# Patient Record
Sex: FEMALE | Race: WHITE | NOT HISPANIC OR LATINO | Employment: FULL TIME | URBAN - METROPOLITAN AREA
[De-identification: names, ages, dates, MRNs, and addresses within clinical notes are randomized per-mention and may not be internally consistent; named-entity substitution may affect disease eponyms.]

---

## 2018-01-30 ENCOUNTER — OFFICE VISIT CONVERTED (OUTPATIENT)
Dept: INTERNAL MEDICINE | Facility: CLINIC | Age: 48
End: 2018-01-30
Attending: INTERNAL MEDICINE

## 2018-07-09 ENCOUNTER — OFFICE VISIT CONVERTED (OUTPATIENT)
Dept: INTERNAL MEDICINE | Facility: CLINIC | Age: 48
End: 2018-07-09
Attending: INTERNAL MEDICINE

## 2019-02-21 ENCOUNTER — HOSPITAL ENCOUNTER (OUTPATIENT)
Dept: OTHER | Facility: HOSPITAL | Age: 49
Discharge: HOME | End: 2019-02-21
Attending: INTERNAL MEDICINE

## 2019-02-21 ENCOUNTER — OFFICE VISIT CONVERTED (OUTPATIENT)
Dept: INTERNAL MEDICINE | Facility: CLINIC | Age: 49
End: 2019-02-21
Attending: INTERNAL MEDICINE

## 2019-05-21 ENCOUNTER — OFFICE VISIT CONVERTED (OUTPATIENT)
Dept: INTERNAL MEDICINE | Facility: CLINIC | Age: 49
End: 2019-05-21
Attending: INTERNAL MEDICINE

## 2019-05-21 ENCOUNTER — CONVERSION ENCOUNTER (OUTPATIENT)
Dept: INTERNAL MEDICINE | Facility: CLINIC | Age: 49
End: 2019-05-21

## 2020-01-16 ENCOUNTER — HOSPITAL ENCOUNTER (OUTPATIENT)
Dept: URGENT CARE | Facility: CLINIC | Age: 50
Discharge: HOME | End: 2020-01-16
Attending: FAMILY MEDICINE

## 2020-01-19 LAB — BACTERIA SPEC AEROBE CULT: NORMAL

## 2020-01-24 ENCOUNTER — OFFICE VISIT CONVERTED (OUTPATIENT)
Dept: INTERNAL MEDICINE | Facility: CLINIC | Age: 50
End: 2020-01-24
Attending: INTERNAL MEDICINE

## 2020-01-24 ENCOUNTER — HOSPITAL ENCOUNTER (OUTPATIENT)
Dept: OTHER | Facility: HOSPITAL | Age: 50
Discharge: HOME | End: 2020-01-24
Attending: INTERNAL MEDICINE

## 2020-01-24 ENCOUNTER — CONVERSION ENCOUNTER (OUTPATIENT)
Dept: INTERNAL MEDICINE | Facility: CLINIC | Age: 50
End: 2020-01-24

## 2020-01-24 LAB
ALBUMIN SERPL-MCNC: 4.1 G/DL (ref 3.5–5)
ALBUMIN/GLOB SERPL: 1.3 {RATIO} (ref 1.4–2.6)
ALP SERPL-CCNC: 62 U/L (ref 42–98)
ALT SERPL-CCNC: 36 U/L (ref 10–40)
ANION GAP SERPL CALC-SCNC: 19 MMOL/L (ref 8–19)
AST SERPL-CCNC: 23 U/L (ref 15–50)
BASOPHILS # BLD AUTO: 0.04 10*3/UL (ref 0–0.2)
BASOPHILS NFR BLD AUTO: 0.4 % (ref 0–3)
BILIRUB SERPL-MCNC: <0.15 MG/DL (ref 0.2–1.3)
BUN SERPL-MCNC: 24 MG/DL (ref 5–25)
BUN/CREAT SERPL: 32 {RATIO} (ref 6–20)
CALCIUM SERPL-MCNC: 10 MG/DL (ref 8.7–10.4)
CHLORIDE SERPL-SCNC: 95 MMOL/L (ref 99–111)
CHOLEST SERPL-MCNC: 250 MG/DL (ref 107–200)
CHOLEST/HDLC SERPL: 4.5 {RATIO} (ref 3–6)
CONV ABS IMM GRAN: 0.03 10*3/UL (ref 0–0.2)
CONV CO2: 21 MMOL/L (ref 22–32)
CONV IMMATURE GRAN: 0.3 % (ref 0–1.8)
CONV TOTAL PROTEIN: 7.3 G/DL (ref 6.3–8.2)
CREAT UR-MCNC: 0.76 MG/DL (ref 0.5–0.9)
DEPRECATED RDW RBC AUTO: 42 FL (ref 36.4–46.3)
EOSINOPHIL # BLD AUTO: 0.16 10*3/UL (ref 0–0.7)
EOSINOPHIL # BLD AUTO: 1.6 % (ref 0–7)
ERYTHROCYTE [DISTWIDTH] IN BLOOD BY AUTOMATED COUNT: 12.1 % (ref 11.7–14.4)
GFR SERPLBLD BASED ON 1.73 SQ M-ARVRAT: >60 ML/MIN/{1.73_M2}
GLOBULIN UR ELPH-MCNC: 3.2 G/DL (ref 2–3.5)
GLUCOSE SERPL-MCNC: 97 MG/DL (ref 65–99)
HCT VFR BLD AUTO: 41.1 % (ref 37–47)
HDLC SERPL-MCNC: 55 MG/DL (ref 40–60)
HGB BLD-MCNC: 14.1 G/DL (ref 12–16)
LDLC SERPL CALC-MCNC: 142 MG/DL (ref 70–100)
LYMPHOCYTES # BLD AUTO: 3.6 10*3/UL (ref 1–5)
LYMPHOCYTES NFR BLD AUTO: 35.9 % (ref 20–45)
MCH RBC QN AUTO: 32.3 PG (ref 27–31)
MCHC RBC AUTO-ENTMCNC: 34.3 G/DL (ref 33–37)
MCV RBC AUTO: 94.1 FL (ref 81–99)
MONOCYTES # BLD AUTO: 0.71 10*3/UL (ref 0.2–1.2)
MONOCYTES NFR BLD AUTO: 7.1 % (ref 3–10)
NEUTROPHILS # BLD AUTO: 5.48 10*3/UL (ref 2–8)
NEUTROPHILS NFR BLD AUTO: 54.7 % (ref 30–85)
NRBC CBCN: 0 % (ref 0–0.7)
OSMOLALITY SERPL CALC.SUM OF ELEC: 276 MOSM/KG (ref 273–304)
PLATELET # BLD AUTO: 349 10*3/UL (ref 130–400)
PMV BLD AUTO: 10.8 FL (ref 9.4–12.3)
POTASSIUM SERPL-SCNC: 4.2 MMOL/L (ref 3.5–5.3)
RBC # BLD AUTO: 4.37 10*6/UL (ref 4.2–5.4)
SODIUM SERPL-SCNC: 131 MMOL/L (ref 135–147)
T4 FREE SERPL-MCNC: 1.3 NG/DL (ref 0.9–1.8)
TRIGL SERPL-MCNC: 504 MG/DL (ref 40–150)
TSH SERPL-ACNC: 4.81 M[IU]/L (ref 0.27–4.2)
WBC # BLD AUTO: 10.02 10*3/UL (ref 4.8–10.8)

## 2020-02-10 PROBLEM — G47.30 SLEEP APNEA: Status: ACTIVE | Noted: 2020-02-10

## 2020-02-10 PROBLEM — I10 ESSENTIAL HYPERTENSION: Status: ACTIVE | Noted: 2020-02-10

## 2020-02-10 PROBLEM — E03.9 HYPOTHYROID: Status: ACTIVE | Noted: 2020-02-10

## 2020-02-10 PROBLEM — R53.82 CHRONIC FATIGUE: Status: ACTIVE | Noted: 2020-02-10

## 2020-02-10 PROBLEM — F41.9 ANXIETY: Status: ACTIVE | Noted: 2020-02-10

## 2020-02-13 DIAGNOSIS — G47.30 SLEEP APNEA, UNSPECIFIED TYPE: ICD-10-CM

## 2020-02-13 DIAGNOSIS — E66.9 OBESITY, CLASS II, BMI 35-39.9: Primary | ICD-10-CM

## 2020-02-13 DIAGNOSIS — R53.82 CHRONIC FATIGUE: ICD-10-CM

## 2020-02-13 DIAGNOSIS — F41.9 ANXIETY: ICD-10-CM

## 2020-02-13 DIAGNOSIS — I10 ESSENTIAL HYPERTENSION: ICD-10-CM

## 2020-02-13 PROBLEM — E66.812 OBESITY, CLASS II, BMI 35-39.9: Status: ACTIVE | Noted: 2020-02-13

## 2020-02-17 ENCOUNTER — LAB (OUTPATIENT)
Dept: LAB | Facility: HOSPITAL | Age: 50
End: 2020-02-17

## 2020-02-17 ENCOUNTER — CONSULT (OUTPATIENT)
Dept: BARIATRICS/WEIGHT MGMT | Facility: CLINIC | Age: 50
End: 2020-02-17

## 2020-02-17 VITALS
HEART RATE: 87 BPM | RESPIRATION RATE: 18 BRPM | SYSTOLIC BLOOD PRESSURE: 124 MMHG | WEIGHT: 213 LBS | DIASTOLIC BLOOD PRESSURE: 74 MMHG | BODY MASS INDEX: 37.74 KG/M2 | TEMPERATURE: 98.1 F | HEIGHT: 63 IN

## 2020-02-17 DIAGNOSIS — R53.82 CHRONIC FATIGUE: ICD-10-CM

## 2020-02-17 DIAGNOSIS — I10 ESSENTIAL HYPERTENSION: ICD-10-CM

## 2020-02-17 DIAGNOSIS — E66.9 OBESITY, CLASS II, BMI 35-39.9: ICD-10-CM

## 2020-02-17 DIAGNOSIS — E66.9 OBESITY, CLASS II, BMI 35-39.9: Primary | ICD-10-CM

## 2020-02-17 DIAGNOSIS — R10.13 DYSPEPSIA: ICD-10-CM

## 2020-02-17 DIAGNOSIS — E03.9 HYPOTHYROIDISM, UNSPECIFIED TYPE: ICD-10-CM

## 2020-02-17 DIAGNOSIS — Z01.818 PREOPERATIVE TESTING: ICD-10-CM

## 2020-02-17 DIAGNOSIS — G47.30 SLEEP APNEA, UNSPECIFIED TYPE: ICD-10-CM

## 2020-02-17 DIAGNOSIS — F41.9 ANXIETY: ICD-10-CM

## 2020-02-17 LAB
ALBUMIN SERPL-MCNC: 4 G/DL (ref 3.5–5.2)
ALBUMIN/GLOB SERPL: 1.5 G/DL
ALP SERPL-CCNC: 47 U/L (ref 39–117)
ALT SERPL W P-5'-P-CCNC: 18 U/L (ref 1–33)
ANION GAP SERPL CALCULATED.3IONS-SCNC: 13 MMOL/L (ref 5–15)
AST SERPL-CCNC: 17 U/L (ref 1–32)
BASOPHILS # BLD AUTO: 0.04 10*3/MM3 (ref 0–0.2)
BASOPHILS NFR BLD AUTO: 0.6 % (ref 0–1.5)
BILIRUB SERPL-MCNC: 0.2 MG/DL (ref 0.2–1.2)
BUN BLD-MCNC: 16 MG/DL (ref 6–20)
BUN/CREAT SERPL: 22.5 (ref 7–25)
CALCIUM SPEC-SCNC: 9.5 MG/DL (ref 8.6–10.5)
CHLORIDE SERPL-SCNC: 101 MMOL/L (ref 98–107)
CHOLEST SERPL-MCNC: 191 MG/DL (ref 0–200)
CO2 SERPL-SCNC: 22 MMOL/L (ref 22–29)
CREAT BLD-MCNC: 0.71 MG/DL (ref 0.57–1)
DEPRECATED RDW RBC AUTO: 41.4 FL (ref 37–54)
EOSINOPHIL # BLD AUTO: 0.13 10*3/MM3 (ref 0–0.4)
EOSINOPHIL NFR BLD AUTO: 1.9 % (ref 0.3–6.2)
ERYTHROCYTE [DISTWIDTH] IN BLOOD BY AUTOMATED COUNT: 12.2 % (ref 12.3–15.4)
GFR SERPL CREATININE-BSD FRML MDRD: 87 ML/MIN/1.73
GLOBULIN UR ELPH-MCNC: 2.7 GM/DL
GLUCOSE BLD-MCNC: 101 MG/DL (ref 65–99)
HBA1C MFR BLD: 6 % (ref 4.8–5.6)
HCT VFR BLD AUTO: 39.4 % (ref 34–46.6)
HDLC SERPL-MCNC: 71 MG/DL (ref 40–60)
HGB BLD-MCNC: 13.4 G/DL (ref 12–15.9)
IMM GRANULOCYTES # BLD AUTO: 0.02 10*3/MM3 (ref 0–0.05)
IMM GRANULOCYTES NFR BLD AUTO: 0.3 % (ref 0–0.5)
LDLC SERPL CALC-MCNC: 92 MG/DL (ref 0–100)
LDLC/HDLC SERPL: 1.29 {RATIO}
LYMPHOCYTES # BLD AUTO: 2.07 10*3/MM3 (ref 0.7–3.1)
LYMPHOCYTES NFR BLD AUTO: 30.9 % (ref 19.6–45.3)
MCH RBC QN AUTO: 31.8 PG (ref 26.6–33)
MCHC RBC AUTO-ENTMCNC: 34 G/DL (ref 31.5–35.7)
MCV RBC AUTO: 93.4 FL (ref 79–97)
MONOCYTES # BLD AUTO: 0.38 10*3/MM3 (ref 0.1–0.9)
MONOCYTES NFR BLD AUTO: 5.7 % (ref 5–12)
NEUTROPHILS # BLD AUTO: 4.06 10*3/MM3 (ref 1.7–7)
NEUTROPHILS NFR BLD AUTO: 60.6 % (ref 42.7–76)
NRBC BLD AUTO-RTO: 0 /100 WBC (ref 0–0.2)
PLATELET # BLD AUTO: 252 10*3/MM3 (ref 140–450)
PMV BLD AUTO: 10.5 FL (ref 6–12)
POTASSIUM BLD-SCNC: 4.4 MMOL/L (ref 3.5–5.2)
PROT SERPL-MCNC: 6.7 G/DL (ref 6–8.5)
RBC # BLD AUTO: 4.22 10*6/MM3 (ref 3.77–5.28)
SODIUM BLD-SCNC: 136 MMOL/L (ref 136–145)
TRIGL SERPL-MCNC: 141 MG/DL (ref 0–150)
TSH SERPL DL<=0.05 MIU/L-ACNC: 7.61 UIU/ML (ref 0.27–4.2)
VLDLC SERPL-MCNC: 28.2 MG/DL (ref 5–40)
WBC NRBC COR # BLD: 6.7 10*3/MM3 (ref 3.4–10.8)

## 2020-02-17 PROCEDURE — 84443 ASSAY THYROID STIM HORMONE: CPT

## 2020-02-17 PROCEDURE — 99205 OFFICE O/P NEW HI 60 MIN: CPT | Performed by: NURSE PRACTITIONER

## 2020-02-17 PROCEDURE — 83036 HEMOGLOBIN GLYCOSYLATED A1C: CPT

## 2020-02-17 PROCEDURE — 36415 COLL VENOUS BLD VENIPUNCTURE: CPT

## 2020-02-17 PROCEDURE — 80061 LIPID PANEL: CPT

## 2020-02-17 PROCEDURE — 80053 COMPREHEN METABOLIC PANEL: CPT

## 2020-02-17 PROCEDURE — 85025 COMPLETE CBC W/AUTO DIFF WBC: CPT

## 2020-02-17 RX ORDER — NORGESTIMATE AND ETHINYL ESTRADIOL 7DAYSX3 LO
1 KIT ORAL DAILY
COMMUNITY
Start: 2020-01-09 | End: 2020-07-06

## 2020-02-17 NOTE — PROGRESS NOTES
[facility] BARIATRIC SURGERY  [address] [facility] [address]  [facility] [address]  [phone]  [address] [id number] [phone]  Dept: [phone]  2020      Xuan Gasca.  [id number]  [id number]  1970  female      Chief Complaint of weight gain; unable to maintain weight loss    History of Present Illness:   Xuan is a 49 y.o. female who presents today for evaluation, education and consultation regarding weight loss surgery. The patient is interested in the sleeve gastrectomy.      Diet History:Xuan has been overweight for at least 20 years, has been 35 pounds or more overweight for at least 20 years, has been 100 pounds or more overweight for 20 or more years and started dieting at age 29.  The most weight Xuan lost was 40 pounds on exercise and low carb diet and maintained the weight loss for 9 months. Xuan describes her eating habits as skipping meals, not eating regularly, not cooking for herself and eating fast food due to fatigue associated with low energy and underactive thyroid. Xuan Gasca has tried Atkins, Nutrisystem, [place], Weight Watchers, reduced calorie and exercising among others with success of losing up to 40 pounds, but in each instance regained the weight.    See dietician documentation for complete history.    Bariatric Surgery Evaluation: The patient is being seen for an initial visit for bariatric surgery evaluation.     Bariatric Co-morbidities:  sleep apnea, hypertension and mental health disease    Patient Active Problem List   Diagnosis   • Chronic fatigue   • Essential hypertension   • Sleep apnea   • Anxiety   • Hypothyroid   • Obesity, Class II, BMI 35-39.9   • Preoperative testing       No past medical history on file.    Past Surgical History:   Procedure Laterality Date   • CARPAL TUNNEL RELEASE     •  SECTION  ,,   • HYSTERECTOMY         Allergies    Allergen Reactions   • Penicillins Other (See Comments)     unknown         Current Outpatient Medications:   •  amLODIPine (NORVASC) 5 MG tablet, Take 5 mg by mouth Daily., Disp: , Rfl:   •  citalopram (CeleXA) 40 MG tablet, Take 40 mg by mouth Daily., Disp: , Rfl:   •  levothyroxine (SYNTHROID, LEVOTHROID) 137 MCG tablet, Take 137 mcg by mouth Daily., Disp: , Rfl:   •  lisinopril-hydrochlorothiazide (PRINZIDE,ZESTORETIC) 20-12.5 MG per tablet, Take 1 tablet by mouth Daily., Disp: , Rfl:   •  norgestimate-ethinyl estradiol (ORTHO TRI-CYCLEN LO) 0.18/0.215/0.25 MG-25 MCG per tablet, Take 1 tablet by mouth Daily., Disp: , Rfl:   •  spironolactone (ALDACTONE) 100 MG tablet, Take 100 mg by mouth Daily., Disp: , Rfl:     Social History     Socioeconomic History   • Marital status:      Spouse name: Not on file   • Number of children: Not on file   • Years of education: Not on file   • Highest education level: Not on file   Tobacco Use   • Smoking status: Never Smoker   • Smokeless tobacco: Never Used   Substance and Sexual Activity   • Alcohol use: Never     Frequency: Never   • Drug use: Never       Family History   Problem Relation Age of Onset   • Diabetes Mother    • Hypertension Mother    • Hypertension Father          Review of Systems:  Review of Systems   Constitutional: Positive for fatigue. Negative for unexpected weight change.   HENT: Negative.    Respiratory: Negative.    Cardiovascular: Negative.    Gastrointestinal: Negative for constipation.   Endocrine: Negative.    Genitourinary: Negative.    Musculoskeletal: Negative for back pain.   Neurological: Negative.    Hematological: Negative.    Psychiatric/Behavioral: Negative.        Physical Exam:  Vital Signs:  Weight: 96.6 kg (213 lb)   Body mass index is 37.73 kg/m².  Temp: 98.1 °F (36.7 °C)   Heart Rate: 87   BP: 124/74     Physical Exam   Constitutional: She is oriented to person, place, and time. She appears well-developed and  well-nourished.   HENT:   Head: Normocephalic and atraumatic.   Neck: Normal range of motion.   Cardiovascular: Normal rate, regular rhythm and normal heart sounds.   Pulmonary/Chest: Effort normal and breath sounds normal. No respiratory distress. She has no wheezes.   Abdominal: Soft. Bowel sounds are normal. She exhibits no distension. There is no tenderness.   Musculoskeletal: She exhibits no edema or deformity.   Neurological: She is alert and oriented to person, place, and time.   Skin: Skin is warm and dry.   Psychiatric: She has a normal mood and affect. Her behavior is normal.   Nursing note and vitals reviewed.         Assessment:         Xuan Gasca is a 49 y.o. year old female with medically complicated severe obesity. Weight: 96.6 kg (213 lb), Body mass index is 37.73 kg/m². and weight related problems including sleep apnea, hypertension and mental health disease.    I explained in detail the procedures that we are performing.  All of those procedures can be performed laparoscopically but there is a chance to convert to open if any technical challenges or complications do occur.  Bariatric surgery is not cosmetic surgery but rather a tool to help a patient make a life-long commitment lifestyle changes including diet, exercise, behavior changes, and taking supplemental vitamins and minerals.    Due to the patient's BMI and co-morbidities they are at a high risk for surgery and will obtain the following:  The patient has been advised that a letter of medical support and a history and physical must be obtained from her primary care physician. A psychological evaluation will be arranged for this patient. CBC, CMP, FLP, TSH and HgbA1C will be drawn- reviewed in the office. Xuan Gasca will obtain a pre-operative CXR and EKG.       Xuan Gasca will be set up for a pre-operative diagnostic esophagogastroduodenoscopy with biopsy for evaluation. The risks and benefits of the procedure were discussed  with the patient in detail and all questions were answered.  Possibility of perforation, bleeding, aspiration, anoxic brain injury, respiratory and/or cardiac arrest and death were discussed.   She received handouts regarding, all questions were answered.     The risks, benefits, alternatives, and potential complications of all of the procedures were explained in detail including, but not limited to death, anesthesia and medication adverse effect/DVT, pulmonary embolism, trocar site/incisional hernia, wound infection, abdominal infection, bleeding, failure to lose weight or gain weight and change in body image, metabolic complications with calcium, thiamine, vitamin B12, folate, iron, and anemia.    The patient was advised to start a high protein, low fat and low carbohydrate diet. The patient was given individualized information by our dietician along with general group information and handouts.     The patient was given information regarding the ESTEE educational video. ESTEE is an internet based educational video which explains the surgical procedure and answers basic questions regarding the procedure. The patient was provided with instructions and a password to watch the video.    The patient was encouraged to start routine exercise including but not limited to 150 minutes per week. The patient received a resistance band along with a handout of exercises.     The consultation plan was reviewed with the patient.    The patient understands the surgical procedures and the different surgical options that are available.  She understands the lifestyle changes that would be required after surgery and has agreed to participate in a pre-operative and postoperative weight management program.  She also expressed understanding of possible risks, had several questions answered and desires to proceed.    I think she is a good candidate for this surgery, and is interested in a sleeve gastrectomy.    Encounter Diagnoses   Name  "Primary?   • Obesity, Class II, BMI 35-39.9 Yes   • Sleep apnea, unspecified type    • Essential hypertension    • Preoperative testing    • Anxiety    • Chronic fatigue    • Hypothyroidism, unspecified type        Plan:    Patient will have evaluations and follow up with bariatric dieticians and a psychologist before undergoing a multidisciplinary review of her candidacy.  We also discussed the weight loss requirement and rationale, and other program requirements.      Teresa Jaime, APRN  2020                                           Bariatric Nutrition Counseling Interview    Patient Name:  Xuan Gasca  YOB: 1970  Age:  49 y.o.  Sex:  female  MRN: [id number]  Date:  20    Procedure Considering:  Sleeve    Last Documented Height:    Ht Readings from Last 1 Encounters:   20 160 cm (63\")     Last Documented Weight:   Wt Readings from Last 1 Encounters:   20 96.6 kg (213 lb)      Body mass index is 37.73 kg/m².    Highest Weight:  220  Goal Weight: 125    History:  No past medical history on file.  Past Surgical History:   Procedure Laterality Date   • CARPAL TUNNEL RELEASE     •  SECTION  ,,   • HYSTERECTOMY  2004     Family History   Problem Relation Age of Onset   • Diabetes Mother    • Hypertension Mother    • Hypertension Father      Social History     Socioeconomic History   • Marital status:      Spouse name: Not on file   • Number of children: Not on file   • Years of education: Not on file   • Highest education level: Not on file   Tobacco Use   • Smoking status: Never Smoker   • Smokeless tobacco: Never Used   Substance and Sexual Activity   • Alcohol use: Never     Frequency: Never   • Drug use: Never     Additional Health Issues to Consider:  Hypertension, hypothyroid, sleep apnea per patient; elevated TSH and HgbA1c - refer to PCP    Weight History:  Weight gain as a result of an event or condition - pregnancy    Previous " Weight Loss Efforts:  Weight Watchers, The Gal[place]z diet, The [place] diet, Nutrisystem  Most Successful Weight Loss Effort:  Walter pugh diet and exercise - got down to 136lb from 176lb    Eating Habits: Eat large portions, Eat too fast  Eat three meals on most days?  Yes  Worst eating habit?  Eat large portions, Eat too fast    How often do you eat fast food? weekly    Do you exercise regularly? (at least 3 times each week)  No    Occupation:      Personal Goal After Procedure:  Improve sleep apnea, decrease medications   Personal Support:  family and coworkers    Assessment:  Program materials for successful weight loss before/after bariatric surgery were provided, reviewed, and discussed. The significance of taking in at least 70g of protein and 64 ounces of fluid was emphasized. The importance of routine exercise was discussed. Nutrition materials provided included a reduced calorie meal plan, protein sources, snack options, and diet guidelines post-surgery. Discussed personal habits and lifestyle behaviors that may influence diet efforts. She demonstrated a good comprehension of diet requirements and a commitment to work on personal challenges.  Patient was also provided a list of short-term goals to work towards prior to surgery. She appears to be an appropriate candidate for bariatric surgery.    Electronically signed by:  Manda Aviles RD  02/17/20 11:10 AM

## 2020-02-20 NOTE — PROGRESS NOTES
"Bariatric Nutrition Counseling Interview    Patient Name:  Xuan Gasca  YOB: 1970  Age:  49 y.o.  Sex:  female  MRN: [id number]  Date:  20    Procedure Considering:  Sleeve    Last Documented Height:    Ht Readings from Last 1 Encounters:   20 160 cm (63\")     Last Documented Weight:   Wt Readings from Last 1 Encounters:   20 96.6 kg (213 lb)      Body mass index is 37.73 kg/m².    Highest Weight:  220  Goal Weight: 125    History:  History reviewed. No pertinent past medical history.  Past Surgical History:   Procedure Laterality Date   • CARPAL TUNNEL RELEASE     •  SECTION  ,,   • HYSTERECTOMY       Family History   Problem Relation Age of Onset   • Diabetes Mother    • Hypertension Mother    • Hypertension Father      Social History     Socioeconomic History   • Marital status:      Spouse name: Not on file   • Number of children: Not on file   • Years of education: Not on file   • Highest education level: Not on file   Tobacco Use   • Smoking status: Never Smoker   • Smokeless tobacco: Never Used   Substance and Sexual Activity   • Alcohol use: Never     Frequency: Never   • Drug use: Never   • Sexual activity: Defer     Additional Health Issues to Consider:  Hypothyroidism, HTN, sleep apnea per patient report; noted elevated HgbA1c and TSH - refer to PCP    Weight History:  Weight gain as a result of an event or condition - pregnancy    Previous Weight Loss Efforts:  Weight Watchers, The Galdamez diet, The [place] diet, Nutrisystem  Most Successful Weight Loss Effort:  Lost 40 lb with exercise and Walter Angel plan    Eating Habits: Eat large portions, Eat too fast  Eat three meals on most days?  Yes  Worst eating habit?  Eat large portions, Eat too fast    How often do you eat fast food? weekly    Do you exercise regularly? (at least 3 times each week)  No    Occupation:      Personal Goal After Procedure:  " Decrease medications and improve sleep apnea   Personal Support:  family, coworkers    Assessment:  Program materials for successful weight loss before/after bariatric surgery were provided, reviewed, and discussed. The significance of taking in at least 70g of protein and 64 ounces of fluid was emphasized. The importance of routine exercise was discussed. Nutrition materials provided included a reduced calorie meal plan, protein sources, snack options, and diet guidelines post-surgery. Discussed personal habits and lifestyle behaviors that may influence diet efforts. She demonstrated a good comprehension of diet requirements and a commitment to work on personal challenges.  Patient was also provided a list of short-term goals to work towards prior to surgery. She appears to be an appropriate candidate for bariatric surgery.      Electronically signed by:  Manda Aviles RD  02/20/20 10:57 AM

## 2020-02-24 ENCOUNTER — OFFICE VISIT CONVERTED (OUTPATIENT)
Dept: INTERNAL MEDICINE | Facility: CLINIC | Age: 50
End: 2020-02-24
Attending: INTERNAL MEDICINE

## 2020-02-24 ENCOUNTER — CONVERSION ENCOUNTER (OUTPATIENT)
Dept: INTERNAL MEDICINE | Facility: CLINIC | Age: 50
End: 2020-02-24

## 2020-02-24 ENCOUNTER — HOSPITAL ENCOUNTER (OUTPATIENT)
Dept: OTHER | Facility: HOSPITAL | Age: 50
Discharge: HOME | End: 2020-02-24
Attending: INTERNAL MEDICINE

## 2020-02-24 LAB
T4 FREE SERPL-MCNC: 1.5 NG/DL (ref 0.9–1.8)
TSH SERPL-ACNC: 2.36 M[IU]/L (ref 0.27–4.2)

## 2020-02-27 ENCOUNTER — HOSPITAL ENCOUNTER (OUTPATIENT)
Dept: OTHER | Facility: HOSPITAL | Age: 50
Discharge: HOME | End: 2020-02-27

## 2020-03-05 ENCOUNTER — TELEPHONE (OUTPATIENT)
Dept: BARIATRICS/WEIGHT MGMT | Facility: CLINIC | Age: 50
End: 2020-03-05

## 2020-03-05 NOTE — TELEPHONE ENCOUNTER
Spoke with pt regarding normal EKG. Pt gave verbal understanding        ----- Message from KULWINDER Magallanes sent at 3/5/2020  2:30 PM EST -----  AS

## 2020-03-06 ENCOUNTER — TELEPHONE (OUTPATIENT)
Dept: BARIATRICS/WEIGHT MGMT | Facility: CLINIC | Age: 50
End: 2020-03-06

## 2020-03-06 NOTE — TELEPHONE ENCOUNTER
Spoke to pt regarding normal chest xray. Pt gave a verbal understanding      ----- Message from KULWINDER Magallanes sent at 3/5/2020  3:55 PM EST -----  Normal chest

## 2020-03-26 ENCOUNTER — TELEMEDICINE CONVERTED (OUTPATIENT)
Dept: INTERNAL MEDICINE | Facility: CLINIC | Age: 50
End: 2020-03-26
Attending: INTERNAL MEDICINE

## 2020-04-23 ENCOUNTER — TELEPHONE CONVERTED (OUTPATIENT)
Dept: INTERNAL MEDICINE | Facility: CLINIC | Age: 50
End: 2020-04-23
Attending: INTERNAL MEDICINE

## 2020-04-23 ENCOUNTER — CONVERSION ENCOUNTER (OUTPATIENT)
Dept: INTERNAL MEDICINE | Facility: CLINIC | Age: 50
End: 2020-04-23

## 2020-04-29 ENCOUNTER — TELEMEDICINE CONVERTED (OUTPATIENT)
Dept: UROLOGY | Facility: CLINIC | Age: 50
End: 2020-04-29
Attending: UROLOGY

## 2020-05-28 ENCOUNTER — TELEMEDICINE CONVERTED (OUTPATIENT)
Dept: INTERNAL MEDICINE | Facility: CLINIC | Age: 50
End: 2020-05-28
Attending: INTERNAL MEDICINE

## 2020-06-04 ENCOUNTER — TRANSCRIBE ORDERS (OUTPATIENT)
Dept: SLEEP MEDICINE | Facility: HOSPITAL | Age: 50
End: 2020-06-04

## 2020-06-04 DIAGNOSIS — Z01.818 OTHER SPECIFIED PRE-OPERATIVE EXAMINATION: Primary | ICD-10-CM

## 2020-06-06 ENCOUNTER — LAB (OUTPATIENT)
Dept: LAB | Facility: HOSPITAL | Age: 50
End: 2020-06-06

## 2020-06-06 DIAGNOSIS — Z01.818 OTHER SPECIFIED PRE-OPERATIVE EXAMINATION: ICD-10-CM

## 2020-06-06 PROCEDURE — U0004 COV-19 TEST NON-CDC HGH THRU: HCPCS

## 2020-06-08 LAB
REF LAB TEST METHOD: NORMAL
SARS-COV-2 RNA RESP QL NAA+PROBE: NOT DETECTED

## 2020-06-09 ENCOUNTER — HOSPITAL ENCOUNTER (OUTPATIENT)
Facility: HOSPITAL | Age: 50
Setting detail: HOSPITAL OUTPATIENT SURGERY
Discharge: HOME | End: 2020-06-09
Attending: SURGERY | Admitting: SURGERY

## 2020-06-09 ENCOUNTER — ANESTHESIA EVENT (OUTPATIENT)
Dept: GASTROENTEROLOGY | Facility: HOSPITAL | Age: 50
End: 2020-06-09

## 2020-06-09 ENCOUNTER — ANESTHESIA (OUTPATIENT)
Dept: GASTROENTEROLOGY | Facility: HOSPITAL | Age: 50
End: 2020-06-09

## 2020-06-09 VITALS
DIASTOLIC BLOOD PRESSURE: 82 MMHG | OXYGEN SATURATION: 98 % | HEART RATE: 75 BPM | TEMPERATURE: 98 F | WEIGHT: 210.7 LBS | HEIGHT: 63 IN | SYSTOLIC BLOOD PRESSURE: 136 MMHG | BODY MASS INDEX: 37.33 KG/M2 | RESPIRATION RATE: 16 BRPM

## 2020-06-09 DIAGNOSIS — E66.9 OBESITY, CLASS II, BMI 35-39.9: ICD-10-CM

## 2020-06-09 DIAGNOSIS — Z01.818 PREOPERATIVE TESTING: ICD-10-CM

## 2020-06-09 DIAGNOSIS — R10.13 DYSPEPSIA: ICD-10-CM

## 2020-06-09 PROBLEM — K44.9 HIATAL HERNIA: Status: ACTIVE | Noted: 2020-06-09

## 2020-06-09 PROBLEM — K31.7 GASTRIC POLYPS: Status: ACTIVE | Noted: 2020-06-09

## 2020-06-09 PROCEDURE — 87081 CULTURE SCREEN ONLY: CPT | Performed by: SURGERY

## 2020-06-09 PROCEDURE — 88305 TISSUE EXAM BY PATHOLOGIST: CPT | Performed by: SURGERY

## 2020-06-09 PROCEDURE — 43239 EGD BIOPSY SINGLE/MULTIPLE: CPT | Performed by: SURGERY

## 2020-06-09 PROCEDURE — 25010000002 PROPOFOL 10 MG/ML EMULSION: Performed by: ANESTHESIOLOGY

## 2020-06-09 RX ORDER — LIDOCAINE HYDROCHLORIDE 20 MG/ML
INJECTION, SOLUTION INFILTRATION; PERINEURAL AS NEEDED
Status: DISCONTINUED | OUTPATIENT
Start: 2020-06-09 | End: 2020-06-09 | Stop reason: SURG

## 2020-06-09 RX ORDER — PROPOFOL 10 MG/ML
VIAL (ML) INTRAVENOUS CONTINUOUS PRN
Status: DISCONTINUED | OUTPATIENT
Start: 2020-06-09 | End: 2020-06-09 | Stop reason: SURG

## 2020-06-09 RX ORDER — PANTOPRAZOLE SODIUM 40 MG/1
40 TABLET, DELAYED RELEASE ORAL DAILY
Qty: 30 TABLET | Refills: 5 | Status: SHIPPED | OUTPATIENT
Start: 2020-06-09 | End: 2020-10-16

## 2020-06-09 RX ORDER — LISINOPRIL 20 MG/1
20 TABLET ORAL DAILY
COMMUNITY
End: 2020-07-06

## 2020-06-09 RX ORDER — PROPOFOL 10 MG/ML
VIAL (ML) INTRAVENOUS AS NEEDED
Status: DISCONTINUED | OUTPATIENT
Start: 2020-06-09 | End: 2020-06-09 | Stop reason: SURG

## 2020-06-09 RX ORDER — SODIUM CHLORIDE, SODIUM LACTATE, POTASSIUM CHLORIDE, CALCIUM CHLORIDE 600; 310; 30; 20 MG/100ML; MG/100ML; MG/100ML; MG/100ML
1000 INJECTION, SOLUTION INTRAVENOUS CONTINUOUS
Status: DISCONTINUED | OUTPATIENT
Start: 2020-06-09 | End: 2020-06-09 | Stop reason: HOSPADM

## 2020-06-09 RX ADMIN — PROPOFOL 300 MCG/KG/MIN: 10 INJECTION, EMULSION INTRAVENOUS at 08:35

## 2020-06-09 RX ADMIN — PROPOFOL 120 MG: 10 INJECTION, EMULSION INTRAVENOUS at 08:35

## 2020-06-09 RX ADMIN — LIDOCAINE HYDROCHLORIDE 60 MG: 20 INJECTION, SOLUTION INFILTRATION; PERINEURAL at 08:35

## 2020-06-09 RX ADMIN — SODIUM CHLORIDE, POTASSIUM CHLORIDE, SODIUM LACTATE AND CALCIUM CHLORIDE 1000 ML: 600; 310; 30; 20 INJECTION, SOLUTION INTRAVENOUS at 08:10

## 2020-06-09 NOTE — ANESTHESIA PREPROCEDURE EVALUATION
Anesthesia Evaluation     Patient summary reviewed and Nursing notes reviewed                Airway   Mallampati: III  TM distance: >3 FB  Neck ROM: full  Possible difficult intubation  Dental - normal exam     Pulmonary - normal exam   (+) sleep apnea on CPAP,   Cardiovascular - normal exam    (+) hypertension 2 medications or greater,       Neuro/Psych  (+) psychiatric history Depression and Anxiety,     GI/Hepatic/Renal/Endo      Musculoskeletal     Abdominal   (+) obese,    Substance History      OB/GYN          Other                        Anesthesia Plan    ASA 3     MAC     intravenous induction     Anesthetic plan, all risks, benefits, and alternatives have been provided, discussed and informed consent has been obtained with: patient.

## 2020-06-09 NOTE — OP NOTE
Surgeon: Ron Cadet Jr., M.D.    Preoperative Diagnosis: Dyspepsia    Postoperative Diagnosis: #1 gastritis #2 LA grade a esophagitis #3 small hiatal hernia #4 gastric body polyps    Procedure Performed: Transoral esophagogastroduodenoscopy with forceps polypectomies, gastric antral and distal esophageal biopsies    Indications: 50-year-old female with morbid obesity with complaints of heartburn.  Patient does not take H2 blocker or PPI on regular basis.    Procedure:     The procedure, indications, preparation and potential complications were explained to the patient, who indicated understanding and signed the corresponding consent forms.  The patient was identified, taken to the endoscopy suite, and placed on the left side down decubitus position.  The patient underwent a MAC anesthesia and was appropriately monitored through the case by the anesthesia personnel with continuous pulse oximetry, blood pressure, and cardiac monitoring.  A bite block was placed.  After adequate IV sedation and using a transoral technique a lubed flexible endoscope was placed in the hypopharynx and advanced to the second portion of the duodenum without difficulty. The scope was then withdrawn back into the antrum of the stomach.  Cold forcep biopsies of the antrum were taken to rule out Helicobacter pylori.  The scope was retroflexed noting the body, fundus and cardia.  The scope was then withdrawn back into the esophagus after decompressing the stomach.  The Z line was noted and GE junction measured from the incisors.  The scope was then completely withdrawn.  The patient tolerated the procedure well and left the endoscopy suite in stable condition.  The findings are listed below.    Duodenum: Unremarkable  Antrum: Mild patchy erythema  Body/Fundus: Multiple small polyps larger one measuring up to 5 to 6 mm in diameter.  The larger ones were removed with biopsy forceps and sent off pathology rule out dysplasia.  No active  bleeding noted  Cardia: Small defect  Esophagus: Z line slightly irregular with one area of erythema extending proximally less than 5 mm.  This was biopsied with cold forceps sent off pathology rule out Joe's.  No active bleeding noted    Recommendations:     We will start on H2 blocker or PPI and await biopsy results and follow-up in the office as scheduled

## 2020-06-09 NOTE — ANESTHESIA POSTPROCEDURE EVALUATION
Patient: Xuan Gasca    Procedure Summary     Date:  06/09/20 Room / Location:  [facility] ENDOSCOPY 7 /  [facility]    Anesthesia Start:  0830 Anesthesia Stop:  0847    Procedure:  ESOPHAGOGASTRODUODENOSCOPY WITH BIOPSY (N/A Esophagus) Diagnosis:       Obesity, Class II, BMI 35-39.9      Preoperative testing      Dyspepsia      (Obesity, Class II, BMI 35-39.9 [E66.9])      (Preoperative testing [Z01.818])      (Dyspepsia [R10.13])    Surgeon:  Ron Cadet Jr., MD Provider:  Horacio Olmedo MD    Anesthesia Type:  MAC ASA Status:  3          Anesthesia Type: MAC    Vitals  No vitals data found for the desired time range.          Post Anesthesia Care and Evaluation    Patient location during evaluation: PHASE II  Patient participation: complete - patient participated  Level of consciousness: awake and alert  Pain management: adequate  Airway patency: patent  Anesthetic complications: No anesthetic complications  PONV Status: none  Cardiovascular status: acceptable  Respiratory status: acceptable  Hydration status: acceptable

## 2020-06-09 NOTE — H&P
Patient Care Team:  Suri Lynn MD as PCP - General (Internal Medicine)    Chief complaint Heartburn and in need of preoperative clearance prior to surgery    Subjective     Patient is a 50 y.o. female who is a patient of ours and has undergone our extensive initial evaluation for bariatric surgery and needs to proceed with upper endoscopy for preoperative clearance prior to proceeding with surgery.  Please see the initial history and physical for further detailed information.      Review of Systems   Pertinent items are noted in HPI and no changes since last visit.    Past Medical History:   Diagnosis Date   • Depression    • Disease of thyroid gland    • Hypertension    • Sleep apnea     COMPLIANT WITH CPAP      Past Surgical History:   Procedure Laterality Date   • CARPAL TUNNEL RELEASE     •  SECTION  ,,   • HYSTERECTOMY       Family History   Problem Relation Age of Onset   • Diabetes Mother    • Hypertension Mother    • Hypertension Father    • Malig Hyperthermia Neg Hx      Social History     Tobacco Use   • Smoking status: Never Smoker   • Smokeless tobacco: Never Used   Substance Use Topics   • Alcohol use: Yes     Frequency: Never     Comment: OCCASIONALLY    • Drug use: Never     Medications Prior to Admission   Medication Sig Dispense Refill Last Dose   • amLODIPine (NORVASC) 5 MG tablet Take 5 mg by mouth Daily.      • citalopram (CeleXA) 40 MG tablet Take 40 mg by mouth Daily.   Taking   • levothyroxine (SYNTHROID, LEVOTHROID) 137 MCG tablet Take 137 mcg by mouth Daily.   Taking   • lisinopril-hydrochlorothiazide (PRINZIDE,ZESTORETIC) 20-12.5 MG per tablet Take 1 tablet by mouth Daily.   Taking   • spironolactone (ALDACTONE) 100 MG tablet Take 100 mg by mouth Daily.   Taking   • norgestimate-ethinyl estradiol (ORTHO TRI-CYCLEN LO) 0.18/0.215/0.25 MG-25 MCG per tablet Take 1 tablet by mouth Daily.        Allergies:  Penicillins    Vital Signs  See PreOp record    "    Flowsheet Rows      First Filed Value   Admission Height  160 cm (63\") Documented at 06/09/2020 0747   Admission Weight  95.6 kg (210 lb 11.2 oz) Documented at 06/09/2020 0747           Physical Exam:   Heart: RR  Lungs: CTA B  Abd: soft and NT/ND  Ext: no clubbing, cyanosis    Results Review:    I have reviewed the patient's clinical results      Obesity, Class II, BMI 35-39.9    Preoperative testing    Dyspepsia      The risks and benefits of the procedure were discussed with the patient in detail and all questions were answered.  Possibility of perforation, bleeding, aspiration, anoxic brain injury, respiratory and/or cardiac arrest and death were discussed.  Consent will be signed and witnessed..     Ron Cadet MD  06/09/20  07:56  Time: Approximately 15 minutes was spent with the patient and over half that time was spent counseling.  All of the patients questions were answered.    "

## 2020-06-10 ENCOUNTER — TELEPHONE (OUTPATIENT)
Dept: BARIATRICS/WEIGHT MGMT | Facility: CLINIC | Age: 50
End: 2020-06-10

## 2020-06-10 LAB
LAB AP CASE REPORT: NORMAL
PATH REPORT.FINAL DX SPEC: NORMAL
PATH REPORT.GROSS SPEC: NORMAL
UREASE TISS QL: NEGATIVE

## 2020-06-10 NOTE — TELEPHONE ENCOUNTER
Spoke to patient and informed her of negative results.    ----- Message from Ron Cadet Jr., MD sent at 6/10/2020  9:42 AM EDT -----  Please call patient with negative results.

## 2020-06-11 ENCOUNTER — CONVERSION ENCOUNTER (OUTPATIENT)
Dept: INTERNAL MEDICINE | Facility: CLINIC | Age: 50
End: 2020-06-11

## 2020-06-11 ENCOUNTER — TELEMEDICINE CONVERTED (OUTPATIENT)
Dept: INTERNAL MEDICINE | Facility: CLINIC | Age: 50
End: 2020-06-11
Attending: NURSE PRACTITIONER

## 2020-06-18 ENCOUNTER — PREP FOR SURGERY (OUTPATIENT)
Dept: OTHER | Facility: HOSPITAL | Age: 50
End: 2020-06-18

## 2020-06-18 DIAGNOSIS — E66.9 OBESITY, CLASS II, BMI 35-39.9: Primary | ICD-10-CM

## 2020-06-18 RX ORDER — ACETAMINOPHEN 160 MG/5ML
975 SOLUTION ORAL ONCE
Status: CANCELLED | OUTPATIENT
Start: 2020-07-08 | End: 2020-06-18

## 2020-06-18 RX ORDER — SCOLOPAMINE TRANSDERMAL SYSTEM 1 MG/1
1 PATCH, EXTENDED RELEASE TRANSDERMAL CONTINUOUS
Status: CANCELLED | OUTPATIENT
Start: 2020-07-08 | End: 2020-07-11

## 2020-06-18 RX ORDER — SODIUM CHLORIDE 0.9 % (FLUSH) 0.9 %
3-10 SYRINGE (ML) INJECTION AS NEEDED
Status: CANCELLED | OUTPATIENT
Start: 2020-07-08

## 2020-06-18 RX ORDER — SODIUM CHLORIDE 0.9 % (FLUSH) 0.9 %
3 SYRINGE (ML) INJECTION EVERY 12 HOURS SCHEDULED
Status: CANCELLED | OUTPATIENT
Start: 2020-06-18

## 2020-06-18 RX ORDER — METOCLOPRAMIDE HYDROCHLORIDE 5 MG/ML
10 INJECTION INTRAMUSCULAR; INTRAVENOUS ONCE
Status: CANCELLED | OUTPATIENT
Start: 2020-07-08 | End: 2020-06-18

## 2020-06-18 RX ORDER — SODIUM CHLORIDE, SODIUM LACTATE, POTASSIUM CHLORIDE, CALCIUM CHLORIDE 600; 310; 30; 20 MG/100ML; MG/100ML; MG/100ML; MG/100ML
100 INJECTION, SOLUTION INTRAVENOUS CONTINUOUS
Status: CANCELLED | OUTPATIENT
Start: 2020-07-08

## 2020-06-18 RX ORDER — CHLORHEXIDINE GLUCONATE 0.12 MG/ML
15 RINSE ORAL SEE ADMIN INSTRUCTIONS
Status: CANCELLED | OUTPATIENT
Start: 2020-07-08

## 2020-06-18 RX ORDER — PANTOPRAZOLE SODIUM 40 MG/10ML
40 INJECTION, POWDER, LYOPHILIZED, FOR SOLUTION INTRAVENOUS ONCE
Status: CANCELLED | OUTPATIENT
Start: 2020-07-08 | End: 2020-06-18

## 2020-06-25 ENCOUNTER — CONSULT (OUTPATIENT)
Dept: BARIATRICS/WEIGHT MGMT | Facility: CLINIC | Age: 50
End: 2020-06-25

## 2020-06-25 VITALS
HEIGHT: 63 IN | WEIGHT: 210 LBS | TEMPERATURE: 97.1 F | HEART RATE: 65 BPM | DIASTOLIC BLOOD PRESSURE: 84 MMHG | RESPIRATION RATE: 18 BRPM | SYSTOLIC BLOOD PRESSURE: 148 MMHG | BODY MASS INDEX: 37.21 KG/M2

## 2020-06-25 DIAGNOSIS — E66.9 OBESITY, CLASS II, BMI 35-39.9: Primary | ICD-10-CM

## 2020-06-25 DIAGNOSIS — R53.82 CHRONIC FATIGUE: ICD-10-CM

## 2020-06-25 DIAGNOSIS — K44.9 HIATAL HERNIA: ICD-10-CM

## 2020-06-25 DIAGNOSIS — K31.7 GASTRIC POLYPS: ICD-10-CM

## 2020-06-25 DIAGNOSIS — I10 ESSENTIAL HYPERTENSION: ICD-10-CM

## 2020-06-25 PROCEDURE — 99215 OFFICE O/P EST HI 40 MIN: CPT | Performed by: SURGERY

## 2020-06-25 RX ORDER — URSODIOL 300 MG/1
300 CAPSULE ORAL 2 TIMES DAILY
Qty: 60 CAPSULE | Refills: 5 | Status: SHIPPED | OUTPATIENT
Start: 2020-06-25 | End: 2021-07-27

## 2020-06-25 RX ORDER — LISINOPRIL 10 MG/1
10 TABLET ORAL DAILY
COMMUNITY
Start: 2020-06-11 | End: 2021-06-18

## 2020-06-25 NOTE — H&P
Bariatric Consult:  Referred by Suri Lynn MD    Xuan Gasca is here today for consult on Consult (Consult sleeve)      History of Present Illness:     Xuan Gasca is a 50 y.o. female with morbid obesity with co-morbidities including sleep apnea, hypertension, back pain, knee pain and depression who presents for surgical consultation for the above procedure. Xuan has completed the initial intake visit and has been examined by our nurse practitioner, dietician, psychologist and underwent the extensive educational teaching process under the guidance of our bariatric coordinator and myself. Xuan also has seen the educational video ESTEE on the surgical procedure if available. Xuan attended today more educational teaching from our bariatric coordinator and myself. Xuan has had an extensive medical workup including a visit with their primary care physician, EKG, chest radiograph, blood work, EGD or UGI and possibly further testing. These have been reviewed by me and discussed with the patient. Xuan is now ready to proceed with surgery. Xuan presently denies nausea, vomiting, fever, chills, chest pain, shortness of air, melena, hematochezia, hemetemesis, dysuria, frequency, hematuria, jaundice or abdominal pain.       Past Medical History:   Diagnosis Date   • Depression    • Disease of thyroid gland    • Hypertension    • Sleep apnea     COMPLIANT WITH CPAP        Encounter Diagnoses   Name Primary?   • Obesity, Class II, BMI 35-39.9 Yes   • Hiatal hernia    • Gastric polyps    • Essential hypertension    • Chronic fatigue        Past Surgical History:   Procedure Laterality Date   • CARPAL TUNNEL RELEASE     •  SECTION  ,,   • ENDOSCOPY N/A 2020    Procedure: ESOPHAGOGASTRODUODENOSCOPY WITH BIOPSY;  Surgeon: Ron Cadet Jr., MD;  Location: [facility] ENDOSCOPY;  Service: General;  Laterality: N/A;  PRE- DYSPEPSIA  POST- GASTRITIS, GASTRIC POLYPS,  ESOPHAGITIS, HIATAL HERNIA     • HYSTERECTOMY  2004       Patient Active Problem List   Diagnosis   • Chronic fatigue   • Essential hypertension   • Sleep apnea   • Anxiety   • Hypothyroid   • Obesity, Class II, BMI 35-39.9   • Preoperative testing   • Dyspepsia   • Gastric polyps   • Hiatal hernia       Allergies   Allergen Reactions   • Penicillins Anaphylaxis     unknown         Current Outpatient Medications:   •  citalopram (CeleXA) 40 MG tablet, Take 40 mg by mouth Daily., Disp: , Rfl:   •  levothyroxine (SYNTHROID, LEVOTHROID) 137 MCG tablet, Take 137 mcg by mouth Daily., Disp: , Rfl:   •  lisinopril (PRINIVIL,ZESTRIL) 10 MG tablet, Take 10 mg by mouth Daily., Disp: , Rfl:   •  lisinopril-hydrochlorothiazide (PRINZIDE,ZESTORETIC) 20-12.5 MG per tablet, Take 1 tablet by mouth Daily., Disp: , Rfl:   •  pantoprazole (PROTONIX) 40 MG EC tablet, Take 1 tablet by mouth Daily., Disp: 30 tablet, Rfl: 5  •  amLODIPine (NORVASC) 5 MG tablet, Take 5 mg by mouth Daily., Disp: , Rfl:   •  folic acid-vit B6-vit B12 (FOLBEE) 2.5-25-1 MG tablet tablet, Take 1 tablet by mouth Daily., Disp: 40 tablet, Rfl: 0  •  lisinopril (PRINIVIL,ZESTRIL) 20 MG tablet, Take 20 mg by mouth Daily. 1/2 pill, Disp: , Rfl:   •  norgestimate-ethinyl estradiol (ORTHO TRI-CYCLEN LO) 0.18/0.215/0.25 MG-25 MCG per tablet, Take 1 tablet by mouth Daily., Disp: , Rfl:   •  spironolactone (ALDACTONE) 100 MG tablet, Take 100 mg by mouth Daily., Disp: , Rfl:   •  ursodiol (Actigall) 300 MG capsule, Take 1 capsule by mouth 2 (Two) Times a Day., Disp: 60 capsule, Rfl: 5    Social History     Socioeconomic History   • Marital status:      Spouse name: Not on file   • Number of children: Not on file   • Years of education: Not on file   • Highest education level: Not on file   Tobacco Use   • Smoking status: Never Smoker   • Smokeless tobacco: Never Used   Substance and Sexual Activity   • Alcohol use: Yes     Frequency: Never     Comment: OCCASIONALLY     • Drug use: Never   • Sexual activity: Defer       Family History   Problem Relation Age of Onset   • Diabetes Mother    • Hypertension Mother    • Hypertension Father    • Malig Hyperthermia Neg Hx        Review of Systems:  Review of Systems   Constitutional: Positive for fatigue.   Musculoskeletal: Positive for arthralgias.   All other systems reviewed and are negative.        Physical Exam:    Vital Signs:  Weight: 95.3 kg (210 lb)   Body mass index is 37.21 kg/m².  Temp: 97.1 °F (36.2 °C)   Heart Rate: 65   BP: 148/84       Physical Exam   Constitutional: She is oriented to person, place, and time. She appears well-nourished.   HENT:   Head: Normocephalic and atraumatic.   Mouth/Throat: Oropharynx is clear and moist.   Eyes: Pupils are equal, round, and reactive to light. Conjunctivae and EOM are normal. No scleral icterus.   Neck: Normal range of motion. Neck supple. No thyromegaly present.   Cardiovascular: Normal rate and regular rhythm.   Pulmonary/Chest: Effort normal and breath sounds normal.   Abdominal: Soft. Bowel sounds are normal. She exhibits no distension and no mass. There is no tenderness. There is no rebound and no guarding. No hernia.   Musculoskeletal: Normal range of motion.   Lymphadenopathy:     She has no cervical adenopathy.   Neurological: She is alert and oriented to person, place, and time. No cranial nerve deficit. Coordination normal.   Skin: Skin is warm and dry. No erythema.   Psychiatric: She has a normal mood and affect. Her behavior is normal.   Vitals reviewed.        Assessment:    Xuan Gasca is a 50 y.o. year old female with medically complicated severe obesity with a BMI of Body mass index is 37.21 kg/m². and multiple co-morbidities listed in the encounter diagnosis.    I think she is an appropriate candidate for this surgery, and is ready to proceed.      Plan/Discussion/Summary:  Small hiatal hernia per me.  No PPI.  H. pylori negative.  Polyps benign.  Biopsies  negative    The patient has returned to the office for a surgical consultation and has requested to proceed with a laparoscopic gastric sleeve.  I have had the opportunity to obtain a history, examine the patient and review the patient's chart.    The patient understands that surgery is a tool and that weight loss is not guaranteed but only seen in the context of appropriate use, regular follow up, exercise and making appropriate food choices.     I personally discussed the potential complications of the laparoscopic gastric sleeve with this patient.  The patient is well aware of potential complications of the surgery that include but not limited to bleeding, infections, deep vein thrombosis, pulmonary embolism, pulmonary complications such as pneumonia, cardiac event, hernias, small bowel obstruction, damage to the spleen or other organs, bowel injury, disfiguring scars, failure to lose weight, need for additional surgery, conversion to an open procedure and death.  The patient is also aware of complications which apply in particular to the gastric sleeve and can include but not limited to the leakage of gastric contents at the staple line, the development of an intra-abdominal abscess, gastroesophageal reflux disease, Joe's esophagus, ulcers, vitamin/mineral deficiencies, strictures, and the possibility of converting this procedure to a Loretta-en-Y gastric bypass. The patient also understands the possibility of requiring an acid reducer medication for the rest of their life.    The risks, benefits, potential complications and alternative therapies were discussed at great length as outlined in our extensive consent forms, online consent and educational teaching processes.    The patient has confirmed the participation in the programs extensive educational activities.    All questions and concerns were answered to patient's satisfaction.  The patient now wishes to proceed with surgery.    Patient has declined the  pre-operative insertion of an IVC filter.     The patient has declined a postoperative course of anitcoagulant therapy.      I instructed patient to start on a H2 blocker or proton pump inhibitor if not already on one of these medications.    I explained in detail the procedures that we perform.  All of these procedures have a chance to convert to open if any technical challenges or complications do occur.  Bariatric surgery is not cosmetic surgery but rather a tool to help a patient make a life-long commitment lifestyle change including diet, exercise, behavior changes, and taking supplemental vitamins and minerals.    Problems after surgery may require more operations to correct them.    The risks, benefits, alternatives, and potential complications of all of the procedures were explained in detail including, but not limited to death, anesthesia and medication adverse effect, deep venous thrombosis, pulmonary embolism, trocar site/incisional hernia, wound infection, abdominal infection, bleeding, failure to lose weight, gain weight, a change in body image, metabolic complications with vitamin deficiences and anemia.    Weight loss expectations were discussed with the patient in detail. The weight loss operations most commonly performed are the sleeve gastrectomy and the Loretta-en-Y gastric bypass. These operations result in weight losses up to approximately 25-35% of initial body weight 12 to 24 months after surgery with the gastric bypass usually the higher percent of weight loss but depends on patient using the tool.    For the gastric bypass and loop duodenal switch (TRUNG-S) the risks include but not limited to the following early complications:  Anastomotic leak/peritonitis, Loretta/Alimentary/biliopancreatic limb obstruction, severe & minor wound infection/seroma, and nausea/vomiting.  Late complications can include but are not limited to malnutrition, vitamin deficiencies, frequent loose stools,  stomal stenosis,  marginal ulcer, bowel obstruction, intussusception, internal, and incisional hernia.    Regarding the gastric sleeve, there is less long-term outcome data and higher risk of dysphagia and reflux compared to a gastric bypass, as well as risk of internal visceral/organ injury, splenectomy, bleeding, infection, leak (which could require further intervention possible conversion to Loretta-en-Y gastric bypass), stenosis and possibility of regaining weight.    Xuan was counseled regarding diagnostic results, instructions for management, risk factor reductions, prognosis, patient and family education, impressions, risks and benefits of treatment options and importance of compliance with treatment. Total face to face time of the encounter was over 45 minutes and over 30 minutes was spent counseling.     Stella Report   As part of this patient's treatment plan I am prescribing controlled substances. The patient has been made aware of appropriate use of such medications, including potential risk of somnolence, limited ability to drive and /or work safely, and potential for dependence or overdose. It has also been made clear that these medications are for use by this patient only, without concomitant use of alcohol or other substances unless prescribed.    Xuan has completed prescribing agreement detailing terms of continued prescribing of controlled substances, including monitoring STELLA reports, urine drug screening, and pill counts if necessary. Xuan is aware that inappropriate use will result in cessation of prescribing such medications.    STELLA report has been reviewed      History and physical exam exhibit continued safe and appropriate use of controlled substances.      Xuan understands the surgical procedures and the different surgical options that are available.  She understands the lifestyle changes that are required after surgery and has agreed to follow the guidelines outlined in the weight  management program.  She also expressed understanding of the risks involved and had all of female questions answered and desires to proceed.      Ron Cadet MD  6/25/2020

## 2020-06-25 NOTE — PATIENT INSTRUCTIONS
Bariatric Manual    You were provided a manual specific to the procedure that you have chosen.  Please refer to that with any questions or call the office at [phone]

## 2020-07-06 ENCOUNTER — APPOINTMENT (OUTPATIENT)
Dept: PREADMISSION TESTING | Facility: HOSPITAL | Age: 50
End: 2020-07-06

## 2020-07-06 VITALS
HEIGHT: 63 IN | RESPIRATION RATE: 16 BRPM | TEMPERATURE: 98.8 F | BODY MASS INDEX: 37.2 KG/M2 | OXYGEN SATURATION: 96 % | HEART RATE: 71 BPM | SYSTOLIC BLOOD PRESSURE: 130 MMHG | DIASTOLIC BLOOD PRESSURE: 85 MMHG

## 2020-07-06 DIAGNOSIS — E66.9 OBESITY, CLASS II, BMI 35-39.9: ICD-10-CM

## 2020-07-06 LAB
ALBUMIN SERPL-MCNC: 4.4 G/DL (ref 3.5–5.2)
ALBUMIN/GLOB SERPL: 1.8 G/DL
ALP SERPL-CCNC: 60 U/L (ref 39–117)
ALT SERPL W P-5'-P-CCNC: 32 U/L (ref 1–33)
ANION GAP SERPL CALCULATED.3IONS-SCNC: 10 MMOL/L (ref 5–15)
AST SERPL-CCNC: 22 U/L (ref 1–32)
BILIRUB SERPL-MCNC: <0.2 MG/DL (ref 0–1.2)
BUN SERPL-MCNC: 18 MG/DL (ref 6–20)
BUN/CREAT SERPL: 28.6 (ref 7–25)
CALCIUM SPEC-SCNC: 9.6 MG/DL (ref 8.6–10.5)
CHLORIDE SERPL-SCNC: 104 MMOL/L (ref 98–107)
CO2 SERPL-SCNC: 25 MMOL/L (ref 22–29)
CREAT SERPL-MCNC: 0.63 MG/DL (ref 0.57–1)
DEPRECATED RDW RBC AUTO: 47.5 FL (ref 37–54)
ERYTHROCYTE [DISTWIDTH] IN BLOOD BY AUTOMATED COUNT: 13.3 % (ref 12.3–15.4)
GFR SERPL CREATININE-BSD FRML MDRD: 100 ML/MIN/1.73
GLOBULIN UR ELPH-MCNC: 2.4 GM/DL
GLUCOSE SERPL-MCNC: 94 MG/DL (ref 65–99)
HCT VFR BLD AUTO: 43 % (ref 34–46.6)
HGB BLD-MCNC: 14.3 G/DL (ref 12–15.9)
MCH RBC QN AUTO: 31.6 PG (ref 26.6–33)
MCHC RBC AUTO-ENTMCNC: 33.3 G/DL (ref 31.5–35.7)
MCV RBC AUTO: 94.9 FL (ref 79–97)
PLATELET # BLD AUTO: 262 10*3/MM3 (ref 140–450)
PMV BLD AUTO: 10.4 FL (ref 6–12)
POTASSIUM SERPL-SCNC: 4.2 MMOL/L (ref 3.5–5.2)
PROT SERPL-MCNC: 6.8 G/DL (ref 6–8.5)
RBC # BLD AUTO: 4.53 10*6/MM3 (ref 3.77–5.28)
SODIUM SERPL-SCNC: 139 MMOL/L (ref 136–145)
WBC # BLD AUTO: 8.76 10*3/MM3 (ref 3.4–10.8)

## 2020-07-06 PROCEDURE — 85027 COMPLETE CBC AUTOMATED: CPT | Performed by: SURGERY

## 2020-07-06 PROCEDURE — C9803 HOPD COVID-19 SPEC COLLECT: HCPCS

## 2020-07-06 PROCEDURE — 36415 COLL VENOUS BLD VENIPUNCTURE: CPT

## 2020-07-06 PROCEDURE — 80053 COMPREHEN METABOLIC PANEL: CPT | Performed by: SURGERY

## 2020-07-06 PROCEDURE — U0004 COV-19 TEST NON-CDC HGH THRU: HCPCS | Performed by: NURSE PRACTITIONER

## 2020-07-06 NOTE — DISCHARGE INSTRUCTIONS
Take only the following medications the morning of surgery with a small sip of water:  LEVOTHYROXINE    ARRIVAL TIME 08:30      Do not take Bariatric Vitamins, Folic Acid, Actigall (if applicable) or Lovenox Injections (if applicable) the morning of surgery.  If you have a history of blood clots or have a BMI greater than 50, Dr. Cadet may order Lovenox for after surgery. Do not take Lovenox blood thinner before surgery.      General Instructions:   • Drink one 20 ounce Gatorade G2 the evening before surgery.  Nothing red in color.  • Do not eat solid food after midnight the night before surgery.    • The morning of surgery have another 20 ounce Gatorade G2.  Again, nothing red in color.  Your drink must be completed 2 hours before your arrival time.   • Patients who avoid smoking, chewing tobacco and alcohol for 4 weeks prior to surgery have a reduced risk of post-operative complications.  Quit smoking as many days before surgery as you can.  • Do not smoke, use chewing tobacco or drink alcohol the day of surgery.   • Bring any papers given to you in the doctor's office.  Wear clean comfortable clothes.  • Do not wear contact lenses, false eyelashes or make-up.  Bring a case for your glasses.   • Bring crutches or walker if applicable.  • Remove all piercings.  Leave jewelry and any other valuables at home.  • Remove fingernail polish, gel overlays or any artificial nails.  • Hair extensions with metal clips must be removed prior to surgery.  • The Pre-Admission Testing nurse will instruct you to bring medications if unable to obtain an accurate list in Pre-Admission Testing.        Preventing a Surgical Site Infection:  • For 2 to 3 days before surgery, avoid shaving with a razor because the razor can irritate skin and make it easier to develop an infection.    • Any areas of open skin can increase the risk of a post-operative wound infection by allowing bacteria to enter and travel throughout the body.  Notify  your surgeon if you have any skin wounds / rashes even if it is not near the expected surgical site.  The area will need assessed to determine if surgery should be delayed until it is healed.  • 2 days prior to surgery, take a shower using a fresh bar of anti-bacterial soap (such as Dial).  Use a clean washcloth and dry with a clean towel.    • The day prior to surgery, take a shower using a fresh bar of anti-bacterial soap (such as Dial).  Use a clean washcloth and dry with a clean towel.  Sleep in a clean bed with clean clothing.  Do not allow pets to sleep with you.  • The morning of surgery shower using a fresh bar of anti-bacterial soap (such as Dial).  Use a clean washcloth and dry with a clean towel.  Follow the Chlorhexidine instructions below.    CHLORHEXIDINE CLOTH INSTRUCTIONS  The morning of surgery follow these instructions using the Chlorhexidine cloths you've been given.  These steps reduce bacteria on the body.  Do not use the cloths near your eyes, ears mouth, genitalia or on open wounds.  Throw the cloths away after use but do not try to flush them down a toilet.    • Open and remove one cloth at a time from the package.    • Leave the cloth unfolded and begin the bathing.  • Massage the skin with the cloths using gentle pressure to remove bacteria.  Do not scrub harshly.   • Follow the steps below with one 2% CHG cloth per area (6 total cloths).  • One cloth for neck, shoulders and chest.  • One cloth for both arms, hands, fingers and underarms (do underarms last).  • One cloth for the abdomen followed by groin.  • One cloth for right leg and foot including between the toes.  • One cloth for left leg and foot including between the toes.  • The last cloth is to be used for the back of the neck, back and buttocks.    Allow the CHG to air dry 3 minutes on the skin which will give it time to work and decrease the chance of irritation.  The skin may feel sticky until it is dry.  Do not rinse with water  or any other liquid or you will lose the beneficial effects of the CHG.  If mild skin irritation occurs, do rinse the skin to remove the CHG.  Report this to the nurse at time of admission.  Do not apply lotions, creams, ointments, deodorants or perfumes after using the clothes. Dress in clean clothes before coming to the hospital.    • Ask your surgeon if you will be receiving antibiotics prior to surgery.  • Make sure you, your family, and all healthcare providers clean their hands with soap and water or an alcohol based hand  before caring for you or your wound.      Day of surgery:  Your arrival time is approximately two hours before your scheduled surgery time.  Upon arrival, a Pre-op nurse and Anesthesiologist will review your health history, obtain vital signs, and answer questions you may have.  A Pre-op nurse will start an IV and you may receive medication in preparation for surgery, including something to help you relax.  Your family will be able to see you in the Pre-op area.  Two visitors at a time will be allowed in the Pre-op room.  While you are in surgery, your family should notify the waiting room  if they leave the waiting room area and provide a contact phone number.  If you are staying overnight your family can leave your belongings in the car and bring them to your room later.  If applicable, we do ask that you have your C-PAP/BI-PAP machine available. It can be utilized the night of surgery.     Please be aware that surgery does come with discomfort.  We want to make every effort to control your discomfort so please discuss any uncontrolled symptoms with your nurse.   Your doctor will most likely have prescribed pain medications.      If you are going home after surgery you will receive individualized written care instructions before being discharged.  A responsible adult must drive you to and from the hospital on the day of your surgery and stay with you for 24  hours.    If you are staying overnight following surgery, you will be transported to your hospital room following the recovery period.  [facility] has all private rooms.    If you have any questions please call Pre-Admission Testing at [phone].  Deductibles and co-payments are collected on the day of service. Please be prepared to pay the required co-pay, deductible or deposit on the day of service as defined by your plan.    Patient Education for Self-Quarantine Process    Following your COVID testing, we strongly recommend that you do not leave your home after you have been tested for COVID except to get medical care. This includes not going to work, school or to public areas.  If this is not possible for you to do please limit your activities to only required outings.  Be sure to wear a mask when you are with other people, practice social distancing and wash your hands frequently.      The following items provide additional details to keep you safe.  • Wash your hands with soap and water frequently for at least 20 seconds.   • Avoid touching your eyes, nose and mouth with unwashed hands.  • Do not share anything - utensils, towels, food from the same bowl.   • Have your own utensils, drinking glass, dishes, towels and bedding.   • Do not have visitors.   • Do use FaceTime to stay in touch with family and friends.  • You should stay in a specific room away from others if possible.   • Stay at least 6 feet away from others in the home if you cannot have a dedicated room to yourself.   • Do not snuggle with your pet. While the CDC says there is no evidence that pets can spread COVID-19 or be infected from humans, it is probably best to avoid “petting, snuggling, being kissed or licked and sharing food (during self-quarantine)”, according to the CDC.   • Sanitize household surfaces daily. Include all high touch areas (door handles, light switches, phones, countertops, etc.)  • Do not share a  bathroom with others, if possible.   • Wear a mask around others in your home if you are unable to stay in a separate room or 6 feet apart. If  you are unable to wear a mask, have your family member wear a mask if they must be within 6 feet of you.   Call your surgeon immediately if you experience any of the following symptoms:  • Sore Throat  • Shortness of Breath or difficulty breathing  • Cough  • Chills  • Body soreness or muscle pain  • Headache  • Fever  • New loss of taste or smell  • Do not arrive for your surgery ill.  Your procedure will need to be rescheduled to another time.  You will need to call your physician before the day of surgery to avoid any unnecessary exposure to hospital staff as well as other patients.

## 2020-07-07 LAB
REF LAB TEST METHOD: NORMAL
SARS-COV-2 RNA RESP QL NAA+PROBE: NOT DETECTED

## 2020-07-08 ENCOUNTER — HOSPITAL ENCOUNTER (INPATIENT)
Facility: HOSPITAL | Age: 50
LOS: 1 days | Discharge: HOME | End: 2020-07-09
Attending: SURGERY | Admitting: SURGERY

## 2020-07-08 ENCOUNTER — ANESTHESIA EVENT (OUTPATIENT)
Dept: PERIOP | Facility: HOSPITAL | Age: 50
End: 2020-07-08

## 2020-07-08 ENCOUNTER — ANESTHESIA (OUTPATIENT)
Dept: PERIOP | Facility: HOSPITAL | Age: 50
End: 2020-07-08

## 2020-07-08 DIAGNOSIS — E66.9 OBESITY, CLASS II, BMI 35-39.9: ICD-10-CM

## 2020-07-08 DIAGNOSIS — Z98.84 S/P LAPAROSCOPIC SLEEVE GASTRECTOMY: Primary | ICD-10-CM

## 2020-07-08 PROCEDURE — 25010000002 METOCLOPRAMIDE PER 10 MG: Performed by: SURGERY

## 2020-07-08 PROCEDURE — 25010000002 PROPOFOL 10 MG/ML EMULSION: Performed by: NURSE ANESTHETIST, CERTIFIED REGISTERED

## 2020-07-08 PROCEDURE — 0BQT4ZZ REPAIR DIAPHRAGM, PERCUTANEOUS ENDOSCOPIC APPROACH: ICD-10-PCS | Performed by: SURGERY

## 2020-07-08 PROCEDURE — 25010000002 HYDROMORPHONE PER 4 MG: Performed by: ANESTHESIOLOGY

## 2020-07-08 PROCEDURE — 25010000002 PHENYLEPHRINE PER 1 ML: Performed by: NURSE ANESTHETIST, CERTIFIED REGISTERED

## 2020-07-08 PROCEDURE — 25010000002 FENTANYL CITRATE (PF) 100 MCG/2ML SOLUTION: Performed by: NURSE ANESTHETIST, CERTIFIED REGISTERED

## 2020-07-08 PROCEDURE — 43775 LAP SLEEVE GASTRECTOMY: CPT | Performed by: NURSE PRACTITIONER

## 2020-07-08 PROCEDURE — 25010000002 VANCOMYCIN 10 G RECONSTITUTED SOLUTION: Performed by: SURGERY

## 2020-07-08 PROCEDURE — 88307 TISSUE EXAM BY PATHOLOGIST: CPT | Performed by: SURGERY

## 2020-07-08 PROCEDURE — 43775 LAP SLEEVE GASTRECTOMY: CPT | Performed by: SURGERY

## 2020-07-08 PROCEDURE — 25010000002 MIDAZOLAM PER 1 MG: Performed by: ANESTHESIOLOGY

## 2020-07-08 PROCEDURE — 0DB64Z3 EXCISION OF STOMACH, PERCUTANEOUS ENDOSCOPIC APPROACH, VERTICAL: ICD-10-PCS | Performed by: SURGERY

## 2020-07-08 PROCEDURE — 25010000002 ONDANSETRON PER 1 MG: Performed by: NURSE ANESTHETIST, CERTIFIED REGISTERED

## 2020-07-08 PROCEDURE — 25010000002 DEXAMETHASONE PER 1 MG: Performed by: NURSE ANESTHETIST, CERTIFIED REGISTERED

## 2020-07-08 PROCEDURE — 25010000002 ENOXAPARIN PER 10 MG: Performed by: SURGERY

## 2020-07-08 PROCEDURE — 25010000002 FENTANYL CITRATE (PF) 100 MCG/2ML SOLUTION: Performed by: ANESTHESIOLOGY

## 2020-07-08 DEVICE — ENDOPATH ECHELON ENDOSCOPIC LINEAR CUTTER RELOADS, GREEN, 60MM
Type: IMPLANTABLE DEVICE | Site: ABDOMEN | Status: FUNCTIONAL
Brand: ECHELON ENDOPATH

## 2020-07-08 DEVICE — SEALANT WND FIBRIN TISSEEL PREFIL/SYR/PRIMAFZ 4ML: Type: IMPLANTABLE DEVICE | Site: ABDOMEN | Status: FUNCTIONAL

## 2020-07-08 DEVICE — STPL/LN REINF PERISTRIP DRY VERITAS SECUREGRIP THN: Type: IMPLANTABLE DEVICE | Site: ABDOMEN | Status: FUNCTIONAL

## 2020-07-08 RX ORDER — FENTANYL CITRATE 50 UG/ML
INJECTION, SOLUTION INTRAMUSCULAR; INTRAVENOUS AS NEEDED
Status: DISCONTINUED | OUTPATIENT
Start: 2020-07-08 | End: 2020-07-08 | Stop reason: SURG

## 2020-07-08 RX ORDER — ACETAMINOPHEN 500 MG
1000 TABLET ORAL EVERY 6 HOURS
Status: DISCONTINUED | OUTPATIENT
Start: 2020-07-08 | End: 2020-07-09 | Stop reason: HOSPADM

## 2020-07-08 RX ORDER — SODIUM CHLORIDE, SODIUM LACTATE, POTASSIUM CHLORIDE, CALCIUM CHLORIDE 600; 310; 30; 20 MG/100ML; MG/100ML; MG/100ML; MG/100ML
150 INJECTION, SOLUTION INTRAVENOUS CONTINUOUS
Status: DISCONTINUED | OUTPATIENT
Start: 2020-07-08 | End: 2020-07-09 | Stop reason: HOSPADM

## 2020-07-08 RX ORDER — PROMETHAZINE HYDROCHLORIDE 25 MG/1
25 TABLET ORAL ONCE AS NEEDED
Status: DISCONTINUED | OUTPATIENT
Start: 2020-07-08 | End: 2020-07-08 | Stop reason: HOSPADM

## 2020-07-08 RX ORDER — SODIUM CHLORIDE 0.9 % (FLUSH) 0.9 %
10 SYRINGE (ML) INJECTION AS NEEDED
Status: DISCONTINUED | OUTPATIENT
Start: 2020-07-08 | End: 2020-07-08 | Stop reason: HOSPADM

## 2020-07-08 RX ORDER — MIDAZOLAM HYDROCHLORIDE 1 MG/ML
1 INJECTION INTRAMUSCULAR; INTRAVENOUS
Status: DISCONTINUED | OUTPATIENT
Start: 2020-07-08 | End: 2020-07-08 | Stop reason: HOSPADM

## 2020-07-08 RX ORDER — ACETAMINOPHEN 160 MG/5ML
975 SOLUTION ORAL ONCE
Status: COMPLETED | OUTPATIENT
Start: 2020-07-08 | End: 2020-07-08

## 2020-07-08 RX ORDER — ONDANSETRON 2 MG/ML
INJECTION INTRAMUSCULAR; INTRAVENOUS AS NEEDED
Status: DISCONTINUED | OUTPATIENT
Start: 2020-07-08 | End: 2020-07-08 | Stop reason: SURG

## 2020-07-08 RX ORDER — MORPHINE SULFATE 2 MG/ML
2 INJECTION, SOLUTION INTRAMUSCULAR; INTRAVENOUS
Status: DISCONTINUED | OUTPATIENT
Start: 2020-07-08 | End: 2020-07-09 | Stop reason: HOSPADM

## 2020-07-08 RX ORDER — LIDOCAINE HYDROCHLORIDE 10 MG/ML
0.5 INJECTION, SOLUTION EPIDURAL; INFILTRATION; INTRACAUDAL; PERINEURAL ONCE AS NEEDED
Status: COMPLETED | OUTPATIENT
Start: 2020-07-08 | End: 2020-07-08

## 2020-07-08 RX ORDER — LORAZEPAM 1 MG/1
1 TABLET ORAL EVERY 12 HOURS PRN
Status: DISCONTINUED | OUTPATIENT
Start: 2020-07-08 | End: 2020-07-09 | Stop reason: HOSPADM

## 2020-07-08 RX ORDER — BUPIVACAINE HYDROCHLORIDE AND EPINEPHRINE 5; 5 MG/ML; UG/ML
INJECTION, SOLUTION PERINEURAL AS NEEDED
Status: DISCONTINUED | OUTPATIENT
Start: 2020-07-08 | End: 2020-07-08 | Stop reason: HOSPADM

## 2020-07-08 RX ORDER — ACETAMINOPHEN 650 MG/1
650 SUPPOSITORY RECTAL ONCE AS NEEDED
Status: DISCONTINUED | OUTPATIENT
Start: 2020-07-08 | End: 2020-07-08 | Stop reason: HOSPADM

## 2020-07-08 RX ORDER — ONDANSETRON 4 MG/1
4 TABLET, FILM COATED ORAL EVERY 4 HOURS PRN
Status: DISCONTINUED | OUTPATIENT
Start: 2020-07-08 | End: 2020-07-09 | Stop reason: HOSPADM

## 2020-07-08 RX ORDER — GABAPENTIN 300 MG/1
300 CAPSULE ORAL EVERY 8 HOURS SCHEDULED
Status: DISCONTINUED | OUTPATIENT
Start: 2020-07-08 | End: 2020-07-09 | Stop reason: HOSPADM

## 2020-07-08 RX ORDER — DIPHENHYDRAMINE HYDROCHLORIDE 50 MG/ML
25 INJECTION INTRAMUSCULAR; INTRAVENOUS EVERY 4 HOURS PRN
Status: DISCONTINUED | OUTPATIENT
Start: 2020-07-08 | End: 2020-07-09 | Stop reason: HOSPADM

## 2020-07-08 RX ORDER — NALOXONE HCL 0.4 MG/ML
0.1 VIAL (ML) INJECTION
Status: DISCONTINUED | OUTPATIENT
Start: 2020-07-08 | End: 2020-07-09 | Stop reason: HOSPADM

## 2020-07-08 RX ORDER — HYDROMORPHONE HYDROCHLORIDE 1 MG/ML
0.5 INJECTION, SOLUTION INTRAMUSCULAR; INTRAVENOUS; SUBCUTANEOUS
Status: DISCONTINUED | OUTPATIENT
Start: 2020-07-08 | End: 2020-07-09 | Stop reason: HOSPADM

## 2020-07-08 RX ORDER — SODIUM CHLORIDE 9 MG/ML
INJECTION, SOLUTION INTRAVENOUS AS NEEDED
Status: DISCONTINUED | OUTPATIENT
Start: 2020-07-08 | End: 2020-07-08 | Stop reason: HOSPADM

## 2020-07-08 RX ORDER — SODIUM CHLORIDE 0.9 % (FLUSH) 0.9 %
3-10 SYRINGE (ML) INJECTION AS NEEDED
Status: DISCONTINUED | OUTPATIENT
Start: 2020-07-08 | End: 2020-07-08 | Stop reason: HOSPADM

## 2020-07-08 RX ORDER — HYDROCODONE BITARTRATE AND ACETAMINOPHEN 5; 325 MG/1; MG/1
1 TABLET ORAL ONCE AS NEEDED
Status: DISCONTINUED | OUTPATIENT
Start: 2020-07-08 | End: 2020-07-08 | Stop reason: HOSPADM

## 2020-07-08 RX ORDER — LORAZEPAM 2 MG/ML
1 INJECTION INTRAMUSCULAR EVERY 12 HOURS PRN
Status: DISCONTINUED | OUTPATIENT
Start: 2020-07-08 | End: 2020-07-09 | Stop reason: HOSPADM

## 2020-07-08 RX ORDER — CYANOCOBALAMIN 1000 UG/ML
1000 INJECTION, SOLUTION INTRAMUSCULAR; SUBCUTANEOUS ONCE
Status: COMPLETED | OUTPATIENT
Start: 2020-07-09 | End: 2020-07-09

## 2020-07-08 RX ORDER — FAMOTIDINE 10 MG/ML
20 INJECTION, SOLUTION INTRAVENOUS EVERY 12 HOURS SCHEDULED
Status: DISCONTINUED | OUTPATIENT
Start: 2020-07-08 | End: 2020-07-09 | Stop reason: HOSPADM

## 2020-07-08 RX ORDER — HYDROMORPHONE HYDROCHLORIDE 2 MG/1
2 TABLET ORAL EVERY 4 HOURS PRN
Status: DISCONTINUED | OUTPATIENT
Start: 2020-07-08 | End: 2020-07-09 | Stop reason: HOSPADM

## 2020-07-08 RX ORDER — DIPHENHYDRAMINE HYDROCHLORIDE 50 MG/ML
12.5 INJECTION INTRAMUSCULAR; INTRAVENOUS
Status: DISCONTINUED | OUTPATIENT
Start: 2020-07-08 | End: 2020-07-08 | Stop reason: HOSPADM

## 2020-07-08 RX ORDER — FENTANYL CITRATE 50 UG/ML
50 INJECTION, SOLUTION INTRAMUSCULAR; INTRAVENOUS
Status: DISCONTINUED | OUTPATIENT
Start: 2020-07-08 | End: 2020-07-08 | Stop reason: HOSPADM

## 2020-07-08 RX ORDER — CHLORHEXIDINE GLUCONATE 0.12 MG/ML
15 RINSE ORAL SEE ADMIN INSTRUCTIONS
Status: COMPLETED | OUTPATIENT
Start: 2020-07-08 | End: 2020-07-08

## 2020-07-08 RX ORDER — PROMETHAZINE HYDROCHLORIDE 25 MG/ML
6.25 INJECTION, SOLUTION INTRAMUSCULAR; INTRAVENOUS ONCE AS NEEDED
Status: DISCONTINUED | OUTPATIENT
Start: 2020-07-08 | End: 2020-07-08 | Stop reason: HOSPADM

## 2020-07-08 RX ORDER — LISINOPRIL 10 MG/1
10 TABLET ORAL DAILY
Status: DISCONTINUED | OUTPATIENT
Start: 2020-07-08 | End: 2020-07-09 | Stop reason: HOSPADM

## 2020-07-08 RX ORDER — PROMETHAZINE HYDROCHLORIDE 25 MG/ML
12.5 INJECTION, SOLUTION INTRAMUSCULAR; INTRAVENOUS EVERY 4 HOURS PRN
Status: DISCONTINUED | OUTPATIENT
Start: 2020-07-08 | End: 2020-07-09 | Stop reason: HOSPADM

## 2020-07-08 RX ORDER — HYDRALAZINE HYDROCHLORIDE 20 MG/ML
5 INJECTION INTRAMUSCULAR; INTRAVENOUS
Status: DISCONTINUED | OUTPATIENT
Start: 2020-07-08 | End: 2020-07-08 | Stop reason: HOSPADM

## 2020-07-08 RX ORDER — LIDOCAINE HYDROCHLORIDE 20 MG/ML
INJECTION, SOLUTION INFILTRATION; PERINEURAL AS NEEDED
Status: DISCONTINUED | OUTPATIENT
Start: 2020-07-08 | End: 2020-07-08 | Stop reason: SURG

## 2020-07-08 RX ORDER — LEVOTHYROXINE SODIUM 137 UG/1
137 TABLET ORAL DAILY
Status: DISCONTINUED | OUTPATIENT
Start: 2020-07-09 | End: 2020-07-09 | Stop reason: HOSPADM

## 2020-07-08 RX ORDER — SCOLOPAMINE TRANSDERMAL SYSTEM 1 MG/1
1 PATCH, EXTENDED RELEASE TRANSDERMAL CONTINUOUS
Status: DISCONTINUED | OUTPATIENT
Start: 2020-07-08 | End: 2020-07-09 | Stop reason: HOSPADM

## 2020-07-08 RX ORDER — NALBUPHINE HCL 10 MG/ML
2 AMPUL (ML) INJECTION EVERY 4 HOURS PRN
Status: DISCONTINUED | OUTPATIENT
Start: 2020-07-08 | End: 2020-07-08 | Stop reason: HOSPADM

## 2020-07-08 RX ORDER — ACETAMINOPHEN 325 MG/1
650 TABLET ORAL ONCE AS NEEDED
Status: DISCONTINUED | OUTPATIENT
Start: 2020-07-08 | End: 2020-07-08 | Stop reason: HOSPADM

## 2020-07-08 RX ORDER — NITROGLYCERIN 0.4 MG/1
0.4 TABLET SUBLINGUAL
Status: DISCONTINUED | OUTPATIENT
Start: 2020-07-08 | End: 2020-07-09 | Stop reason: HOSPADM

## 2020-07-08 RX ORDER — ONDANSETRON 4 MG/1
4 TABLET, ORALLY DISINTEGRATING ORAL EVERY 4 HOURS PRN
Status: DISCONTINUED | OUTPATIENT
Start: 2020-07-08 | End: 2020-07-09 | Stop reason: HOSPADM

## 2020-07-08 RX ORDER — NALOXONE HCL 0.4 MG/ML
0.4 VIAL (ML) INJECTION AS NEEDED
Status: DISCONTINUED | OUTPATIENT
Start: 2020-07-08 | End: 2020-07-08 | Stop reason: HOSPADM

## 2020-07-08 RX ORDER — GLYCOPYRROLATE 0.2 MG/ML
INJECTION INTRAMUSCULAR; INTRAVENOUS AS NEEDED
Status: DISCONTINUED | OUTPATIENT
Start: 2020-07-08 | End: 2020-07-08 | Stop reason: SURG

## 2020-07-08 RX ORDER — ALBUTEROL SULFATE 2.5 MG/3ML
2.5 SOLUTION RESPIRATORY (INHALATION) EVERY 6 HOURS PRN
Status: DISCONTINUED | OUTPATIENT
Start: 2020-07-08 | End: 2020-07-09 | Stop reason: HOSPADM

## 2020-07-08 RX ORDER — EPHEDRINE SULFATE 50 MG/ML
INJECTION, SOLUTION INTRAVENOUS AS NEEDED
Status: DISCONTINUED | OUTPATIENT
Start: 2020-07-08 | End: 2020-07-08 | Stop reason: SURG

## 2020-07-08 RX ORDER — SODIUM CHLORIDE 0.9 % (FLUSH) 0.9 %
10 SYRINGE (ML) INJECTION EVERY 12 HOURS SCHEDULED
Status: DISCONTINUED | OUTPATIENT
Start: 2020-07-08 | End: 2020-07-08 | Stop reason: HOSPADM

## 2020-07-08 RX ORDER — PANTOPRAZOLE SODIUM 40 MG/10ML
40 INJECTION, POWDER, LYOPHILIZED, FOR SOLUTION INTRAVENOUS ONCE
Status: COMPLETED | OUTPATIENT
Start: 2020-07-08 | End: 2020-07-08

## 2020-07-08 RX ORDER — ALBUTEROL SULFATE 2.5 MG/3ML
2.5 SOLUTION RESPIRATORY (INHALATION)
Status: DISCONTINUED | OUTPATIENT
Start: 2020-07-08 | End: 2020-07-08

## 2020-07-08 RX ORDER — METOCLOPRAMIDE HYDROCHLORIDE 5 MG/ML
10 INJECTION INTRAMUSCULAR; INTRAVENOUS EVERY 6 HOURS
Status: DISCONTINUED | OUTPATIENT
Start: 2020-07-08 | End: 2020-07-09 | Stop reason: HOSPADM

## 2020-07-08 RX ORDER — NALOXONE HCL 0.4 MG/ML
0.4 VIAL (ML) INJECTION
Status: DISCONTINUED | OUTPATIENT
Start: 2020-07-08 | End: 2020-07-09 | Stop reason: HOSPADM

## 2020-07-08 RX ORDER — MAGNESIUM HYDROXIDE 1200 MG/15ML
LIQUID ORAL AS NEEDED
Status: DISCONTINUED | OUTPATIENT
Start: 2020-07-08 | End: 2020-07-08 | Stop reason: HOSPADM

## 2020-07-08 RX ORDER — ROCURONIUM BROMIDE 10 MG/ML
INJECTION, SOLUTION INTRAVENOUS AS NEEDED
Status: DISCONTINUED | OUTPATIENT
Start: 2020-07-08 | End: 2020-07-08 | Stop reason: SURG

## 2020-07-08 RX ORDER — DEXAMETHASONE SODIUM PHOSPHATE 10 MG/ML
INJECTION INTRAMUSCULAR; INTRAVENOUS AS NEEDED
Status: DISCONTINUED | OUTPATIENT
Start: 2020-07-08 | End: 2020-07-08 | Stop reason: SURG

## 2020-07-08 RX ORDER — SODIUM CHLORIDE, SODIUM LACTATE, POTASSIUM CHLORIDE, CALCIUM CHLORIDE 600; 310; 30; 20 MG/100ML; MG/100ML; MG/100ML; MG/100ML
100 INJECTION, SOLUTION INTRAVENOUS CONTINUOUS
Status: DISCONTINUED | OUTPATIENT
Start: 2020-07-08 | End: 2020-07-08 | Stop reason: HOSPADM

## 2020-07-08 RX ORDER — METOCLOPRAMIDE HYDROCHLORIDE 5 MG/ML
10 INJECTION INTRAMUSCULAR; INTRAVENOUS ONCE
Status: COMPLETED | OUTPATIENT
Start: 2020-07-08 | End: 2020-07-08

## 2020-07-08 RX ORDER — NALBUPHINE HCL 10 MG/ML
10 AMPUL (ML) INJECTION EVERY 4 HOURS PRN
Status: DISCONTINUED | OUTPATIENT
Start: 2020-07-08 | End: 2020-07-08 | Stop reason: HOSPADM

## 2020-07-08 RX ORDER — ONDANSETRON 2 MG/ML
4 INJECTION INTRAMUSCULAR; INTRAVENOUS EVERY 4 HOURS PRN
Status: DISCONTINUED | OUTPATIENT
Start: 2020-07-08 | End: 2020-07-09 | Stop reason: HOSPADM

## 2020-07-08 RX ORDER — SODIUM CHLORIDE, SODIUM LACTATE, POTASSIUM CHLORIDE, CALCIUM CHLORIDE 600; 310; 30; 20 MG/100ML; MG/100ML; MG/100ML; MG/100ML
9 INJECTION, SOLUTION INTRAVENOUS CONTINUOUS
Status: DISCONTINUED | OUTPATIENT
Start: 2020-07-08 | End: 2020-07-08 | Stop reason: HOSPADM

## 2020-07-08 RX ORDER — HALOPERIDOL 5 MG/ML
0.5 INJECTION INTRAMUSCULAR EVERY 4 HOURS PRN
Status: DISCONTINUED | OUTPATIENT
Start: 2020-07-08 | End: 2020-07-09 | Stop reason: HOSPADM

## 2020-07-08 RX ORDER — HYDROMORPHONE HYDROCHLORIDE 1 MG/ML
0.25 INJECTION, SOLUTION INTRAMUSCULAR; INTRAVENOUS; SUBCUTANEOUS
Status: DISCONTINUED | OUTPATIENT
Start: 2020-07-08 | End: 2020-07-08 | Stop reason: HOSPADM

## 2020-07-08 RX ORDER — MIRTAZAPINE 15 MG/1
15 TABLET, ORALLY DISINTEGRATING ORAL NIGHTLY
Status: DISCONTINUED | OUTPATIENT
Start: 2020-07-08 | End: 2020-07-09 | Stop reason: HOSPADM

## 2020-07-08 RX ORDER — PROMETHAZINE HYDROCHLORIDE 25 MG/1
25 SUPPOSITORY RECTAL ONCE AS NEEDED
Status: DISCONTINUED | OUTPATIENT
Start: 2020-07-08 | End: 2020-07-08 | Stop reason: HOSPADM

## 2020-07-08 RX ORDER — LABETALOL HYDROCHLORIDE 5 MG/ML
10 INJECTION, SOLUTION INTRAVENOUS
Status: DISCONTINUED | OUTPATIENT
Start: 2020-07-08 | End: 2020-07-09 | Stop reason: HOSPADM

## 2020-07-08 RX ORDER — PROPOFOL 10 MG/ML
VIAL (ML) INTRAVENOUS AS NEEDED
Status: DISCONTINUED | OUTPATIENT
Start: 2020-07-08 | End: 2020-07-08 | Stop reason: SURG

## 2020-07-08 RX ADMIN — FAMOTIDINE 20 MG: 10 INJECTION INTRAVENOUS at 20:24

## 2020-07-08 RX ADMIN — GABAPENTIN 300 MG: 300 CAPSULE ORAL at 14:34

## 2020-07-08 RX ADMIN — AZTREONAM 2 G: 2 INJECTION, POWDER, FOR SOLUTION INTRAMUSCULAR; INTRAVENOUS at 11:50

## 2020-07-08 RX ADMIN — DEXAMETHASONE SODIUM PHOSPHATE 8 MG: 10 INJECTION INTRAMUSCULAR; INTRAVENOUS at 11:54

## 2020-07-08 RX ADMIN — LIDOCAINE HYDROCHLORIDE 0.5 ML: 10 INJECTION, SOLUTION EPIDURAL; INFILTRATION; INTRACAUDAL; PERINEURAL at 09:12

## 2020-07-08 RX ADMIN — LIDOCAINE HYDROCHLORIDE 40 MG: 20 INJECTION, SOLUTION INFILTRATION; PERINEURAL at 11:50

## 2020-07-08 RX ADMIN — GLYCOPYRROLATE 0.2 MG: 0.2 INJECTION INTRAMUSCULAR; INTRAVENOUS at 11:43

## 2020-07-08 RX ADMIN — METOCLOPRAMIDE HYDROCHLORIDE 10 MG: 5 INJECTION INTRAMUSCULAR; INTRAVENOUS at 09:54

## 2020-07-08 RX ADMIN — PHENYLEPHRINE HYDROCHLORIDE 100 MCG: 10 INJECTION INTRAVENOUS at 12:02

## 2020-07-08 RX ADMIN — FENTANYL CITRATE 50 MCG: 50 INJECTION INTRAMUSCULAR; INTRAVENOUS at 11:45

## 2020-07-08 RX ADMIN — SODIUM CHLORIDE, POTASSIUM CHLORIDE, SODIUM LACTATE AND CALCIUM CHLORIDE 500 ML: 600; 310; 30; 20 INJECTION, SOLUTION INTRAVENOUS at 09:12

## 2020-07-08 RX ADMIN — MIDAZOLAM 1 MG: 1 INJECTION INTRAMUSCULAR; INTRAVENOUS at 09:53

## 2020-07-08 RX ADMIN — SUGAMMADEX 200 MG: 100 INJECTION, SOLUTION INTRAVENOUS at 12:24

## 2020-07-08 RX ADMIN — METOCLOPRAMIDE HYDROCHLORIDE 10 MG: 5 INJECTION INTRAMUSCULAR; INTRAVENOUS at 14:34

## 2020-07-08 RX ADMIN — ACETAMINOPHEN 1000 MG: 500 TABLET, FILM COATED ORAL at 20:24

## 2020-07-08 RX ADMIN — PANTOPRAZOLE SODIUM 40 MG: 40 INJECTION, POWDER, FOR SOLUTION INTRAVENOUS at 09:12

## 2020-07-08 RX ADMIN — ACETAMINOPHEN 1000 MG: 500 TABLET, FILM COATED ORAL at 14:34

## 2020-07-08 RX ADMIN — METOCLOPRAMIDE HYDROCHLORIDE 10 MG: 5 INJECTION INTRAMUSCULAR; INTRAVENOUS at 20:24

## 2020-07-08 RX ADMIN — SODIUM CHLORIDE, POTASSIUM CHLORIDE, SODIUM LACTATE AND CALCIUM CHLORIDE: 600; 310; 30; 20 INJECTION, SOLUTION INTRAVENOUS at 11:42

## 2020-07-08 RX ADMIN — ROCURONIUM BROMIDE 50 MG: 10 INJECTION, SOLUTION INTRAVENOUS at 11:45

## 2020-07-08 RX ADMIN — GABAPENTIN 300 MG: 300 CAPSULE ORAL at 21:43

## 2020-07-08 RX ADMIN — FENTANYL CITRATE 50 MCG: 50 INJECTION, SOLUTION INTRAMUSCULAR; INTRAVENOUS at 13:38

## 2020-07-08 RX ADMIN — EPHEDRINE SULFATE 10 MG: 50 INJECTION INTRAVENOUS at 12:00

## 2020-07-08 RX ADMIN — SODIUM CHLORIDE, POTASSIUM CHLORIDE, SODIUM LACTATE AND CALCIUM CHLORIDE: 600; 310; 30; 20 INJECTION, SOLUTION INTRAVENOUS at 12:20

## 2020-07-08 RX ADMIN — ONDANSETRON HYDROCHLORIDE 4 MG: 2 SOLUTION INTRAMUSCULAR; INTRAVENOUS at 11:43

## 2020-07-08 RX ADMIN — HYDROMORPHONE HYDROCHLORIDE 0.5 MG: 1 INJECTION, SOLUTION INTRAMUSCULAR; INTRAVENOUS; SUBCUTANEOUS at 13:13

## 2020-07-08 RX ADMIN — HYDROMORPHONE HYDROCHLORIDE 0.5 MG: 1 INJECTION, SOLUTION INTRAMUSCULAR; INTRAVENOUS; SUBCUTANEOUS at 13:23

## 2020-07-08 RX ADMIN — PROPOFOL 200 MG: 10 INJECTION, EMULSION INTRAVENOUS at 11:45

## 2020-07-08 RX ADMIN — THIAMINE HYDROCHLORIDE 250 ML/HR: 100 INJECTION, SOLUTION INTRAMUSCULAR; INTRAVENOUS at 23:42

## 2020-07-08 RX ADMIN — SCOPALAMINE 1 PATCH: 1 PATCH, EXTENDED RELEASE TRANSDERMAL at 09:17

## 2020-07-08 RX ADMIN — HYDROMORPHONE HYDROCHLORIDE 0.5 MG: 1 INJECTION, SOLUTION INTRAMUSCULAR; INTRAVENOUS; SUBCUTANEOUS at 13:37

## 2020-07-08 RX ADMIN — FENTANYL CITRATE 50 MCG: 50 INJECTION INTRAMUSCULAR; INTRAVENOUS at 11:50

## 2020-07-08 RX ADMIN — MIRTAZAPINE 15 MG: 15 TABLET, ORALLY DISINTEGRATING ORAL at 20:24

## 2020-07-08 RX ADMIN — CHLORHEXIDINE GLUCONATE 15 ML: 1.2 RINSE ORAL at 09:18

## 2020-07-08 RX ADMIN — ENOXAPARIN SODIUM 40 MG: 40 INJECTION SUBCUTANEOUS at 11:28

## 2020-07-08 RX ADMIN — SODIUM CHLORIDE, POTASSIUM CHLORIDE, SODIUM LACTATE AND CALCIUM CHLORIDE 150 ML/HR: 600; 310; 30; 20 INJECTION, SOLUTION INTRAVENOUS at 17:46

## 2020-07-08 RX ADMIN — ACETAMINOPHEN ORAL SOLUTION 975 MG: 325 SOLUTION ORAL at 08:57

## 2020-07-08 RX ADMIN — VANCOMYCIN HYDROCHLORIDE 1500 MG: 10 INJECTION, POWDER, LYOPHILIZED, FOR SOLUTION INTRAVENOUS at 10:00

## 2020-07-08 NOTE — ANESTHESIA POSTPROCEDURE EVALUATION
Patient: Xuan Gasca    Procedure Summary     Date:  07/08/20 Room / Location:   [facility]  /  [facility]    Anesthesia Start:  1141 Anesthesia Stop:  1245    Procedure:  GASTRIC SLEEVE LAPAROSCOPIC With Hiatal Hernia Repair (N/A Abdomen) Diagnosis:       Obesity, Class II, BMI 35-39.9      (Obesity, Class II, BMI 35-39.9 [E66.9])    Surgeon:  Rno Cadet Jr., MD Provider:  Tree Ochoa MD    Anesthesia Type:  general ASA Status:  3          Anesthesia Type: general    Vitals  Vitals Value Taken Time   /66 7/8/2020  1:45 PM   Temp 36.4 °C (97.5 °F) 7/8/2020 12:42 PM   Pulse 97 7/8/2020  1:48 PM   Resp 16 7/8/2020  1:45 PM   SpO2 95 % 7/8/2020  1:48 PM   Vitals shown include unvalidated device data.        Post Anesthesia Care and Evaluation    Patient location during evaluation: bedside  Patient participation: complete - patient participated  Level of consciousness: awake and alert  Pain management: adequate  Airway patency: patent  Anesthetic complications: No anesthetic complications    Cardiovascular status: acceptable  Respiratory status: acceptable  Hydration status: acceptable    Comments: /66   Pulse 92   Temp 36.4 °C (97.5 °F) (Oral)   Resp 16   SpO2 94%

## 2020-07-08 NOTE — OP NOTE
PREOPERATIVE DIAGNOSIS:  Morbid obesity with multiple comorbidities as referenced in the most recent history and physical.    POSTOPERATIVE DIAGNOSIS:    1:  Morbid obesity with multiple comorbidities as referenced in the most recent history and physical.  2:  Hiatal Hernia    PROCEDURES PERFORMED:  1.  Laparoscopic sleeve gastrectomy.  2.  Laparoscopic hiatal hernia repair.  3.  Tisseel application.     SURGEON:  Ron Cadet Jr., MD    ASSISTANT:  HILARY Wolfe, [facility]    Surgery assisted and facilitated by a certified physician assistant, who directly resulted in a decreased operative time, anesthetic time, wound exposure, and possibly of an operative wound infection, thereby decreasing patient morbidity and ultimately total expenditures. The surgical assistant assisted in placement of trochars, take down of the gastrocolic omentum, short gastric vessels and dissection at the angle of His.  Also assisted in retraction of the stomach during stapling so as not to kink the gastric sleeve.  Also assisted in removing of the gastric specimen, closure of the fascial defect as well as closure of the skin incisions. They also assisted in retraction of the stomach during the hiatal hernia repair, dissection of the hernia sac and closure of the crura.      ANESTHESIA:  General endotracheal.    ESTIMATED BLOOD LOSS:   Less than 25 mL unless dictated below.    FLUIDS:  Crystalloids.    SPECIMENS: Gastric remnant    DRAINS:  None.    COUNTS:  Correct.    COMPLICATIONS:  None.    INDICATIONS:  This patient with morbid obesity and associated comorbidities presents for elective laparoscopic, possible open sleeve gastrectomy.  The patient has received medical clearance to proceed.  The patient has undergone our extensive educational process and consent process and wishes to proceed.    DESCRIPTION OF PROCEDURE:  The patient was brought to the operating room and placed supine upon the operating room table. SCD hose were  placed.  The patient underwent uneventful general endotracheal anesthesia per the anesthesiology staff. The abdomen was prepped with ChloraPrep and draped in the usual sterile fashion.  An Ioban was used as well if not allergic.  Anesthesia staff passed a 18 Lao gastric tube into the stomach to decompress. A 5-10 mm transverse incision was made a few centimeters above and to the left of the umbilicus and the peritoneal cavity entered under direct camera visualization using a 5 or 10 mm 0° laparoscope and an Optiview trocar.  The abdomen was then insufflated to a pressure of 15-16 mmHg with CO2 gas.  Exploratory laparoscopy revealed no evidence of injury from the entrance technique and no significant abnormalities were seen unless addendum dictated below.  An angled laparoscope was then used.  The patient was placed in reverse Trendelenburg position.  Under direct camera visualization a 5 mm trocar was placed in the right lateral subcostal position.  A 12 mm trocar was placed in the right midabdominal position.  A 5-12 mm trocar was placed in the left midabdominal position. A Gopi retractor was placed through an epigastric incision and used to elevate the left lobe of the liver.  The fat pad was elevated and the left una exposed.  At this point, approximately skilled nursing along the greater curvature, the gastrocolic omentum was divided with the Enseal and this proceeded superiorly to the angle of His taking down the short gastric vessels.  All posterior attachments of the lesser sac and posterior aspect of the stomach to the pancreas were taken down as well.  The left una was exposed along its length.  Dissection then proceeded medially taking down the greater curvature with an Enseal until just proximal to the pylorus. The standard clamp and 18 Lao orogastric tube were used to size the gastric sleeve. The stomach was marked in the 3 positions using the indelible ink pen.  The 3 markings were at the angle of  Hiss, the incisura and antrum using 1cm, 3cm and 5cm respectively. Green cartridges were used for a total of 4-5. The 1st load was a green load on the Platea Flex stapler with Veritas Brigitte-Strip and this was placed 5 cm proximal to the pylorus.  The next 3-4loads were green with absorbable Veritas Brigitte-Strips depending on the size of the stomach. Careful attention was made to stay 1 cm from the esophagus.  Areas of the reinforced staple line were oversewn with absorbable sutures as needed for bleeding or questionable staple lines.  The gastric specimen was then removed from the 12 mm trocar site. The specimen staple line was inspected and was intact.  The specimen was then sent off to pathology.   At this point, the sleeve was submerged under saline and using the orogastric tube to insufflate the stomach, a leak test was performed.  This revealed the sleeve to be watertight, no air bubbles, no leak, and no bleeding seen from the staple lines and no significant abnormalities.  Irrigation fluid from the abdomen was then suctioned.  The gastric sleeve staple line was then treated with 4 mL Tisseel fibrin glue. The fascia at the 12 mm trocar site incision was closed with a single 0 Vicryl suture in a figure-of-eight fashion placed under direct laparoscopic camera visualization with a suture passer and tying the knot extracorporeally.  The fascia in the area was infiltrated with local anesthesia. All incisions were then infiltrated with local anesthetic. The remaining trocars were removed under direct camera visualization with no bleeding noted from their sites.  The abdomen was desufflated of gas. The skin in each incision was closed using 4-0 antibiotic impregnated Monocryl in a subcuticular stitch followed by Dermabond.  The patient tolerated the procedure well without complication and was taken to the recovery room in stable condition.  All sponge, needle, and instrument counts were correct.    The patient was noted  to have a hiatal hernia.  The phrenoesophageal membrane was opened up with electrocautery and the right and left crura were cleaned off using sharp and blunt dissection.  The hernia sac was completely dissected free.  The intra-abdominal esophagus was now approximately 3 cm in length.  The base of the left una was exposed to evaluate for a posterior defect and lipomas.The short gastric vessels were taken down using the Enseal device and the fundus was removed as dictated above.  The hiatus was then reapproximated with 0 ethibond sutures in a figure-of-eight fashion.

## 2020-07-08 NOTE — ANESTHESIA PREPROCEDURE EVALUATION
Anesthesia Evaluation     no history of anesthetic complications:  NPO Solid Status: > 8 hours             Airway   Mallampati: III  TM distance: <3 FB  Neck ROM: full  Possible difficult intubation  Dental - normal exam     Pulmonary - normal exam   (+) sleep apnea,   Cardiovascular     ECG reviewed  Rhythm: regular    (+) hypertension,       Neuro/Psych  (+) psychiatric history,     GI/Hepatic/Renal/Endo    (+)  hiatal hernia,      Musculoskeletal     Abdominal    Substance History      OB/GYN          Other                        Anesthesia Plan    ASA 3     general   (  D/W R&B of GA including but not limited to: heart, lung, liver, kidney, neurologic problems, positioning injuries, dental damage, corneal abrasion and TMJ.  .)  intravenous induction

## 2020-07-08 NOTE — ANESTHESIA PROCEDURE NOTES
Airway  Urgency: elective    Date/Time: 7/8/2020 11:46 AM  Airway not difficult    General Information and Staff    Patient location during procedure: OR  Anesthesiologist: Tree Ochoa MD  CRNA: Jane Yang CRNA    Indications and Patient Condition  Indications for airway management: airway protection    Preoxygenated: yes  Mask difficulty assessment: 1 - vent by mask    Final Airway Details  Final airway type: endotracheal airway      Successful airway: ETT  Cuffed: yes   Successful intubation technique: direct laryngoscopy  Facilitating devices/methods: Bougie  Endotracheal tube insertion site: oral  Blade: Solano  Blade size: 2  ETT size (mm): 7.0  Cormack-Lehane Classification: grade IIa - partial view of glottis  Placement verified by: chest auscultation and capnometry   Cuff volume (mL): 7  Measured from: teeth  ETT/EBT  to teeth (cm): 20  Number of attempts at approach: 1  Assessment: lips, teeth, and gum same as pre-op and atraumatic intubation

## 2020-07-09 VITALS
TEMPERATURE: 98.6 F | BODY MASS INDEX: 37.08 KG/M2 | DIASTOLIC BLOOD PRESSURE: 85 MMHG | OXYGEN SATURATION: 94 % | WEIGHT: 209.35 LBS | HEART RATE: 93 BPM | SYSTOLIC BLOOD PRESSURE: 145 MMHG | RESPIRATION RATE: 18 BRPM

## 2020-07-09 LAB
ALBUMIN SERPL-MCNC: 3.6 G/DL (ref 3.5–5.2)
ALBUMIN/GLOB SERPL: 1.3 G/DL
ALP SERPL-CCNC: 50 U/L (ref 39–117)
ALT SERPL W P-5'-P-CCNC: 43 U/L (ref 1–33)
ANION GAP SERPL CALCULATED.3IONS-SCNC: 11.3 MMOL/L (ref 5–15)
AST SERPL-CCNC: 29 U/L (ref 1–32)
BASOPHILS # BLD AUTO: 0.01 10*3/MM3 (ref 0–0.2)
BASOPHILS NFR BLD AUTO: 0.1 % (ref 0–1.5)
BILIRUB SERPL-MCNC: 0.3 MG/DL (ref 0–1.2)
BUN SERPL-MCNC: 7 MG/DL (ref 6–20)
BUN/CREAT SERPL: 14.9 (ref 7–25)
CALCIUM SPEC-SCNC: 9.3 MG/DL (ref 8.6–10.5)
CHLORIDE SERPL-SCNC: 105 MMOL/L (ref 98–107)
CO2 SERPL-SCNC: 22.7 MMOL/L (ref 22–29)
CREAT SERPL-MCNC: 0.47 MG/DL (ref 0.57–1)
CYTO UR: NORMAL
DEPRECATED RDW RBC AUTO: 42.9 FL (ref 37–54)
EOSINOPHIL # BLD AUTO: 0 10*3/MM3 (ref 0–0.4)
EOSINOPHIL NFR BLD AUTO: 0 % (ref 0.3–6.2)
ERYTHROCYTE [DISTWIDTH] IN BLOOD BY AUTOMATED COUNT: 12.6 % (ref 12.3–15.4)
GFR SERPL CREATININE-BSD FRML MDRD: 140 ML/MIN/1.73
GLOBULIN UR ELPH-MCNC: 2.8 GM/DL
GLUCOSE SERPL-MCNC: 119 MG/DL (ref 65–99)
HCT VFR BLD AUTO: 36.6 % (ref 34–46.6)
HGB BLD-MCNC: 12.5 G/DL (ref 12–15.9)
IMM GRANULOCYTES # BLD AUTO: 0.04 10*3/MM3 (ref 0–0.05)
IMM GRANULOCYTES NFR BLD AUTO: 0.3 % (ref 0–0.5)
LAB AP CASE REPORT: NORMAL
LYMPHOCYTES # BLD AUTO: 1.2 10*3/MM3 (ref 0.7–3.1)
LYMPHOCYTES NFR BLD AUTO: 10.3 % (ref 19.6–45.3)
MAGNESIUM SERPL-MCNC: 2 MG/DL (ref 1.6–2.6)
MCH RBC QN AUTO: 31.5 PG (ref 26.6–33)
MCHC RBC AUTO-ENTMCNC: 34.2 G/DL (ref 31.5–35.7)
MCV RBC AUTO: 92.2 FL (ref 79–97)
MONOCYTES # BLD AUTO: 0.75 10*3/MM3 (ref 0.1–0.9)
MONOCYTES NFR BLD AUTO: 6.4 % (ref 5–12)
NEUTROPHILS NFR BLD AUTO: 82.9 % (ref 42.7–76)
NEUTROPHILS NFR BLD AUTO: 9.67 10*3/MM3 (ref 1.7–7)
NRBC BLD AUTO-RTO: 0 /100 WBC (ref 0–0.2)
PATH REPORT.FINAL DX SPEC: NORMAL
PATH REPORT.GROSS SPEC: NORMAL
PHOSPHATE SERPL-MCNC: 3.2 MG/DL (ref 2.5–4.5)
PLATELET # BLD AUTO: 253 10*3/MM3 (ref 140–450)
PMV BLD AUTO: 10.5 FL (ref 6–12)
POTASSIUM SERPL-SCNC: 3.9 MMOL/L (ref 3.5–5.2)
PROT SERPL-MCNC: 6.4 G/DL (ref 6–8.5)
RBC # BLD AUTO: 3.97 10*6/MM3 (ref 3.77–5.28)
SODIUM SERPL-SCNC: 139 MMOL/L (ref 136–145)
WBC # BLD AUTO: 11.67 10*3/MM3 (ref 3.4–10.8)

## 2020-07-09 PROCEDURE — 80053 COMPREHEN METABOLIC PANEL: CPT | Performed by: SURGERY

## 2020-07-09 PROCEDURE — 25010000002 THIAMINE PER 100 MG: Performed by: SURGERY

## 2020-07-09 PROCEDURE — 25010000002 CYANOCOBALAMIN PER 1000 MCG: Performed by: SURGERY

## 2020-07-09 PROCEDURE — 83735 ASSAY OF MAGNESIUM: CPT | Performed by: SURGERY

## 2020-07-09 PROCEDURE — 25010000002 METOCLOPRAMIDE PER 10 MG: Performed by: SURGERY

## 2020-07-09 PROCEDURE — 25010000002 ENOXAPARIN PER 10 MG: Performed by: SURGERY

## 2020-07-09 PROCEDURE — 85025 COMPLETE CBC W/AUTO DIFF WBC: CPT | Performed by: SURGERY

## 2020-07-09 PROCEDURE — 84100 ASSAY OF PHOSPHORUS: CPT | Performed by: SURGERY

## 2020-07-09 RX ORDER — HYDROMORPHONE HYDROCHLORIDE 2 MG/1
2 TABLET ORAL EVERY 4 HOURS PRN
Qty: 18 TABLET | Refills: 0 | Status: SHIPPED | OUTPATIENT
Start: 2020-07-09 | End: 2020-07-18

## 2020-07-09 RX ORDER — ONDANSETRON 4 MG/1
4 TABLET, ORALLY DISINTEGRATING ORAL EVERY 4 HOURS PRN
Qty: 10 TABLET | Refills: 0 | Status: SHIPPED | OUTPATIENT
Start: 2020-07-09 | End: 2020-08-12

## 2020-07-09 RX ADMIN — FAMOTIDINE 20 MG: 10 INJECTION INTRAVENOUS at 08:16

## 2020-07-09 RX ADMIN — GABAPENTIN 300 MG: 300 CAPSULE ORAL at 05:28

## 2020-07-09 RX ADMIN — METOCLOPRAMIDE HYDROCHLORIDE 10 MG: 5 INJECTION INTRAMUSCULAR; INTRAVENOUS at 03:52

## 2020-07-09 RX ADMIN — CYANOCOBALAMIN 1000 MCG: 1000 INJECTION, SOLUTION INTRAMUSCULAR at 08:16

## 2020-07-09 RX ADMIN — ENOXAPARIN SODIUM 40 MG: 40 INJECTION SUBCUTANEOUS at 08:15

## 2020-07-09 RX ADMIN — ACETAMINOPHEN 1000 MG: 500 TABLET, FILM COATED ORAL at 03:52

## 2020-07-09 RX ADMIN — ACETAMINOPHEN 1000 MG: 500 TABLET, FILM COATED ORAL at 08:16

## 2020-07-09 RX ADMIN — LISINOPRIL 10 MG: 10 TABLET ORAL at 08:16

## 2020-07-09 RX ADMIN — METOCLOPRAMIDE HYDROCHLORIDE 10 MG: 5 INJECTION INTRAMUSCULAR; INTRAVENOUS at 08:16

## 2020-07-09 NOTE — PLAN OF CARE
"  Problem: Patient Care Overview  Goal: Plan of Care Review  Outcome: Outcome(s) achieved  Flowsheets (Taken 7/9/2020 8986)  Progress: improving  Plan of Care Reviewed With: patient  Outcome Summary: Pt only c/o \"soreness\". Tolerating diet. Ambulating well in hallway. Patient discharged home.  Goal: Individualization and Mutuality  Outcome: Outcome(s) achieved  Goal: Discharge Needs Assessment  Outcome: Outcome(s) achieved  Goal: Interprofessional Rounds/Family Conf  Outcome: Outcome(s) achieved     Problem: Bariatric Surgery (Adult,Pediatric)  Goal: Signs and Symptoms of Listed Potential Problems Will be Absent, Minimized or Managed (Bariatric Surgery)  Outcome: Outcome(s) achieved  Goal: Anesthesia/Sedation Recovery  Outcome: Outcome(s) achieved     "

## 2020-07-09 NOTE — PROGRESS NOTES
Discharge Planning Assessment  [facility]     Patient Name: Xuan Gasca  MRN: [id number]  Today's Date: 7/9/2020    Admit Date: 7/8/2020    Discharge Needs Assessment    No documentation.       Discharge Plan     Row Name 07/09/20 1113       Plan    Plan  Home    Final Discharge Disposition Code  01 - home or self-care    Final Note  Home         Destination      Coordination has not been started for this encounter.      Durable Medical Equipment      Coordination has not been started for this encounter.      Dialysis/Infusion      Coordination has not been started for this encounter.      Home Medical Care      Coordination has not been started for this encounter.      Therapy      Coordination has not been started for this encounter.      Community Resources      Coordination has not been started for this encounter.        Expected Discharge Date and Time     Expected Discharge Date Expected Discharge Time    Jul 9, 2020         Demographic Summary    No documentation.       Functional Status    No documentation.       Psychosocial    No documentation.       Abuse/Neglect    No documentation.       Legal    No documentation.       Substance Abuse    No documentation.       Patient Forms    No documentation.           Jeanne Roberto RN

## 2020-07-09 NOTE — DISCHARGE INSTRUCTIONS
GOING HOME AFTER GASTRIC SLEEVE/ GASTRIC BYPASS SURGERY  [facility] Weight Loss: Post-Operative Information/Instructions  Ron Cadet Jr., MD  General Patient Instructions for Discharge   - Call Surgeon's office at [phone] for follow-up appointment.    - Be sure you, the patient, have a follow-up appointment to be seen within seven (7) days after discharge. If not, please call [phone] to schedule an appointment. If you are discharged on a Saturday or Sunday, please call Monday to schedule the appointment.  - Contact the Surgeon at [phone] for any questions or concerns, including temperature greater than or equal to 101F, shortness of breath, leg swelling, redness at incision sites, nausea, vomiting, chills, or problems or questions.    - Follow the Gastric Stage 1 Diet    à Clear liquids, room temperature, sugar-free, caffeine-free, non-carbonated, 70 grams of protein, No Straws.  - You may shower. No tub bath for 2 weeks.  - No lifting, pushing, pulling, or tugging >25 pounds for 3 weeks.  - Ambulate every 3 hours while awake minimum for seven (7) days, increase distance daily.  - For the next several weeks, you are at an increased risk for blood clot formation. Therefore, you should walk regularly. You should not sit for prolonged periods of time, more than 45 minutes, without getting up and walking for 5-10 minutes. This includes any car rides, including the drive home from the hospital. If driving any distance greater than 30 miles over the next two (2) weeks, stop every 30-45 minutes and walk for 5-10 minutes each time.  - Continue using Incentive Spirometer and coughing exercises at least every two (2) hours while awake for one week.  - Continue use of CPAP/BIPAP for diagnosis of sleep apnea as directed.  - No driving or operating machinery allowed while taking narcotic (prescription) pain medication, and until you feel comfortable forcefully applying the brakes if needed. (This  usually takes more than 3 days.)    - Make an appointment with your Primary Care Physician within one week post-op to look at your home medications for possible changes or discontinuity.   Medications  - The nurse will provide a list of medications for you to continue at home   - If you received a Lovenox (Enoxaparin) or Apixiban (Eliquis) prescription at pre-op visit with Surgeon, start taking the medicine the morning after discharge unless directed otherwise.    - If you were prescribed Lovenox (Enoxaparin), review the education/teaching material/video with the nurse.    - Take post op pain meds as prescribed as needed.   - Continue Foltx until finished.   - Resume use of Actigall (Ursodiol) one (1) week after surgery if patient still has gallbladder. You should have been given a prescription at your pre-op visit. Contact the office if you do not have the prescription.   - Resume bariatric vitamin regimen as instructed in pre-op education with bariatric coordinator.    - Zegerid or Prilosec OTC (or generic) by mouth once daily for four (4) weeks unless you are already taking a proton pump inhibitor as home medication. Follow dosing instructions on package.   Nausea/Vomiting:  The following are possible causes for nausea/vomiting:  - Drinking too much or too fast.  - Sinus drainage/post nasal drip for allergy sufferers (you may take Sudafed, Claritin, Tylenol Sinus/Allergy, or other decongestants and nose sprays to help with this discomfort).  - Low blood sugar (sweating, shaky, irritable, weakness, dizzy or tunnel-vision) - treatment is to sip 100% fruit juice - no sugar added until symptoms subside.  - Acid in fruit juice - (may dilute with water or avoid).  - Eating or drinking something that is not on clear liquid (stage 1) diet.  Any nausea/vomiting that prohibits you from keeping fluids down for greater than 24 hours requires a call to the surgeon's office.  Urine:  Use your urine color as a guide to  determine if you are drinking enough fluid. The darker the urine, the more fluids you need to drink. Urine should be clear to light yellow if you are getting enough fluid. If you should experience frequency, burning or pain with urination, blood in urine, contact us or your primary care physician for possible UTI (urinary tract infection), which could require antibiotics (liquid preferred).  Bowel Movements:  You may not have a bowel movement for 2-5 days after going home. You may then experience liquid, runny or loose stools for approximately 3-4 weeks following surgery. This would require you to drink even more fluids to prevent dehydration. Some patients may experience constipation, which can be treated with increased fluids, drinking warm liquids, increased activity and the use of a Fleets Enema, Milk of Magnesia, or suppositories. The first couple of bowel movements could be bloody, tarry black or dark maroon in color. This is OK as long as the stool returns to a normal color in 1-2 days. If however, you have frequent or a large amount of bloody or tarry black stools and/or become light-headed or dizzy, you may be bleeding and require urgent attention. Please call us right away.  Abdominal Incisions:  You will have small incisions. Do not scrub incisions, but allow the warm, soapy water to run over the incisions, rinse well, and pat dry. You may use any brand of anti-bacterial soap. Do not use Peroxide or Neosporin type ointments on sites, unless instructed to do so by a surgeon or nurse. Monitor daily for signs/symptoms of infection, which might include: drainage with a foul odor, pain, redness, swelling or heat at the incision sight; fever, body aches and chills. If you suspect infection or have a fever, give us a call.  Pain:  You will be given a prescription for pain medication to control your pain. If you feel the dose is too strong, you may take half the ordered dose, or you may take Tylenol adult liquid  per package instructions for minor pain. Do not take any medications that contain aspirin or aspirin products.  Do not take medications like: Motrin, Aleve, Ibuprofen, Advil, Naproxen, Celebrex, Daypro, Bextra, Meloxicam or other medications commonly used for arthritis or joint pain.  No steroids or cortisone injections. There may be pain, which should improve every few days. Pain should not suddenly get worse or more intense. Pain that suddenly changes and is constant and severe should be called in to the surgeon's office. Any sudden pain in the lower extremities with associated warmth and redness should be called in to the surgeon's office immediately. Do not rub or massage this area, as it could be a blood clot.  Diet:  Remain on the clear liquid diet (stage 1) per your  which includes 70 grams of protein each day, sugar free, non carbonated and no straws. Day 1 is the day of surgery. If you are tolerating the stage 1 diet, you may then proceed to stage 2 diet, as instructed in the . Do not progress to the stage 2 diet if you are having nausea/vomiting. Refer to the Basic Nutrition and Food Principles guide.  Medications:  The nurse will let you know which medications you will need to continue once you go home. Do not take any medications that are extended or time released if you had the gastric bypass procedure, OK to take if you had the gastric sleeve procedure. Large capsules can be opened and diluted with clear liquids. Check with your physician or pharmacist as to which pills may be crushed and which capsules may be opened and diluted safely. Continue taking Foltx as surgeon orders. If you still have your gall bladder and were prescribed Actigall (Ursodiol), you may resume this medication one week after your surgery. You will remain on Actigall (Ursodiol) for approximately 6 months. The dose is 1 pill, 2 times each day for 6 months.  Activity:  Continue your deep breathing and  coughing exercises with your Incentive Spirometer breathing device at least every 3 hours while awake (10 repetitions each time) for one week. May use CPAP. This will help to prevent respiratory problems such as pneumonia. No lifting, pulling or tugging anything over 25 pounds for 3 weeks after surgery. You may shower but no tub baths, hot tubs or swimming for 2 weeks. Moderate walking is recommended every 3 hours while awake minimum, increase distance daily. Further exercise will be discussed at the first post-op visit. No driving or operating machinery allow until off narcotic pain medication and until you feel comfortable forcefully applying the brakes (usually takes 3 or more days). For the next few weeks you are at an increased risk for blood clot formation. Therefore you should walk regularly and you should not sit for prolonged periods of time, more than 45 minutes without getting up and walking for 5-10 minutes. This includes car rides. Including riding home from the hospital. If riding a distance greater than 30 miles over the next 2 weeks stop every 30-45 minutes and walk 5-10 mintues each time. No tanning bed use for 8 weeks after surgery and in general, not recommended due to the increased risk for skin cancer. Incisions will burn/blister very badly with tanning bed use.  Illness:  Your primary care physician should treat general illness such as ear infections, sinus infections, and viral type illnesses, etc. Medications prescribed should be liquid/elixir form when possible, for the first 30 days.  General:  In general, it is recommended that you weigh yourself no more than once per week. Let the weight come off you and concentrate on more important things. Remember the weight was not gained overnight, nor will it be lost overnight. Gastric Bypass/ Gastric Sleeve weight loss will continue over a period of 12-18 months. Do not  yourself according to how others are doing after surgery, as this will  cause unnecessary discouragement.  THE ABOVE ARE GENERAL GUIDELINES TO ASSIST YOU ONCE HOME, IF YOU ARE IN DOUBT, OR YOU HAVE ANY QUESTIONS, CALL US AT THE NUMBERS LISTED BELOW.  IN THE EVENT OF SUDDEN CHEST PAIN, SHORTNESS OF BREATH, OR ANY LIFE THREATENING CONDITION, CALL 911.  Any time you are evaluated or admitted to another facility, please have someone notify the surgeon's office.  Supplements:  70 grams of protein taken EVERY DAY. Remember to drink at least 64 ounces of fluid a day, sipping slowly early on. Increase this amount during the summertime. Sipping slowly will not stretch your new stomach. Drinking too fast or gulping liquids will cause brief discomfort and early could cause staple line disruption (leak). With eating, tiny bites, then chew, chew, chew, and swallow. Lay your fork/spoon down for 2-3 minutes, and then take your next bite. Your pouch will tell you within 1-2 bites if it is going to tolerate what you are eating.   Protein Vendors:  Refer to protein vendors' handout from consult class. You can always find protein drinks at the bariatric office, grocery stores, [employer], drug [employer], [employer], health food stores, and on the Internet. Find one high in protein (15-30 grams per serving) and low carb (less than 18 grams per serving).  Now is a great time to re-read your . Please review specific instructions given to you at discharge by your physician (surgeon).  HOW/WHEN TO CONTACT US:  It is imperative that you contact us with any of the following:    Ÿ fever greater than 101 degrees  Ÿ shortness of breath  Ÿ leg swelling  Ÿ body aches  Ÿ shaking chills  Ÿ nausea and vomitting  Ÿ pain that has worsened  Ÿ redness at incision sites  Ÿ pus or foul smelling drainage from an incision or wound  Ÿ inability to keep fluids down for more than a day  Ÿ any other condition you feel needs our attention.  [facility] - Bariatric: [phone] call this number anytime 24 hours a  day / 7 days a week.  Teach-back Questions to be answered by the patient prior to discharge.   What complications would prompt you to call your doctor when you return home? _________________    What is the purpose of your prescribed medication? ________________  What are some potential side effects of the medications you will be taking at home? _______________

## 2020-07-09 NOTE — DISCHARGE SUMMARY
Discharge Summary    Patient name: Xuan Gasca    Medical record number: [id number]    Admission date: 7/8/2020  Discharge date:      Attending physician: Dr. Ron Cadet    Primary care physician: Suri Lynn MD    Referring physician: Ron Cadet Jr., MD  [address] [address]    Condition on discharge: Stable    Primary Diagnoses:  Morbid obesity with co-morbidities    Operative Procedure: Laparoscopic sleeve gastrectomy with hiatal hernia repair     Xuan Gasca  is post op day one status post procedure listed. Patient denies shortness of air and lower extremity pain. Feels better than yesterday. No vomiting this am. Ambulating well and using incentive spirometer.          /85 (BP Location: Right arm, Patient Position: Lying)   Pulse 93   Temp 98.6 °F (37 °C) (Oral)   Resp 18   Wt 95 kg (209 lb 5.6 oz)   SpO2 94%   BMI 37.08 kg/m²     General:  alert, appears stated age and cooperative   Abdomen: soft, bowel sounds active, appropriate tenderness   Incision:   healing well, no drainage, no erythema, no hernia, no seroma, no swelling, no dehiscence, incision well approximated   Heart: Regular rate   Lungs: Clear to auscultation bilaterally     I reviewed the patient's new clinical results.     Lab Results (last 24 hours)     Procedure Component Value Units Date/Time    Comprehensive Metabolic Panel [[id number]]  (Abnormal) Collected:  07/09/20 0437    Specimen:  Blood Updated:  07/09/20 0558     Glucose 119 mg/dL      BUN 7 mg/dL      Creatinine 0.47 mg/dL      Sodium 139 mmol/L      Potassium 3.9 mmol/L      Chloride 105 mmol/L      CO2 22.7 mmol/L      Calcium 9.3 mg/dL      Total Protein 6.4 g/dL      Albumin 3.60 g/dL      ALT (SGPT) 43 U/L      AST (SGOT) 29 U/L      Alkaline Phosphatase 50 U/L      Total Bilirubin 0.3 mg/dL      eGFR Non African Amer 140 mL/min/1.73      Globulin 2.8 gm/dL      A/G Ratio 1.3 g/dL      BUN/Creatinine Ratio 14.9      Anion Gap 11.3 mmol/L     Narrative:       GFR Normal >60  Chronic Kidney Disease <60  Kidney Failure <15      Phosphorus [[id number]]  (Normal) Collected:  07/09/20 0437    Specimen:  Blood Updated:  07/09/20 0557     Phosphorus 3.2 mg/dL     Magnesium [[id number]]  (Normal) Collected:  07/09/20 0437    Specimen:  Blood Updated:  07/09/20 0557     Magnesium 2.0 mg/dL     CBC & Differential [[id number]] Collected:  07/09/20 0437    Specimen:  Blood Updated:  07/09/20 0537    Narrative:       The following orders were created for panel order CBC & Differential.  Procedure                               Abnormality         Status                     ---------                               -----------         ------                     CBC Auto Differential[[id number]]        Abnormal            Final result                 Please view results for these tests on the individual orders.    CBC Auto Differential [[id number]]  (Abnormal) Collected:  07/09/20 0437    Specimen:  Blood Updated:  07/09/20 0537     WBC 11.67 10*3/mm3      RBC 3.97 10*6/mm3      Hemoglobin 12.5 g/dL      Hematocrit 36.6 %      MCV 92.2 fL      MCH 31.5 pg      MCHC 34.2 g/dL      RDW 12.6 %      RDW-SD 42.9 fl      MPV 10.5 fL      Platelets 253 10*3/mm3      Neutrophil % 82.9 %      Lymphocyte % 10.3 %      Monocyte % 6.4 %      Eosinophil % 0.0 %      Basophil % 0.1 %      Immature Grans % 0.3 %      Neutrophils, Absolute 9.67 10*3/mm3      Lymphocytes, Absolute 1.20 10*3/mm3      Monocytes, Absolute 0.75 10*3/mm3      Eosinophils, Absolute 0.00 10*3/mm3      Basophils, Absolute 0.01 10*3/mm3      Immature Grans, Absolute 0.04 10*3/mm3      nRBC 0.0 /100 WBC     Tissue Pathology Exam [[id number]] Collected:  07/08/20 1201    Specimen:  Tissue from Stomach Updated:  07/08/20 1400             Assessment:      Doing well postoperatively.      Plan:   1. Continue Stage 1 diet  2. Continue with ambulation and Incentive spirometry  3. Plan for d/c  home    Patient was seen and examined by Dr. Cadet.    Hospital Course: The patient is a very pleasant 50 y.o. female that was admitted to the hospital with morbid obesity who underwent above procedure.  Postoperatively the patient was transferred to the bariatric unit per protocol.  Patient remained afebrile and hemodynamically stable.  Patient was up ambulating well and using incentive spirometer.  Postoperatively day 1 patient was started on stage I bariatric diet and continued with good ambulation.  Lovenox was continued.  Patient was then discharged home.      Discharge medications:      Discharge Medications      New Medications      Instructions Start Date   HYDROmorphone 2 MG tablet  Commonly known as:  Dilaudid   2 mg, Oral, Every 4 Hours PRN      ondansetron ODT 4 MG disintegrating tablet  Commonly known as:  ZOFRAN-ODT   4 mg, Translingual, Every 4 Hours PRN         Continue These Medications      Instructions Start Date   citalopram 40 MG tablet  Commonly known as:  CeleXA   40 mg, Oral, Daily      folic acid-vit B6-vit B12 2.5-25-1 MG tablet tablet  Commonly known as:  FOLBEE   1 tablet, Oral, Daily      levothyroxine 137 MCG tablet  Commonly known as:  SYNTHROID, LEVOTHROID   137 mcg, Oral, Daily      lisinopril 10 MG tablet  Commonly known as:  PRINIVIL,ZESTRIL   10 mg, Oral, Daily      pantoprazole 40 MG EC tablet  Commonly known as:  PROTONIX   40 mg, Oral, Daily      ursodiol 300 MG capsule  Commonly known as:  Actigall   300 mg, Oral, 2 Times Daily         Stop These Medications    HIBICLENS EX            Discharge instructions:  Per Bariatric manual; per our protocol      Follow-up appointment: Follow up with Dr. Cadet in the office as scheduled.  If not already scheduled call for appointment at [phone].

## 2020-07-09 NOTE — PLAN OF CARE
Problem: Patient Care Overview  Goal: Plan of Care Review  Outcome: Ongoing (interventions implemented as appropriate)  Flowsheets (Taken 7/9/2020 9574)  Progress: improving  Plan of Care Reviewed With: patient  Note:   Vital signs stable. Pt denies pain and is controlled with scheduled tylenol and gabapentin. Pt ambulating in the hallway with no issues noted. Denies nausea or vomiting. LR infusing and pt received schedule MVI IVPB per MAR. SCD for VTE prophylaxis. Pt independent with IS at bedside. 5 lap sites JEREMIAH. Voiding spontaneously without difficulty. AM LABS ordered. Will continue to monitor.

## 2020-07-14 ENCOUNTER — OFFICE VISIT (OUTPATIENT)
Dept: BARIATRICS/WEIGHT MGMT | Facility: CLINIC | Age: 50
End: 2020-07-14

## 2020-07-14 VITALS
RESPIRATION RATE: 18 BRPM | WEIGHT: 200 LBS | DIASTOLIC BLOOD PRESSURE: 89 MMHG | SYSTOLIC BLOOD PRESSURE: 135 MMHG | TEMPERATURE: 97.1 F | BODY MASS INDEX: 35.44 KG/M2 | HEART RATE: 90 BPM | HEIGHT: 63 IN

## 2020-07-14 DIAGNOSIS — K44.9 HIATAL HERNIA: ICD-10-CM

## 2020-07-14 DIAGNOSIS — Z90.3 HISTORY OF SLEEVE GASTRECTOMY: ICD-10-CM

## 2020-07-14 DIAGNOSIS — Z71.3 DIETARY COUNSELING: ICD-10-CM

## 2020-07-14 DIAGNOSIS — R53.82 CHRONIC FATIGUE: ICD-10-CM

## 2020-07-14 DIAGNOSIS — I10 ESSENTIAL HYPERTENSION: ICD-10-CM

## 2020-07-14 DIAGNOSIS — E66.9 OBESITY, CLASS II, BMI 35-39.9: Primary | ICD-10-CM

## 2020-07-14 PROCEDURE — 99024 POSTOP FOLLOW-UP VISIT: CPT | Performed by: SURGERY

## 2020-07-14 NOTE — PROGRESS NOTES
[facility] BARIATRIC SURGERY  [address] [address]  [phone]  [address] [address] [phone]  Dept: [phone]  7/14/2020      Xuan Gasca.  [id number]  [id number]  1970  female      Chief Complaint   Patient presents with   • Post-op     1 WEEK POST OP SLEEVE       BH Post-Op Bariatric Surgery:   Xuan Gasca is status post laparopscopic Laparoscopic Sleeve/HH procedure, performed on 7/8/20.     HPI:   Today's weight is 90.7 kg (200 lb) pounds, today's BMI is Body mass index is 35.44 kg/m².,@ has a  loss of 10 pounds since the last visit and@ weight loss since surgery is 10 pounds. The patient reports a decreased portion size and loss of appetite.  Xuan Gasca denies nausea vomiting fever chills chest pain shortness of air or lower extremity pain and reports feeling great. The patient c/o appropriate post-op incisional discomfort that is improving. she is doing well with protein and water intake so far. Taking their vitamins, walking and using IS. Denies fevers, chills, chest pain or shortness of air.      Diet and Exercise: Diet history reviewed and discussed with the patient. Weight loss/gains to date discussed with the patient. No carbonated beverage consumption and exercising regularly- walking frequently.   Supplements: multivitamins, B-12, calcium, iron, B-1 and Vitamin D.   Patient is taking blood thinner as prescribed: Not indicated    Review of Systems   Constitutional: Positive for fatigue.   All other systems reviewed and are negative.      Patient Active Problem List   Diagnosis   • Chronic fatigue   • Essential hypertension   • Sleep apnea   • Anxiety   • Hypothyroid   • Obesity, Class II, BMI 35-39.9   • Dietary counseling   • Dyspepsia   • Gastric polyps   • History of sleeve gastrectomy       The following portions of the patient's history were reviewed and updated as  appropriate: allergies, current medications, past family history, past medical history, past social history, past surgical history and problem list.    Vitals:    07/14/20 1220   BP: 135/89   Pulse: 90   Resp: 18   Temp: 97.1 °F (36.2 °C)       Physical Exam   Constitutional: She is oriented to person, place, and time. She appears well-nourished.   HENT:   Head: Normocephalic and atraumatic.   Mouth/Throat: Oropharynx is clear and moist.   Eyes: Pupils are equal, round, and reactive to light. Conjunctivae and EOM are normal. No scleral icterus.   Neck: Normal range of motion. Neck supple. No thyromegaly present.   Cardiovascular: Normal rate and regular rhythm.   Pulmonary/Chest: Effort normal and breath sounds normal.   Abdominal: Soft. Bowel sounds are normal. She exhibits no distension and no mass. There is no rebound and no guarding. No hernia.   Incisions clean, dry and intact without erythema and nontender on exam   Musculoskeletal: Normal range of motion.   Lymphadenopathy:     She has no cervical adenopathy.   Neurological: She is alert and oriented to person, place, and time. No cranial nerve deficit. Coordination normal.   Skin: Skin is warm and dry. No erythema.   Psychiatric: She has a normal mood and affect. Her behavior is normal.   Vitals reviewed.      Assessment:   Post-op, the patient is 1 week status post laparoscopic sleeve gastrectomy and hiatal hernia repair.  Patient is afebrile and hemodynamically stable and abdomen benign on exam.  She is tolerating her diet and getting 70 g of protein.     Encounter Diagnoses   Name Primary?   • Obesity, Class II, BMI 35-39.9 Yes   • History of sleeve gastrectomy    • Dietary counseling    • Hiatal hernia    • Chronic fatigue    • Essential hypertension        Plan:   Reviewed with patient the importance of following the manual for diet progression. Increase activity as tolerated. Continue increasing daily intake of protein and water.   Return to work: the  patient is to return to 3 weeks from their surgery date with no restrictions unless they develop medical problems in which we will see them back in the office. They received a note in our office today with their return to work date.  Activity restrictions: no lifting, pushing or pulling over 25lbs for 3 weeks.   Recommended patient be sure to get at least 70 grams of protein per day. Discussed with the patient the recommended amount of water per day to intake. Reviewed vitamin requirements. Be sure to do routine exercise and increase activity as tolerated. No asa, nsaids or steroids for 8 weeks. Patient may use miralax as needed if necessary.     Instructions / Recommendations: dietary counseling recommended, recommended a daily protein intake of  grams, vitamin supplement(s) recommended, recommended exercising at least 150 minutes per week, behavior modifications recommended and instructed to call the office for concerns, questions, or problems.     The patient was instructed to follow up at one month follow up appt.     The patient was counseled regarding post op bariatric manual

## 2020-08-12 ENCOUNTER — LAB (OUTPATIENT)
Dept: LAB | Facility: HOSPITAL | Age: 50
End: 2020-08-12

## 2020-08-12 ENCOUNTER — OFFICE VISIT (OUTPATIENT)
Dept: BARIATRICS/WEIGHT MGMT | Facility: CLINIC | Age: 50
End: 2020-08-12

## 2020-08-12 VITALS
RESPIRATION RATE: 18 BRPM | BODY MASS INDEX: 32.96 KG/M2 | DIASTOLIC BLOOD PRESSURE: 68 MMHG | HEIGHT: 63 IN | SYSTOLIC BLOOD PRESSURE: 106 MMHG | WEIGHT: 186 LBS | HEART RATE: 82 BPM | TEMPERATURE: 96.9 F

## 2020-08-12 DIAGNOSIS — I10 ESSENTIAL HYPERTENSION: ICD-10-CM

## 2020-08-12 DIAGNOSIS — G47.30 SLEEP APNEA, UNSPECIFIED TYPE: ICD-10-CM

## 2020-08-12 DIAGNOSIS — Z90.3 HISTORY OF SLEEVE GASTRECTOMY: ICD-10-CM

## 2020-08-12 DIAGNOSIS — R53.82 CHRONIC FATIGUE: ICD-10-CM

## 2020-08-12 DIAGNOSIS — E66.9 OBESITY, CLASS I, BMI 30-34.9: Primary | ICD-10-CM

## 2020-08-12 DIAGNOSIS — E66.9 OBESITY, CLASS I, BMI 30-34.9: ICD-10-CM

## 2020-08-12 PROBLEM — E66.811 OBESITY, CLASS I, BMI 30-34.9: Status: ACTIVE | Noted: 2020-02-13

## 2020-08-12 LAB
25(OH)D3 SERPL-MCNC: 60.3 NG/ML (ref 30–100)
ALBUMIN SERPL-MCNC: 4.3 G/DL (ref 3.5–5.2)
ALBUMIN/GLOB SERPL: 1.7 G/DL
ALP SERPL-CCNC: 60 U/L (ref 39–117)
ALT SERPL W P-5'-P-CCNC: 144 U/L (ref 1–33)
ANION GAP SERPL CALCULATED.3IONS-SCNC: 10.5 MMOL/L (ref 5–15)
AST SERPL-CCNC: 53 U/L (ref 1–32)
BASOPHILS # BLD AUTO: 0.04 10*3/MM3 (ref 0–0.2)
BASOPHILS NFR BLD AUTO: 0.5 % (ref 0–1.5)
BILIRUB SERPL-MCNC: 0.2 MG/DL (ref 0–1.2)
BUN SERPL-MCNC: 17 MG/DL (ref 6–20)
BUN/CREAT SERPL: 21.8 (ref 7–25)
CALCIUM SPEC-SCNC: 10 MG/DL (ref 8.6–10.5)
CHLORIDE SERPL-SCNC: 100 MMOL/L (ref 98–107)
CO2 SERPL-SCNC: 25.5 MMOL/L (ref 22–29)
CREAT SERPL-MCNC: 0.78 MG/DL (ref 0.57–1)
DEPRECATED RDW RBC AUTO: 46.1 FL (ref 37–54)
EOSINOPHIL # BLD AUTO: 0.1 10*3/MM3 (ref 0–0.4)
EOSINOPHIL NFR BLD AUTO: 1.3 % (ref 0.3–6.2)
ERYTHROCYTE [DISTWIDTH] IN BLOOD BY AUTOMATED COUNT: 13.1 % (ref 12.3–15.4)
FERRITIN SERPL-MCNC: 365 NG/ML (ref 13–150)
FOLATE SERPL-MCNC: 16.5 NG/ML (ref 4.78–24.2)
GFR SERPL CREATININE-BSD FRML MDRD: 78 ML/MIN/1.73
GLOBULIN UR ELPH-MCNC: 2.6 GM/DL
GLUCOSE SERPL-MCNC: 96 MG/DL (ref 65–99)
HCT VFR BLD AUTO: 40.5 % (ref 34–46.6)
HGB BLD-MCNC: 13.2 G/DL (ref 12–15.9)
IMM GRANULOCYTES # BLD AUTO: 0.02 10*3/MM3 (ref 0–0.05)
IMM GRANULOCYTES NFR BLD AUTO: 0.3 % (ref 0–0.5)
IRON 24H UR-MRATE: 66 MCG/DL (ref 37–145)
LYMPHOCYTES # BLD AUTO: 2.44 10*3/MM3 (ref 0.7–3.1)
LYMPHOCYTES NFR BLD AUTO: 31.3 % (ref 19.6–45.3)
MCH RBC QN AUTO: 31.1 PG (ref 26.6–33)
MCHC RBC AUTO-ENTMCNC: 32.6 G/DL (ref 31.5–35.7)
MCV RBC AUTO: 95.5 FL (ref 79–97)
MONOCYTES # BLD AUTO: 0.54 10*3/MM3 (ref 0.1–0.9)
MONOCYTES NFR BLD AUTO: 6.9 % (ref 5–12)
NEUTROPHILS NFR BLD AUTO: 4.65 10*3/MM3 (ref 1.7–7)
NEUTROPHILS NFR BLD AUTO: 59.7 % (ref 42.7–76)
NRBC BLD AUTO-RTO: 0 /100 WBC (ref 0–0.2)
PLATELET # BLD AUTO: 259 10*3/MM3 (ref 140–450)
PMV BLD AUTO: 10.7 FL (ref 6–12)
POTASSIUM SERPL-SCNC: 4.1 MMOL/L (ref 3.5–5.2)
PREALB SERPL-MCNC: 23.2 MG/DL (ref 20–40)
PROT SERPL-MCNC: 6.9 G/DL (ref 6–8.5)
RBC # BLD AUTO: 4.24 10*6/MM3 (ref 3.77–5.28)
SODIUM SERPL-SCNC: 136 MMOL/L (ref 136–145)
WBC # BLD AUTO: 7.79 10*3/MM3 (ref 3.4–10.8)

## 2020-08-12 PROCEDURE — 82728 ASSAY OF FERRITIN: CPT

## 2020-08-12 PROCEDURE — 82746 ASSAY OF FOLIC ACID SERUM: CPT

## 2020-08-12 PROCEDURE — 83921 ORGANIC ACID SINGLE QUANT: CPT

## 2020-08-12 PROCEDURE — 99024 POSTOP FOLLOW-UP VISIT: CPT | Performed by: NURSE PRACTITIONER

## 2020-08-12 PROCEDURE — 85025 COMPLETE CBC W/AUTO DIFF WBC: CPT

## 2020-08-12 PROCEDURE — 82306 VITAMIN D 25 HYDROXY: CPT

## 2020-08-12 PROCEDURE — 80053 COMPREHEN METABOLIC PANEL: CPT

## 2020-08-12 PROCEDURE — 84134 ASSAY OF PREALBUMIN: CPT

## 2020-08-12 PROCEDURE — 83540 ASSAY OF IRON: CPT

## 2020-08-12 PROCEDURE — 84425 ASSAY OF VITAMIN B-1: CPT

## 2020-08-12 PROCEDURE — 36415 COLL VENOUS BLD VENIPUNCTURE: CPT

## 2020-08-12 NOTE — PROGRESS NOTES
[facility] BARIATRIC SURGERY  [address]  [facility] [address]  [phone]  [address] [address]  [phone]  Dept: [phone]  8/12/2020      Xuan Gasca.  [id number]  [id number]  1970  female      Chief Complaint   Patient presents with   • Follow-up     1 month sleeve       BH Post-Op Bariatric Surgery:   Xuan Gasca is status post Laparoscopic Sleeve/ HH procedure, performed on 7/8/20     HPI:   Today's weight is 84.4 kg (186 lb) pounds, today's BMI is Body mass index is 32.96 kg/m²., she has a  loss of 14 pounds since the last visit and her weight loss since surgery is 24 pounds. The patient reports a decreased portion size and loss of appetite.      Xuan Gasca denies vomiting reflux regurgitation or dysphagia      Diet and Exercise: Diet history reviewed and discussed with the patient. Weight loss/gains to date discussed with the patient. The patient states they are eating 70-80 grams of protein per day. She reports eating 3 meals per day, a typical portion size of 1/2 cup, eating 3 snacks per day, drinking 5-6 or more 8-oz. glasses of water per day, no carbonated beverage consumption and exercising regularly- cardio 5 days per week, she is doing strength 3 days per week.     She does a premier shake in the morning, she is eating 5 times outside of that.     Supplements: she has switched back to the bariatric fusion chewables    Review of Systems   Constitutional: Positive for appetite change. Negative for fatigue and unexpected weight change.   HENT: Negative.    Eyes: Negative.    Respiratory: Negative.    Cardiovascular: Negative.  Negative for leg swelling.   Gastrointestinal: Negative for abdominal distention, abdominal pain, constipation, diarrhea, nausea and vomiting.   Genitourinary: Negative for difficulty urinating, frequency and urgency.   Musculoskeletal: Negative for back pain.   Skin:  Negative.    Psychiatric/Behavioral: Negative.    All other systems reviewed and are negative.      Patient Active Problem List   Diagnosis   • Chronic fatigue   • Essential hypertension   • Sleep apnea   • Anxiety   • Hypothyroid   • Obesity, Class I, BMI 30-34.9   • Dietary counseling   • Dyspepsia   • Gastric polyps   • History of sleeve gastrectomy       Past Medical History:   Diagnosis Date   • Depression    • Hiatal hernia    • Hypertension    • Hypothyroidism    • Sleep apnea     COMPLIANT WITH CPAP        The following portions of the patient's history were reviewed and updated as appropriate: allergies, current medications, past family history, past medical history, past social history, past surgical history and problem list.    Vitals:    08/12/20 1415   BP: 106/68   Pulse: 82   Resp: 18   Temp: 96.9 °F (36.1 °C)       Physical Exam   Constitutional: She appears well-developed and well-nourished.   Neck: No thyromegaly present.   Cardiovascular: Normal rate, regular rhythm and normal heart sounds.   Pulmonary/Chest: Effort normal and breath sounds normal. No respiratory distress. She has no wheezes.   Abdominal: Soft. Bowel sounds are normal. She exhibits no distension. There is no tenderness. There is no guarding. No hernia.   Musculoskeletal: She exhibits no edema or tenderness.   Neurological: She is alert.   Skin: Skin is warm and dry. No rash noted. No erythema.   Psychiatric: She has a normal mood and affect. Her behavior is normal.   Nursing note and vitals reviewed.      Assessment:   Post-op, the patient is doing well.     Encounter Diagnoses   Name Primary?   • Obesity, Class I, BMI 30-34.9 Yes   • Essential hypertension    • Sleep apnea, unspecified type    • History of sleeve gastrectomy    • Chronic fatigue        Plan:   Patient was encouraged to keep up the great work with meal frequency, fluid intake, exercise, protein intake.  Check 1 month postop labs and follow-up with patient with  results in about 2 weeks  Encouraged patient to be sure to get plenty of lean protein per day through small frequent meals all with a protein source.   Activity restrictions: none.   Recommended patient be sure to get at least 70 grams of protein per day by eating small, frequent meals all with high lean protein choices. Be sure to limit/cut back on daily carbohydrate intake. Discussed with the patient the recommended amount of water per day to intake- half of body weight in ounces. Reviewed vitamin requirements. Be sure to do routine exercise, 150 minutes per week minimum, including both cardio and strength training.     Instructions / Recommendations: dietary counseling recommended, recommended a daily protein intake of  grams, vitamin supplement(s) recommended, recommended exercising at least 150 minutes per week, behavior modifications recommended and instructed to call the office for concerns, questions, or problems.     The patient was instructed to follow up in 2 months .      Total time spent face to face was 25 minutes and 20 minutes was spent counseling.

## 2020-08-14 ENCOUNTER — OFFICE VISIT CONVERTED (OUTPATIENT)
Dept: INTERNAL MEDICINE | Facility: CLINIC | Age: 50
End: 2020-08-14
Attending: PHYSICIAN ASSISTANT

## 2020-08-14 ENCOUNTER — HOSPITAL ENCOUNTER (OUTPATIENT)
Dept: OTHER | Facility: HOSPITAL | Age: 50
Discharge: HOME | End: 2020-08-14
Attending: PHYSICIAN ASSISTANT

## 2020-08-14 LAB
APPEARANCE UR: ABNORMAL
BILIRUB UR QL: NEGATIVE
COLOR UR: YELLOW
CONV BACTERIA: ABNORMAL
CONV COLLECTION SOURCE (UA): ABNORMAL
CONV HYALINE CASTS IN URINE MICRO: ABNORMAL /[LPF]
CONV UROBILINOGEN IN URINE BY AUTOMATED TEST STRIP: 0.2 {EHRLICHU}/DL (ref 0.1–1)
GLUCOSE UR QL: NEGATIVE MG/DL
HGB UR QL STRIP: ABNORMAL
KETONES UR QL STRIP: 15 MG/DL
LEUKOCYTE ESTERASE UR QL STRIP: ABNORMAL
NITRITE UR QL STRIP: NEGATIVE
PH UR STRIP.AUTO: 7.5 [PH] (ref 5–8)
PROT UR QL: 30 MG/DL
RBC #/AREA URNS HPF: ABNORMAL /[HPF]
SP GR UR: 1.02 (ref 1–1.03)
SQUAMOUS SPT QL MICRO: ABNORMAL /[HPF]
VIT B1 BLD-SCNC: 158.4 NMOL/L (ref 66.5–200)
WBC #/AREA URNS HPF: ABNORMAL /[HPF]

## 2020-08-15 LAB
Lab: NORMAL
METHYLMALONATE SERPL-SCNC: 110 NMOL/L (ref 0–378)

## 2020-08-16 LAB
AMPICILLIN SUSC ISLT: <=2
AMPICILLIN+SULBAC SUSC ISLT: <=2
BACTERIA UR CULT: ABNORMAL
CEFAZOLIN SUSC ISLT: <=4
CEFEPIME SUSC ISLT: <=0.12
CEFTAZIDIME SUSC ISLT: <=1
CEFTRIAXONE SUSC ISLT: <=0.25
CIPROFLOXACIN SUSC ISLT: <=0.25
ERTAPENEM SUSC ISLT: <=0.12
GENTAMICIN SUSC ISLT: <=1
LEVOFLOXACIN SUSC ISLT: <=0.12
NITROFURANTOIN SUSC ISLT: 256
PIP+TAZO SUSC ISLT: <=4
TMP SMX SUSC ISLT: <=20
TOBRAMYCIN SUSC ISLT: <=1

## 2020-08-17 ENCOUNTER — TELEPHONE (OUTPATIENT)
Dept: BARIATRICS/WEIGHT MGMT | Facility: CLINIC | Age: 50
End: 2020-08-17

## 2020-08-17 NOTE — TELEPHONE ENCOUNTER
Spoke to pt regarding lab results. Instructed pt to get US vitamins and to buy and take iron separately . Pt agreed to have labs shared with PCP as well.       ----- Message from KULWINDER Magallanes sent at 8/17/2020 10:04 AM EDT -----  I would have her follow up with her PCP regarding elevated ALT level.

## 2020-10-06 ENCOUNTER — HOSPITAL ENCOUNTER (OUTPATIENT)
Dept: OTHER | Facility: HOSPITAL | Age: 50
Discharge: HOME | End: 2020-10-06
Attending: INTERNAL MEDICINE

## 2020-10-06 ENCOUNTER — OFFICE VISIT CONVERTED (OUTPATIENT)
Dept: INTERNAL MEDICINE | Facility: CLINIC | Age: 50
End: 2020-10-06
Attending: INTERNAL MEDICINE

## 2020-10-16 ENCOUNTER — OFFICE VISIT (OUTPATIENT)
Dept: BARIATRICS/WEIGHT MGMT | Facility: CLINIC | Age: 50
End: 2020-10-16

## 2020-10-16 VITALS
DIASTOLIC BLOOD PRESSURE: 85 MMHG | TEMPERATURE: 97.5 F | BODY MASS INDEX: 30.48 KG/M2 | SYSTOLIC BLOOD PRESSURE: 130 MMHG | WEIGHT: 172 LBS | HEIGHT: 63 IN | RESPIRATION RATE: 18 BRPM | HEART RATE: 77 BPM

## 2020-10-16 DIAGNOSIS — Z90.3 HISTORY OF SLEEVE GASTRECTOMY: ICD-10-CM

## 2020-10-16 DIAGNOSIS — G47.30 SLEEP APNEA, UNSPECIFIED TYPE: ICD-10-CM

## 2020-10-16 DIAGNOSIS — I10 ESSENTIAL HYPERTENSION: ICD-10-CM

## 2020-10-16 DIAGNOSIS — E66.9 OBESITY, CLASS I, BMI 30-34.9: Primary | ICD-10-CM

## 2020-10-16 PROCEDURE — 99213 OFFICE O/P EST LOW 20 MIN: CPT | Performed by: NURSE PRACTITIONER

## 2020-10-16 RX ORDER — NORGESTIMATE AND ETHINYL ESTRADIOL 7DAYSX3 LO
1 KIT ORAL DAILY
COMMUNITY
Start: 2020-09-03 | End: 2021-07-23 | Stop reason: SDUPTHER

## 2020-10-16 RX ORDER — SPIRONOLACTONE 100 MG/1
1 TABLET, FILM COATED ORAL DAILY
COMMUNITY
Start: 2020-10-12 | End: 2020-10-16

## 2020-10-16 RX ORDER — OMEPRAZOLE 20 MG/1
20 CAPSULE, DELAYED RELEASE ORAL DAILY
Qty: 30 CAPSULE | Refills: 3 | Status: SHIPPED | OUTPATIENT
Start: 2020-10-16 | End: 2021-07-27

## 2020-10-16 NOTE — PROGRESS NOTES
[facility] BARIATRIC SURGERY  [address] [facility] [address]  [facility] [address]  [phone]  [address] [address] [phone]  Dept: [phone]  10/16/2020      Xuan Gasca.  [id number]  [id number]  1970  female      Chief Complaint   Patient presents with   • Follow-up     3 MONTH SLEEVE FOLLOW UP        Post-Op Bariatric Surgery:   Xuan Gasca is status post Laparoscopic Sleeve/HH procedure, performed on 7/8/20     HPI:   Today's weight is 78 kg (172 lb) pounds, today's BMI is Body mass index is 30.48 kg/m².,@ has a  loss of 14 pounds since the last visit and@ weight loss since surgery is 38 pounds. The patient reports a decreased portion size and loss of appetite.      Xuan Gasca denies vomiting reflux regurgitation dysphagia.  Patient reports that she feels great has wonderful energy levels.  She is walking 2 to 3 days/week.  She is a member of [employer] and is planning to work in a little bit more strength training.  She has had intermittent constipation but this is well controlled with MiraLAX as needed     Diet and Exercise: Diet history reviewed and discussed with the patient. Weight loss/gains to date discussed with the patient. The patient states they are eating 30-60 grams of protein per day. She reports eating 3 meals per day, a typical portion size of 1/2 cup, eating 3 snacks per day, drinking 5-6 or more 8-oz. glasses of water per day, no carbonated beverage consumption and exercising regularly- she is walking most days    Supplements: fusion [employer] chews.     Review of Systems   Constitutional: Positive for appetite change. Negative for fatigue and unexpected weight change.   HENT: Negative.    Eyes: Negative.    Respiratory: Negative.    Cardiovascular: Negative.  Negative for leg swelling.   Gastrointestinal: Positive for constipation (on days that her water intake is low). Negative for abdominal  distention, abdominal pain, diarrhea, nausea and vomiting.   Genitourinary: Negative for difficulty urinating, frequency and urgency.   Musculoskeletal: Negative for back pain.   Skin: Negative.    Psychiatric/Behavioral: Negative.    All other systems reviewed and are negative.      Patient Active Problem List   Diagnosis   • Chronic fatigue   • Essential hypertension   • Sleep apnea   • Anxiety   • Hypothyroid   • Obesity, Class I, BMI 30-34.9   • Dietary counseling   • Dyspepsia   • Gastric polyps   • History of sleeve gastrectomy       Past Medical History:   Diagnosis Date   • Depression    • Hiatal hernia    • Hypertension    • Hypothyroidism    • Sleep apnea     COMPLIANT WITH CPAP        The following portions of the patient's history were reviewed and updated as appropriate: allergies, current medications, past family history, past medical history, past social history, past surgical history and problem list.    Vitals:    10/16/20 1148   BP: 130/85   Pulse: 77   Resp: 18   Temp: 97.5 °F (36.4 °C)       Physical Exam  Vitals signs and nursing note reviewed.   Constitutional:       Appearance: She is well-developed.   Neck:      Thyroid: No thyromegaly.   Cardiovascular:      Rate and Rhythm: Normal rate and regular rhythm.      Heart sounds: Normal heart sounds.   Pulmonary:      Effort: Pulmonary effort is normal. No respiratory distress.      Breath sounds: Normal breath sounds. No wheezing.   Abdominal:      General: Bowel sounds are normal. There is no distension.      Palpations: Abdomen is soft.      Tenderness: There is no abdominal tenderness. There is no guarding.      Hernia: No hernia is present.   Musculoskeletal:         General: No tenderness.   Skin:     General: Skin is warm and dry.      Findings: No erythema or rash.   Neurological:      Mental Status: She is alert.   Psychiatric:         Behavior: Behavior normal.         Assessment:   Post-op, the patient is doing well.     Encounter  Diagnoses   Name Primary?   • Obesity, Class I, BMI 30-34.9 Yes   • History of sleeve gastrectomy    • Essential hypertension    • Sleep apnea, unspecified type        Plan:     Encouraged patient to be sure to get plenty of lean protein per day through small frequent meals all with a protein source.   Activity restrictions: none.   Recommended patient be sure to get at least 70 grams of protein per day by eating small, frequent meals all with high lean protein choices. Be sure to limit/cut back on daily carbohydrate intake. Discussed with the patient the recommended amount of water per day to intake- half of body weight in ounces. Reviewed vitamin requirements. Be sure to do routine exercise, 150 minutes per week minimum, including both cardio and strength training.     Instructions / Recommendations: dietary counseling recommended, recommended a daily protein intake of  grams, vitamin supplement(s) recommended, recommended exercising at least 150 minutes per week, behavior modifications recommended and instructed to call the office for concerns, questions, or problems.     The patient was instructed to follow up in 3 months .   Total time spent face to face was 20 minutes and 15 minutes was spent counseling.

## 2021-02-23 ENCOUNTER — HOSPITAL ENCOUNTER (OUTPATIENT)
Dept: OTHER | Facility: HOSPITAL | Age: 51
Discharge: HOME | End: 2021-02-23
Attending: INTERNAL MEDICINE

## 2021-02-23 ENCOUNTER — CONVERSION ENCOUNTER (OUTPATIENT)
Dept: INTERNAL MEDICINE | Facility: CLINIC | Age: 51
End: 2021-02-23

## 2021-02-23 LAB
BILIRUB UR QL STRIP: NEGATIVE
COLOR UR: YELLOW
CONV CLARITY OF URINE: CLEAR
CONV PROTEIN IN URINE BY AUTOMATED TEST STRIP: NEGATIVE
CONV UROBILINOGEN IN URINE BY AUTOMATED TEST STRIP: NORMAL
GLUCOSE UR QL: NEGATIVE
HGB UR QL STRIP: NEGATIVE
KETONES UR QL STRIP: NORMAL
LEUKOCYTE ESTERASE UR QL STRIP: NEGATIVE
NITRITE UR QL STRIP: NEGATIVE
PH UR STRIP.AUTO: 6 [PH]
SP GR UR: 1.02

## 2021-02-24 ENCOUNTER — OFFICE VISIT CONVERTED (OUTPATIENT)
Dept: INTERNAL MEDICINE | Facility: CLINIC | Age: 51
End: 2021-02-24
Attending: PHYSICIAN ASSISTANT

## 2021-02-24 ENCOUNTER — CONVERSION ENCOUNTER (OUTPATIENT)
Dept: INTERNAL MEDICINE | Facility: CLINIC | Age: 51
End: 2021-02-24

## 2021-02-25 LAB — BACTERIA UR CULT: NORMAL

## 2021-05-10 NOTE — H&P
History and Physical      Patient Name: Caro Gasca   Patient ID: [id number]   Sex: Female   YOB: 1970    Primary Care Provider: Suri Lynn MD    Visit Date: April 29, 2020    Provider: Teresa Wan MD   Location: [facility]   Location Address: [address] [place] [address]  [id number]   Location Phone: [phone]          Chief Complaint  · I recently passed a kidney stone      History Of Present Illness  Video Conferencing Visit  Caro Gasca is a 50 year old /White female who is presenting for evaluation via video conferencing. Verbal consent obtained before beginning visit.   The following staff were present during this visit: Olesya Camarillo and Teresa Wan MD   The patient is an 50 year old /White female, who is sent by a consulting Physician, for the evaluation of bilateral renal stones and a recently passed kidney stone.   She has had symptoms recently, but none currently.   Recent diagnostic studies were done 10 days ago and include stone protocol CT. The studies have shown bilateral renal stones. The largest stone size is described as 4mm on the on both sides.   The patient has no additional complaints. She denies nausea, vomiting, fever, and chills.   The problem is made worse by no known factors. The problem is relieved by no known factors.   Her past medical history is positive for renal stones. The most recent episode occurred 25 years ago and was not treated. Prior kidney stone composition unknown.   Her family history is non-contributory.       Past Medical History  Allergy; Anxiety; Depression; Essential hypertension; Hypertension; Hypothyroidism; Kidney Stone; Obesity; Vitamin D deficiency         Past Surgical History  Cesarian Section; Hysterectomy         Medication List  citalopram 40 mg oral tablet; Flomax 0.4 mg oral capsule; fluticasone propionate 50 mcg/actuation nasal spray,suspension; hydrocodone-acetaminophen 5-325 mg oral tablet;  levothyroxine 137 mcg oral tablet; lisinopril 20 mg oral tablet; Multiple Vitamins oral tablet; norgestimate-ethinyl estradiol 0.18/0.215/0.25 mg-25 mcg oral tablet; spironolactone 100 mg oral tablet; Zyrtec 10 mg oral tablet         Allergy List  PENICILLINS       Allergies Reconciled  Family Medical History  Diabetes, unspecified type         Reproductive History   3 Para 3 0 0 3       Social History  Alcohol (Never); Tobacco (Never)         Immunizations  Name Date Admin   Hepatitis A    Hepatitis A    Influenza    Influenza    Influenza    Influenza          Review of Systems  · Constitutional  o Denies  o : fever, chills  · Eyes  o Denies  o : double vision, cataracts  · HENT  o Denies  o : hearing loss, headaches  · Cardiovascular  o Denies  o : chest pain at rest, chest pain with exercise, irregular heart beats, palpitations, leg cramps with exercise  · Respiratory  o Denies  o : shortness of breath, wheezing, sleep apnea  · Gastrointestinal  o Denies  o : heartburn or indigestion, nausea or vomiting, change in abdominal girth, diarrhea, constipation, blood in stools  · Genitourinary  o Admits  o : additional symptoms as noted in HPI  · Integument  o Denies  o : rash, new skin lesions  · Neurologic  o Denies  o : memory difficulties, headache, mini-strokes, seizures  · Endocrine  o Denies  o : hot flashes, thyroid disorders  · Psychiatric  o Denies  o : depression, schizophrenia, bipolar disorder  · Heme-Lymph  o Denies  o : easy bleeding, easy bruising, sickle cell disease or trait, lymph node enlargement or tenderness  · Allergic-Immunologic  o Denies  o : immune deficiency, HIV, Hepatitis C      Physical Examination  · Constitutional  o Appearance  o : Well nourished, well developed patient in no acute distress. Ambulating without difficulty.  · Head and Face  o Head  o :   § Inspection  § : atraumatic, normocephalic  o Face  o :   § Inspection  § : no facial lesions  · Eyes  o Sclerae  o : sclerae  white  · Ears, Nose, Mouth and Throat  o Ears  o :   § External Ears  § : appearance within normal limits, no lesions present  o Nose  o :   § External Nose  § : appearance normal  · Neck  o Inspection/Palpation  o : normal appearance, trachea midline  · Respiratory  o Respiratory Effort  o : breathing unlabored  · Skin and Subcutaneous Tissue  o General Inspection  o : No rashes, lesions or areas of discoloration present. Skin turgor is normal.  · Neurologic  o Mental Status Examination  o :   § Orientation  § : grossly oriented to person, place and time  § Speech/Language  § : communication ability within normal limits  o Gait and Station  o : normal gait, able to stand without difficulty  · Psychiatric  o Judgement and Insight  o : judgment and insight intact, judgement for everyday activities and social situations within normal limits, insight intact  o Mood and Affect  o : mood normal, affect appropriate              Assessment  · Nephrolithiasis     592.0/N20.0      Plan  · Orders  o KUB xray [facility] Preferred View (22009) - 592.0/N20.0 - 04/29/2021  · Medications  o Medications have been Reconciled  · Instructions  o DISCUSSION:  o The patient has developed a new eposide of stone disease. I have discussed the etiologies of stone disease with emphasis on treatment ,management and prevention. At this point, I think we can take a conservative approach. Patient will continue to strain all urine and RTC as instructed.   o PLAN:  o Return for KUB as scheduled on next clinic appointment.            Electronically Signed by: Teresa Wan MD -Author on April 29, 2020 01:09:39 PM

## 2021-05-12 NOTE — PROGRESS NOTES
"   Quick Note      Patient Name: Caro Gasca   Patient ID: [id number]   Sex: Female   YOB: 1970    Primary Care Provider: Suri Lynn MD    Visit Date: April 23, 2020    Provider: Suri Lynn MD   Location: [facility]   Location Address: [address] [place] [address], [address]  [place], [place]  [id number]   Location Phone: [phone]          History Of Present Illness  TELEHEALTH TELEPHONE VISIT  Chief Complaint: \"I recently had a kidney stone\"   Caro Gasca is a 50 year old /White female who is presenting for evaluation via telehealth telephone visit. Verbal consent obtained before beginning visit.   Provider spent 14 minutes with the patient during telehealth visit.   The following staff were present during this visit: none; done by telephone   Past Medical History/Overview of Patient Symptoms     recently was having flank pain and was seen in the ER  diagnosed with kidney stone  states that she is still having quite a bit of pain  reviewed ct scan with her    however she is having trouble with her diet right now due to Covid  she isn't able to exercise well either    no chest pain  no trouble breathing         Vitals  Date Time BP Position Site L\R Cuff Size HR RR TEMP (F) WT  HT  BMI kg/m2 BSA m2 O2 Sat HC       03/26/2020 03:39 PM         211lbs 0oz 5'  4\" 36.22 2.08     04/23/2020 04:25 PM         213lbs 16oz 5'  4\" 36.73 2.09           Physical Examination                Assessment  · Obesity     278.00/E66.9  cont diet and exercise  · Nephrolithiasis     592.0/N20.0  Flomax and hydrocodone; she already has follow up with urology      Plan  · Orders  o Physician Telephone Evaluation, 11-20 minutes (21933) - - 04/23/2020  · Medications  o hydrocodone-acetaminophen 5-325 mg oral tablet   SIG: take 1 tablet by oral route every 6 hours as needed for pain   DISP: (10) tablets with 0 refills  Prescribed on 04/23/2020     o Flomax 0.4 mg oral capsule   SIG: take 1 capsule (0.4 " mg) by oral route once daily   DISP: (30) capsules with 0 refills  Prescribed on 04/23/2020     · Instructions  o Plan Of Care:             Electronically Signed by: Suri Lynn MD -Author on April 27, 2020 12:12:02 AM

## 2021-05-13 NOTE — PROGRESS NOTES
"   Progress Note      Patient Name: Caro Gasca   Patient ID: [id number]   Sex: Female   YOB: 1970    Primary Care Provider: Suri Lynn MD    Visit Date: October 6, 2020    Provider: Suri Lynn MD   Location: [facility]   Location Address: [address] [place] [address], [address]  [place], [place]  [id number]   Location Phone: [phone]          Chief Complaint  · \"follow up on my medications\"  · \"I want to discuss possibly getting off my blood pressure medication\"      History Of Present Illness  Caro Gasca is a 50 year old /White female who presents for evaluation and treatment of:      chronic issues.    States that she has done well post procedure  states that she has adjusted her diet  has lost a significant amount of weight  has been going to the gym 5 days a week  drining water well    constipation at times    no chest pain  no breathing issues    did fall on her butt and has pain there  she did fracture her coccyx in the past           Past Medical History  Disease Name Date Onset Notes   Allergy --  --    Anxiety --  --    Depression 04/07/2015 working well, continue citalopram discussed that she can stay on meds long term or go off them whenever she is ready   Essential hypertension 04/06/2016 stable will try to decrease atenolol and see if that will give her more energy, need to watch bp closely   Hypertension --  --    Hypothyroidism --  --    Kidney stone --  --    Obesity 04/06/2016 she is going to work on weight by trying to go to the gym she is working on this with her son   Vitamin D deficiency 04/07/2015 will repeat labs continue current OTC meds for now         Past Surgical History  Procedure Name Date Notes   Cesarian Section 1999, 2002, 2003 X 3    Hysterectomy 2004 --          Medication List  Name Date Started Instructions   citalopram 40 mg oral tablet 07/17/2020 TAKE 1 TABLET BY MOUTH ONCE DAILY FOR 90 DAYS   Euthyrox 137 mcg oral tablet 09/15/2020 " Take 1 tablet by mouth once daily   fluticasone propionate 50 mcg/actuation nasal spray,suspension 07/06/2020 USE 2 SPRAY(S) IN EACH NOSTRIL ONCE DAILY AS NEEDED FOR 30 DAYS   Multiple Vitamins oral tablet 01/18/2017 take 1 tablet by oral route daily for 90 days   Protonix 40 mg oral tablet,delayed release (DR/EC)  take 1 tablet (40 mg) by oral route once daily   spironolactone 100 mg oral tablet 09/28/2020 Take 1 tablet by mouth twice daily   Tri-Lo-Ale 0.18/0.215/0.25 mg-25 mcg oral tablet 09/28/2020 Take 1 tablet by mouth once daily   ursodiol 300 mg oral capsule  take 1 capsule (300 mg) by oral route 2 times per day   Zyrtec 10 mg oral tablet 10/06/2020 take 1 tablet (10 mg) by oral route once daily         Allergy List  Allergen Name Date Reaction Notes   PENICILLINS --  Swelling/SOB --        Allergies Reconciled  Family Medical History  Disease Name Relative/Age Notes   Diabetes, unspecified type Mother/   --          Reproductive History  Menstrual   Pregnancy Summary   Total Pregnancies: 3 Full Term: 3 Premature: 0   Ab Induced: 0 Ab Spontaneous: 0 Ectopics: 0   Multiples: 0 Living: 3         Social History  Finding Status Start/Stop Quantity Notes   Alcohol Never --/-- --  --    Tobacco Never --/-- --  --          Immunizations  NameDate Admin Mfg Trade Name Lot Number Route Inj VIS Given VIS Publication   Hepatitis A01/31/2019 [facility] HAVRIX-ADULT [id number]  RD 01/31/2019 07/20/2016   Comments: Tolerated well   Hepatitis A07/09/2018 [facility] HAVRIX-ADULT   RD 07/09/2018 07/20/2016   Comments: Pt tolerated well and left office in stable condition   Fikeoiemh23/06/2020 [facility] Fluzone Quadrivalent [id number] RD 10/06/2020 08/15/2019   Comments: pt tolerated well and left office in stable condition, JOSE ELIAS Myers         Review of Systems  · Constitutional  o Denies  o : fever, fatigue, weight loss, weight gain  · Cardiovascular  o Denies  o : lower extremity edema, claudication, chest pressure,  "palpitations  · Respiratory  o Denies  o : shortness of breath, wheezing, frequent cough, hemoptysis, dyspnea on exertion  · Gastrointestinal  o Admits  o : constipation  o Denies  o : nausea, vomiting, diarrhea, abdominal pain      Vitals  Date Time BP Position Site L\R Cuff Size HR RR TEMP (F) WT  HT  BMI kg/m2 BSA m2 O2 Sat FR L/min FiO2 HC       06/11/2020 11:18 AM         210lbs 0oz 5'  4\" 36.05 2.07       08/14/2020 01:59 /72 Sitting    72 - R  97.2 183lbs 0oz 5'  4\" 31.41 1.94 97 %  21%    10/06/2020 09:57 /74 Sitting    114 - R  97.6 171lbs 8oz 5'  4\" 29.44 1.87 95 %  21%          Physical Examination  · Constitutional  o Appearance  o : no acute distress, well-nourished  · Head and Face  o Head  o :   § Inspection  § : atraumatic, normocephalic  · Eyes  o Eyes  o : extraocular movements intact, no scleral icterus, no conjunctival injection  · Respiratory  o Respiratory Effort  o : breathing comfortably, symmetric chest rise  o Auscultation of Lungs  o : clear to asculatation bilaterally, no wheezes, rales, or rhonchii  · Cardiovascular  o Heart  o :   § Auscultation of Heart  § : regular rate and rhythm, no murmurs, rubs, or gallops  o Peripheral Vascular System  o :   § Extremities  § : no edema  · Neurologic  o Mental Status Examination  o :   § Orientation  § : grossly oriented to person, place and time  o Gait and Station  o :   § Gait Screening  § : normal gait  · Psychiatric  o General  o : normal mood and affect              Assessment  · Vitamin D deficiency     268.9/E55.9  will repeat labs  continue current OTC meds for now  · Hypertension     401.9/I10  will stop lisinopril as she is doing so well  · Hypothyroidism     244.9/E03.9  · Need for influenza vaccination     V04.81/Z23  · Screening for colon cancer     V76.51/Z12.11  · Visit for screening mammogram     V76.12/Z12.31  · Allergic rhinitis due to allergen     477.9/J30.9  · Coccyx pain     724.79/M53.3  · S/P bariatric " surgery     V45.86/Z98.84  gastric sleeve 7/2020  · Weight loss     783.21/R63.4  encouraged continued weight loss     will check labs  adjust meds as needed  spironolactone is for hair loss       Problems Reconciled  Plan  · Orders  o Vitamin D Level (46543) - 268.9/E55.9 - 10/06/2020  o Thyroid Profile (88312, 03846, THYII) - 244.9/E03.9 - 10/06/2020  o COLONOSCOPY REFERRAL (COLON) - V76.51/Z12.11 - 10/06/2020   Dr. Sterling  o Screening Mammography; Bilateral 3D (08588, 32163, ) - V76.12/Z12.31 - 10/06/2020  o CBC with Auto Diff [facility] (79255) - 401.9/I10, 244.9/E03.9 - 10/06/2020  o CMP [facility] (97493) - 268.9/E55.9, 401.9/I10, 244.9/E03.9 - 10/06/2020  o ACO-39: Current medications updated and reviewed (1159F, ) - - 10/06/2020  o Sacrum and Coccyx 2-3 views [facility] Preferred View (71838) - 724.79/M53.3 - 10/06/2020  o IM/SQ - Injection Fee [facility] (91086) - - 10/06/2020  o Fluzone Quadrivalent Vaccine, age 6 months + (38712) - V04.81/Z23 - 10/06/2020   Vaccine - Influenza; Dose: 0.5; Site: Right Deltoid; Route: Intramuscular; Date: 10/06/2020 10:49:00; Exp: 06/30/2021; Lot: [id number]; Mfg: sanofi pasteur; TradeName: Fluzone Quadrivalent; Administered By: Geetha Coronado MA; Comment: pt tolerated well and left office in stable condition, JOSE ELIAS Myers  · Medications  o Zyrtec 10 mg oral tablet   SIG: take 1 tablet (10 mg) by oral route once daily   DISP: (90) Tablet with 0 refills  Prescribed on 10/06/2020     o lisinopril 10 mg oral tablet   SIG: take 1 tablet (10 mg) by oral route once daily for 90 days   DISP: (90) tablets with 1 refills  Discontinued on 10/06/2020     o Medications have been Reconciled  o Transition of Care or Provider Policy  · Instructions  o Patient was educated/instructed on their diagnosis, treatment and medications prior to discharge from the clinic today.  · Disposition  o 3 Month Follow Up  o please print labs            Electronically Signed by: Suri Lynn MD -Author on October 6, 2020  11:37:51 AM

## 2021-05-13 NOTE — PROGRESS NOTES
Progress Note      Patient Name: Caro Gasca   Patient ID: [id number]   Sex: Female   YOB: 1970    Primary Care Provider: Suri Lynn MD    Visit Date: May 28, 2020    Provider: Suri Lynn MD   Location: [facility]   Location Address: [address] [place] [address], [address]  [place], [place]  [id number]   Location Phone: [phone]          Chief Complaint     obesity check       History Of Present Illness  Video Conferencing Visit  Caro Gasca is a 50 year old /White female who is presenting for evaluation via video conferencing via [employer]. Verbal consent obtained before beginning visit.   The following staff were present during this visit: none   Caro Gasca is a 50 year old /White female who presents for evaluation and treatment of:      this is her 1 month visit for considering weight loss surgery  she has to do 5-6 visits with me prior to any surgery  has June 9th EGD for further eval for surgery  after that she is hoping she can have her weight loss procedure    she has been doin gthe protein shake in the AM  she is still trying to stick with her diet    still having some trouble with activity  has been stuck at home      kidney seems to be better  she isnt' having pain  she stopped the flomax    bp 104/74  no dizziness or lightheadedness  no chest pain  no trouble breathing  no leg swelling           Past Medical History  Disease Name Date Onset Notes   Allergy --  --    Anxiety --  --    Depression 04/07/2015 working well, continue citalopram discussed that she can stay on meds long term or go off them whenever she is ready   Essential hypertension 04/06/2016 stable will try to decrease atenolol and see if that will give her more energy, need to watch bp closely   Hypertension --  --    Hypothyroidism --  --    Kidney Stone --  --    Obesity 04/06/2016 she is going to work on weight by trying to go to the gym she is working on this with her son   Vitamin D  deficiency 04/07/2015 will repeat labs continue current OTC meds for now         Past Surgical History  Procedure Name Date Notes   Cesarian Section 1999, 2002, 2003 X 3    Hysterectomy 2004 --          Medication List  Name Date Started Instructions   citalopram 40 mg oral tablet 04/16/2020 TAKE 1 TABLET BY MOUTH ONCE DAILY FOR 90 DAYS   fluticasone propionate 50 mcg/actuation nasal spray,suspension 04/09/2020 USE 2 SPRAY(S) IN EACH NOSTRIL ONCE DAILY AS NEEDED FOR 30 DAYS   hydrocodone-acetaminophen 5-325 mg oral tablet 04/23/2020 take 1 tablet by oral route every 6 hours as needed for pain   levothyroxine 137 mcg oral tablet 03/23/2020 TAKE 1 TABLET BY MOUTH ONCE DAILY   lisinopril 20 mg oral tablet 05/26/2020 TAKE 1 TABLET BY MOUTH ONCE DAILY   Multiple Vitamins oral tablet 01/18/2017 take 1 tablet by oral route daily for 90 days   norgestimate-ethinyl estradiol 0.18/0.215/0.25 mg-25 mcg oral tablet 04/16/2020 TAKE 1 TABLET BY MOUTH ONCE DAILY   spironolactone 100 mg oral tablet 05/12/2020 TAKE 1 TABLET BY MOUTH TWICE DAILY   Zyrtec 10 mg oral tablet  take 1 tablet (10 mg) by oral route once daily         Allergy List  Allergen Name Date Reaction Notes   PENICILLINS --  Swelling/SOB --          Family Medical History  Disease Name Relative/Age Notes   Diabetes, unspecified type Mother/   --          Reproductive History  Menstrual   Pregnancy Summary   Total Pregnancies: 3 Full Term: 3 Premature: 0   Ab Induced: 0 Ab Spontaneous: 0 Ectopics: 0   Multiples: 0 Living: 3         Social History  Finding Status Start/Stop Quantity Notes   Alcohol Never --/-- --  --    Tobacco Never --/-- --  --          Immunizations  NameDate Admin Mfg Trade Name Lot Number Route Inj VIS Given VIS Publication   Hepatitis A01/31/2019 [facility] HAVRIX-ADULT [id number] IM RD 01/31/2019 07/20/2016   Comments: Tolerated well   Hepatitis A07/09/2018 [facility] HAVRIX-ADULT  IM RD 07/09/2018 07/20/2016   Comments: Pt tolerated well and left office in  stable condition   Pfizyvtds24/16/2019 [facility] Fluzone Quadrivalent [id number] IM RD 12/16/2019    Comments: pt tolerated well   Xwozclqio56/02/2018 [facility] Fluarix, quadrivalent, preservative free [id number] IM RD 02/02/2018 08/07/2015   Comments: pt tolerated well and left office in stable condition, JOSE ELIAS anguiano   Oxyamufdc29/07/2015 [facility] Fluarix, quadrivalent, preservative free [id number] IM LA 10/07/2015 08/19/2014   Comments: Patient given vaccine and left office in stable condition   Qecerdtud27/13/2014 [facility] Fluarix, quadrivalent, preservative free [id number] IM RD 10/13/2014 07/02/2012   Comments:          Review of Systems  · Constitutional  o Denies  o : fever, fatigue, weight loss, weight gain  · Cardiovascular  o Denies  o : lower extremity edema, claudication, chest pressure, palpitations  · Respiratory  o Denies  o : shortness of breath, wheezing, cough, hemoptysis, dyspnea on exertion  · Gastrointestinal  o Denies  o : nausea, vomiting, diarrhea, constipation, abdominal pain      Vitals  Date Time BP Position Site L\R Cuff Size HR RR TEMP (F) WT  HT  BMI kg/m2 BSA m2 O2 Sat HC       05/28/2020 10:51 AM         211lbs 0oz              Physical Examination     Gen: well-nourished, no acute distress  HENT: atraumatic, normocephalic  Eyes: extraocular movements intact, no scleral icterus  Lung: breathing comfortably, no cough  Skin: no visible rash, no lesions  Neuro: grossly oriented to person, place, and time. no facial droop   Psych: normal mood and affect               Assessment  · Obesity     278.00/E66.9  encouraged her to be more active  cont to monitor weight and activity    Problems Reconciled  Plan  · Orders  o ACO-39: Current medications updated and reviewed () - - 05/28/2020  · Instructions  o Patient was educated/instructed on their diagnosis, treatment and medications prior to discharge from the clinic today.  · Disposition  o 1 Month Follow Up            Electronically Signed by: Suri Lynn MD -Author on  May 28, 2020 03:41:40 PM

## 2021-05-13 NOTE — PROGRESS NOTES
Progress Note      Patient Name: Caro Gasca   Patient ID: [id number]   Sex: Female   YOB: 1970    Primary Care Provider: Suri Lynn MD    Visit Date: August 14, 2020    Provider: Josee Pop PA-C   Location: [facility]   Location Address: [address] [place] [address], [address]  [place], [place]  [id number]   Location Phone: [phone]          Chief Complaint  · UTI symptoms   · Dysuria   · Blood in urine       History Of Present Illness  Caro Gasca is a 50 year old /White female who presents for evaluation and treatment of:      pt states 3 days ago she started having burning and frequent urination, started having blood in urine today. Pt states she a few months ago she went to the er for kidney stone and does not know if she passed them.  She has incontinence today, she knew she had to go but did not make it in time.   She feels dull aches  Denies nausea, vomiting, abd pain, lbp.   denies vaginal itching, discharge, odor.       Past Medical History  Disease Name Date Onset Notes   Allergy --  --    Anxiety --  --    Depression 04/07/2015 working well, continue citalopram discussed that she can stay on meds long term or go off them whenever she is ready   Essential hypertension 04/06/2016 stable will try to decrease atenolol and see if that will give her more energy, need to watch bp closely   Hypertension --  --    Hypothyroidism --  --    Kidney stone --  --    Obesity 04/06/2016 she is going to work on weight by trying to go to the gym she is working on this with her son   Vitamin D deficiency 04/07/2015 will repeat labs continue current OTC meds for now         Past Surgical History  Procedure Name Date Notes   Cesarian Section 1999, 2002, 2003 X 3    Hysterectomy 2004 --          Medication List  Name Date Started Instructions   Bactrim -160 mg oral tablet 08/17/2020 take 1 tablet by oral route every 12 hours for 7 days   citalopram 40 mg oral tablet 07/17/2020  TAKE 1 TABLET BY MOUTH ONCE DAILY FOR 90 DAYS   Euthyrox 137 mcg oral tablet 08/17/2020 Take 1 tablet by mouth once daily   fluticasone propionate 50 mcg/actuation nasal spray,suspension 07/06/2020 USE 2 SPRAY(S) IN EACH NOSTRIL ONCE DAILY AS NEEDED FOR 30 DAYS   levothyroxine 137 mcg oral tablet 03/23/2020 TAKE 1 TABLET BY MOUTH ONCE DAILY   lisinopril 10 mg oral tablet 06/11/2020 take 1 tablet (10 mg) by oral route once daily for 90 days   Multiple Vitamins oral tablet 01/18/2017 take 1 tablet by oral route daily for 90 days   norgestimate-ethinyl estradiol 0.18/0.215/0.25 mg-25 mcg oral tablet 04/16/2020 TAKE 1 TABLET BY MOUTH ONCE DAILY   Protonix 40 mg oral tablet,delayed release (DR/EC)  take 1 tablet (40 mg) by oral route once daily   spironolactone 100 mg oral tablet 08/14/2020 Take 1 tablet by mouth twice daily   Zyrtec 10 mg oral tablet  take 1 tablet (10 mg) by oral route once daily         Allergy List  Allergen Name Date Reaction Notes   PENICILLINS --  Swelling/SOB --          Family Medical History  Disease Name Relative/Age Notes   Diabetes, unspecified type Mother/   --          Reproductive History  Menstrual   Pregnancy Summary   Total Pregnancies: 3 Full Term: 3 Premature: 0   Ab Induced: 0 Ab Spontaneous: 0 Ectopics: 0   Multiples: 0 Living: 3         Social History  Finding Status Start/Stop Quantity Notes   Alcohol Never --/-- --  --    Tobacco Never --/-- --  --          Immunizations  NameDate Admin Mfg Trade Name Lot Number Route Inj VIS Given VIS Publication   Hepatitis A01/31/2019 [facility] HAVRIX-ADULT [id number] IM RD 01/31/2019 07/20/2016   Comments: Tolerated well   Hepatitis A07/09/2018 [facility] HAVRIX-ADULT  IM RD 07/09/2018 07/20/2016   Comments: Pt tolerated well and left office in stable condition   Vrnjajvng41/16/2019 [facility]C Fluzone Quadrivalent [id number] IM RD 12/16/2019    Comments: pt tolerated well   Zlqaqilkg63/02/2018 [facility] Fluarix, quadrivalent, preservative free [id number] IM RD 02/02/2018  "08/07/2015   Comments: pt tolerated well and left office in stable condition, JOSE ELIAS anguiano   Pmiqarqfc72/07/2015 [facility]B Fluarix, quadrivalent, preservative free [id number] IM LA 10/07/2015 08/19/2014   Comments: Patient given vaccine and left office in stable condition   Klrttluan37/13/2014 SK[facility] Fluarix, quadrivalent, preservative free [id number] IM RD 10/13/2014 07/02/2012   Comments:          Review of Systems  · Constitutional  o Denies  o : fever, fatigue, weight loss, weight gain  · Cardiovascular  o Denies  o : lower extremity edema, claudication, chest pressure, palpitations  · Respiratory  o Denies  o : shortness of breath, wheezing, cough, hemoptysis, dyspnea on exertion  · Gastrointestinal  o Denies  o : nausea, vomiting, diarrhea, constipation, abdominal pain  · Genitourinary  o Admits  o : frequency, dysuria, hematuria  o Denies  o : urgency, incontinence, vaginal discharge      Vitals  Date Time BP Position Site L\R Cuff Size HR RR TEMP (F) WT  HT  BMI kg/m2 BSA m2 O2 Sat        05/28/2020 10:51 AM         211lbs 0oz        06/11/2020 11:18 AM         210lbs 0oz 5'  4\" 36.05 2.07     08/14/2020 01:59 /72 Sitting    72 - R  97.2 183lbs 0oz 5'  4\" 31.41 1.94 97 %          Physical Examination  · Constitutional  o Appearance  o : no acute distress, well-nourished  · Head and Face  o Head  o :   § Inspection  § : atraumatic, normocephalic  · Ears, Nose, Mouth and Throat  o Ears  o :   § External Ears  § : normal  o Nose  o :   § Intranasal Exam  § : nares patent  o Oral Cavity  o :   § Oral Mucosa  § : moist mucous membranes  · Respiratory  o Respiratory Effort  o : breathing comfortably, symmetric chest rise  o Auscultation of Lungs  o : clear to asculatation bilaterally, no wheezes, rales, or rhonchii  · Cardiovascular  o Heart  o :   § Auscultation of Heart  § : regular rate and rhythm, no murmurs, rubs, or gallops  o Peripheral Vascular System  o :   § Extremities  § : no edema  · Gastrointestinal  o Abdominal " Examination  o : abdomen nontender to palpation, tone normal without rigidity or guarding, no masses present, abdominal contour scaphoid. no cva tenderness  · Neurologic  o Mental Status Examination  o :   § Orientation  § : grossly oriented to person, place and time  o Gait and Station  o :   § Gait Screening  § : normal gait          Results  · In-Office Procedures  o Lab procedure  § IOP - Urinalysis without Microscopy (Clinitek) [facility] ([address])   § Color Ur: Yellow   § Clarity Ur: Clear   § Glucose Ur Ql Strip: Negative   § Bilirub Ur Ql Strip: Small   § Ketones Ur Ql Strip: 40 mg/dL   § Sp Gr Ur Qn: 1.020   § Hgb Ur Ql Strip: Large   § pH Ur-LsCnc: 5.5   § Prot Ur Ql Strip: 100 mg/dL   § Urobilinogen Ur Strip-mCnc: 0.2 E.U./dL   § Nitrite Ur Ql Strip: Negative   § WBC Est Ur Ql Strip: Small       Assessment  · Dysuria     788.1/R30.0  · UTI (urinary tract infection)     599.0/N39.0  increase water intake. abx and culture sent today. No hematuria or concerning sx for stone. Pt will monitor for worsening sx, fever, vomiting, abd pain. Pt understands snd agrees    Problems Reconciled  Plan  · Orders  o Urinalysis with Reflex Microscopy if abnormal ([facility]) ([address]) - 788.1/R30.0 - 08/14/2020  o Urine culture ([id number], 27255) - 788.1/R30.0 - 08/14/2020  o ACO-39: Current medications updated and reviewed () - - 08/14/2020  · Medications  o Macrobid 100 mg oral capsule   SIG: take 1 capsule (100 mg) by oral route every 12 hours with food for 7 days   DISP: (14) capsules with 0 refills  Prescribed on 08/14/2020     o phenazopyridine 100 mg oral tablet   SIG: take 1 tablet (100 mg) by oral route 3 times per day after meals for 2 days   DISP: (6) tablets with 0 refills  Prescribed on 08/14/2020     o Medications have been Reconciled  o Transition of Care or Provider Policy  · Instructions  o Patient was educated/instructed on their diagnosis, treatment and medications prior to discharge from the clinic  today.  · Disposition  o Call or Return if symptoms worsen or persist.  o Keep follow up appt as scheduled            Electronically Signed by: Josee Pop PA-C -Author on August 21, 2020 02:49:15 PM

## 2021-05-13 NOTE — PROGRESS NOTES
Progress Note      Patient Name: Caro Gasca   Patient ID: [id number]   Sex: Female   YOB: 1970    Primary Care Provider: Suri Lynn MD    Visit Date: June 11, 2020    Provider: KULWINDER Nur   Location: [facility]   Location Address: [address] [place] [address], [address]  [place], [place]  [id number]   Location Phone: [phone]          History Of Present Illness  Video Conferencing Visit  Caro Gasca is a 50 year old /White female who is presenting for evaluation via video conferencing via [employer]. Verbal consent obtained before beginning visit.   The following staff were present during this visit: Margraet Toledo NP.   Caro Gasca is a 50 year old /White female who presents for evaluation and treatment of:      Last follow up before weight loss surgery    Patient recently had upper GI scope by weight loss surgeon which displayed a hiatal hernia which he will repair while performing her surgery.     Patient voices no concerns and has lost another pound.   Is complying with the low carb diet.   Walking 30 minutes a day in 10 min increments.     Form completed and faxed for weight loss follow up.       Past Medical History  Disease Name Date Onset Notes   Allergy --  --    Anxiety --  --    Depression 04/07/2015 working well, continue citalopram discussed that she can stay on meds long term or go off them whenever she is ready   Essential hypertension 04/06/2016 stable will try to decrease atenolol and see if that will give her more energy, need to watch bp closely   Hypertension --  --    Hypothyroidism --  --    Kidney stone --  --    Obesity 04/06/2016 she is going to work on weight by trying to go to the gym she is working on this with her son   Vitamin D deficiency 04/07/2015 will repeat labs continue current OTC meds for now         Past Surgical History  Procedure Name Date Notes   Cesarian Section 1999, 2002, 2003 X 3    Hysterectomy 2004 --           Medication List  Name Date Started Instructions   citalopram 40 mg oral tablet 04/16/2020 TAKE 1 TABLET BY MOUTH ONCE DAILY FOR 90 DAYS   fluticasone propionate 50 mcg/actuation nasal spray,suspension 04/09/2020 USE 2 SPRAY(S) IN EACH NOSTRIL ONCE DAILY AS NEEDED FOR 30 DAYS   levothyroxine 137 mcg oral tablet 03/23/2020 TAKE 1 TABLET BY MOUTH ONCE DAILY   lisinopril 10 mg oral tablet 06/11/2020 take 1 tablet (10 mg) by oral route once daily for 90 days   Multiple Vitamins oral tablet 01/18/2017 take 1 tablet by oral route daily for 90 days   norgestimate-ethinyl estradiol 0.18/0.215/0.25 mg-25 mcg oral tablet 04/16/2020 TAKE 1 TABLET BY MOUTH ONCE DAILY   spironolactone 100 mg oral tablet 05/12/2020 TAKE 1 TABLET BY MOUTH TWICE DAILY   Zyrtec 10 mg oral tablet  take 1 tablet (10 mg) by oral route once daily         Allergy List  Allergen Name Date Reaction Notes   PENICILLINS --  Swelling/SOB --        Allergies Reconciled  Family Medical History  Disease Name Relative/Age Notes   Diabetes, unspecified type Mother/   --          Reproductive History  Menstrual   Pregnancy Summary   Total Pregnancies: 3 Full Term: 3 Premature: 0   Ab Induced: 0 Ab Spontaneous: 0 Ectopics: 0   Multiples: 0 Living: 3         Social History  Finding Status Start/Stop Quantity Notes   Alcohol Never --/-- --  --    Tobacco Never --/-- --  --          Immunizations  NameDate Admin Mfg Trade Name Lot Number Route Inj VIS Given VIS Publication   Hepatitis A01/31/2019 [facility] HAVRIX-ADULT [id number] IM RD 01/31/2019 07/20/2016   Comments: Tolerated well   Hepatitis A07/09/2018 [facility] HAVRIX-ADULT  IM RD 07/09/2018 07/20/2016   Comments: Pt tolerated well and left office in stable condition   Kowltmira25/16/2019 [facility] Fluzone Quadrivalent [id number] IM RD 12/16/2019    Comments: pt tolerated well   Kxunygnev80/02/2018 [facility] Fluarix, quadrivalent, preservative free [id number] IM RD 02/02/2018 08/07/2015   Comments: pt tolerated well and left office  "in stable condition, JOSE ELIAS anguiano   Blqsawnns41/07/2015 [facility]B Fluarix, quadrivalent, preservative free [id number] IM LA 10/07/2015 08/19/2014   Comments: Patient given vaccine and left office in stable condition   Ifhrafxlz44/13/2014 SKB Fluarix, quadrivalent, preservative free [id number] IM RD 10/13/2014 07/02/2012   Comments:          Review of Systems  · Constitutional  o Denies  o : fatigue, fever, night sweats  · Cardiovascular  o Denies  o : chest pain, irregular heart beats, rapid heart rate, syncope, dyspnea on exertion  · Respiratory  o Denies  o : shortness of breath, productive cough  · Gastrointestinal  o Denies  o : nausea, vomiting      Vitals  Date Time BP Position Site L\R Cuff Size HR RR TEMP (F) WT  HT  BMI kg/m2 BSA m2 O2 Sat HC       04/23/2020 04:25 PM         213lbs 16oz 5'  4\" 36.73 2.09     05/28/2020 10:51 AM         211lbs 0oz        06/11/2020 11:18 AM         210lbs 0oz 5'  4\" 36.05 2.07           Physical Examination     Patient is pleasant and Alert and orientedx3, unlabored breathing.               Assessment  · Obesity     278.00/E66.9  Has lost another pound, patient reports she will be having surgery in July if everything goes as planned. Complying to low carb diet. Exercising by walking 30 mins.     Problems Reconciled  Plan  · Orders  o ACO-39: Current medications updated and reviewed () - - 06/11/2020  · Medications  o lisinopril 10 mg oral tablet   SIG: take 1 tablet (10 mg) by oral route once daily for 90 days   DISP: (90) tablets with 1 refills  Adjusted on 06/11/2020     o Medications have been Reconciled  o Transition of Care or Provider Policy  · Instructions  o Patient was educated/instructed on their diagnosis, treatment and medications prior to discharge from the clinic today.  o Call the office with any concerns or questions.  · Disposition  o Call or Return if symptoms worsen or persist.  o Meds sent to pharmacy  o 1 month follow up            Electronically Signed by: Margaret LOPEZ" KULWINDER Toledo -Author on June 11, 2020 11:20:46 AM

## 2021-05-14 VITALS
HEART RATE: 114 BPM | HEIGHT: 64 IN | DIASTOLIC BLOOD PRESSURE: 74 MMHG | BODY MASS INDEX: 29.28 KG/M2 | TEMPERATURE: 97.6 F | OXYGEN SATURATION: 95 % | SYSTOLIC BLOOD PRESSURE: 104 MMHG | WEIGHT: 171.5 LBS

## 2021-05-14 VITALS
BODY MASS INDEX: 28.34 KG/M2 | TEMPERATURE: 97.9 F | HEART RATE: 83 BPM | HEIGHT: 64 IN | SYSTOLIC BLOOD PRESSURE: 120 MMHG | OXYGEN SATURATION: 96 % | DIASTOLIC BLOOD PRESSURE: 88 MMHG | WEIGHT: 166 LBS

## 2021-05-14 NOTE — PROGRESS NOTES
"   Progress Note      Patient Name: Xuan Horvath   Patient ID: [id number]   Sex: Female   YOB: 1970    Primary Care Provider: Suri Lynn MD    Visit Date: February 24, 2021    Provider: Louise Nino PA-C   Location: [facility]   Location Address: [address] [place] [address], [address]  [place], [place]  [id number]   Location Phone: [phone]          Chief Complaint  · Acute visit  · \"I think I have a kidney stone\"      History Of Present Illness  Xuan Horvath is a 50 year old /White female who presents for evaluation and treatment of:      flank pain.  Constant \"stabbing\" pain.  She denies any injury.  Patient has a history of kidney stones and states that her symptoms feel very similar to that. She denies hematuria, fever or body aches. Patient has taken some left over Tylenol 3 which has helped ease the pain some but it is still present.     ER report reviewed from 2019 when pt was last dx with kidney stone. u/a normal, CT showing 3mm stone.    refill birth control       Past Medical History  Disease Name Date Onset Notes   Allergy --  --    Anxiety --  --    Depression 04/07/2015 working well, continue citalopram discussed that she can stay on meds long term or go off them whenever she is ready   Essential hypertension 04/06/2016 stable will try to decrease atenolol and see if that will give her more energy, need to watch bp closely   Hypertension --  --    Hypothyroidism --  --    Kidney stone --  --    Obesity 04/06/2016 she is going to work on weight by trying to go to the gym she is working on this with her son   Vitamin D deficiency 04/07/2015 will repeat labs continue current OTC meds for now         Past Surgical History  Procedure Name Date Notes   Cesarian Section 1999, 2002, 2003 X 3    Hysterectomy 2004 --          Medication List  Name Date Started Instructions   citalopram 40 mg oral tablet 12/15/2020 TAKE 1 TABLET BY MOUTH ONCE DAILY FOR 90 DAYS "   fluticasone propionate 50 mcg/actuation nasal spray,suspension 12/15/2020 USE 2 SPRAY(S) IN EACH NOSTRIL ONCE DAILY AS NEEDED FOR 30 DAYS   levothyroxine 137 mcg oral tablet 12/21/2020 take 1 tablet (137 mcg) by oral route once daily   Multiple Vitamins oral tablet 01/18/2017 take 1 tablet by oral route daily for 90 days   spironolactone 100 mg oral tablet 01/19/2021 Take 1 tablet by mouth twice daily   Tri-Lo-Ale 0.18/0.215/0.25 mg-25 mcg oral tablet 02/25/2021 Take 1 tablet by mouth once daily   Zyrtec 10 mg oral tablet 12/15/2020 take 1 tablet (10 mg) by oral route once daily         Allergy List  Allergen Name Date Reaction Notes   PENICILLINS --  Swelling/SOB --        Allergies Reconciled  Family Medical History  Disease Name Relative/Age Notes   Diabetes, unspecified type Mother/   --          Reproductive History  Menstrual   Pregnancy Summary   Total Pregnancies: 3 Full Term: 3 Premature: 0   Ab Induced: 0 Ab Spontaneous: 0 Ectopics: 0   Multiples: 0 Living: 3         Social History  Finding Status Start/Stop Quantity Notes   Alcohol Never --/-- --  --    Tobacco Never --/-- --  --          Immunizations  NameDate Admin Mfg Trade Name Lot Number Route Inj VIS Given VIS Publication   Hepatitis A01/31/2019 [facility] HAVRIX-ADULT [id number]  RD 01/31/2019 07/20/2016   Comments: Tolerated well   Hepatitis A07/09/2018 [facility] HAVRIX-ADULT   RD 07/09/2018 07/20/2016   Comments: Pt tolerated well and left office in stable condition   Cnxbmwble39/06/2020 [facility]C Fluzone Quadrivalent [id number] IM RD 10/06/2020 08/15/2019   Comments: pt tolerated well and left office in stable condition, JOSE ELIAS Myers         Review of Systems  · Constitutional  o Denies  o : fever, fatigue, weight loss, weight gain  · Cardiovascular  o Denies  o : lower extremity edema, claudication, chest pressure, palpitations  · Respiratory  o Denies  o : shortness of breath, wheezing, cough, hemoptysis, dyspnea on  "exertion  · Gastrointestinal  o Denies  o : nausea, vomiting, diarrhea, constipation, abdominal pain      Vitals  Date Time BP Position Site L\R Cuff Size HR RR TEMP (F) WT  HT  BMI kg/m2 BSA m2 O2 Sat FR L/min FiO2 HC       08/14/2020 01:59 /72 Sitting    72 - R  97.2 183lbs 0oz 5'  4\" 31.41 1.94 97 %  21%    10/06/2020 09:57 /74 Sitting    114 - R  97.6 171lbs 8oz 5'  4\" 29.44 1.87 95 %  21%    02/24/2021 02:21 /88 Sitting    83 - R  97.9 165lbs 16oz 5'  4\" 28.49 1.84 96 %  21%          Physical Examination  · Constitutional  o Appearance  o : no acute distress, well-nourished  · Head and Face  o Head  o :   § Inspection  § : atraumatic, normocephalic  · Eyes  o Eyes  o : extraocular movements intact, no scleral icterus, no conjunctival injection  · Ears, Nose, Mouth and Throat  o Ears  o :   § External Ears  § : normal  o Nose  o :   § Intranasal Exam  § : nares patent  o Oral Cavity  o :   § Oral Mucosa  § : moist mucous membranes  · Respiratory  o Respiratory Effort  o : breathing comfortably, symmetric chest rise  o Auscultation of Lungs  o : clear to asculatation bilaterally, no wheezes, rales, or rhonchii  · Cardiovascular  o Heart  o :   § Auscultation of Heart  § : regular rate and rhythm, no murmurs, rubs, or gallops  o Peripheral Vascular System  o :   § Extremities  § : no edema  · Gastrointestinal  o Abdominal Examination  o :   § Abdomen  § : bowel sounds present, non-distended, non-tender, L sided CVA tenderness  · Skin and Subcutaneous Tissue  o General Inspection  o : no lesions present, no areas of discoloration, skin turgor normal  · Neurologic  o Mental Status Examination  o :   § Orientation  § : grossly oriented to person, place and time  o Gait and Station  o :   § Gait Screening  § : normal gait  · Psychiatric  o General  o : normal mood and affect          Assessment  · Left flank pain     789.09/R10.9  u/a in office without hematuria but patient is persistent that " symptoms are related to kidney stone. Discussed with patient that we can treat with Flomax, toradol injection and ibuprofen today but if symptoms persist or worsen we will order a CT kidney stone protocol. Also discussed possibility that symptoms could be MSK in origin. Pt voiced understanding.    Problems Reconciled  Plan  · Orders  o Immunization Admin Fee (Single) ([facility]) ([address]804) - 789.09/R10.9 - 02/24/2021  o ACO-39: Current medications updated and reviewed (, 1159F) - - 02/24/2021  o 4.00 - Toradol Injection 60mg (-9) - 789.09/R10.9 - 02/24/2021   Injection - Toradol 60 mg; Dose: 60mg/2mL; Site: Right Gluteus; Route: intramuscular; Date: 02/24/2021 15:20:29; Exp: 09/01/2022; Lot: [id number]; Mfg: [employer]; TradeName: Ketorolac Tromethamine (Oph); Location: [facility]; Administered By: Mega Sandoval MA; Comment: pt tolerated well, left office in stable condition  · Medications  o Flomax 0.4 mg oral capsule   SIG: take 1 capsule (0.4 mg) by oral route once daily 1/2 hour following the same meal each day   DISP: (20) Capsule with 0 refills  Prescribed on 02/24/2021     o ibuprofen 600 mg oral tablet   SIG: take 1 tablet (600 mg) by oral route 3 times per day with food   DISP: (30) Tablet with 0 refills  Prescribed on 02/24/2021     o Medications have been Reconciled  o Transition of Care or Provider Policy  · Instructions  o Take all medications as prescribed/directed.  o Patient was educated/instructed on their diagnosis, treatment and medications prior to discharge from the clinic today.  o Patient instructed to seek medical attention urgently for new or worsening symptoms.  o Call the office with any concerns or questions.  · Disposition  o Call or Return if symptoms worsen or persist.  o follow up as needed  o Discussed with Dr. Lynn  o Medications sent electronically to pharmacy            Electronically Signed by: Louise Nino PA-C -Author on March 1, 2021 06:50:51  AM

## 2021-05-15 VITALS — BODY MASS INDEX: 36.54 KG/M2 | WEIGHT: 214 LBS | HEIGHT: 64 IN

## 2021-05-15 VITALS — WEIGHT: 211 LBS | BODY MASS INDEX: 37.38 KG/M2

## 2021-05-15 VITALS
HEIGHT: 64 IN | BODY MASS INDEX: 36.7 KG/M2 | WEIGHT: 215 LBS | OXYGEN SATURATION: 98 % | DIASTOLIC BLOOD PRESSURE: 76 MMHG | TEMPERATURE: 97.1 F | SYSTOLIC BLOOD PRESSURE: 110 MMHG | HEART RATE: 91 BPM

## 2021-05-15 VITALS
HEART RATE: 96 BPM | OXYGEN SATURATION: 94 % | DIASTOLIC BLOOD PRESSURE: 72 MMHG | BODY MASS INDEX: 36.38 KG/M2 | WEIGHT: 213.12 LBS | HEIGHT: 64 IN | SYSTOLIC BLOOD PRESSURE: 118 MMHG | TEMPERATURE: 97.4 F

## 2021-05-15 VITALS
HEIGHT: 64 IN | SYSTOLIC BLOOD PRESSURE: 102 MMHG | WEIGHT: 183 LBS | DIASTOLIC BLOOD PRESSURE: 72 MMHG | HEART RATE: 72 BPM | OXYGEN SATURATION: 97 % | TEMPERATURE: 97.2 F | BODY MASS INDEX: 31.24 KG/M2

## 2021-05-15 VITALS
BODY MASS INDEX: 36.88 KG/M2 | TEMPERATURE: 97.4 F | HEIGHT: 64 IN | SYSTOLIC BLOOD PRESSURE: 102 MMHG | OXYGEN SATURATION: 98 % | HEART RATE: 104 BPM | WEIGHT: 216 LBS | DIASTOLIC BLOOD PRESSURE: 72 MMHG

## 2021-05-15 VITALS — WEIGHT: 211 LBS | HEIGHT: 64 IN | BODY MASS INDEX: 36.02 KG/M2

## 2021-05-15 VITALS — HEIGHT: 64 IN | WEIGHT: 210 LBS | BODY MASS INDEX: 35.85 KG/M2

## 2021-05-16 VITALS
OXYGEN SATURATION: 97 % | SYSTOLIC BLOOD PRESSURE: 116 MMHG | BODY MASS INDEX: 35.55 KG/M2 | RESPIRATION RATE: 16 BRPM | WEIGHT: 208.25 LBS | HEIGHT: 64 IN | TEMPERATURE: 97 F | DIASTOLIC BLOOD PRESSURE: 72 MMHG | HEART RATE: 96 BPM

## 2021-05-16 VITALS
DIASTOLIC BLOOD PRESSURE: 82 MMHG | TEMPERATURE: 97 F | WEIGHT: 214.5 LBS | OXYGEN SATURATION: 97 % | SYSTOLIC BLOOD PRESSURE: 122 MMHG | HEART RATE: 107 BPM | BODY MASS INDEX: 36.62 KG/M2 | HEIGHT: 64 IN | RESPIRATION RATE: 16 BRPM

## 2021-05-16 VITALS
BODY MASS INDEX: 36.11 KG/M2 | HEART RATE: 85 BPM | OXYGEN SATURATION: 97 % | DIASTOLIC BLOOD PRESSURE: 82 MMHG | RESPIRATION RATE: 16 BRPM | WEIGHT: 211.5 LBS | TEMPERATURE: 98.1 F | HEIGHT: 64 IN | SYSTOLIC BLOOD PRESSURE: 121 MMHG

## 2021-05-28 ENCOUNTER — HOSPITAL ENCOUNTER (OUTPATIENT)
Dept: URGENT CARE | Facility: CLINIC | Age: 51
Discharge: HOME | End: 2021-05-28
Attending: EMERGENCY MEDICINE

## 2021-05-30 LAB — BACTERIA UR CULT: NORMAL

## 2021-06-18 DIAGNOSIS — E03.9 HYPOTHYROIDISM, UNSPECIFIED TYPE: Primary | ICD-10-CM

## 2021-06-18 RX ORDER — LEVOTHYROXINE SODIUM 137 UG/1
137 TABLET ORAL DAILY
Qty: 90 TABLET | Refills: 0 | Status: SHIPPED | OUTPATIENT
Start: 2021-06-18 | End: 2021-08-04 | Stop reason: SDUPTHER

## 2021-06-18 RX ORDER — CETIRIZINE HYDROCHLORIDE 10 MG/1
1 TABLET ORAL DAILY
COMMUNITY
Start: 2021-03-29 | End: 2021-08-04 | Stop reason: SDUPTHER

## 2021-06-18 RX ORDER — SPIRONOLACTONE 100 MG/1
1 TABLET, FILM COATED ORAL 2 TIMES DAILY
COMMUNITY
Start: 2021-04-19 | End: 2021-08-04 | Stop reason: SDUPTHER

## 2021-06-18 RX ORDER — CITALOPRAM 40 MG/1
1 TABLET ORAL DAILY
COMMUNITY
Start: 2020-12-15 | End: 2021-07-12 | Stop reason: SDUPTHER

## 2021-06-18 RX ORDER — NORGESTIMATE AND ETHINYL ESTRADIOL 7DAYSX3 LO
1 KIT ORAL DAILY
COMMUNITY
Start: 2021-04-26 | End: 2021-07-23

## 2021-06-18 RX ORDER — FLUTICASONE PROPIONATE 50 MCG
1 SPRAY, SUSPENSION (ML) NASAL DAILY
COMMUNITY
Start: 2020-12-15 | End: 2021-08-04 | Stop reason: SDUPTHER

## 2021-06-18 RX ORDER — LEVOTHYROXINE SODIUM 137 UG/1
1 TABLET ORAL DAILY
COMMUNITY
Start: 2021-03-15 | End: 2021-07-23 | Stop reason: SDUPTHER

## 2021-06-18 NOTE — TELEPHONE ENCOUNTER
Caller: Xuan Horvath    Relationship: Self    Best call back number: [phone]    Medication needed:   Requested Prescriptions      No prescriptions requested or ordered in this encounter     levothyroxine (SYNTHROID, LEVOTHROID) 137 MCG tablet,  90 DAY SUPPLY      When do you need the refill by: 06/18/2021    What additional details did the patient provide when requesting the medication: PATIENT DOES NOT HAVE ANY MEDICATION LEFT.    Does the patient have less than a 3 day supply:  [x] Yes  [] No    What is the patient's preferred pharmacy:  [facility]  [address] [place] [id number] [phone]

## 2021-06-18 NOTE — TELEPHONE ENCOUNTER
Caller: Xuan Horvath    Relationship: Self    Best call back number: [phone]    Medication needed:   Requested Prescriptions      No prescriptions requested or ordered in this encounter     levothyroxine (SYNTHROID, LEVOTHROID) 137 MCG tablet    When do you need the refill by: 06/18/2021    What additional details did the patient provide when requesting the medication:   PATIENT WANTED TO KNOW IF 5 PILLS CAN BE SEND TO THE [facility] TO HELP HER HAVE HER MEDICATION UNTIL THE [facility] CAN FILL THE 90 DAY SUPPLY. PATIENT IS REQUESTING A CALLBACK ABOUT REQUEST.      Does the patient have less than a 3 day supply:  [x] Yes  [] No    What is the patient's preferred pharmacy: [facility] [address] [place] [address] [phone] [facility] [phone] FX

## 2021-06-23 ENCOUNTER — TELEPHONE (OUTPATIENT)
Dept: INTERNAL MEDICINE | Facility: CLINIC | Age: 51
End: 2021-06-23

## 2021-06-23 DIAGNOSIS — Z98.890 S/P GASTRIC SURGERY: Primary | ICD-10-CM

## 2021-06-23 NOTE — TELEPHONE ENCOUNTER
Caller: Xuan Horvath    Relationship: Self    Best call back number: [phone]    What form or medical record are you requesting: CONTINUATION OF CARE LETTER    Who is requesting this form or medical record from you: BLAKE    How would you like to receive the form or medical records (pick-up, mail, fax):  If fax, what is the fax number:   [phone] FOR DR PUMA MENENDEZ WITH [facility]            Timeframe paperwork needed: PATIENT HAS APPOINTMENT ON 07/27/21.     Additional notes: PLEASE CALL TO PATIENT AND CONFIRM WHEN DONE.

## 2021-06-29 NOTE — TELEPHONE ENCOUNTER
Patient had switched insurances and they are needing a new referral for gastro for follow up care on her gastric sleeve.  Patient sees Dr. Ron Cadet he is part of [facility].

## 2021-07-12 NOTE — TELEPHONE ENCOUNTER
Caller: Xuan Horvath    Relationship: Self    Best call back number: [phone]    Medication needed:   Requested Prescriptions     Pending Prescriptions Disp Refills   • citalopram (CeleXA) 40 MG tablet       Sig: Take 1 tablet by mouth Daily.       When do you need the refill by: WHEN POSSIBLE    Does the patient have less than a 3 day supply:  [x] Yes  [] No    What is the patient's preferred pharmacy:      [facility]  [address] [place] [address] [phone]

## 2021-07-13 RX ORDER — CITALOPRAM 40 MG/1
40 TABLET ORAL DAILY
Qty: 90 TABLET | Refills: 1 | Status: SHIPPED | OUTPATIENT
Start: 2021-07-13 | End: 2021-08-04 | Stop reason: SDUPTHER

## 2021-07-23 RX ORDER — NORGESTIMATE AND ETHINYL ESTRADIOL 7DAYSX3 LO
KIT ORAL
Qty: 56 TABLET | Refills: 0 | Status: SHIPPED | OUTPATIENT
Start: 2021-07-23 | End: 2021-07-25 | Stop reason: SDUPTHER

## 2021-07-23 NOTE — TELEPHONE ENCOUNTER
Caller: Xuan Horvath    Relationship: Self    Best call back number: [phone]   Medication needed:   norgestimate-ethinyl estradiol (ORTHO TRI-CYCLEN LO) 0.18/0.215/0.25 MG-25 MCG per tablet    Does the patient have less than a 3 day supply:  [x] Yes  [] No    What is the patient's preferred pharmacy: [address] [phone] [facility] [phone]

## 2021-07-25 RX ORDER — NORGESTIMATE AND ETHINYL ESTRADIOL 7DAYSX3 LO
1 KIT ORAL DAILY
Qty: 28 TABLET | Refills: 2 | Status: SHIPPED | OUTPATIENT
Start: 2021-07-25 | End: 2021-08-04 | Stop reason: SDUPTHER

## 2021-07-27 ENCOUNTER — OFFICE VISIT (OUTPATIENT)
Dept: BARIATRICS/WEIGHT MGMT | Facility: CLINIC | Age: 51
End: 2021-07-27

## 2021-07-27 VITALS
BODY MASS INDEX: 28.34 KG/M2 | DIASTOLIC BLOOD PRESSURE: 89 MMHG | TEMPERATURE: 97.3 F | HEART RATE: 78 BPM | WEIGHT: 166 LBS | SYSTOLIC BLOOD PRESSURE: 136 MMHG | HEIGHT: 64 IN

## 2021-07-27 DIAGNOSIS — Z90.3 HISTORY OF SLEEVE GASTRECTOMY: ICD-10-CM

## 2021-07-27 DIAGNOSIS — Z71.3 DIETARY COUNSELING: Primary | ICD-10-CM

## 2021-07-27 DIAGNOSIS — I10 ESSENTIAL HYPERTENSION: ICD-10-CM

## 2021-07-27 PROBLEM — E66.811 OBESITY, CLASS I, BMI 30-34.9: Status: RESOLVED | Noted: 2020-02-13 | Resolved: 2021-07-27

## 2021-07-27 PROBLEM — E66.9 OBESITY, CLASS I, BMI 30-34.9: Status: RESOLVED | Noted: 2020-02-13 | Resolved: 2021-07-27

## 2021-07-27 PROBLEM — R10.13 DYSPEPSIA: Status: RESOLVED | Noted: 2020-02-17 | Resolved: 2021-07-27

## 2021-07-27 PROCEDURE — 99214 OFFICE O/P EST MOD 30 MIN: CPT | Performed by: SURGERY

## 2021-07-27 NOTE — PROGRESS NOTES
[facility]  [address] [facility] [address]  [facility] [address]  [phone]  [address] [address]  [phone]  Dept: [phone]  7/27/2021      Xuan Horvath.  [id number]  [id number]  1970  female      Chief Complaint   Patient presents with   • Follow-up       BH Post-Op Bariatric Surgery:   Xuan Horvath is status post Laparoscopic Sleeve/HH procedure, performed on 7/8/20     HPI:   Today's weight is 75.3 kg (166 lb) pounds, today's BMI is Body mass index is 28.49 kg/m²., a  loss of 6 pounds since the last visit and weight loss since surgery is 47 pounds. The patient reports a decreased portion size and loss of appetite.      Xuan Horvath denies nausea vomiting fever chills chest pain shortness of air or abdominal pain and reports feeling great with her weight loss.  She is off her lisinopril blood pressure medication.  She is exercising and doing very well.  No heartburn symptoms     Diet and Exercise: Diet history reviewed and discussed with the patient. Weight loss/gains to date discussed with the patient. The patient states they are eating 70 grams of protein per day. She reports eating 2-3 meals per day, a typical portion size of 1 cup, eating 1-2 snacks per day, drinking 5 or more 8-oz. glasses of water per day, no carbonated beverage consumption and exercising regularly.     Supplements: Bariatric per gastric sleeve.     Review of Systems   Musculoskeletal: Positive for arthralgias.   All other systems reviewed and are negative.      Patient Active Problem List   Diagnosis   • Chronic fatigue   • Essential hypertension   • Sleep apnea   • Anxiety   • Hypothyroid   • Dietary counseling   • Gastric polyps   • History of sleeve gastrectomy       Past Medical History:   Diagnosis Date   • Allergy    • Anxiety    • Depression    • Depression 04/07/2015    working well, continue citalopram discussed  that she can stay on meds long term or go off them whenever she is ready   • Essential hypertension 04/06/2016    stable will try to decrease atenolol and see if that will give her more energy, need to watch bp closely   • Hiatal hernia    • Hypertension    • Hypothyroidism    • Hypothyroidism    • Kidney stone    • Obesity 04/06/2016    she is going to work on weight by trying to go to the gym she is working on this with her son   • Sleep apnea     COMPLIANT WITH CPAP    • Vitamin D deficiency 04/07/2015    will repeat labs continue current OTC meds for now       The following portions of the patient's history were reviewed and updated as appropriate: allergies, current medications, past family history, past medical history, past social history, past surgical history and problem list.    Vitals:    07/27/21 1423   BP: 136/89   Pulse: 78   Temp: 97.3 °F (36.3 °C)       Physical Exam  Vitals reviewed.   HENT:      Head: Normocephalic and atraumatic.      Mouth/Throat:      Mouth: Mucous membranes are moist.      Pharynx: Oropharynx is clear.   Eyes:      General: No scleral icterus.     Extraocular Movements: Extraocular movements intact.      Conjunctiva/sclera: Conjunctivae normal.      Pupils: Pupils are equal, round, and reactive to light.   Neck:      Thyroid: No thyromegaly.   Cardiovascular:      Rate and Rhythm: Normal rate.   Pulmonary:      Effort: Pulmonary effort is normal. No respiratory distress.      Breath sounds: Normal breath sounds. No stridor. No wheezing or rhonchi.   Abdominal:      General: Bowel sounds are normal.      Palpations: Abdomen is soft.      Tenderness: There is no abdominal tenderness. There is no right CVA tenderness, left CVA tenderness, guarding or rebound.      Hernia: No hernia is present.   Musculoskeletal:         General: Normal range of motion.      Cervical back: Normal range of motion and neck supple.   Lymphadenopathy:      Cervical: No cervical adenopathy.   Skin:      General: Skin is warm and dry.      Findings: No erythema.   Neurological:      Mental Status: She is alert and oriented to person, place, and time.   Psychiatric:         Mood and Affect: Mood normal.         Behavior: Behavior normal.         Thought Content: Thought content normal.         Judgment: Judgment normal.         Assessment:   Post-op, the patient 1 year status post laparoscopic sleeve gastrectomy and hiatal hernia repair.  Patient is doing well without any complaints.  No heartburn symptoms.  She is off her blood pressure medication.  She does have some rashes on her lower abdomen from the excess skin which she will see plastic surgeon in the future when her weight loss is completely achieved.  She would like to get down to around 140 pounds.  Went through her daily routine and instructed her about eating clean concentrating on meals only.  Also to use Metamucil outside the evening to help with any snacking.  She started strength training yesterday with addition to her 3 mile walking per day.  She will plan to see her primary care doctor this week and will check labs at that time which I gave her handout on..     Encounter Diagnoses   Name Primary?   • Dietary counseling Yes   • History of sleeve gastrectomy    • Essential hypertension        Plan:     Encouraged patient to be sure to get plenty of lean protein per day through small frequent meals all with a protein source.   Activity restrictions: none.   Recommended patient be sure to get at least 70 grams of protein per day by eating small, frequent meals all with high lean protein choices. Be sure to limit/cut back on daily carbohydrate intake. Discussed with the patient the recommended amount of water per day to intake- half of body weight in ounces. Reviewed vitamin requirements. Be sure to do routine exercise, 150 minutes per week minimum, including both cardio and strength training.     Instructions / Recommendations: dietary counseling  recommended, recommended a daily protein intake of  grams, vitamin supplement(s) recommended, recommended exercising at least 150 minutes per week, behavior modifications recommended and instructed to call the office for concerns, questions, or problems.     The patient was instructed to follow up in 6 months plan to get down to around 140 pounds.     The patient was counseled regarding the above procedure. Total time spent on this encounter was  30 minutes including reviewing previous notes, lab results and face to face time spent with the patient.  The majority of time was spent counseling the patient regarding diet and exercise as well as reviewing their medications and their compliance with the procedure.

## 2021-08-04 ENCOUNTER — OFFICE VISIT (OUTPATIENT)
Dept: INTERNAL MEDICINE | Facility: CLINIC | Age: 51
End: 2021-08-04

## 2021-08-04 VITALS
TEMPERATURE: 97.5 F | DIASTOLIC BLOOD PRESSURE: 86 MMHG | SYSTOLIC BLOOD PRESSURE: 126 MMHG | HEIGHT: 64 IN | HEART RATE: 73 BPM | BODY MASS INDEX: 28.58 KG/M2 | WEIGHT: 167.4 LBS

## 2021-08-04 DIAGNOSIS — E03.9 HYPOTHYROIDISM, UNSPECIFIED TYPE: ICD-10-CM

## 2021-08-04 DIAGNOSIS — I10 ESSENTIAL HYPERTENSION: ICD-10-CM

## 2021-08-04 DIAGNOSIS — E06.3 HYPOTHYROIDISM DUE TO HASHIMOTO'S THYROIDITIS: ICD-10-CM

## 2021-08-04 DIAGNOSIS — E03.8 HYPOTHYROIDISM DUE TO HASHIMOTO'S THYROIDITIS: ICD-10-CM

## 2021-08-04 DIAGNOSIS — Z12.11 ENCOUNTER FOR SCREENING FOR MALIGNANT NEOPLASM OF COLON: ICD-10-CM

## 2021-08-04 DIAGNOSIS — Z11.59 NEED FOR HEPATITIS C SCREENING TEST: ICD-10-CM

## 2021-08-04 DIAGNOSIS — Z12.31 ENCOUNTER FOR SCREENING MAMMOGRAM FOR MALIGNANT NEOPLASM OF BREAST: ICD-10-CM

## 2021-08-04 DIAGNOSIS — Z23 NEED FOR VACCINATION: ICD-10-CM

## 2021-08-04 DIAGNOSIS — Z90.3 HISTORY OF SLEEVE GASTRECTOMY: Primary | ICD-10-CM

## 2021-08-04 LAB
ALBUMIN SERPL-MCNC: 4.4 G/DL (ref 3.5–5.2)
ALBUMIN/GLOB SERPL: 1.8 G/DL
ALP SERPL-CCNC: 51 U/L (ref 39–117)
ALT SERPL W P-5'-P-CCNC: 20 U/L (ref 1–33)
ANION GAP SERPL CALCULATED.3IONS-SCNC: 12.6 MMOL/L (ref 5–15)
AST SERPL-CCNC: 21 U/L (ref 1–32)
BASOPHILS # BLD AUTO: 0.04 10*3/MM3 (ref 0–0.2)
BASOPHILS NFR BLD AUTO: 0.6 % (ref 0–1.5)
BILIRUB SERPL-MCNC: 0.2 MG/DL (ref 0–1.2)
BUN SERPL-MCNC: 17 MG/DL (ref 6–20)
BUN/CREAT SERPL: 21.8 (ref 7–25)
CALCIUM SPEC-SCNC: 9.8 MG/DL (ref 8.6–10.5)
CHLORIDE SERPL-SCNC: 101 MMOL/L (ref 98–107)
CHOLEST SERPL-MCNC: 175 MG/DL (ref 0–200)
CO2 SERPL-SCNC: 23.4 MMOL/L (ref 22–29)
CREAT SERPL-MCNC: 0.78 MG/DL (ref 0.57–1)
DEPRECATED RDW RBC AUTO: 43 FL (ref 37–54)
EOSINOPHIL # BLD AUTO: 0.09 10*3/MM3 (ref 0–0.4)
EOSINOPHIL NFR BLD AUTO: 1.3 % (ref 0.3–6.2)
ERYTHROCYTE [DISTWIDTH] IN BLOOD BY AUTOMATED COUNT: 12.4 % (ref 12.3–15.4)
GFR SERPL CREATININE-BSD FRML MDRD: 78 ML/MIN/1.73
GLOBULIN UR ELPH-MCNC: 2.5 GM/DL
GLUCOSE SERPL-MCNC: 90 MG/DL (ref 65–99)
HBA1C MFR BLD: 5.2 % (ref 4.8–5.6)
HCT VFR BLD AUTO: 44.1 % (ref 34–46.6)
HDLC SERPL-MCNC: 77 MG/DL (ref 40–60)
HGB BLD-MCNC: 15 G/DL (ref 12–15.9)
IMM GRANULOCYTES # BLD AUTO: 0.01 10*3/MM3 (ref 0–0.05)
IMM GRANULOCYTES NFR BLD AUTO: 0.1 % (ref 0–0.5)
IRON 24H UR-MRATE: 150 MCG/DL (ref 37–145)
IRON SATN MFR SERPL: 32 % (ref 20–50)
LDLC SERPL CALC-MCNC: 70 MG/DL (ref 0–100)
LDLC/HDLC SERPL: 0.83 {RATIO}
LYMPHOCYTES # BLD AUTO: 2.01 10*3/MM3 (ref 0.7–3.1)
LYMPHOCYTES NFR BLD AUTO: 27.9 % (ref 19.6–45.3)
MCH RBC QN AUTO: 32.3 PG (ref 26.6–33)
MCHC RBC AUTO-ENTMCNC: 34 G/DL (ref 31.5–35.7)
MCV RBC AUTO: 95 FL (ref 79–97)
MONOCYTES # BLD AUTO: 0.44 10*3/MM3 (ref 0.1–0.9)
MONOCYTES NFR BLD AUTO: 6.1 % (ref 5–12)
NEUTROPHILS NFR BLD AUTO: 4.61 10*3/MM3 (ref 1.7–7)
NEUTROPHILS NFR BLD AUTO: 64 % (ref 42.7–76)
NRBC BLD AUTO-RTO: 0 /100 WBC (ref 0–0.2)
PLATELET # BLD AUTO: 257 10*3/MM3 (ref 140–450)
PMV BLD AUTO: 10.7 FL (ref 6–12)
POTASSIUM SERPL-SCNC: 4.4 MMOL/L (ref 3.5–5.2)
PREALB SERPL-MCNC: 22.9 MG/DL (ref 20–40)
PROT SERPL-MCNC: 6.9 G/DL (ref 6–8.5)
RBC # BLD AUTO: 4.64 10*6/MM3 (ref 3.77–5.28)
SODIUM SERPL-SCNC: 137 MMOL/L (ref 136–145)
TIBC SERPL-MCNC: 463 MCG/DL (ref 298–536)
TRANSFERRIN SERPL-MCNC: 311 MG/DL (ref 200–360)
TRIGL SERPL-MCNC: 171 MG/DL (ref 0–150)
VLDLC SERPL-MCNC: 28 MG/DL (ref 5–40)
WBC # BLD AUTO: 7.2 10*3/MM3 (ref 3.4–10.8)

## 2021-08-04 PROCEDURE — 80053 COMPREHEN METABOLIC PANEL: CPT | Performed by: INTERNAL MEDICINE

## 2021-08-04 PROCEDURE — 83921 ORGANIC ACID SINGLE QUANT: CPT | Performed by: INTERNAL MEDICINE

## 2021-08-04 PROCEDURE — 86803 HEPATITIS C AB TEST: CPT | Performed by: INTERNAL MEDICINE

## 2021-08-04 PROCEDURE — 84466 ASSAY OF TRANSFERRIN: CPT | Performed by: INTERNAL MEDICINE

## 2021-08-04 PROCEDURE — 90715 TDAP VACCINE 7 YRS/> IM: CPT | Performed by: INTERNAL MEDICINE

## 2021-08-04 PROCEDURE — 83540 ASSAY OF IRON: CPT | Performed by: INTERNAL MEDICINE

## 2021-08-04 PROCEDURE — 80061 LIPID PANEL: CPT | Performed by: INTERNAL MEDICINE

## 2021-08-04 PROCEDURE — 90471 IMMUNIZATION ADMIN: CPT | Performed by: INTERNAL MEDICINE

## 2021-08-04 PROCEDURE — 36415 COLL VENOUS BLD VENIPUNCTURE: CPT | Performed by: INTERNAL MEDICINE

## 2021-08-04 PROCEDURE — 83036 HEMOGLOBIN GLYCOSYLATED A1C: CPT | Performed by: INTERNAL MEDICINE

## 2021-08-04 PROCEDURE — 85025 COMPLETE CBC W/AUTO DIFF WBC: CPT | Performed by: INTERNAL MEDICINE

## 2021-08-04 PROCEDURE — 84425 ASSAY OF VITAMIN B-1: CPT | Performed by: INTERNAL MEDICINE

## 2021-08-04 PROCEDURE — 99214 OFFICE O/P EST MOD 30 MIN: CPT | Performed by: INTERNAL MEDICINE

## 2021-08-04 PROCEDURE — 82607 VITAMIN B-12: CPT | Performed by: INTERNAL MEDICINE

## 2021-08-04 PROCEDURE — 84443 ASSAY THYROID STIM HORMONE: CPT | Performed by: INTERNAL MEDICINE

## 2021-08-04 PROCEDURE — 84134 ASSAY OF PREALBUMIN: CPT | Performed by: INTERNAL MEDICINE

## 2021-08-04 PROCEDURE — 82746 ASSAY OF FOLIC ACID SERUM: CPT | Performed by: INTERNAL MEDICINE

## 2021-08-04 PROCEDURE — 82728 ASSAY OF FERRITIN: CPT | Performed by: INTERNAL MEDICINE

## 2021-08-04 PROCEDURE — 84439 ASSAY OF FREE THYROXINE: CPT | Performed by: INTERNAL MEDICINE

## 2021-08-04 PROCEDURE — 82306 VITAMIN D 25 HYDROXY: CPT | Performed by: INTERNAL MEDICINE

## 2021-08-04 RX ORDER — NORGESTIMATE AND ETHINYL ESTRADIOL 7DAYSX3 LO
1 KIT ORAL DAILY
Qty: 28 TABLET | Refills: 5 | Status: SHIPPED | OUTPATIENT
Start: 2021-08-04 | End: 2022-01-12

## 2021-08-04 RX ORDER — SPIRONOLACTONE 100 MG/1
100 TABLET, FILM COATED ORAL 2 TIMES DAILY
Qty: 180 TABLET | Refills: 1 | Status: SHIPPED | OUTPATIENT
Start: 2021-08-04 | End: 2022-02-02

## 2021-08-04 RX ORDER — CETIRIZINE HYDROCHLORIDE 10 MG/1
10 TABLET ORAL DAILY
Qty: 90 TABLET | Refills: 1 | Status: SHIPPED | OUTPATIENT
Start: 2021-08-04 | End: 2022-02-02 | Stop reason: SDUPTHER

## 2021-08-04 RX ORDER — LEVOTHYROXINE SODIUM 137 UG/1
137 TABLET ORAL DAILY
Qty: 90 TABLET | Refills: 1 | Status: SHIPPED | OUTPATIENT
Start: 2021-08-04 | End: 2021-09-16 | Stop reason: SDUPTHER

## 2021-08-04 RX ORDER — CITALOPRAM 20 MG/1
20 TABLET ORAL DAILY
Qty: 90 TABLET | Refills: 1 | Status: SHIPPED | OUTPATIENT
Start: 2021-08-04 | End: 2021-11-04

## 2021-08-04 RX ORDER — FLUTICASONE PROPIONATE 50 MCG
1 SPRAY, SUSPENSION (ML) NASAL DAILY
Qty: 16 G | Refills: 1 | Status: SHIPPED | OUTPATIENT
Start: 2021-08-04 | End: 2022-02-02 | Stop reason: SDUPTHER

## 2021-08-04 NOTE — PROGRESS NOTES
"Chief Complaint  I want to talk about coming off Celexa, I need a Mammogram, and I need a colonoscopy    Subjective          Xaun Horvath presents to [facility]  History of Present Illness    She and her  have been walking a 5K regularly  She feels great after surgery  Has been eating well  Just added fiber to her diet per her bariatric surgeon    Wants to to try going off her meds.  States that she feels great these days and although she has been on Celexa for years she is in a good place and would like to try to come off of it  No SI    No chest pain  No trouble breathing  Not dizzy or lightheaded    Objective   Vital Signs:   /86   Pulse 73   Temp 97.5 °F (36.4 °C)   Ht 162.6 cm (64\")   Wt 75.9 kg (167 lb 6.4 oz)   PF 98 L/min   BMI 28.73 kg/m²     Physical Exam  Vitals reviewed.   Constitutional:       Appearance: Normal appearance. She is well-developed.   HENT:      Head: Normocephalic and atraumatic.      Right Ear: External ear normal.      Left Ear: External ear normal.      Mouth/Throat:      Pharynx: No oropharyngeal exudate.   Eyes:      Conjunctiva/sclera: Conjunctivae normal.      Pupils: Pupils are equal, round, and reactive to light.   Cardiovascular:      Rate and Rhythm: Normal rate and regular rhythm.      Heart sounds: No murmur heard.   No friction rub. No gallop.    Pulmonary:      Effort: Pulmonary effort is normal.      Breath sounds: Normal breath sounds. No wheezing or rhonchi.   Skin:     General: Skin is warm and dry.   Neurological:      Mental Status: She is alert and oriented to person, place, and time.      Cranial Nerves: No cranial nerve deficit.   Psychiatric:         Mood and Affect: Affect normal.         Behavior: Behavior normal.         Thought Content: Thought content normal.        Result Review :     Common labs    Common Labsle 8/12/20 8/12/20 8/4/21 8/4/21 8/4/21 8/4/21    1458 1458 0918 0918 0918 " 0918   Glucose  96   90    BUN  17   17    Creatinine  0.78   0.78    eGFR Non African Am  78   78    Sodium  136   137    Potassium  4.1   4.4    Chloride  100   101    Calcium  10.0   9.8    Albumin  4.30   4.40    Total Bilirubin  0.2   0.2    Alkaline Phosphatase  60   51    AST (SGOT)  53 (A)   21    ALT (SGPT)  144 (A)   20    WBC 7.79  7.20      Hemoglobin 13.2  15.0      Hematocrit 40.5  44.1      Platelets 259  257      Total Cholesterol      175   Triglycerides      171 (A)   HDL Cholesterol      77 (A)   LDL Cholesterol       70   Hemoglobin A1C    5.20     (A) Abnormal value            \plain   Procedures      Assessment and Plan    Diagnoses and all orders for this visit:    1. History of sleeve gastrectomy (Primary)  -     Comprehensive metabolic panel; Future  -     TSH; Future  -     T4, free; Future  -     CBC w AUTO Differential; Future  -     Lipid panel; Future  -     Methylmalonic Acid, Serum; Future  -     Vitamin B1, Whole Blood; Future  -     Ferritin; Future  -     Iron and TIBC; Future  -     Prealbumin; Future  -     Folate; Future  -     Vitamin D 25 hydroxy; Future  -     Hemoglobin A1c; Future  -     Vitamin B12; Future  -     Comprehensive metabolic panel  -     Cancel: TSH  -     Cancel: T4, free  -     CBC w AUTO Differential  -     Lipid panel  -     Methylmalonic Acid, Serum  -     Vitamin B1, Whole Blood  -     Ferritin  -     Iron and TIBC  -     Prealbumin  -     Folate  -     Vitamin D 25 hydroxy  -     Hemoglobin A1c  -     Vitamin B12    2. Hypothyroidism due to Hashimoto's thyroiditis  Assessment & Plan:  Check labs and adjust meds as needed based on results    Orders:  -     TSH; Future  -     T4, free; Future  -     Cancel: TSH  -     Cancel: T4, free    3. Encounter for screening mammogram for malignant neoplasm of breast  -     Mammo Screening Digital Tomosynthesis Bilateral With CAD; Future    4. Encounter for screening for malignant neoplasm of colon  -     Ambulatory  Referral For Screening Colonoscopy    5. Need for hepatitis C screening test  -     Hepatitis C Antibody; Future  -     Hepatitis C Antibody    6. Need for vaccination  -     Tdap Vaccine Greater Than or Equal To 6yo IM    7. Hypothyroidism, unspecified type  Assessment & Plan:  Check labs and adjust meds as needed based on results    Orders:  -     T4, free  -     TSH    8. Essential hypertension  Assessment & Plan:  Doing great continue to wean meds as tolerated      Other orders  -     cetirizine (ZyrTEC Allergy) 10 MG tablet; Take 1 tablet by mouth Daily.  Dispense: 90 tablet; Refill: 1  -     fluticasone (FLONASE) 50 MCG/ACT nasal spray; 1 spray by Each Nare route Daily.  Dispense: 16 g; Refill: 1  -     norgestimate-ethinyl estradiol (ORTHO TRI-CYCLEN LO) 0.18/0.215/0.25 MG-25 MCG per tablet; Take 1 tablet by mouth Daily.  Dispense: 28 tablet; Refill: 5  -     levothyroxine (SYNTHROID, LEVOTHROID) 137 MCG tablet; Take 1 tablet by mouth Daily.  Dispense: 90 tablet; Refill: 1  -     spironolactone (ALDACTONE) 100 MG tablet; Take 1 tablet by mouth 2 (two) times a day.  Dispense: 180 tablet; Refill: 1  -     citalopram (CeleXA) 20 MG tablet; Take 1 tablet by mouth Daily.  Dispense: 90 tablet; Refill: 1      Follow Up   No follow-ups on file.  Patient was given instructions and counseling regarding her condition or for health maintenance advice. Please see specific information pulled into the AVS if appropriate.

## 2021-08-05 DIAGNOSIS — E03.8 HYPOTHYROIDISM DUE TO HASHIMOTO'S THYROIDITIS: Primary | ICD-10-CM

## 2021-08-05 DIAGNOSIS — E06.3 HYPOTHYROIDISM DUE TO HASHIMOTO'S THYROIDITIS: Primary | ICD-10-CM

## 2021-08-05 LAB
25(OH)D3 SERPL-MCNC: 81 NG/ML (ref 30–100)
FERRITIN SERPL-MCNC: 173 NG/ML (ref 13–150)
FOLATE SERPL-MCNC: >20 NG/ML (ref 4.78–24.2)
HCV AB SER DONR QL: NORMAL
T4 FREE SERPL-MCNC: 1.48 NG/DL (ref 0.93–1.7)
TSH SERPL DL<=0.05 MIU/L-ACNC: 4.64 UIU/ML (ref 0.27–4.2)
VIT B12 BLD-MCNC: 651 PG/ML (ref 211–946)

## 2021-08-05 NOTE — ASSESSMENT & PLAN NOTE
Discussed how to wean Celexa safely she will go down to 20 to start and after a month if doing well could consider going to 10 mg daily warned of side effects for which to monitor

## 2021-08-06 LAB — VIT B1 BLD-SCNC: 172.5 NMOL/L (ref 66.5–200)

## 2021-08-09 LAB
Lab: NORMAL
METHYLMALONATE SERPL-SCNC: 147 NMOL/L (ref 0–378)

## 2021-09-16 RX ORDER — LEVOTHYROXINE SODIUM 137 UG/1
137 TABLET ORAL DAILY
Qty: 30 TABLET | Refills: 0 | Status: SHIPPED | OUTPATIENT
Start: 2021-09-16 | End: 2021-09-23 | Stop reason: SDUPTHER

## 2021-09-16 NOTE — TELEPHONE ENCOUNTER
Caller: Xuan Horvath    Relationship: Self    Best call back number:[phone]   Medication needed:   Requested Prescriptions     Pending Prescriptions Disp Refills   • levothyroxine (SYNTHROID, LEVOTHROID) 137 MCG tablet 90 tablet 1     Sig: Take 1 tablet by mouth Daily.       When do you need the refill by: TODAY    What additional details did the patient provide when requesting the medication:     Does the patient have less than a 3 day supply:  [x] Yes  [] No    What is the patient's preferred pharmacy:    [facility]  [address] [place] [id number]

## 2021-09-21 ENCOUNTER — CLINICAL SUPPORT (OUTPATIENT)
Dept: INTERNAL MEDICINE | Facility: CLINIC | Age: 51
End: 2021-09-21

## 2021-09-21 DIAGNOSIS — E06.3 HYPOTHYROIDISM DUE TO HASHIMOTO'S THYROIDITIS: ICD-10-CM

## 2021-09-21 DIAGNOSIS — E03.8 HYPOTHYROIDISM DUE TO HASHIMOTO'S THYROIDITIS: ICD-10-CM

## 2021-09-21 LAB
T4 FREE SERPL-MCNC: 1.22 NG/DL (ref 0.93–1.7)
TSH SERPL DL<=0.05 MIU/L-ACNC: 6.11 UIU/ML (ref 0.27–4.2)

## 2021-09-21 PROCEDURE — 36415 COLL VENOUS BLD VENIPUNCTURE: CPT | Performed by: INTERNAL MEDICINE

## 2021-09-21 PROCEDURE — 84439 ASSAY OF FREE THYROXINE: CPT | Performed by: INTERNAL MEDICINE

## 2021-09-21 PROCEDURE — 84443 ASSAY THYROID STIM HORMONE: CPT | Performed by: INTERNAL MEDICINE

## 2021-09-23 RX ORDER — LEVOTHYROXINE SODIUM 0.15 MG/1
137 TABLET ORAL DAILY
Qty: 90 TABLET | Refills: 0 | Status: SHIPPED | OUTPATIENT
Start: 2021-09-23 | End: 2021-12-21 | Stop reason: SDUPTHER

## 2021-09-27 ENCOUNTER — TELEPHONE (OUTPATIENT)
Dept: INTERNAL MEDICINE | Facility: CLINIC | Age: 51
End: 2021-09-27

## 2021-11-01 ENCOUNTER — HOSPITAL ENCOUNTER (OUTPATIENT)
Dept: MAMMOGRAPHY | Facility: HOSPITAL | Age: 51
Discharge: HOME | End: 2021-11-01
Admitting: INTERNAL MEDICINE

## 2021-11-01 DIAGNOSIS — Z12.31 ENCOUNTER FOR SCREENING MAMMOGRAM FOR MALIGNANT NEOPLASM OF BREAST: ICD-10-CM

## 2021-11-01 PROCEDURE — 77067 SCR MAMMO BI INCL CAD: CPT

## 2021-11-01 PROCEDURE — 77063 BREAST TOMOSYNTHESIS BI: CPT

## 2021-11-04 ENCOUNTER — TELEPHONE (OUTPATIENT)
Dept: INTERNAL MEDICINE | Facility: CLINIC | Age: 51
End: 2021-11-04

## 2021-11-04 RX ORDER — CITALOPRAM 10 MG/1
10 TABLET ORAL DAILY
Qty: 90 TABLET | Refills: 1 | Status: SHIPPED | OUTPATIENT
Start: 2021-11-04 | End: 2022-02-02 | Stop reason: SDUPTHER

## 2021-11-04 NOTE — TELEPHONE ENCOUNTER
Caller: Xuan Horvath    Relationship: Self      Medication requested (name and dosage): CELEXA 10 MG    Pharmacy where request should be sent: [address] [phone] [facility] [phone]   [phone]    Additional details provided by patient: PATIENT WOULD LIKE TO DROP TO 10 MG ON THIS MEDICATION    Best call back number: [phone]    Does the patient have less than a 3 day supply:  [x] Yes  [] No    Noemy Hodges Rep   11/04/21 08:07 EDT

## 2021-11-16 ENCOUNTER — CLINICAL SUPPORT (OUTPATIENT)
Dept: GASTROENTEROLOGY | Facility: CLINIC | Age: 51
End: 2021-11-16

## 2021-11-16 ENCOUNTER — PREP FOR SURGERY (OUTPATIENT)
Dept: OTHER | Facility: HOSPITAL | Age: 51
End: 2021-11-16

## 2021-11-16 DIAGNOSIS — Z12.11 COLON CANCER SCREENING: Primary | ICD-10-CM

## 2021-11-16 RX ORDER — PEG-3350, SODIUM SULFATE, SODIUM CHLORIDE, POTASSIUM CHLORIDE, SODIUM ASCORBATE AND ASCORBIC ACID 7.5-2.691G
1000 KIT ORAL EVERY 12 HOURS
Qty: 1 EACH | Refills: 0 | Status: SHIPPED | OUTPATIENT
Start: 2021-11-16 | End: 2022-01-12

## 2021-11-16 NOTE — PROGRESS NOTES
Xuan Briana Alexandru     REASON FOR CALL-colonoscopy, screening call, first time    SENT IN PREP-moviprep  DOS-2022  Past Medical History:   Diagnosis Date   • Allergy    • Anxiety    • Depression    • Depression 2015    working well, continue citalopram discussed that she can stay on meds long term or go off them whenever she is ready   • Essential hypertension 2016    stable will try to decrease atenolol and see if that will give her more energy, need to watch bp closely   • Hiatal hernia    • Hypertension    • Hypothyroidism    • Hypothyroidism    • Kidney stone    • Obesity 2016    she is going to work on weight by trying to go to the gym she is working on this with her son   • Sleep apnea     COMPLIANT WITH CPAP    • Vitamin D deficiency 2015    will repeat labs continue current OTC meds for now     Allergies   Allergen Reactions   • Penicillins Anaphylaxis     unknown     Past Surgical History:   Procedure Laterality Date   • CARPAL TUNNEL RELEASE     •  SECTION  ,,2003    x3   • ENDOSCOPY N/A 2020    Procedure: ESOPHAGOGASTRODUODENOSCOPY WITH BIOPSY;  Surgeon: Ron Cadet Jr., MD;  Location: [facility] ENDOSCOPY;  Service: General;  Laterality: N/A;  PRE- DYSPEPSIA  POST- GASTRITIS, GASTRIC POLYPS, ESOPHAGITIS, HIATAL HERNIA     • GASTRIC SLEEVE LAPAROSCOPIC N/A 2020    Procedure: GASTRIC SLEEVE LAPAROSCOPIC With Hiatal Hernia Repair;  Surgeon: Ron Cadet Jr., MD;  Location: [facility];  Service: Bariatric;  Laterality: N/A;   • HYSTERECTOMY       Social History     Socioeconomic History   • Marital status:    Tobacco Use   • Smoking status: Never Smoker   • Smokeless tobacco: Never Used   Vaping Use   • Vaping Use: Never used   Substance and Sexual Activity   • Alcohol use: Never   • Drug use: Never   • Sexual activity: Defer     Family History   Problem Relation Age of Onset   • Diabetes Mother    • Hypertension Mother    •  Hypertension Father    • Malig Hyperthermia Neg Hx        Current Outpatient Medications:   •  cetirizine (ZyrTEC Allergy) 10 MG tablet, Take 1 tablet by mouth Daily., Disp: 90 tablet, Rfl: 1  •  citalopram (CeleXA) 10 MG tablet, Take 1 tablet by mouth Daily., Disp: 90 tablet, Rfl: 1  •  fluticasone (FLONASE) 50 MCG/ACT nasal spray, 1 spray by Each Nare route Daily., Disp: 16 g, Rfl: 1  •  levothyroxine (SYNTHROID, LEVOTHROID) 150 MCG tablet, Take 1 tablet by mouth Daily., Disp: 90 tablet, Rfl: 0  •  norgestimate-ethinyl estradiol (ORTHO TRI-CYCLEN LO) 0.18/0.215/0.25 MG-25 MCG per tablet, Take 1 tablet by mouth Daily., Disp: 28 tablet, Rfl: 5  •  spironolactone (ALDACTONE) 100 MG tablet, Take 1 tablet by mouth 2 (two) times a day., Disp: 180 tablet, Rfl: 1

## 2021-11-29 DIAGNOSIS — G47.33 OBSTRUCTIVE SLEEP APNEA: Primary | ICD-10-CM

## 2021-12-07 ENCOUNTER — OFFICE VISIT (OUTPATIENT)
Dept: SLEEP MEDICINE | Facility: HOSPITAL | Age: 51
End: 2021-12-07

## 2021-12-07 VITALS
WEIGHT: 171 LBS | SYSTOLIC BLOOD PRESSURE: 145 MMHG | TEMPERATURE: 98.7 F | DIASTOLIC BLOOD PRESSURE: 84 MMHG | BODY MASS INDEX: 30.3 KG/M2 | HEIGHT: 63 IN | HEART RATE: 73 BPM | OXYGEN SATURATION: 99 %

## 2021-12-07 DIAGNOSIS — G47.33 OSA (OBSTRUCTIVE SLEEP APNEA): Primary | ICD-10-CM

## 2021-12-07 PROCEDURE — G0463 HOSPITAL OUTPT CLINIC VISIT: HCPCS | Performed by: PSYCHIATRY & NEUROLOGY

## 2021-12-07 NOTE — PROGRESS NOTES
[facility]    Xuan Horvath  1970  51 y.o.  female      PCP:Suri Lynn MD    Type of service: Initial New Patient Office Visit  Date of service: 12/07/2021     Chief complaint: The patient reports that she was diagnosed with obstructive sleep apnea in April 2012.  She is here for follow-up regarding this.    History of present illness;  Xuan Horvath is a new patient for me.  She reports that she had previously been diagnosed with obstructive sleep apnea in April 2012, in  another sleep facility.  Prior to diagnosis, she had symptoms of excessive daytime sleepiness and snoring.  She is not sure about the severity of her sleep apnea but did undergo CPAP treatment following which, she slept better and her daytime sleepiness improved.  She is currently using CPAP machine with a full facemask.  She denies any problems with current device settings.    We do not have copies of her previous sleep studies for review.  In the interim, she reports that she had undergone a gastric sleeve procedure in July 2020 and has lost about 58 pounds to date.    Patient denies any other surgeries or injuries to her nose and throat.    Patient gives the following sleep history.  Sleep schedule:  Bedtime: 11 PM  Wake time: 8:30 AM  Normally takes about 15 minutes to fall asleep  Average hours of sleep 8 to 10 hours  Number of naps per day 0  Symptoms  In addition to snoring, nonrestorative sleep and witnessed apneas patient gives the following associated symptoms.  (Pre-CPAP)  Have you ever awakened gasping for breath, coughing, choking: No   Change in weight,  Yes 58 lbs weight loss  Morning headaches  No   Awaken with a sore throat or dry mouth  No   Leg jerking at night:  Yes, her legs twitch prior to going to sleep   crawly feeling/urge sensation to move in the legs: No   Teeth grinding:No   Have you ever awakened at night with a sour taste or burning sensation in your chest:  No  "  Do you have muscle weakness with laughing or anger or sleep paralysis:  No   Have you ever felt paralyzed while going to sleep or waking up:  No   Sleepwalking, nightmares, No   Nocturia (urination at night): 0 times per night  Memory Problem:No     Past medical history: (Relevant to sleep medicine)  1. Anxiety    Gastric sleeve procedure July 2020    • Medications are reviewed by me and documented in the encounter  • Allergies reviewed and documented in encounter    Social history:  Do you drive a commercial vehicle:  No   Shift work:  No   Tobacco use:  No   Alcohol use:  0 per week  Caffeinated drinks: 2 cups of coffee    FAMILY HISTORY (Your mother, father, brothers and sisters)  1. Sleep apnea (father)    REVIEW OF SYSTEMS.  Full review of systems available on the intake form which is scanned in the media tab.  The relevant positive are noted below  1. [place] Sleepiness Scale :Total score: 0   2. Snoring  3. anxiety      Physical exam:  Vitals:    12/07/21 0900   BP: 145/84   Pulse: 73   Temp: 98.7 °F (37.1 °C)   SpO2: 99%   Weight: 77.6 kg (171 lb)   Height: 160 cm (63\")    Body mass index is 30.29 kg/m².    Physical Exam  Vitals reviewed.   Constitutional:       Appearance: Normal appearance.   HENT:      Head: Normocephalic and atraumatic.      Nose: Nose normal.      Mouth/Throat:      Mouth: Mucous membranes are moist.      Comments: Small airway; Mallampati Class 111. Small airway. Scalloping edges of the tongue  Neck:      Vascular: No carotid bruit.   Cardiovascular:      Rate and Rhythm: Normal rate and regular rhythm.      Heart sounds: Normal heart sounds.   Pulmonary:      Effort: Pulmonary effort is normal.      Breath sounds: Normal breath sounds.   Musculoskeletal:      Cervical back: Neck supple. No tenderness.   Neurological:      Mental Status: She is alert and oriented to person, place, and time.   Psychiatric:         Mood and Affect: Mood normal.         Behavior: Behavior normal.        "  Thought Content: Thought content normal.        TSH Results:  TSH    TSH 8/4/21 9/21/21   TSH 4.640 (A) 6.110 (A)   (A) Abnormal value             Most Recent A1C    HGBA1C Most Recent 8/4/21   Hemoglobin A1C 5.20              Assessment and plan:  JODY        Patient will be scheduled for home sleep apnea test to determine the status of her sleep apnea following weight loss of approximately 58 pounds    I have also discussed with the patient the following  • Sleep hygiene: Maintaining a regular bedtime and wake time, not to watch television or work in bed, limit caffeine-containing beverages before bed time and avoid naps during the day  • Adequate amount of sleep.  Generally most people needs about 7 to 8 hours of sleep.  • She is going to return for follow-up visit after the sleep study has been done.  •  Patient's questions were answered.      Roxanna Morales MD  12/7/2021

## 2021-12-09 DIAGNOSIS — L98.7 EXCESS SKIN OF ABDOMEN: Primary | ICD-10-CM

## 2021-12-13 ENCOUNTER — HOSPITAL ENCOUNTER (OUTPATIENT)
Dept: SLEEP MEDICINE | Facility: HOSPITAL | Age: 51
Discharge: HOME | End: 2021-12-13
Admitting: PSYCHIATRY & NEUROLOGY

## 2021-12-13 DIAGNOSIS — G47.33 OSA (OBSTRUCTIVE SLEEP APNEA): ICD-10-CM

## 2021-12-13 PROCEDURE — 95806 SLEEP STUDY UNATT&RESP EFFT: CPT

## 2021-12-20 ENCOUNTER — PATIENT MESSAGE (OUTPATIENT)
Dept: INTERNAL MEDICINE | Facility: CLINIC | Age: 51
End: 2021-12-20

## 2021-12-21 RX ORDER — LEVOTHYROXINE SODIUM 0.15 MG/1
137 TABLET ORAL DAILY
Qty: 90 TABLET | Refills: 0 | Status: SHIPPED | OUTPATIENT
Start: 2021-12-21 | End: 2022-02-02 | Stop reason: SDUPTHER

## 2021-12-21 NOTE — TELEPHONE ENCOUNTER
From: Xuan Horvath  To: Suri Lynn MD  Sent: 12/20/2021 7:33 PM EST  Subject: Thyroid medicine     I need refills on my synthroid 150 mcg called into [facility].

## 2021-12-27 ENCOUNTER — PATIENT MESSAGE (OUTPATIENT)
Dept: INTERNAL MEDICINE | Facility: CLINIC | Age: 51
End: 2021-12-27

## 2021-12-27 DIAGNOSIS — L84 FOOT CALLUS: Primary | ICD-10-CM

## 2021-12-27 NOTE — TELEPHONE ENCOUNTER
From: Xuan Horvath  To: Suri Lynn MD  Sent: 12/27/2021 2:27 PM EST  Subject: Podiatrist     I need to get a referral to see  for calluses on my feet.

## 2022-01-05 ENCOUNTER — OFFICE VISIT (OUTPATIENT)
Dept: SLEEP MEDICINE | Facility: HOSPITAL | Age: 52
End: 2022-01-05

## 2022-01-05 DIAGNOSIS — G47.33 OSA (OBSTRUCTIVE SLEEP APNEA): Primary | ICD-10-CM

## 2022-01-05 PROCEDURE — G0463 HOSPITAL OUTPT CLINIC VISIT: HCPCS | Performed by: PSYCHIATRY & NEUROLOGY

## 2022-01-05 NOTE — PROGRESS NOTES
Chief Complaint  Xuan Horvath is a 51 y.o. female who is returning  for a follow-up visit to discuss the results of her most recent home sleep apnea test.    Subjective        The patient was recently evaluated for continuity of care of obstructive sleep apnea which was diagnosed in another sleep facility in April 2012.  Previous sleep studies were not available for review.  She had a home sleep apnea test performed to determine the status of her sleep apnea as she had lost approximately 58 pounds following a gastric sleeve procedure which was done in July 2020.      Interval Past Medical and Surgical History:  Noncontributory         Result Review :   The following data was reviewed by Roxanna Morales MD  Home sleep apnea test done on 12/13/2021.  Patient reported that her pulse oximeter intermittently displaced during the sleep test.  Total monitoring time for 544.2 minutes  Respiratory events consisted of 15 obstructive apneas and 33 hypopneas.  Respiratory event index 5.3 using 4% desaturation.  Oximetry data: Average oxygen saturation 92%, highest SPO2 98%.  Lowest SPO2 82%.  There were 102.3 minutes with O2 saturation less than 88%.)  I suspect that this is related to her pulse oximetry getting dislodged intermittently during the recording.  Snoring percentage: 0.  Patient denies any history of pulmonary problems or cardiac difficulties.  A detailed explanation of the findings was given to the patient.  Questions were answered.     Assessment and Plan    Diagnoses and all orders for this visit:    1. JODY (obstructive sleep apnea) (Primary)  -     PAP Therapy      Since the patient still desires to be on CPAP treatment, I will order a new CPAP machine for her at 7 to 15 cm water pressure.  She was advised to call to schedule a compliance follow-up after she receives her equipment.  When she achieves her target weight loss of about 140 pounds, I suggested that we obtain another home sleep study so we can  determine whether her sleep has completely resolved.    Follow Up   Return for 31 to 90 days after PAP setup.  Patient was given instructions and counseling regarding her condition or for health maintenance advice.

## 2022-01-13 ENCOUNTER — HOSPITAL ENCOUNTER (OUTPATIENT)
Facility: HOSPITAL | Age: 52
Setting detail: HOSPITAL OUTPATIENT SURGERY
Discharge: HOME | End: 2022-01-13
Attending: INTERNAL MEDICINE | Admitting: INTERNAL MEDICINE

## 2022-01-13 ENCOUNTER — ANESTHESIA (OUTPATIENT)
Dept: GASTROENTEROLOGY | Facility: HOSPITAL | Age: 52
End: 2022-01-13

## 2022-01-13 ENCOUNTER — ANESTHESIA EVENT (OUTPATIENT)
Dept: GASTROENTEROLOGY | Facility: HOSPITAL | Age: 52
End: 2022-01-13

## 2022-01-13 VITALS
RESPIRATION RATE: 19 BRPM | OXYGEN SATURATION: 99 % | TEMPERATURE: 98.8 F | HEART RATE: 61 BPM | HEIGHT: 63 IN | DIASTOLIC BLOOD PRESSURE: 78 MMHG | BODY MASS INDEX: 30 KG/M2 | WEIGHT: 169.31 LBS | SYSTOLIC BLOOD PRESSURE: 130 MMHG

## 2022-01-13 DIAGNOSIS — Z12.11 COLON CANCER SCREENING: ICD-10-CM

## 2022-01-13 PROCEDURE — 25010000002 PROPOFOL 10 MG/ML EMULSION: Performed by: NURSE ANESTHETIST, CERTIFIED REGISTERED

## 2022-01-13 PROCEDURE — 45385 COLONOSCOPY W/LESION REMOVAL: CPT | Performed by: INTERNAL MEDICINE

## 2022-01-13 PROCEDURE — 88305 TISSUE EXAM BY PATHOLOGIST: CPT | Performed by: INTERNAL MEDICINE

## 2022-01-13 RX ORDER — LIDOCAINE HYDROCHLORIDE 20 MG/ML
INJECTION, SOLUTION INFILTRATION; PERINEURAL AS NEEDED
Status: DISCONTINUED | OUTPATIENT
Start: 2022-01-13 | End: 2022-01-13 | Stop reason: SURG

## 2022-01-13 RX ORDER — SODIUM CHLORIDE, SODIUM LACTATE, POTASSIUM CHLORIDE, CALCIUM CHLORIDE 600; 310; 30; 20 MG/100ML; MG/100ML; MG/100ML; MG/100ML
30 INJECTION, SOLUTION INTRAVENOUS CONTINUOUS
Status: DISCONTINUED | OUTPATIENT
Start: 2022-01-13 | End: 2022-01-13 | Stop reason: HOSPADM

## 2022-01-13 RX ADMIN — LIDOCAINE HYDROCHLORIDE 100 MG: 20 INJECTION, SOLUTION INFILTRATION; PERINEURAL at 11:44

## 2022-01-13 RX ADMIN — SODIUM CHLORIDE, POTASSIUM CHLORIDE, SODIUM LACTATE AND CALCIUM CHLORIDE 30 ML/HR: 600; 310; 30; 20 INJECTION, SOLUTION INTRAVENOUS at 10:31

## 2022-01-13 RX ADMIN — PROPOFOL 125 MCG/KG/MIN: 10 INJECTION, EMULSION INTRAVENOUS at 11:44

## 2022-01-13 NOTE — ANESTHESIA POSTPROCEDURE EVALUATION
Patient: Xuan Horvath    Procedure Summary     Date: 01/13/22 Room / Location: [facility] ENDOSCOPY 3 / [facility] ENDOSCOPY    Anesthesia Start: 1141 Anesthesia Stop:     Procedure: COLONOSCOPY WITH POLYPECTOMY COLD SNARE (N/A ) Diagnosis:       Colon cancer screening      (Colon cancer screening [Z12.11])    Surgeons: Luis Sterling MD Provider: Gala Cordova MD    Anesthesia Type: general, MAC ASA Status: 3          Anesthesia Type: general, MAC    Vitals  Vitals Value Taken Time   /82 01/13/22 1208   Temp 36.1 °C (97 °F) 01/13/22 1207   Pulse 79 01/13/22 1209   Resp 17 01/13/22 1207   SpO2 96 % 01/13/22 1209   Vitals shown include unvalidated device data.        Post Anesthesia Care and Evaluation    Patient location during evaluation: bedside  Patient participation: complete - patient participated  Level of consciousness: awake  Pain score: 0  Pain management: adequate  Airway patency: patent  Anesthetic complications: No anesthetic complications  PONV Status: none  Cardiovascular status: acceptable and stable  Respiratory status: acceptable and room air  Hydration status: acceptable    Comments: An Anesthesiologist personally participated in the most demanding procedures (including induction and emergence if applicable) in the anesthesia plan, monitored the course of anesthesia administration at frequent intervals and remained physically present and available for immediate diagnosis and treatment of emergencies.

## 2022-01-13 NOTE — ANESTHESIA PREPROCEDURE EVALUATION
Anesthesia Evaluation     Patient summary reviewed and Nursing notes reviewed   no history of anesthetic complications:  NPO Solid Status: > 8 hours  NPO Liquid Status: > 2 hours           Airway   Mallampati: II  TM distance: >3 FB  Neck ROM: full  No difficulty expected  Dental      Pulmonary - normal exam    breath sounds clear to auscultation  (+) sleep apnea on CPAP,   Cardiovascular   Exercise tolerance: good (4-7 METS)    Rhythm: regular  Rate: normal    (+) hypertension, murmur,       Neuro/Psych  (+) psychiatric history Anxiety and Depression,     GI/Hepatic/Renal/Endo    (+) obesity,  hiatal hernia,  renal disease stones, thyroid problem hypothyroidism    Musculoskeletal (-) negative ROS    Abdominal    Substance History - negative use     OB/GYN negative ob/gyn ROS         Other - negative ROS                       Anesthesia Plan    ASA 3     general and MAC   (Patient understands anesthesia not responsible for dental damage.)  intravenous induction     Anesthetic plan, all risks, benefits, and alternatives have been provided, discussed and informed consent has been obtained with: patient.  Use of blood products discussed with patient .   Plan discussed with CRNA.

## 2022-01-13 NOTE — ANESTHESIA POSTPROCEDURE EVALUATION
Patient: Xuan Horvath    Procedure Summary     Date: 01/13/22 Room / Location: [facility] ENDOSCOPY 3 / [facility] ENDOSCOPY    Anesthesia Start: 1141 Anesthesia Stop: 1210    Procedure: COLONOSCOPY WITH POLYPECTOMY COLD SNARE (N/A ) Diagnosis:       Colon cancer screening      (Colon cancer screening [Z12.11])    Surgeons: Luis Sterling MD Provider: Gala Cordova MD    Anesthesia Type: general, MAC ASA Status: 3          Anesthesia Type: general, MAC    Vitals  Vitals Value Taken Time   /82 01/13/22 1208   Temp 36.1 °C (97 °F) 01/13/22 1207   Pulse 77 01/13/22 1212   Resp 17 01/13/22 1207   SpO2 96 % 01/13/22 1212   Vitals shown include unvalidated device data.        Post Anesthesia Care and Evaluation    Patient location during evaluation: bedside  Patient participation: complete - patient participated  Level of consciousness: awake  Pain score: 0  Pain management: adequate  Airway patency: patent  Anesthetic complications: No anesthetic complications  PONV Status: none  Cardiovascular status: acceptable and stable  Respiratory status: acceptable and room air  Hydration status: acceptable    Comments: An Anesthesiologist personally participated in the most demanding procedures (including induction and emergence if applicable) in the anesthesia plan, monitored the course of anesthesia administration at frequent intervals and remained physically present and available for immediate diagnosis and treatment of emergencies.

## 2022-01-13 NOTE — H&P
ScreeningPre Procedure History & Physical    Chief Complaint:   Screening     Subjective     HPI:   Screening     Past Medical History:   Past Medical History:   Diagnosis Date   • Allergy    • Anxiety    • Colon cancer screening    • Depression    • Depression 2015    working well, continue citalopram discussed that she can stay on meds long term or go off them whenever she is ready   • Essential hypertension 2016    stable will try to decrease atenolol and see if that will give her more energy, need to watch bp closely   • Hiatal hernia    • Hypertension    • Hypothyroidism    • Hypothyroidism    • Kidney stone    • Obesity 2016    she is going to work on weight by trying to go to the gym she is working on this with her son   • Sleep apnea     COMPLIANT WITH CPAP    • Vitamin D deficiency 2015    will repeat labs continue current OTC meds for now       Past Surgical History:  Past Surgical History:   Procedure Laterality Date   • CARPAL TUNNEL RELEASE Right    •  SECTION  ,,2003    x3   • ENDOSCOPY N/A 2020    Procedure: ESOPHAGOGASTRODUODENOSCOPY WITH BIOPSY;  Surgeon: Ron Cadet Jr., MD;  Location: [facility] ENDOSCOPY;  Service: General;  Laterality: N/A;  PRE- DYSPEPSIA  POST- GASTRITIS, GASTRIC POLYPS, ESOPHAGITIS, HIATAL HERNIA     • GASTRIC SLEEVE LAPAROSCOPIC N/A 2020    Procedure: GASTRIC SLEEVE LAPAROSCOPIC With Hiatal Hernia Repair;  Surgeon: Ron Cadet Jr., MD;  Location: [facility] OR [facility];  Service: Bariatric;  Laterality: N/A;   • HYSTERECTOMY         Family History:  Family History   Problem Relation Age of Onset   • Diabetes Mother    • Hypertension Mother    • Hypertension Father    • Malig Hyperthermia Neg Hx        Social History:   reports that she has never smoked. She has never used smokeless tobacco. She reports that she does not drink alcohol and does not use drugs.    Medications:   Medications Prior to Admission  "  Medication Sig Dispense Refill Last Dose   • cetirizine (ZyrTEC Allergy) 10 MG tablet Take 1 tablet by mouth Daily. 90 tablet 1 1/13/2022 at Unknown time   • citalopram (CeleXA) 10 MG tablet Take 1 tablet by mouth Daily. (Patient taking differently: Take 20 mg by mouth Daily.) 90 tablet 1 1/13/2022 at Unknown time   • fluticasone (FLONASE) 50 MCG/ACT nasal spray 1 spray by Each Nare route Daily. 16 g 1 1/13/2022 at Unknown time   • levothyroxine (SYNTHROID, LEVOTHROID) 150 MCG tablet Take 1 tablet by mouth Daily. 90 tablet 0 1/13/2022 at Unknown time   • spironolactone (ALDACTONE) 100 MG tablet Take 1 tablet by mouth 2 (two) times a day. 180 tablet 1 1/12/2022 at 2100       Allergies:  Penicillins        Objective     Blood pressure 141/93, pulse 73, temperature 98.1 °F (36.7 °C), temperature source Temporal, resp. rate 18, height 160 cm (63\"), weight 76.8 kg (169 lb 5 oz), SpO2 97 %.    Physical Exam   Constitutional: Pt is oriented to person, place, and time and well-developed, well-nourished, and in no distress.   Mouth/Throat: Oropharynx is clear and moist.   Neck: Normal range of motion.   Cardiovascular: Normal rate, regular rhythm and normal heart sounds.    Pulmonary/Chest: Effort normal and breath sounds normal.   Abdominal: Soft. Nontender  Skin: Skin is warm and dry.   Psychiatric: Mood, memory, affect and judgment normal.     Assessment/Plan     Diagnosis:  Screening colonoscopy       Anticipated Surgical Procedure:  colonoscopy    The risks, benefits, and alternatives of this procedure have been discussed with the patient or the responsible party- the patient understands and agrees to proceed.            "

## 2022-01-14 LAB
CYTO UR: NORMAL
LAB AP CASE REPORT: NORMAL
LAB AP CLINICAL INFORMATION: NORMAL
PATH REPORT.FINAL DX SPEC: NORMAL
PATH REPORT.GROSS SPEC: NORMAL

## 2022-01-17 ENCOUNTER — TELEPHONE (OUTPATIENT)
Dept: SLEEP MEDICINE | Facility: HOSPITAL | Age: 52
End: 2022-01-17

## 2022-01-17 NOTE — TELEPHONE ENCOUNTER
Called pt in response to her [employer] message- she wants to switch her DME to [employer].  I advised pt that I would send her set up packet to [employer].

## 2022-01-24 ENCOUNTER — TELEPHONE (OUTPATIENT)
Dept: INTERNAL MEDICINE | Facility: CLINIC | Age: 52
End: 2022-01-24

## 2022-01-24 DIAGNOSIS — Z90.3 HISTORY OF SLEEVE GASTRECTOMY: ICD-10-CM

## 2022-01-24 DIAGNOSIS — E06.3 HYPOTHYROIDISM DUE TO HASHIMOTO'S THYROIDITIS: Primary | ICD-10-CM

## 2022-01-24 DIAGNOSIS — E03.8 HYPOTHYROIDISM DUE TO HASHIMOTO'S THYROIDITIS: Primary | ICD-10-CM

## 2022-01-24 NOTE — TELEPHONE ENCOUNTER
Caller: Xuan Horvath    Relationship: Self    Best call back number: [phone]    What orders are you requesting (i.e. lab or imaging): LABS    In what timeframe would the patient need to come in: ASAP    Where will you receive your lab/imaging services: [facility] LABS    Additional notes: THE PATIENT WOULD LIKE HER LABS AND THYROID CHECKED BEFORE HER UPCOMING APPOINTMENT ON 02/02/22. THE PATIENT WOULD LIKE A CALL BACK WHEN THESE ORDERS ARE READY

## 2022-01-31 ENCOUNTER — CLINICAL SUPPORT (OUTPATIENT)
Dept: INTERNAL MEDICINE | Facility: CLINIC | Age: 52
End: 2022-01-31

## 2022-01-31 DIAGNOSIS — E06.3 HYPOTHYROIDISM DUE TO HASHIMOTO'S THYROIDITIS: ICD-10-CM

## 2022-01-31 DIAGNOSIS — Z90.3 HISTORY OF SLEEVE GASTRECTOMY: ICD-10-CM

## 2022-01-31 DIAGNOSIS — E03.8 HYPOTHYROIDISM DUE TO HASHIMOTO'S THYROIDITIS: ICD-10-CM

## 2022-01-31 LAB
25(OH)D3 SERPL-MCNC: 50.5 NG/ML (ref 30–100)
ALBUMIN SERPL-MCNC: 4.4 G/DL (ref 3.5–5.2)
ALBUMIN/GLOB SERPL: 1.9 G/DL
ALP SERPL-CCNC: 63 U/L (ref 39–117)
ALT SERPL W P-5'-P-CCNC: 27 U/L (ref 1–33)
ANION GAP SERPL CALCULATED.3IONS-SCNC: 8.5 MMOL/L (ref 5–15)
AST SERPL-CCNC: 17 U/L (ref 1–32)
BASOPHILS # BLD AUTO: 0.04 10*3/MM3 (ref 0–0.2)
BASOPHILS NFR BLD AUTO: 0.6 % (ref 0–1.5)
BILIRUB SERPL-MCNC: 0.2 MG/DL (ref 0–1.2)
BUN SERPL-MCNC: 11 MG/DL (ref 6–20)
BUN/CREAT SERPL: 16.9 (ref 7–25)
CALCIUM SPEC-SCNC: 9.9 MG/DL (ref 8.6–10.5)
CHLORIDE SERPL-SCNC: 104 MMOL/L (ref 98–107)
CHOLEST SERPL-MCNC: 191 MG/DL (ref 0–200)
CO2 SERPL-SCNC: 27.5 MMOL/L (ref 22–29)
CREAT SERPL-MCNC: 0.65 MG/DL (ref 0.57–1)
DEPRECATED RDW RBC AUTO: 41.8 FL (ref 37–54)
EOSINOPHIL # BLD AUTO: 0.14 10*3/MM3 (ref 0–0.4)
EOSINOPHIL NFR BLD AUTO: 2.1 % (ref 0.3–6.2)
ERYTHROCYTE [DISTWIDTH] IN BLOOD BY AUTOMATED COUNT: 12.2 % (ref 12.3–15.4)
FOLATE SERPL-MCNC: >20 NG/ML (ref 4.78–24.2)
GFR SERPL CREATININE-BSD FRML MDRD: 96 ML/MIN/1.73
GLOBULIN UR ELPH-MCNC: 2.3 GM/DL
GLUCOSE SERPL-MCNC: 96 MG/DL (ref 65–99)
HCT VFR BLD AUTO: 42.7 % (ref 34–46.6)
HDLC SERPL-MCNC: 68 MG/DL (ref 40–60)
HGB BLD-MCNC: 14.7 G/DL (ref 12–15.9)
IMM GRANULOCYTES # BLD AUTO: 0.03 10*3/MM3 (ref 0–0.05)
IMM GRANULOCYTES NFR BLD AUTO: 0.4 % (ref 0–0.5)
LDLC SERPL CALC-MCNC: 110 MG/DL (ref 0–100)
LDLC/HDLC SERPL: 1.61 {RATIO}
LYMPHOCYTES # BLD AUTO: 2.49 10*3/MM3 (ref 0.7–3.1)
LYMPHOCYTES NFR BLD AUTO: 37.2 % (ref 19.6–45.3)
MCH RBC QN AUTO: 32 PG (ref 26.6–33)
MCHC RBC AUTO-ENTMCNC: 34.4 G/DL (ref 31.5–35.7)
MCV RBC AUTO: 93 FL (ref 79–97)
MONOCYTES # BLD AUTO: 0.58 10*3/MM3 (ref 0.1–0.9)
MONOCYTES NFR BLD AUTO: 8.7 % (ref 5–12)
NEUTROPHILS NFR BLD AUTO: 3.41 10*3/MM3 (ref 1.7–7)
NEUTROPHILS NFR BLD AUTO: 51 % (ref 42.7–76)
NRBC BLD AUTO-RTO: 0 /100 WBC (ref 0–0.2)
PLATELET # BLD AUTO: 246 10*3/MM3 (ref 140–450)
PMV BLD AUTO: 10.6 FL (ref 6–12)
POTASSIUM SERPL-SCNC: 4.7 MMOL/L (ref 3.5–5.2)
PROT SERPL-MCNC: 6.7 G/DL (ref 6–8.5)
RBC # BLD AUTO: 4.59 10*6/MM3 (ref 3.77–5.28)
SODIUM SERPL-SCNC: 140 MMOL/L (ref 136–145)
T4 FREE SERPL-MCNC: 1.55 NG/DL (ref 0.93–1.7)
TRIGL SERPL-MCNC: 69 MG/DL (ref 0–150)
TSH SERPL DL<=0.05 MIU/L-ACNC: 0.13 UIU/ML (ref 0.27–4.2)
VIT B12 BLD-MCNC: 580 PG/ML (ref 211–946)
VLDLC SERPL-MCNC: 13 MG/DL (ref 5–40)
WBC NRBC COR # BLD: 6.69 10*3/MM3 (ref 3.4–10.8)

## 2022-01-31 PROCEDURE — 85025 COMPLETE CBC W/AUTO DIFF WBC: CPT | Performed by: INTERNAL MEDICINE

## 2022-01-31 PROCEDURE — 80061 LIPID PANEL: CPT | Performed by: INTERNAL MEDICINE

## 2022-01-31 PROCEDURE — 36415 COLL VENOUS BLD VENIPUNCTURE: CPT | Performed by: INTERNAL MEDICINE

## 2022-01-31 PROCEDURE — 82306 VITAMIN D 25 HYDROXY: CPT | Performed by: INTERNAL MEDICINE

## 2022-01-31 PROCEDURE — 84439 ASSAY OF FREE THYROXINE: CPT | Performed by: INTERNAL MEDICINE

## 2022-01-31 PROCEDURE — 82746 ASSAY OF FOLIC ACID SERUM: CPT | Performed by: INTERNAL MEDICINE

## 2022-01-31 PROCEDURE — 80053 COMPREHEN METABOLIC PANEL: CPT | Performed by: INTERNAL MEDICINE

## 2022-01-31 PROCEDURE — 84443 ASSAY THYROID STIM HORMONE: CPT | Performed by: INTERNAL MEDICINE

## 2022-01-31 PROCEDURE — 82607 VITAMIN B-12: CPT | Performed by: INTERNAL MEDICINE

## 2022-02-02 ENCOUNTER — OFFICE VISIT (OUTPATIENT)
Dept: INTERNAL MEDICINE | Facility: CLINIC | Age: 52
End: 2022-02-02

## 2022-02-02 VITALS
OXYGEN SATURATION: 98 % | DIASTOLIC BLOOD PRESSURE: 72 MMHG | HEIGHT: 63 IN | SYSTOLIC BLOOD PRESSURE: 120 MMHG | HEART RATE: 89 BPM | RESPIRATION RATE: 18 BRPM | TEMPERATURE: 97.7 F | WEIGHT: 170.8 LBS | BODY MASS INDEX: 30.26 KG/M2

## 2022-02-02 DIAGNOSIS — Z90.3 HISTORY OF SLEEVE GASTRECTOMY: ICD-10-CM

## 2022-02-02 DIAGNOSIS — E06.3 HYPOTHYROIDISM DUE TO HASHIMOTO'S THYROIDITIS: ICD-10-CM

## 2022-02-02 DIAGNOSIS — F41.9 ANXIETY: ICD-10-CM

## 2022-02-02 DIAGNOSIS — E03.8 HYPOTHYROIDISM DUE TO HASHIMOTO'S THYROIDITIS: ICD-10-CM

## 2022-02-02 DIAGNOSIS — R01.1 HEART MURMUR: Primary | ICD-10-CM

## 2022-02-02 PROCEDURE — 99214 OFFICE O/P EST MOD 30 MIN: CPT | Performed by: INTERNAL MEDICINE

## 2022-02-02 RX ORDER — LEVOTHYROXINE SODIUM 0.15 MG/1
137 TABLET ORAL DAILY
Qty: 90 TABLET | Refills: 0 | Status: SHIPPED | OUTPATIENT
Start: 2022-02-02 | End: 2022-06-22 | Stop reason: SDUPTHER

## 2022-02-02 RX ORDER — CETIRIZINE HYDROCHLORIDE 10 MG/1
10 TABLET ORAL DAILY
Qty: 90 TABLET | Refills: 1 | Status: SHIPPED | OUTPATIENT
Start: 2022-02-02 | End: 2022-08-15 | Stop reason: SDUPTHER

## 2022-02-02 RX ORDER — CITALOPRAM 20 MG/1
20 TABLET ORAL DAILY
Qty: 90 TABLET | Refills: 1 | Status: SHIPPED | OUTPATIENT
Start: 2022-02-02 | End: 2022-08-15 | Stop reason: SDUPTHER

## 2022-02-02 RX ORDER — FLUTICASONE PROPIONATE 50 MCG
1 SPRAY, SUSPENSION (ML) NASAL DAILY
Qty: 16 G | Refills: 1 | Status: SHIPPED | OUTPATIENT
Start: 2022-02-02

## 2022-02-02 RX ORDER — SPIRONOLACTONE 100 MG/1
100 TABLET, FILM COATED ORAL 2 TIMES DAILY
Qty: 180 TABLET | Refills: 1 | Status: CANCELLED | OUTPATIENT
Start: 2022-02-02

## 2022-02-02 NOTE — PROGRESS NOTES
"Chief Complaint  Follow-up (Heart Murmur), Hypertension, and Hypothyroidism    Subjective          Xuan Horvath presents to [facility]  History of Present Illness     States that when she had her colonoscopy done she was told that she had a heart murmur    Colonoscopy was good other than one polyp, follow up in 5 years    No chest pain  No trouble breathing    Stopped her birth control    Ended up increasing her celexa back to 20mg, but still doing well on that    Trying to lose weight still  She is working on starting to jogging on her new tredmill  Gained about 12 lbs    Doing well with current meds  Reviewed labs that were normal other than thyroid which was only slightly off    Otherwise no concerns    Objective   Vital Signs:   /72 (BP Location: Left arm, Patient Position: Sitting, Cuff Size: Adult)   Pulse 89   Temp 97.7 °F (36.5 °C)   Resp 18   Ht 160 cm (63\")   Wt 77.5 kg (170 lb 12.8 oz)   SpO2 98%   BMI 30.26 kg/m²     Physical Exam  Vitals reviewed.   Constitutional:       Appearance: Normal appearance. She is well-developed.   HENT:      Head: Normocephalic and atraumatic.      Right Ear: External ear normal.      Left Ear: External ear normal.   Eyes:      Conjunctiva/sclera: Conjunctivae normal.      Pupils: Pupils are equal, round, and reactive to light.   Cardiovascular:      Rate and Rhythm: Normal rate and regular rhythm.      Heart sounds: Murmur heard.   No friction rub. No gallop.    Pulmonary:      Effort: Pulmonary effort is normal.      Breath sounds: Normal breath sounds. No wheezing or rhonchi.   Skin:     General: Skin is warm and dry.   Neurological:      Mental Status: She is alert and oriented to person, place, and time.      Cranial Nerves: No cranial nerve deficit.   Psychiatric:         Mood and Affect: Affect normal.         Behavior: Behavior normal.         Thought Content: Thought content normal.        Result " Review :       Common labs    Common Labsle 8/4/21 8/4/21 8/4/21 8/4/21 1/31/22 1/31/22 1/31/22    0918 0918 0918 0918 0930 0930 0931   Glucose   90   96    BUN   17   11    Creatinine   0.78   0.65    eGFR Non African Am   78   96    Sodium   137   140    Potassium   4.4   4.7    Chloride   101   104    Calcium   9.8   9.9    Albumin   4.40   4.40    Total Bilirubin   0.2   0.2    Alkaline Phosphatase   51   63    AST (SGOT)   21   17    ALT (SGPT)   20   27    WBC 7.20      6.69   Hemoglobin 15.0      14.7   Hematocrit 44.1      42.7   Platelets 257      246   Total Cholesterol    175 191     Triglycerides    171 (A) 69     HDL Cholesterol    77 (A) 68 (A)     LDL Cholesterol     70 110 (A)     Hemoglobin A1C  5.20        (A) Abnormal value                     Procedures        Assessment and Plan    Diagnoses and all orders for this visit:    1. Heart murmur (Primary)  Comments:  very slight, will get echo  Orders:  -     Adult Transthoracic Echo Complete w/ Color, Spectral and Contrast if necessary per protocol; Future    2. Hypothyroidism due to Hashimoto's thyroiditis  Comments:  keep meds the same repeat labs in 2 months  Orders:  -     T4, Free; Future  -     TSH; Future    3. Anxiety  Assessment & Plan:  Doing well, increase celexa back to 20mg      4. History of sleeve gastrectomy  Assessment & Plan:  Doing well, cont to work on diet and exercise as she has been      Other orders  -     cetirizine (ZyrTEC Allergy) 10 MG tablet; Take 1 tablet by mouth Daily.  Dispense: 90 tablet; Refill: 1  -     citalopram (CeleXA) 20 MG tablet; Take 1 tablet by mouth Daily.  Dispense: 90 tablet; Refill: 1  -     fluticasone (FLONASE) 50 MCG/ACT nasal spray; 1 spray by Each Nare route Daily.  Dispense: 16 g; Refill: 1  -     levothyroxine (SYNTHROID, LEVOTHROID) 150 MCG tablet; Take 1 tablet by mouth Daily.  Dispense: 90 tablet; Refill: 0              Follow Up   Return in about 4 months (around 6/2/2022).  Patient was  given instructions and counseling regarding her condition or for health maintenance advice. Please see specific information pulled into the AVS if appropriate.

## 2022-02-17 ENCOUNTER — OFFICE VISIT (OUTPATIENT)
Dept: INTERNAL MEDICINE | Facility: CLINIC | Age: 52
End: 2022-02-17

## 2022-02-17 VITALS
WEIGHT: 179.6 LBS | BODY MASS INDEX: 31.82 KG/M2 | HEIGHT: 63 IN | HEART RATE: 80 BPM | OXYGEN SATURATION: 96 % | SYSTOLIC BLOOD PRESSURE: 110 MMHG | DIASTOLIC BLOOD PRESSURE: 56 MMHG | RESPIRATION RATE: 14 BRPM | TEMPERATURE: 97.4 F

## 2022-02-17 DIAGNOSIS — M25.512 CHRONIC PAIN OF BOTH SHOULDERS: Primary | ICD-10-CM

## 2022-02-17 DIAGNOSIS — G89.29 CHRONIC PAIN OF BOTH SHOULDERS: Primary | ICD-10-CM

## 2022-02-17 DIAGNOSIS — M25.511 CHRONIC PAIN OF BOTH SHOULDERS: Primary | ICD-10-CM

## 2022-02-17 PROCEDURE — 99213 OFFICE O/P EST LOW 20 MIN: CPT | Performed by: NURSE PRACTITIONER

## 2022-02-17 NOTE — ASSESSMENT & PLAN NOTE
Patient with bilateral shoulder pain with some tingling and pain shooting down the left arm from time to time.  We did discuss differentials and treatment options.  Patient decided that she would like to go ahead and go to Ortho for further evaluation and management options.  We decided to defer x-rays as they were not available in the office today.  They should be able to obtain those at the time of her visit.  Patient is understands and agrees to the plan.

## 2022-02-17 NOTE — PROGRESS NOTES
"Chief Complaint  Shoulder Pain (in both shoulders, been doing motrin for about a week 3 times a day.  Seems to hurt mostly in the moring, but even task that would cause a lifting motion makes them hurt.  Going for at least 3 weeks.)    Subjective         Xuan Horvath presents to [facility] for Bilateral shoulder pain.  Patient reports that over the last 3 weeks she has had significant bilateral shoulder pain, she reports that she has never had this before.  She has been using Motrin 2-3 times daily for the last 3 weeks without any relief.  She does get some tingling and pain that shoots down the left arm from time to time.  She does not report any significant injury.  No other significant concerns or complaints today.         Review of Systems   Constitutional: Negative for fatigue and fever.   Cardiovascular: Negative for chest pain.   Musculoskeletal: Positive for arthralgias.   Skin: Negative for rash.       Objective   Vital Signs:   /56   Pulse 80   Temp 97.4 °F (36.3 °C) (Temporal)   Resp 14   Ht 160 cm (63\")   Wt 81.5 kg (179 lb 9.6 oz)   SpO2 96%   BMI 31.81 kg/m²     Physical Exam  Vitals and nursing note reviewed.   Constitutional:       Appearance: Normal appearance.   HENT:      Head: Normocephalic and atraumatic.   Cardiovascular:      Rate and Rhythm: Normal rate.   Pulmonary:      Effort: Pulmonary effort is normal.   Musculoskeletal:      Right shoulder: Tenderness present. No swelling. Decreased range of motion.      Left shoulder: Tenderness present. No swelling. Decreased range of motion.   Neurological:      Mental Status: She is alert.        Result Review :                   Diagnoses and all orders for this visit:    1. Chronic pain of both shoulders (Primary)  Assessment & Plan:  Patient with bilateral shoulder pain with some tingling and pain shooting down the left arm from time to time.  We did discuss differentials and treatment options.  " Patient decided that she would like to go ahead and go to Ortho for further evaluation and management options.  We decided to defer x-rays as they were not available in the office today.  They should be able to obtain those at the time of her visit.  Patient is understands and agrees to the plan.    Orders:  -     Ambulatory Referral to Orthopedic Surgery        Follow Up   Return if symptoms worsen or fail to improve.  Patient was given instructions and counseling regarding her condition or for health maintenance advice. Please see specific information pulled into the AVS if appropriate.     Deuce Romeo, KULWINDER  2/17/2022  This note was electronically signed.

## 2022-02-23 ENCOUNTER — OFFICE VISIT (OUTPATIENT)
Dept: ORTHOPEDIC SURGERY | Facility: CLINIC | Age: 52
End: 2022-02-23

## 2022-02-23 VITALS — WEIGHT: 179 LBS | OXYGEN SATURATION: 99 % | HEIGHT: 63 IN | HEART RATE: 74 BPM | BODY MASS INDEX: 31.71 KG/M2

## 2022-02-23 DIAGNOSIS — M77.8 SHOULDER TENDONITIS, UNSPECIFIED LATERALITY: ICD-10-CM

## 2022-02-23 DIAGNOSIS — M75.40 IMPINGEMENT SYNDROME OF SHOULDER REGION, UNSPECIFIED LATERALITY: ICD-10-CM

## 2022-02-23 DIAGNOSIS — M25.511 BILATERAL SHOULDER PAIN, UNSPECIFIED CHRONICITY: Primary | ICD-10-CM

## 2022-02-23 DIAGNOSIS — M25.512 BILATERAL SHOULDER PAIN, UNSPECIFIED CHRONICITY: Primary | ICD-10-CM

## 2022-02-23 PROCEDURE — 99203 OFFICE O/P NEW LOW 30 MIN: CPT | Performed by: STUDENT IN AN ORGANIZED HEALTH CARE EDUCATION/TRAINING PROGRAM

## 2022-02-23 PROCEDURE — 20610 DRAIN/INJ JOINT/BURSA W/O US: CPT | Performed by: STUDENT IN AN ORGANIZED HEALTH CARE EDUCATION/TRAINING PROGRAM

## 2022-02-23 RX ADMIN — BUPIVACAINE HYDROCHLORIDE 5 ML: 2.5 INJECTION, SOLUTION INFILTRATION; PERINEURAL at 09:54

## 2022-02-23 RX ADMIN — TRIAMCINOLONE ACETONIDE 40 MG: 40 INJECTION, SUSPENSION INTRA-ARTICULAR; INTRAMUSCULAR at 09:54

## 2022-02-23 NOTE — PROGRESS NOTES
"Chief Complaint  Pain of the Right Shoulder and Pain of the Left Shoulder    Subjective          Xuan Horvath presents to [facility] for   History of Present Illness    The patient presents here today for evaluation of the bilateral shoulders. The patient reports pain for about 3 weeks that has been worsening. She gets no relief with tylenol or ibuprofen. She is a  retired teacher. She has no injury or trauma. She has not had any previous surgery. She has no other complaints. She denies numbness and tingling.   Allergies   Allergen Reactions   • Penicillins Anaphylaxis     unknown        Social History     Socioeconomic History   • Marital status:    Tobacco Use   • Smoking status: Never Smoker   • Smokeless tobacco: Never Used   Vaping Use   • Vaping Use: Never used   Substance and Sexual Activity   • Alcohol use: Never   • Drug use: Never   • Sexual activity: Yes     Partners: Male     Birth control/protection: Other, None     Comment: Hysterectomy 2004        I reviewed the patient's chief complaint, history of present illness, review of systems, past medical history, surgical history, family history, social history, medications, and allergy list.     REVIEW OF SYSTEMS    Constitutional: Denies fevers, chills, weight loss  Cardiovascular: Denies chest pain, shortness of breath  Skin: Denies rashes, acute skin changes  Neurologic: Denies headache, loss of consciousness  MSK: bilateral shoulder pain      Objective   Vital Signs:   Pulse 74   Ht 160 cm (63\")   Wt 81.2 kg (179 lb)   SpO2 99%   BMI 31.71 kg/m²     Body mass index is 31.71 kg/m².    Physical Exam    General: Alert. No acute distress.   Right shoulder- non-tender. Forward elevation active 130, passive 160 with pain. Positive impingement test. External Rotation 60. Internal rotation lower lumbar spine. Negative speeds test. Negative O'enrico.  5/5 rotator cuff testing with pain to supraspinatus strength and " infraspinatus. Sensation to light touch median, radial, axillary ulnar nerve. Positive AIN, PIN, ulnar nerve. Positive pulses.     Left shoulder- non-tender. Forward elevation active 100, passive 160 with pain. External Rotation 45. Internal rotation to lower lumbar spine. Positive impingement testing. Positive speeds test. Equivocal O'enrico.  5/5 rotator cuff testing with pain to supraspinatus strength and infraspinatus. Sensation to light touch median, radial, axillary ulnar nerve. Positive AIN, PIN, ulnar nerve. Positive pulses.     Large Joint Arthrocentesis: L glenohumeral  Date/Time: 2/23/2022 9:54 AM  Consent given by: patient  Site marked: site marked  Timeout: Immediately prior to procedure a time out was called to verify the correct patient, procedure, equipment, support staff and site/side marked as required   Supporting Documentation  Indications: pain   Procedure Details  Location: shoulder - L glenohumeral  Needle gauge: 21g.  Medications administered: 5 mL bupivacaine 0.25 %; 40 mg triamcinolone acetonide 40 MG/ML  Patient tolerance: patient tolerated the procedure well with no immediate complications          Imaging Results (Most Recent)     Procedure Component Value Units Date/Time    XR Shoulder 2+ View Bilateral [[id number]] Resulted: 02/23/22 1122     Updated: 02/23/22 1124    Narrative:      Indications: Bilateral shoulder pain    Views: AP, Grashey, Scap Y, axillary bilateral shoulders    Findings: The glenohumeral joints are reduced.  No fractures are noted.    Changes at the greater tuberosity bilaterally consistent with chronic   impingement.  Subacromial enthesophytes are noted.  Mild bilateral   glenohumeral joint arthritic changes with early inferior osteophyte   formation noted.    Comparative Data: No comparative data available                     Assessment and Plan    Diagnoses and all orders for this visit:    1. Bilateral shoulder pain, unspecified chronicity (Primary)  -     XR  Shoulder 2+ View Bilateral  -     diclofenac (VOLTAREN) 50 MG EC tablet; Take 1 tablet by mouth 2 (Two) Times a Day.  Dispense: 60 tablet; Refill: 1    2. Impingement syndrome of shoulder region, Bilaterally  -     diclofenac (VOLTAREN) 50 MG EC tablet; Take 1 tablet by mouth 2 (Two) Times a Day.  Dispense: 60 tablet; Refill: 1    3. Shoulder tendonitis, Bilaterally  -     diclofenac (VOLTAREN) 50 MG EC tablet; Take 1 tablet by mouth 2 (Two) Times a Day.  Dispense: 60 tablet; Refill: 1    Other orders  -     Large Joint Arthrocentesis: L glenohumeral        Discussed the treatment plan with the patient.  I reviewed the x-rays that were obtained today with the patient. Plan for conservative treatment at this time. Prescription for Voltaren given today. Home exercises given today. Discussed the risks and benefits of a left shoulder steroid injection today. The patient expressed understanding and wished to proceed. She tolerated the injection well.     Call or return if symptoms worsen or patient has any concerns.       Scribed for Cisco Isaacs MD by Cisco Isaacs MD  02/23/2022   09:43 EST         Follow Up   Return in about 4 weeks (around 3/23/2022). May consider Right shoulder steroid injection at that time. .  Patient was given instructions and counseling regarding her condition or for health maintenance advice. Please see specific information pulled into the AVS if appropriate.       I have personally performed the services described in this document as scribed by the above individual and it is both accurate and complete.     Cisco Isaacs MD  02/24/22  09:32 EST

## 2022-02-24 RX ORDER — BUPIVACAINE HYDROCHLORIDE 2.5 MG/ML
5 INJECTION, SOLUTION INFILTRATION; PERINEURAL
Status: COMPLETED | OUTPATIENT
Start: 2022-02-23 | End: 2022-02-23

## 2022-02-24 RX ORDER — TRIAMCINOLONE ACETONIDE 40 MG/ML
40 INJECTION, SUSPENSION INTRA-ARTICULAR; INTRAMUSCULAR
Status: COMPLETED | OUTPATIENT
Start: 2022-02-23 | End: 2022-02-23

## 2022-03-24 ENCOUNTER — HOSPITAL ENCOUNTER (OUTPATIENT)
Dept: CARDIOLOGY | Facility: HOSPITAL | Age: 52
Discharge: HOME | End: 2022-03-24
Admitting: INTERNAL MEDICINE

## 2022-03-24 DIAGNOSIS — R01.1 HEART MURMUR: ICD-10-CM

## 2022-03-24 LAB
BH CV ECHO MEAS - AI P1/2T: 348 MSEC
BH CV ECHO MEAS - AO ROOT DIAM: 2.8 CM
BH CV ECHO MEAS - EF(MOD-BP): 56.2 %
BH CV ECHO MEAS - IVSD: 0.8 CM
BH CV ECHO MEAS - LA DIMENSION(2D): 3.7 CM
BH CV ECHO MEAS - LAT PEAK E' VEL: 6.6 CM/SEC
BH CV ECHO MEAS - LVIDD: 4.9 CM
BH CV ECHO MEAS - LVIDS: 2.9 CM
BH CV ECHO MEAS - LVPWD: 0.8 CM
BH CV ECHO MEAS - MED PEAK E' VEL: 9.8 CM/SEC
BH CV ECHO MEAS - MV A MAX VEL: 112 CM/SEC
BH CV ECHO MEAS - MV DEC TIME: 145 MSEC
BH CV ECHO MEAS - MV E MAX VEL: 104 CM/SEC
BH CV ECHO MEAS - MV E/A: 0.9
BH CV ECHO MEAS - RAP SYSTOLE: 3 MMHG
BH CV ECHO MEAS - RVSP: 34 MMHG
BH CV ECHO MEAS - TR MAX PG: 31 MMHG
BH CV ECHO MEAS - TR MAX VEL: 279 CM/SEC
BH CV ECHO MEASUREMENTS AVERAGE E/E' RATIO: 12.68
LEFT ATRIUM VOLUME INDEX: 24.5 ML/M2
MAXIMAL PREDICTED HEART RATE: 169 BPM
STRESS TARGET HR: 144 BPM

## 2022-03-24 PROCEDURE — 93306 TTE W/DOPPLER COMPLETE: CPT

## 2022-03-24 PROCEDURE — 93306 TTE W/DOPPLER COMPLETE: CPT | Performed by: INTERNAL MEDICINE

## 2022-03-31 ENCOUNTER — CLINICAL SUPPORT (OUTPATIENT)
Dept: INTERNAL MEDICINE | Facility: CLINIC | Age: 52
End: 2022-03-31

## 2022-03-31 DIAGNOSIS — E03.8 HYPOTHYROIDISM DUE TO HASHIMOTO'S THYROIDITIS: ICD-10-CM

## 2022-03-31 DIAGNOSIS — E06.3 HYPOTHYROIDISM DUE TO HASHIMOTO'S THYROIDITIS: ICD-10-CM

## 2022-03-31 LAB
T4 FREE SERPL-MCNC: 1.57 NG/DL (ref 0.93–1.7)
TSH SERPL DL<=0.05 MIU/L-ACNC: 1.38 UIU/ML (ref 0.27–4.2)

## 2022-03-31 PROCEDURE — 84439 ASSAY OF FREE THYROXINE: CPT | Performed by: INTERNAL MEDICINE

## 2022-03-31 PROCEDURE — 36415 COLL VENOUS BLD VENIPUNCTURE: CPT | Performed by: INTERNAL MEDICINE

## 2022-03-31 PROCEDURE — 84443 ASSAY THYROID STIM HORMONE: CPT | Performed by: INTERNAL MEDICINE

## 2022-04-11 ENCOUNTER — OFFICE VISIT (OUTPATIENT)
Dept: ORTHOPEDIC SURGERY | Facility: CLINIC | Age: 52
End: 2022-04-11

## 2022-04-11 VITALS — HEART RATE: 58 BPM | HEIGHT: 63 IN | WEIGHT: 178 LBS | OXYGEN SATURATION: 97 % | BODY MASS INDEX: 31.54 KG/M2

## 2022-04-11 DIAGNOSIS — M75.40 IMPINGEMENT SYNDROME OF SHOULDER REGION, UNSPECIFIED LATERALITY: Primary | ICD-10-CM

## 2022-04-11 DIAGNOSIS — M25.511 BILATERAL SHOULDER PAIN, UNSPECIFIED CHRONICITY: ICD-10-CM

## 2022-04-11 DIAGNOSIS — M25.512 BILATERAL SHOULDER PAIN, UNSPECIFIED CHRONICITY: ICD-10-CM

## 2022-04-11 PROCEDURE — 99213 OFFICE O/P EST LOW 20 MIN: CPT | Performed by: STUDENT IN AN ORGANIZED HEALTH CARE EDUCATION/TRAINING PROGRAM

## 2022-04-11 NOTE — PROGRESS NOTES
"Chief Complaint  Pain of the Right Shoulder and Pain of the Left Shoulder    Subjective          Xuan Horvath presents to [facility] for   History of Present Illness    Xuan presents today for a follow up of her bilateral shoulder pain. Patient has bilateral shoulder tendonitis that we have been treating nonoperatively. She received a left shoulder steroid injection at her previous visit and reports significant relief. She has been taking Diclofenac and states this medication has provided relief to both of her shoulders. She denies pain today. She states she has returned to lifting and other activities without difficulties. She has not been doing her home exercises but stays active. Denies numbness or tingling. Denies new injuries.     Allergies   Allergen Reactions   • Penicillins Anaphylaxis     unknown        Social History     Socioeconomic History   • Marital status:    Tobacco Use   • Smoking status: Never Smoker   • Smokeless tobacco: Never Used   Vaping Use   • Vaping Use: Never used   Substance and Sexual Activity   • Alcohol use: Never   • Drug use: Never   • Sexual activity: Yes     Partners: Male     Birth control/protection: Other, None     Comment: Hysterectomy 2004        I reviewed the patient's chief complaint, history of present illness, review of systems, past medical history, surgical history, family history, social history, medications, and allergy list.     REVIEW OF SYSTEMS    Constitutional: Denies fevers, chills, weight loss  Cardiovascular: Denies chest pain, shortness of breath  Skin: Denies rashes, acute skin changes  Neurologic: Denies headache, loss of consciousness  MSK: bilateral shoulder pain      Objective   Vital Signs:   Pulse 58   Ht 160 cm (63\")   Wt 80.7 kg (178 lb)   SpO2 97%   BMI 31.53 kg/m²     Body mass index is 31.53 kg/m².    Physical Exam    General: Alert. No acute distress.   Right upper extremity: No areas of " tenderness to palpation. Active forward elevation 180 degrees. Internal rotation to the lower lumbar spine. External rotation to 60 degrees. Full elbow flexion and extension. 5/5 rotator cuff testing. Sensation intact to the axillary, median, radial, and ulnar nerve distributions. Thumb opposition intact. Palmar abduction of the thumb intact. Demonstrates active wrist and hand range of motion. Palpable radial pulse.     Left upper extremity: No areas of tenderness to palpation. Active forward elevation 180 degrees. Internal rotation to the mid lumbar spine. External rotation to 60 degrees. Full elbow flexion and extension. 5/5 rotator cuff testing. Sensation intact to the axillary, median, radial, and ulnar nerve distributions. Thumb opposition intact. Palmar abduction of the thumb intact. Demonstrates active wrist and hand range of motion. Palpable radial pulse.     Procedures    Imaging Results (Most Recent)     None                 Assessment and Plan    Diagnoses and all orders for this visit:    1. Impingement syndrome of shoulder region, Bilaterally (Primary)    2. Bilateral shoulder pain, unspecified chronicity        Xuan presents today for a follow up of her bilateral shoulder tendonitis. Patient is doing well and will continue with Diclofenac as needed. Diclofenac refilled today. We discussed the importance of home exercises to continue improving range of motion and strength. Patient understood and agreed. Continue with activities as tolerated. Patient will follow up as needed.     Call or return if symptoms worsen or patient has any concerns.       Follow Up   Return if symptoms worsen or fail to improve.  Patient was given instructions and counseling regarding her condition or for health maintenance advice. Please see specific information pulled into the AVS if appropriate.     Maranda Estes PA-C  04/11/22  13:32 EDT

## 2022-04-14 ENCOUNTER — OFFICE VISIT (OUTPATIENT)
Dept: BARIATRICS/WEIGHT MGMT | Facility: CLINIC | Age: 52
End: 2022-04-14

## 2022-04-14 VITALS
SYSTOLIC BLOOD PRESSURE: 178 MMHG | TEMPERATURE: 97.4 F | DIASTOLIC BLOOD PRESSURE: 91 MMHG | WEIGHT: 178.5 LBS | BODY MASS INDEX: 31.63 KG/M2 | HEIGHT: 63 IN | HEART RATE: 57 BPM

## 2022-04-14 DIAGNOSIS — Z71.3 DIETARY COUNSELING: ICD-10-CM

## 2022-04-14 DIAGNOSIS — Z90.3 HISTORY OF SLEEVE GASTRECTOMY: ICD-10-CM

## 2022-04-14 DIAGNOSIS — R63.5 UNINTENDED WEIGHT GAIN: ICD-10-CM

## 2022-04-14 DIAGNOSIS — E66.9 OBESITY, CLASS I, BMI 30-34.9: Primary | ICD-10-CM

## 2022-04-14 PROCEDURE — 99214 OFFICE O/P EST MOD 30 MIN: CPT | Performed by: SURGERY

## 2022-04-14 NOTE — PATIENT INSTRUCTIONS
Diet Manual    You were given a manual that discusses in detail good eating tips and habits. Please read and keep with you for future.

## 2022-04-14 NOTE — PROGRESS NOTES
[facility]  [address] [facility] [address]  [facility] [address]  [phone]  [address] [facility] [address]  [facility] [address]  [phone]  Dept: [phone]  4/14/2022      Xuan Horvath.  [id number]  [id number]  1970  female      Chief Complaint   Patient presents with   • Follow-up     SLEEVE 07/08/2020        Post-Op Bariatric Surgery:   Xuan Horvath is status post Laparoscopic Sleeve/HH procedure, performed on 7/8/20     HPI:   Today's weight is 81 kg (178 lb 8 oz) pounds, today's BMI is Body mass index is 31.62 kg/m²., a  gain of 22 pounds since the last visit and weight loss since surgery is 32 pounds. The patient reports a decreased portion size and loss of appetite.      Xuan Horvath denies nausea vomiting fever chills chest pain shortness of air or abdominal pain and reports getting off track with her weight loss.  She states she is continue with her exercises and with walking 3 miles 3 days a week and also strength training.  She has been snacking at night but feels like she is just snacking during the day.  She thinks she is making wrong choices.     Diet and Exercise: Diet history reviewed and discussed with the patient. Weight loss/gains to date discussed with the patient. The patient states they are eating 70 grams of protein per day. She reports eating 3 meals per day, a typical portion size of 1 cup, eating 2 snacks per day, drinking 5 or more 8-oz. glasses of water per day, no carbonated beverage consumption and exercising regularly.     Supplements: Yes.     Review of Systems   Constitutional: Positive for fatigue.   Musculoskeletal: Positive for arthralgias.   All other systems reviewed and are negative.      Patient Active Problem List   Diagnosis   • Chronic fatigue   • Essential hypertension   • Sleep apnea   • Anxiety   • Hypothyroid   • Obesity, Class I, BMI 30-34.9   • Dietary counseling   • Gastric  polyps   • History of sleeve gastrectomy   • Vitamin D deficiency   • Chronic pain of both shoulders   • Impingement syndrome of shoulder region   • Shoulder tendonitis, Bilaterally   • Unintended weight gain       Past Medical History:   Diagnosis Date   • Allergy    • Anxiety    • Colon cancer screening    • Depression    • Depression 04/07/2015    working well, continue citalopram discussed that she can stay on meds long term or go off them whenever she is ready   • Essential hypertension 04/06/2016    stable will try to decrease atenolol and see if that will give her more energy, need to watch bp closely   • Hiatal hernia    • Hypertension    • Hypothyroidism    • Hypothyroidism    • Kidney stone    • Obesity 04/06/2016    she is going to work on weight by trying to go to the gym she is working on this with her son   • Sleep apnea     COMPLIANT WITH CPAP    • Vitamin D deficiency 04/07/2015    will repeat labs continue current OTC meds for now       The following portions of the patient's history were reviewed and updated as appropriate: allergies, current medications, past family history, past medical history, past social history, past surgical history and problem list.    Vitals:    04/14/22 1538   BP: 178/91   Pulse: 57   Temp: 97.4 °F (36.3 °C)       Physical Exam  Constitutional:       Appearance: Normal appearance.   HENT:      Head: Normocephalic and atraumatic.   Eyes:      Conjunctiva/sclera: Conjunctivae normal.   Pulmonary:      Effort: Pulmonary effort is normal.   Neurological:      Mental Status: She is alert and oriented to person, place, and time.   Psychiatric:         Mood and Affect: Mood normal.         Behavior: Behavior normal.         Thought Content: Thought content normal.         Judgment: Judgment normal.         Assessment:   Post-op, the patient is status post laparoscopic sleeve gastrectomy and hiatal hernia repair July 2020 who got off track with her weight loss.  We had a long  discussion regarding getting back on track which she would like to do the replacement shakes.  She also would like to do intermittent fasting after the meal replacement shakes.  She is planning on having a panniculectomy in June secondary to her chronic rash.  We discussed clean eating and concentrating on meals instead of snacking.  Continue with her exercise routine.  She was off her spironolactone with her weight loss but now has gained weight back and blood pressure was elevated today.  I instructed her to check at home and if still elevated she will contact her PCP.  Plan to lose 15 to 20 pounds in 2 to 3 months..     Encounter Diagnoses   Name Primary?   • Obesity, Class I, BMI 30-34.9 Yes   • History of sleeve gastrectomy    • Dietary counseling    • Unintended weight gain        Plan:     Encouraged patient to be sure to get plenty of lean protein per day through small frequent meals all with a protein source.   Activity restrictions: none.   Recommended patient be sure to get at least 70 grams of protein per day by eating small, frequent meals all with high lean protein choices. Be sure to limit/cut back on daily carbohydrate intake. Discussed with the patient the recommended amount of water per day to intake- half of body weight in ounces. Reviewed vitamin requirements. Be sure to do routine exercise, 150 minutes per week minimum, including both cardio and strength training.     Instructions / Recommendations: dietary counseling recommended, recommended a daily protein intake of  grams, vitamin supplement(s) recommended, recommended exercising at least 150 minutes per week, behavior modifications recommended and instructed to call the office for concerns, questions, or problems.     The patient was instructed to follow up in 4 months.     The patient was counseled regarding the above procedure. Total time spent on this encounter was  30 minutes including reviewing previous notes, lab results and face  to face time spent with the patient.  The majority of time was spent counseling the patient regarding diet and exercise as well as reviewing their medications and their compliance with the procedure.

## 2022-04-28 DIAGNOSIS — I10 ESSENTIAL HYPERTENSION: Primary | ICD-10-CM

## 2022-04-28 RX ORDER — LISINOPRIL 10 MG/1
10 TABLET ORAL DAILY
Qty: 90 TABLET | Refills: 0 | Status: SHIPPED | OUTPATIENT
Start: 2022-04-28 | End: 2022-07-20

## 2022-05-31 ENCOUNTER — TELEPHONE (OUTPATIENT)
Dept: INTERNAL MEDICINE | Facility: CLINIC | Age: 52
End: 2022-05-31

## 2022-06-13 ENCOUNTER — TELEPHONE (OUTPATIENT)
Dept: INTERNAL MEDICINE | Facility: CLINIC | Age: 52
End: 2022-06-13

## 2022-06-13 NOTE — TELEPHONE ENCOUNTER
Pt needs another referral for additional visits for treat/eval & consult and additional testing to Dr. Ron Mendez (plastic surgeon) for a tummy tuck. Pt said she has already seen Dr. Mendez but in order to proceed with treatment she needs referral for multiple visits. Please enter if okay.

## 2022-06-15 DIAGNOSIS — L98.7 EXCESS SKIN OF ABDOMEN: Primary | ICD-10-CM

## 2022-06-22 RX ORDER — LEVOTHYROXINE SODIUM 0.15 MG/1
137 TABLET ORAL DAILY
Qty: 90 TABLET | Refills: 0 | Status: SHIPPED | OUTPATIENT
Start: 2022-06-22 | End: 2022-09-20 | Stop reason: SDUPTHER

## 2022-06-22 NOTE — TELEPHONE ENCOUNTER
Please advise refill   Labs on 03/31/2022  LOV: 02/17/2022    Requested Prescriptions     Pending Prescriptions Disp Refills   • levothyroxine (SYNTHROID, LEVOTHROID) 150 MCG tablet 90 tablet 0     Sig: Take 1 tablet by mouth Daily.

## 2022-06-22 NOTE — TELEPHONE ENCOUNTER
Caller: Xuan Horvath    Relationship: Self    Best call back number: [phone]    Requested Prescriptions:   Requested Prescriptions     Pending Prescriptions Disp Refills   • levothyroxine (SYNTHROID, LEVOTHROID) 150 MCG tablet 90 tablet 0     Sig: Take 1 tablet by mouth Daily.        Pharmacy where request should be sent: [id number] - [place], [address] AT [facility] OF [address] & [place] - [phone] [facility] [phone] FX     Additional details provided by patient: PATIENT HAS THREE PILLS LEFT. PLEASE CALL IN A NEW PRESCRIPTION TO THE PHARMACY WITH 90 DAY REFILLS EACH TIME ASAP.    Does the patient have less than a 3 day supply:  [] Yes  [x] No    Noemy Renteria Rep   06/22/22 13:47 EDT

## 2022-06-23 DIAGNOSIS — M25.512 BILATERAL SHOULDER PAIN, UNSPECIFIED CHRONICITY: ICD-10-CM

## 2022-06-23 DIAGNOSIS — M77.8 SHOULDER TENDONITIS, UNSPECIFIED LATERALITY: ICD-10-CM

## 2022-06-23 DIAGNOSIS — M75.40 IMPINGEMENT SYNDROME OF SHOULDER REGION, UNSPECIFIED LATERALITY: ICD-10-CM

## 2022-06-23 DIAGNOSIS — M25.511 BILATERAL SHOULDER PAIN, UNSPECIFIED CHRONICITY: ICD-10-CM

## 2022-07-05 ENCOUNTER — TELEPHONE (OUTPATIENT)
Dept: INTERNAL MEDICINE | Facility: CLINIC | Age: 52
End: 2022-07-05

## 2022-07-05 NOTE — TELEPHONE ENCOUNTER
Caller: Xuan Horvath    Relationship to patient: Self    Best call back number: [phone]    Chief complaint: FOLLOW-UP    Type of visit: OFFICE    Requested date: OCTOBER     Additional notes:THE PATIENT WOULD LIKE A CALL BACK TO DISCUSS SCHEDULING WITH PCP ONLY ASAP

## 2022-07-20 DIAGNOSIS — I10 ESSENTIAL HYPERTENSION: ICD-10-CM

## 2022-07-20 RX ORDER — LISINOPRIL 10 MG/1
10 TABLET ORAL DAILY
Qty: 90 TABLET | Refills: 0 | Status: SHIPPED | OUTPATIENT
Start: 2022-07-20 | End: 2022-10-20

## 2022-08-09 DIAGNOSIS — E06.3 HYPOTHYROIDISM DUE TO HASHIMOTO'S THYROIDITIS: ICD-10-CM

## 2022-08-09 DIAGNOSIS — I10 ESSENTIAL HYPERTENSION: Primary | ICD-10-CM

## 2022-08-09 DIAGNOSIS — E03.8 HYPOTHYROIDISM DUE TO HASHIMOTO'S THYROIDITIS: ICD-10-CM

## 2022-08-12 DIAGNOSIS — M25.511 BILATERAL SHOULDER PAIN, UNSPECIFIED CHRONICITY: ICD-10-CM

## 2022-08-12 DIAGNOSIS — M25.512 BILATERAL SHOULDER PAIN, UNSPECIFIED CHRONICITY: ICD-10-CM

## 2022-08-12 DIAGNOSIS — M75.40 IMPINGEMENT SYNDROME OF SHOULDER REGION, UNSPECIFIED LATERALITY: ICD-10-CM

## 2022-08-12 DIAGNOSIS — M77.8 SHOULDER TENDONITIS, UNSPECIFIED LATERALITY: ICD-10-CM

## 2022-08-15 RX ORDER — CITALOPRAM 20 MG/1
20 TABLET ORAL DAILY
Qty: 90 TABLET | Refills: 0 | Status: SHIPPED | OUTPATIENT
Start: 2022-08-15 | End: 2022-11-11

## 2022-08-15 RX ORDER — CETIRIZINE HYDROCHLORIDE 10 MG/1
10 TABLET ORAL DAILY
Qty: 90 TABLET | Refills: 3 | Status: SHIPPED | OUTPATIENT
Start: 2022-08-15

## 2022-09-21 ENCOUNTER — CLINICAL SUPPORT (OUTPATIENT)
Dept: INTERNAL MEDICINE | Facility: CLINIC | Age: 52
End: 2022-09-21

## 2022-09-21 DIAGNOSIS — I10 ESSENTIAL HYPERTENSION: ICD-10-CM

## 2022-09-21 DIAGNOSIS — E03.8 HYPOTHYROIDISM DUE TO HASHIMOTO'S THYROIDITIS: ICD-10-CM

## 2022-09-21 DIAGNOSIS — E06.3 HYPOTHYROIDISM DUE TO HASHIMOTO'S THYROIDITIS: ICD-10-CM

## 2022-09-21 LAB
ALBUMIN SERPL-MCNC: 4.1 G/DL (ref 3.5–5.2)
ALBUMIN/GLOB SERPL: 2.1 G/DL
ALP SERPL-CCNC: 69 U/L (ref 39–117)
ALT SERPL W P-5'-P-CCNC: 16 U/L (ref 1–33)
ANION GAP SERPL CALCULATED.3IONS-SCNC: 10.5 MMOL/L (ref 5–15)
AST SERPL-CCNC: 16 U/L (ref 1–32)
BASOPHILS # BLD AUTO: 0.05 10*3/MM3 (ref 0–0.2)
BASOPHILS NFR BLD AUTO: 0.6 % (ref 0–1.5)
BILIRUB SERPL-MCNC: 0.4 MG/DL (ref 0–1.2)
BUN SERPL-MCNC: 9 MG/DL (ref 6–20)
BUN/CREAT SERPL: 14.3 (ref 7–25)
CALCIUM SPEC-SCNC: 9.5 MG/DL (ref 8.6–10.5)
CHLORIDE SERPL-SCNC: 104 MMOL/L (ref 98–107)
CHOLEST SERPL-MCNC: 205 MG/DL (ref 0–200)
CO2 SERPL-SCNC: 27.5 MMOL/L (ref 22–29)
CREAT SERPL-MCNC: 0.63 MG/DL (ref 0.57–1)
DEPRECATED RDW RBC AUTO: 41.7 FL (ref 37–54)
EGFRCR SERPLBLD CKD-EPI 2021: 106.9 ML/MIN/1.73
EOSINOPHIL # BLD AUTO: 0.25 10*3/MM3 (ref 0–0.4)
EOSINOPHIL NFR BLD AUTO: 3.1 % (ref 0.3–6.2)
ERYTHROCYTE [DISTWIDTH] IN BLOOD BY AUTOMATED COUNT: 12.1 % (ref 12.3–15.4)
GLOBULIN UR ELPH-MCNC: 2 GM/DL
GLUCOSE SERPL-MCNC: 87 MG/DL (ref 65–99)
HCT VFR BLD AUTO: 39.5 % (ref 34–46.6)
HDLC SERPL-MCNC: 76 MG/DL (ref 40–60)
HGB BLD-MCNC: 13.3 G/DL (ref 12–15.9)
IMM GRANULOCYTES # BLD AUTO: 0.02 10*3/MM3 (ref 0–0.05)
IMM GRANULOCYTES NFR BLD AUTO: 0.2 % (ref 0–0.5)
LDLC SERPL CALC-MCNC: 110 MG/DL (ref 0–100)
LDLC/HDLC SERPL: 1.42 {RATIO}
LYMPHOCYTES # BLD AUTO: 2.62 10*3/MM3 (ref 0.7–3.1)
LYMPHOCYTES NFR BLD AUTO: 32.2 % (ref 19.6–45.3)
MCH RBC QN AUTO: 31.8 PG (ref 26.6–33)
MCHC RBC AUTO-ENTMCNC: 33.7 G/DL (ref 31.5–35.7)
MCV RBC AUTO: 94.5 FL (ref 79–97)
MONOCYTES # BLD AUTO: 0.65 10*3/MM3 (ref 0.1–0.9)
MONOCYTES NFR BLD AUTO: 8 % (ref 5–12)
NEUTROPHILS NFR BLD AUTO: 4.55 10*3/MM3 (ref 1.7–7)
NEUTROPHILS NFR BLD AUTO: 55.9 % (ref 42.7–76)
NRBC BLD AUTO-RTO: 0 /100 WBC (ref 0–0.2)
PLATELET # BLD AUTO: 218 10*3/MM3 (ref 140–450)
PMV BLD AUTO: 11.2 FL (ref 6–12)
POTASSIUM SERPL-SCNC: 4.1 MMOL/L (ref 3.5–5.2)
PROT SERPL-MCNC: 6.1 G/DL (ref 6–8.5)
RBC # BLD AUTO: 4.18 10*6/MM3 (ref 3.77–5.28)
SODIUM SERPL-SCNC: 142 MMOL/L (ref 136–145)
T4 FREE SERPL-MCNC: 1.5 NG/DL (ref 0.93–1.7)
TRIGL SERPL-MCNC: 106 MG/DL (ref 0–150)
TSH SERPL DL<=0.05 MIU/L-ACNC: 0.49 UIU/ML (ref 0.27–4.2)
VLDLC SERPL-MCNC: 19 MG/DL (ref 5–40)
WBC NRBC COR # BLD: 8.14 10*3/MM3 (ref 3.4–10.8)

## 2022-09-21 PROCEDURE — 84443 ASSAY THYROID STIM HORMONE: CPT

## 2022-09-21 PROCEDURE — 84439 ASSAY OF FREE THYROXINE: CPT

## 2022-09-21 PROCEDURE — 80053 COMPREHEN METABOLIC PANEL: CPT

## 2022-09-21 PROCEDURE — 85025 COMPLETE CBC W/AUTO DIFF WBC: CPT

## 2022-09-21 PROCEDURE — 36415 COLL VENOUS BLD VENIPUNCTURE: CPT | Performed by: INTERNAL MEDICINE

## 2022-09-21 PROCEDURE — 80061 LIPID PANEL: CPT

## 2022-09-22 DIAGNOSIS — G47.33 OSA (OBSTRUCTIVE SLEEP APNEA): Primary | ICD-10-CM

## 2022-10-20 DIAGNOSIS — I10 ESSENTIAL HYPERTENSION: ICD-10-CM

## 2022-10-20 RX ORDER — LISINOPRIL 10 MG/1
10 TABLET ORAL DAILY
Qty: 90 TABLET | Refills: 1 | Status: SHIPPED | OUTPATIENT
Start: 2022-10-20 | End: 2023-04-05

## 2022-10-28 ENCOUNTER — OFFICE VISIT (OUTPATIENT)
Dept: INTERNAL MEDICINE | Facility: CLINIC | Age: 52
End: 2022-10-28

## 2022-10-28 VITALS
TEMPERATURE: 99.5 F | WEIGHT: 172.6 LBS | SYSTOLIC BLOOD PRESSURE: 132 MMHG | BODY MASS INDEX: 30.58 KG/M2 | DIASTOLIC BLOOD PRESSURE: 70 MMHG | HEIGHT: 63 IN | HEART RATE: 71 BPM | OXYGEN SATURATION: 98 %

## 2022-10-28 DIAGNOSIS — R06.1 STRIDOR: ICD-10-CM

## 2022-10-28 DIAGNOSIS — R52 BODY ACHES: ICD-10-CM

## 2022-10-28 DIAGNOSIS — R50.9 FEVER, UNSPECIFIED FEVER CAUSE: Primary | ICD-10-CM

## 2022-10-28 PROBLEM — N20.0 CALCULUS OF KIDNEY: Status: ACTIVE | Noted: 2022-10-28

## 2022-10-28 PROBLEM — J30.2 SEASONAL ALLERGIES: Status: ACTIVE | Noted: 2022-10-28

## 2022-10-28 LAB
EXPIRATION DATE: NORMAL
FLUAV AG UPPER RESP QL IA.RAPID: NOT DETECTED
FLUBV AG UPPER RESP QL IA.RAPID: NOT DETECTED
INTERNAL CONTROL: NORMAL
Lab: NORMAL
SARS-COV-2 AG UPPER RESP QL IA.RAPID: NOT DETECTED

## 2022-10-28 PROCEDURE — 87428 SARSCOV & INF VIR A&B AG IA: CPT | Performed by: INTERNAL MEDICINE

## 2022-10-28 PROCEDURE — 99213 OFFICE O/P EST LOW 20 MIN: CPT | Performed by: INTERNAL MEDICINE

## 2022-10-28 RX ORDER — PREDNISONE 20 MG/1
20 TABLET ORAL 2 TIMES DAILY
Qty: 5 TABLET | Refills: 0 | Status: SHIPPED | OUTPATIENT
Start: 2022-10-28 | End: 2023-04-05

## 2022-10-28 RX ORDER — SPIRONOLACTONE 25 MG/1
25 TABLET ORAL 2 TIMES DAILY
Qty: 60 TABLET | Refills: 3 | Status: SHIPPED | OUTPATIENT
Start: 2022-10-28 | End: 2022-12-18 | Stop reason: SDUPTHER

## 2022-11-03 ENCOUNTER — TELEPHONE (OUTPATIENT)
Dept: INTERNAL MEDICINE | Facility: CLINIC | Age: 52
End: 2022-11-03

## 2022-11-11 RX ORDER — CITALOPRAM 20 MG/1
20 TABLET ORAL DAILY
Qty: 90 TABLET | Refills: 0 | Status: SHIPPED | OUTPATIENT
Start: 2022-11-11 | End: 2023-02-08

## 2022-11-14 ENCOUNTER — OFFICE VISIT (OUTPATIENT)
Dept: INTERNAL MEDICINE | Facility: CLINIC | Age: 52
End: 2022-11-14

## 2022-11-14 VITALS
TEMPERATURE: 98.3 F | BODY MASS INDEX: 30.12 KG/M2 | WEIGHT: 170 LBS | HEIGHT: 63 IN | HEART RATE: 73 BPM | DIASTOLIC BLOOD PRESSURE: 76 MMHG | OXYGEN SATURATION: 98 % | SYSTOLIC BLOOD PRESSURE: 122 MMHG

## 2022-11-14 DIAGNOSIS — R06.1 STRIDOR: Primary | ICD-10-CM

## 2022-11-14 PROCEDURE — 90472 IMMUNIZATION ADMIN EACH ADD: CPT | Performed by: NURSE PRACTITIONER

## 2022-11-14 PROCEDURE — 90750 HZV VACC RECOMBINANT IM: CPT | Performed by: NURSE PRACTITIONER

## 2022-11-14 PROCEDURE — 90686 IIV4 VACC NO PRSV 0.5 ML IM: CPT | Performed by: NURSE PRACTITIONER

## 2022-11-14 PROCEDURE — 99213 OFFICE O/P EST LOW 20 MIN: CPT | Performed by: NURSE PRACTITIONER

## 2022-11-14 PROCEDURE — 90471 IMMUNIZATION ADMIN: CPT | Performed by: NURSE PRACTITIONER

## 2022-11-14 NOTE — PROGRESS NOTES
"Chief Complaint  Wheezing (Pt states Dr. Lynn wanted her to come back for the wheezing she was having 2 weeks ago. States that she is not wheezing today, but it mostly happens after physical activity - cleaning the house, washing the car, etc.)    Subjective        Xuan Horvath presents to [facility] for History of Present Illness  Follow-up for stridor.  Patient reports that over the last 6 to 8 months she has been experiencing wheezing/stridor that she attributes to exertion/activity.  At baseline, patient does not report any significant issues with her breathing, but when she gets active she feels a wheeze or high-pitched noise in her upper airways.  Her exam thus far has been negative.  Patient was here 2 weeks ago, unfortunately she was diagnosed with COVID-19, so work-up for stridor was deferred to today.  Patient has no significant symptoms reported today.  No wheezing is present.       Objective   Vital Signs:   /76   Pulse 73   Temp 98.3 °F (36.8 °C) (Oral)   Ht 160 cm (63\")   Wt 77.1 kg (170 lb)   SpO2 98%   BMI 30.11 kg/m²     Physical Exam  Vitals and nursing note reviewed.   Constitutional:       Appearance: Normal appearance.   HENT:      Head: Normocephalic and atraumatic.      Nose: Nose normal.      Mouth/Throat:      Mouth: Mucous membranes are moist.      Pharynx: Oropharynx is clear.   Eyes:      Pupils: Pupils are equal, round, and reactive to light.   Cardiovascular:      Rate and Rhythm: Normal rate and regular rhythm.   Pulmonary:      Effort: Pulmonary effort is normal.      Breath sounds: Normal breath sounds.   Neurological:      Mental Status: She is alert.   Psychiatric:         Mood and Affect: Mood normal.        Result Review :  {The following data was reviewed by KULWINDER Devlin on 11/14/22                Diagnoses and all orders for this visit:    1. Stridor (Primary)  -     Ambulatory Referral to ENT (Otolaryngology)    Other " orders  -     FluLaval/Fluzone >6 mos (0423-5462)  -     Shingrix Vaccine    Discussed with patient's PCP, Dr. Lynn, we will refer to ENT for further evaluation and treatment options.      Follow Up   No follow-ups on file.  Patient was given instructions and counseling regarding her condition or for health maintenance advice. Please see specific information pulled into the AVS if appropriate.     KULWINDER Devlin  11/14/2022  This note was electronically signed.

## 2022-11-28 DIAGNOSIS — M25.511 BILATERAL SHOULDER PAIN, UNSPECIFIED CHRONICITY: ICD-10-CM

## 2022-11-28 DIAGNOSIS — M75.40 IMPINGEMENT SYNDROME OF SHOULDER REGION, UNSPECIFIED LATERALITY: ICD-10-CM

## 2022-11-28 DIAGNOSIS — M25.512 BILATERAL SHOULDER PAIN, UNSPECIFIED CHRONICITY: ICD-10-CM

## 2022-11-28 DIAGNOSIS — M77.8 SHOULDER TENDONITIS, UNSPECIFIED LATERALITY: ICD-10-CM

## 2022-12-18 DIAGNOSIS — I10 ESSENTIAL HYPERTENSION: Primary | ICD-10-CM

## 2022-12-18 RX ORDER — SPIRONOLACTONE 100 MG/1
100 TABLET, FILM COATED ORAL DAILY
Qty: 90 TABLET | Refills: 1 | Status: SHIPPED | OUTPATIENT
Start: 2022-12-18 | End: 2023-01-30 | Stop reason: SDUPTHER

## 2023-01-05 ENCOUNTER — OFFICE VISIT (OUTPATIENT)
Dept: OTOLARYNGOLOGY | Facility: CLINIC | Age: 53
End: 2023-01-05
Payer: OTHER GOVERNMENT

## 2023-01-05 VITALS — BODY MASS INDEX: 29.8 KG/M2 | WEIGHT: 168.2 LBS | HEIGHT: 63 IN | TEMPERATURE: 97.8 F

## 2023-01-05 DIAGNOSIS — R06.2 WHEEZING: ICD-10-CM

## 2023-01-05 DIAGNOSIS — R06.1 STRIDOR: Primary | ICD-10-CM

## 2023-01-05 PROCEDURE — 99203 OFFICE O/P NEW LOW 30 MIN: CPT | Performed by: OTOLARYNGOLOGY

## 2023-01-09 NOTE — PROGRESS NOTES
Patient Name: Xuan Horvath   Visit Date: 01/05/2023   Patient ID: [id number]  Provider: Juliocesar Goldberg MD    Sex: female  Location: [facility]   YOB: 1970  Location Address: [address] [place] [address], [address] [place] [address],?[place]?[address]    Primary Care Provider Suri Lynn MD  Location Phone: [phone]    Referring Provider: KULWINDER Devlin        Chief Complaint  Airway Obstruction (New patient )    Subjective    History of Present Illness  Xuan Horvath is a 52 y.o. female who presents to [facility] today as a consult from KULWINDER Devlin.    She presents to the clinic today for evaluation of 16-month history of wheezing.  She notes a high-pitched sound that sometimes exacerbates with activity and exercise.  She notes that it feels like tightness in her upper chest.  Denies any significant voice changes or hoarseness during these episodes.  She does note that she was vaping which caused some lung irritation issues.  She informs me that her primary care provider was listening to her neck and thought this may be laryngeal in nature.  She notes that her weight has been stable, and she overall feels well.  She has not had the wheezing symptoms for quite some time.    Past Medical History:   Diagnosis Date   • Allergy    • Anxiety    • Colon cancer screening    • Depression    • Depression 04/07/2015    working well, continue citalopram discussed that she can stay on meds long term or go off them whenever she is ready   • Essential hypertension 04/06/2016    stable will try to decrease atenolol and see if that will give her more energy, need to watch bp closely   • Hiatal hernia    • Hypertension    • Hypothyroidism    • Hypothyroidism    • Kidney stone    • Obesity 04/06/2016    she is going to work on weight by trying to go to the gym she is working on this with her son   • Sleep apnea     COMPLIANT WITH CPAP    • Vitamin D  deficiency 2015    will repeat labs continue current OTC meds for now       Past Surgical History:   Procedure Laterality Date   • CARPAL TUNNEL RELEASE Right 2005   •  SECTION  ,,2003    x3   • COLONOSCOPY N/A 2022    Procedure: COLONOSCOPY WITH POLYPECTOMY COLD SNARE;  Surgeon: Luis Sterling MD;  Location: [facility];  Service: Gastroenterology;  Laterality: N/A;  COLON POLYP   • ENDOSCOPY N/A 2020    Procedure: ESOPHAGOGASTRODUODENOSCOPY WITH BIOPSY;  Surgeon: Ron Cadet Jr., MD;  Location: [facility];  Service: General;  Laterality: N/A;  PRE- DYSPEPSIA  POST- GASTRITIS, GASTRIC POLYPS, ESOPHAGITIS, HIATAL HERNIA     • GASTRIC SLEEVE LAPAROSCOPIC N/A 2020    Procedure: GASTRIC SLEEVE LAPAROSCOPIC With Hiatal Hernia Repair;  Surgeon: Ron Cadet Jr., MD;  Location:  [facility];  Service: Bariatric;  Laterality: N/A;   • HYSTERECTOMY     • PANNICULECTOMY     • WRIST SURGERY  2005    Right hand carpal tunnel surgery         Current Outpatient Medications:   •  cetirizine (ZyrTEC Allergy) 10 MG tablet, Take 1 tablet by mouth Daily., Disp: 90 tablet, Rfl: 3  •  citalopram (CeleXA) 20 MG tablet, TAKE 1 TABLET BY MOUTH DAILY, Disp: 90 tablet, Rfl: 0  •  diclofenac (VOLTAREN) 50 MG EC tablet, TAKE 1 TABLET BY MOUTH TWICE DAILY, Disp: 60 tablet, Rfl: 1  •  fluticasone (FLONASE) 50 MCG/ACT nasal spray, 1 spray by Each Nare route Daily., Disp: 16 g, Rfl: 1  •  levothyroxine (SYNTHROID, LEVOTHROID) 150 MCG tablet, Take 1 tablet by mouth Daily., Disp: 90 tablet, Rfl: 1  •  lisinopril (PRINIVIL,ZESTRIL) 10 MG tablet, TAKE 1 TABLET BY MOUTH DAILY, Disp: 90 tablet, Rfl: 1  •  spironolactone (ALDACTONE) 100 MG tablet, Take 1 tablet by mouth Daily., Disp: 90 tablet, Rfl: 1  •  predniSONE (DELTASONE) 20 MG tablet, Take 1 tablet by mouth 2 (Two) Times a Day., Disp: 5 tablet, Rfl: 0     Allergies   Allergen Reactions   • Penicillins Anaphylaxis      unknown       Family History   Problem Relation Age of Onset   • Diabetes Mother    • Hypertension Mother    • Hypertension Father    • Malig Hyperthermia Neg Hx         Social History     Social History Narrative   • Not on file       Objective     Vital Signs:   Temp 97.8 °F (36.6 °C) (Tympanic)   Ht 160 cm (63\")   Wt 76.3 kg (168 lb 3.2 oz)   BMI 29.80 kg/m²       Physical Exam         Constitutional   Appearance  · : well developed, well-nourished, alert and in no acute distress, voice clear and strong    Head  Inspection  · : no deformities or lesions  Face  Inspection  · : No facial lesions; House-Brackmann I/VI bilaterally  Palpation  · : No TMJ crepitus nor  muscle tenderness bilaterally    Eyes  Vision  Visual Fields  · : Extraocular movements are intact. No spontaneous or gaze-induced nystagmus.  Conjunctivae  · : clear  Sclerae  · : clear  Pupils and Irises  · : pupils equal, round, and reactive to light.     Ears, Nose, Mouth and Throat    Ears    External Ears  · : appearance within normal limits, no lesions present  Otoscopic Examination  · : Tympanic membrane appearance within normal limits bilaterally without perforations, well-aerated middle ears  Hearing  · : intact to conversational voice both ears  Tunning fork testing:     :    Nose    External Nose  · : appearance normal  Intranasal Exam  · : mucosa within normal limits, vestibules normal, no intranasal lesions present, septum midline, sinuses non tender to percussion  Oral Cavity    Oral Mucosa  · : oral mucosa normal without pallor or cyanosis  Lips  · : lip appearance normal  Teeth  · : normal dentition for age  Gums  · : gums pink, non-swollen, no bleeding present  Tongue  · : tongue appearance normal; normal mobility  Palate  · : hard palate normal, soft palate appearance normal with symmetric mobility    Throat    Oropharynx  · : no inflammation or lesions present, tonsils within normal limits  Hypopharynx  · : appearance  within normal limits, superior epiglottis within normal limits  Larynx  · : appearance within normal limits, vocal cords within normal limits, no lesions present    Neck  Inspection/Palpation  · : normal appearance, no masses or tenderness, trachea midline; thyroid size normal, nontender, no nodules or masses present on palpation    Respiratory  Respiratory Effort  · : breathing unlabored  Inspection of Chest  · : normal appearance, no retractions    Cardiovascular  Heart  · : regular rate and rhythm    Lymphatic  Neck  · : no lymphadenopathy present  Supraclavicular Nodes  · : no lymphadenopathy present  Preauricular Nodes  · : no lymphadenopathy present    Skin and Subcutaneous Tissue  General Inspection  · : Regarding face and neck - there are no rashes present, no lesions present, and no areas of discoloration    Neurologic  Cranial Nerves  · : cranial nerves II-XII are grossly intact bilaterally  Gait and Station  · : normal gait, able to stand without diffculty    Psychiatric  Judgement and Insight  · : judgment and insight intact  Mood and Affect  · : mood normal, affect appropriate          Assessment and Plan    Diagnoses and all orders for this visit:    1. Stridor (Primary)    2. Wheezing    Examination today revealed her voice to be clear and strong.  There was no noisy breathing or stridor.  We discussed stridor at length today and I recommended that if anything changes or worsens we can perform nasolaryngoscopy, but I am not sure this is necessary at this point as her description sounds like wheezing and possibly bronchitis versus asthma as her voice has not changed and she had no difficulty breathing during these episodes.  What ever the case may be, I will be glad to see her back for further evaluation should she continue to have issues with this.    Follow Up   No follow-ups on file.  Patient was given instructions and counseling regarding her condition or for health maintenance advice. Please see  specific information pulled into the AVS if appropriate.

## 2023-01-17 ENCOUNTER — IMMUNIZATION (OUTPATIENT)
Dept: INTERNAL MEDICINE | Facility: CLINIC | Age: 53
End: 2023-01-17
Payer: OTHER GOVERNMENT

## 2023-01-17 DIAGNOSIS — I10 ESSENTIAL HYPERTENSION: ICD-10-CM

## 2023-01-17 LAB
ANION GAP SERPL CALCULATED.3IONS-SCNC: 8 MMOL/L (ref 5–15)
BUN SERPL-MCNC: 17 MG/DL (ref 6–20)
BUN/CREAT SERPL: 25 (ref 7–25)
CALCIUM SPEC-SCNC: 10.2 MG/DL (ref 8.6–10.5)
CHLORIDE SERPL-SCNC: 102 MMOL/L (ref 98–107)
CO2 SERPL-SCNC: 27 MMOL/L (ref 22–29)
CREAT SERPL-MCNC: 0.68 MG/DL (ref 0.57–1)
EGFRCR SERPLBLD CKD-EPI 2021: 104.9 ML/MIN/1.73
GLUCOSE SERPL-MCNC: 99 MG/DL (ref 65–99)
POTASSIUM SERPL-SCNC: 4.4 MMOL/L (ref 3.5–5.2)
SODIUM SERPL-SCNC: 137 MMOL/L (ref 136–145)

## 2023-01-17 PROCEDURE — 0124A COVID-19 (PFIZER) BIVALENT BOOSTER 12+YRS: CPT | Performed by: INTERNAL MEDICINE

## 2023-01-17 PROCEDURE — 91312 COVID-19 (PFIZER) BIVALENT BOOSTER 12+YRS: CPT | Performed by: INTERNAL MEDICINE

## 2023-01-17 PROCEDURE — 80048 BASIC METABOLIC PNL TOTAL CA: CPT | Performed by: INTERNAL MEDICINE

## 2023-01-26 DIAGNOSIS — M25.512 BILATERAL SHOULDER PAIN, UNSPECIFIED CHRONICITY: ICD-10-CM

## 2023-01-26 DIAGNOSIS — M25.511 BILATERAL SHOULDER PAIN, UNSPECIFIED CHRONICITY: ICD-10-CM

## 2023-01-26 DIAGNOSIS — M77.8 SHOULDER TENDONITIS, UNSPECIFIED LATERALITY: ICD-10-CM

## 2023-01-26 DIAGNOSIS — M75.40 IMPINGEMENT SYNDROME OF SHOULDER REGION, UNSPECIFIED LATERALITY: ICD-10-CM

## 2023-02-03 RX ORDER — SPIRONOLACTONE 100 MG/1
100 TABLET, FILM COATED ORAL DAILY
Qty: 90 TABLET | Refills: 1 | Status: SHIPPED | OUTPATIENT
Start: 2023-02-03 | End: 2023-04-05 | Stop reason: SDUPTHER

## 2023-02-08 RX ORDER — CITALOPRAM 20 MG/1
20 TABLET ORAL DAILY
Qty: 90 TABLET | Refills: 0 | Status: SHIPPED | OUTPATIENT
Start: 2023-02-08 | End: 2023-02-13

## 2023-02-13 RX ORDER — CITALOPRAM 20 MG/1
20 TABLET ORAL DAILY
Qty: 90 TABLET | Refills: 0 | Status: SHIPPED | OUTPATIENT
Start: 2023-02-13

## 2023-03-09 RX ORDER — LEVOTHYROXINE SODIUM 0.15 MG/1
137 TABLET ORAL DAILY
Qty: 90 TABLET | Refills: 1 | Status: SHIPPED | OUTPATIENT
Start: 2023-03-09

## 2023-04-05 ENCOUNTER — OFFICE VISIT (OUTPATIENT)
Dept: INTERNAL MEDICINE | Facility: CLINIC | Age: 53
End: 2023-04-05
Payer: OTHER GOVERNMENT

## 2023-04-05 VITALS
HEIGHT: 63 IN | OXYGEN SATURATION: 99 % | HEART RATE: 91 BPM | DIASTOLIC BLOOD PRESSURE: 74 MMHG | TEMPERATURE: 97.7 F | BODY MASS INDEX: 30.55 KG/M2 | SYSTOLIC BLOOD PRESSURE: 112 MMHG | WEIGHT: 172.4 LBS

## 2023-04-05 DIAGNOSIS — L65.9 HAIR LOSS: ICD-10-CM

## 2023-04-05 DIAGNOSIS — Z00.00 ANNUAL PHYSICAL EXAM: Primary | ICD-10-CM

## 2023-04-05 DIAGNOSIS — E03.8 HYPOTHYROIDISM DUE TO HASHIMOTO'S THYROIDITIS: ICD-10-CM

## 2023-04-05 DIAGNOSIS — I10 ESSENTIAL HYPERTENSION: ICD-10-CM

## 2023-04-05 DIAGNOSIS — E06.3 HYPOTHYROIDISM DUE TO HASHIMOTO'S THYROIDITIS: ICD-10-CM

## 2023-04-05 LAB
ALBUMIN SERPL-MCNC: 4.6 G/DL (ref 3.5–5.2)
ALBUMIN/GLOB SERPL: 2 G/DL
ALP SERPL-CCNC: 73 U/L (ref 39–117)
ALT SERPL W P-5'-P-CCNC: 21 U/L (ref 1–33)
ANION GAP SERPL CALCULATED.3IONS-SCNC: 8.8 MMOL/L (ref 5–15)
AST SERPL-CCNC: 18 U/L (ref 1–32)
BASOPHILS # BLD AUTO: 0.06 10*3/MM3 (ref 0–0.2)
BASOPHILS NFR BLD AUTO: 1 % (ref 0–1.5)
BILIRUB SERPL-MCNC: <0.2 MG/DL (ref 0–1.2)
BUN SERPL-MCNC: 15 MG/DL (ref 6–20)
BUN/CREAT SERPL: 20.3 (ref 7–25)
CALCIUM SPEC-SCNC: 10.5 MG/DL (ref 8.6–10.5)
CHLORIDE SERPL-SCNC: 103 MMOL/L (ref 98–107)
CHOLEST SERPL-MCNC: 227 MG/DL (ref 0–200)
CO2 SERPL-SCNC: 28.2 MMOL/L (ref 22–29)
CREAT SERPL-MCNC: 0.74 MG/DL (ref 0.57–1)
DEPRECATED RDW RBC AUTO: 44.6 FL (ref 37–54)
EGFRCR SERPLBLD CKD-EPI 2021: 97.5 ML/MIN/1.73
EOSINOPHIL # BLD AUTO: 0.13 10*3/MM3 (ref 0–0.4)
EOSINOPHIL NFR BLD AUTO: 2.1 % (ref 0.3–6.2)
ERYTHROCYTE [DISTWIDTH] IN BLOOD BY AUTOMATED COUNT: 12.3 % (ref 12.3–15.4)
GLOBULIN UR ELPH-MCNC: 2.3 GM/DL
GLUCOSE SERPL-MCNC: 107 MG/DL (ref 65–99)
HCT VFR BLD AUTO: 40.7 % (ref 34–46.6)
HDLC SERPL-MCNC: 68 MG/DL (ref 40–60)
HGB BLD-MCNC: 13.5 G/DL (ref 12–15.9)
IMM GRANULOCYTES # BLD AUTO: 0.01 10*3/MM3 (ref 0–0.05)
IMM GRANULOCYTES NFR BLD AUTO: 0.2 % (ref 0–0.5)
LDLC SERPL CALC-MCNC: 117 MG/DL (ref 0–100)
LDLC/HDLC SERPL: 1.63 {RATIO}
LYMPHOCYTES # BLD AUTO: 2.28 10*3/MM3 (ref 0.7–3.1)
LYMPHOCYTES NFR BLD AUTO: 37.2 % (ref 19.6–45.3)
MCH RBC QN AUTO: 32.5 PG (ref 26.6–33)
MCHC RBC AUTO-ENTMCNC: 33.2 G/DL (ref 31.5–35.7)
MCV RBC AUTO: 97.8 FL (ref 79–97)
MONOCYTES # BLD AUTO: 0.54 10*3/MM3 (ref 0.1–0.9)
MONOCYTES NFR BLD AUTO: 8.8 % (ref 5–12)
NEUTROPHILS NFR BLD AUTO: 3.11 10*3/MM3 (ref 1.7–7)
NEUTROPHILS NFR BLD AUTO: 50.7 % (ref 42.7–76)
NRBC BLD AUTO-RTO: 0 /100 WBC (ref 0–0.2)
PLATELET # BLD AUTO: 282 10*3/MM3 (ref 140–450)
PMV BLD AUTO: 11.4 FL (ref 6–12)
POTASSIUM SERPL-SCNC: 4.6 MMOL/L (ref 3.5–5.2)
PROT SERPL-MCNC: 6.9 G/DL (ref 6–8.5)
RBC # BLD AUTO: 4.16 10*6/MM3 (ref 3.77–5.28)
SODIUM SERPL-SCNC: 140 MMOL/L (ref 136–145)
T4 FREE SERPL-MCNC: 1.51 NG/DL (ref 0.93–1.7)
TRIGL SERPL-MCNC: 242 MG/DL (ref 0–150)
TSH SERPL DL<=0.05 MIU/L-ACNC: 0.32 UIU/ML (ref 0.27–4.2)
VLDLC SERPL-MCNC: 42 MG/DL (ref 5–40)
WBC NRBC COR # BLD: 6.13 10*3/MM3 (ref 3.4–10.8)

## 2023-04-05 PROCEDURE — 84443 ASSAY THYROID STIM HORMONE: CPT | Performed by: INTERNAL MEDICINE

## 2023-04-05 PROCEDURE — 80053 COMPREHEN METABOLIC PANEL: CPT | Performed by: INTERNAL MEDICINE

## 2023-04-05 PROCEDURE — 80061 LIPID PANEL: CPT | Performed by: INTERNAL MEDICINE

## 2023-04-05 PROCEDURE — 85025 COMPLETE CBC W/AUTO DIFF WBC: CPT | Performed by: INTERNAL MEDICINE

## 2023-04-05 PROCEDURE — 84439 ASSAY OF FREE THYROXINE: CPT | Performed by: INTERNAL MEDICINE

## 2023-04-05 RX ORDER — SPIRONOLACTONE 100 MG/1
100 TABLET, FILM COATED ORAL 2 TIMES DAILY
Qty: 180 TABLET | Refills: 1 | Status: SHIPPED | OUTPATIENT
Start: 2023-04-05

## 2023-04-05 RX ORDER — PANTOPRAZOLE SODIUM 40 MG/1
40 TABLET, DELAYED RELEASE ORAL DAILY
Qty: 90 TABLET | Refills: 1 | Status: SHIPPED | OUTPATIENT
Start: 2023-04-05

## 2023-04-05 NOTE — PROGRESS NOTES
"Chief Complaint  Med Refill (Pt here for med refills pt would also like to discuss different dosing for Sprinolactone)    Subjective          Xuan Horvath presents to [facility]  History of Present Illness     Has been running regularly for over a year  Has been using a treadmill work out with ifit    States that her surgery went very well  No issues or problems    Due for shingles vaccination     GERD-  Has been taking prilosec but still waking up from sleep due to acid reflux  Taking tums    She would like to try spironolactone to help with her hair     bp doing well     celexa working well doesn't want to make any changes    Stridor is better  Reviewed note from Dr. Goldberg    Reviewed labs    Objective   Vital Signs:   /74 (BP Location: Left arm, Patient Position: Sitting, Cuff Size: Adult)   Pulse 91   Temp 97.7 °F (36.5 °C) (Temporal)   Ht 160 cm (63\")   Wt 78.2 kg (172 lb 6.4 oz)   SpO2 99%   BMI 30.54 kg/m²     Physical Exam  Vitals reviewed.   Constitutional:       Appearance: Normal appearance. She is well-developed.   HENT:      Head: Normocephalic and atraumatic.      Right Ear: External ear normal.      Left Ear: External ear normal.   Eyes:      Conjunctiva/sclera: Conjunctivae normal.      Pupils: Pupils are equal, round, and reactive to light.   Cardiovascular:      Rate and Rhythm: Normal rate and regular rhythm.      Heart sounds: No murmur heard.    No friction rub. No gallop.   Pulmonary:      Effort: Pulmonary effort is normal.      Breath sounds: Normal breath sounds. No wheezing or rhonchi.   Skin:     General: Skin is warm and dry.   Neurological:      Mental Status: She is alert and oriented to person, place, and time.   Psychiatric:         Mood and Affect: Affect normal.         Behavior: Behavior normal.         Thought Content: Thought content normal.        Result Review :       Common labs    Common Labs 9/21/22 " 9/21/22 9/21/22 1/17/23 2/6/23    0813 0813 0813     Glucose  87  99    BUN  9  17    Creatinine  0.63  0.68    Sodium  142  137    Potassium  4.1  4.4 4.4   Chloride  104  102    Calcium  9.5  10.2    Albumin  4.10      Total Bilirubin  0.4      Alkaline Phosphatase  69      AST (SGOT)  16      ALT (SGPT)  16      WBC 8.14       Hemoglobin 13.3       Hematocrit 39.5       Platelets 218       Total Cholesterol   205 (A)     Triglycerides   106     HDL Cholesterol   76 (A)     LDL Cholesterol    110 (A)     (A) Abnormal value              Results for orders placed or performed in visit on 01/17/23   Basic Metabolic Panel    Specimen: Blood   Result Value Ref Range    Glucose 99 65 - 99 mg/dL    BUN 17 6 - 20 mg/dL    Creatinine 0.68 0.57 - 1.00 mg/dL    Sodium 137 136 - 145 mmol/L    Potassium 4.4 3.5 - 5.2 mmol/L    Chloride 102 98 - 107 mmol/L    CO2 27.0 22.0 - 29.0 mmol/L    Calcium 10.2 8.6 - 10.5 mg/dL    BUN/Creatinine Ratio 25.0 7.0 - 25.0    Anion Gap 8.0 5.0 - 15.0 mmol/L    eGFR 104.9 >60.0 mL/min/1.73            Procedures        Assessment and Plan    Diagnoses and all orders for this visit:    1. Annual physical exam (Primary)  Comments:  counseled on cont diet and exercise    2. Essential hypertension  Comments:  will stop lisinopirl and increase spironolactone for brittaney discussed side effects  Orders:  -     Comprehensive Metabolic Panel  -     CBC & Differential  -     TSH  -     Lipid Panel  -     T4, Free    3. Hypothyroidism due to Hashimoto's thyroiditis  Comments:  labs and adjust as needed  Orders:  -     TSH  -     T4, Free    4. Hair loss  Comments:  spironolactone helped with excess facial hair an dincreased regular hair growth    Other orders  -     Shingrix Vaccine  -     pantoprazole (Protonix) 40 MG EC tablet; Take 1 tablet by mouth Daily.  Dispense: 90 tablet; Refill: 1  -     spironolactone (ALDACTONE) 100 MG tablet; Take 1 tablet by mouth 2 (Two) Times a Day.  Dispense: 180 tablet;  Refill: 1        {BMI is >= 30 and <35. (Class 1 Obesity). The following options were offered after discussion;: exercise counseling/recommendations and nutrition counseling/recommendations            Follow Up   Return in about 6 months (around 10/5/2023).  Patient was given instructions and counseling regarding her condition or for health maintenance advice. Please see specific information pulled into the AVS if appropriate.

## 2023-05-02 ENCOUNTER — PATIENT MESSAGE (OUTPATIENT)
Dept: INTERNAL MEDICINE | Facility: CLINIC | Age: 53
End: 2023-05-02
Payer: OTHER GOVERNMENT

## 2023-05-02 DIAGNOSIS — K21.9 GASTROESOPHAGEAL REFLUX DISEASE, UNSPECIFIED WHETHER ESOPHAGITIS PRESENT: Primary | ICD-10-CM

## 2023-05-02 RX ORDER — SEMAGLUTIDE 0.25 MG/.5ML
0.25 INJECTION, SOLUTION SUBCUTANEOUS WEEKLY
Qty: 2.5 ML | Refills: 1 | Status: SHIPPED | OUTPATIENT
Start: 2023-05-02

## 2023-05-04 NOTE — TELEPHONE ENCOUNTER
Yes you can, however I would recommend twice a day and getting in with GI to consider an upper endoscopy scope.  Is that ok?

## 2023-05-09 RX ORDER — PANTOPRAZOLE SODIUM 40 MG/1
40 TABLET, DELAYED RELEASE ORAL 2 TIMES DAILY
Qty: 180 TABLET | Refills: 1 | Status: SHIPPED | OUTPATIENT
Start: 2023-05-09

## 2023-05-22 ENCOUNTER — TELEPHONE (OUTPATIENT)
Dept: INTERNAL MEDICINE | Facility: CLINIC | Age: 53
End: 2023-05-22

## 2023-05-22 ENCOUNTER — PATIENT MESSAGE (OUTPATIENT)
Dept: INTERNAL MEDICINE | Facility: CLINIC | Age: 53
End: 2023-05-22
Payer: OTHER GOVERNMENT

## 2023-05-22 ENCOUNTER — CLINICAL SUPPORT (OUTPATIENT)
Dept: INTERNAL MEDICINE | Facility: CLINIC | Age: 53
End: 2023-05-22
Payer: OTHER GOVERNMENT

## 2023-05-22 DIAGNOSIS — I10 ESSENTIAL HYPERTENSION: ICD-10-CM

## 2023-05-22 LAB
ANION GAP SERPL CALCULATED.3IONS-SCNC: 10.9 MMOL/L (ref 5–15)
BUN SERPL-MCNC: 19 MG/DL (ref 6–20)
BUN/CREAT SERPL: 24.7 (ref 7–25)
CALCIUM SPEC-SCNC: 10 MG/DL (ref 8.6–10.5)
CHLORIDE SERPL-SCNC: 105 MMOL/L (ref 98–107)
CO2 SERPL-SCNC: 24.1 MMOL/L (ref 22–29)
CREAT SERPL-MCNC: 0.77 MG/DL (ref 0.57–1)
EGFRCR SERPLBLD CKD-EPI 2021: 92.4 ML/MIN/1.73
GLUCOSE SERPL-MCNC: 103 MG/DL (ref 65–99)
POTASSIUM SERPL-SCNC: 4.1 MMOL/L (ref 3.5–5.2)
SODIUM SERPL-SCNC: 140 MMOL/L (ref 136–145)

## 2023-05-22 PROCEDURE — 80048 BASIC METABOLIC PNL TOTAL CA: CPT | Performed by: INTERNAL MEDICINE

## 2023-05-22 NOTE — TELEPHONE ENCOUNTER
Caller: Xuan Horvath    Relationship: Self    Best call back number: [phone]    What is the best time to reach you: ANY    Who are you requesting to speak with (clinical staff, provider,  specific staff member): CLINICAL STAFF    What was the call regarding: PATIENT REQUIRES A PRIOR AUTHORIZATION AND MEDICAL NECESSITY FORM FOR HER WEGOVY O.25.    PATIENT PROVIDED A NUMBER FOR THE OFFICE TO CALL TO PROCESS THE PRIOR AUTHORIZATION.    PHONE: [phone]    Do you require a callback: YES

## 2023-06-12 NOTE — TELEPHONE ENCOUNTER
From: Xuan Horvath  To: Suri Sydneyalexis Lynn  Sent: 5/22/2023 10:46 AM EDT  Subject: Wegovy     [facility] hasn’t received prior authorization form or medical necessity form. I called them and they said to call [phone]. What dose is she calling in? 0.25 mg?

## 2023-06-19 ENCOUNTER — PRIOR AUTHORIZATION (OUTPATIENT)
Dept: INTERNAL MEDICINE | Facility: CLINIC | Age: 53
End: 2023-06-19
Payer: OTHER GOVERNMENT

## 2023-06-19 NOTE — TELEPHONE ENCOUNTER
Pa started for contrave via cover [employer]    CaseId:[id number];Status:Approved;Review Type:Prior Auth;Coverage Start Date:05/20/2023;Coverage End Date:10/17/2023;

## 2023-06-26 PROBLEM — K21.9 GASTROESOPHAGEAL REFLUX DISEASE: Status: ACTIVE | Noted: 2023-06-26

## 2023-07-20 ENCOUNTER — PATIENT MESSAGE (OUTPATIENT)
Dept: INTERNAL MEDICINE | Facility: CLINIC | Age: 53
End: 2023-07-20
Payer: OTHER GOVERNMENT

## 2023-07-20 DIAGNOSIS — L68.0 HIRSUTISM: Primary | ICD-10-CM

## 2023-07-23 ENCOUNTER — PATIENT MESSAGE (OUTPATIENT)
Dept: INTERNAL MEDICINE | Facility: CLINIC | Age: 53
End: 2023-07-23
Payer: OTHER GOVERNMENT

## 2023-07-24 NOTE — TELEPHONE ENCOUNTER
From: Xuan Horvath  To: Suri Lynn  Sent: 7/20/2023 12:08 PM EDT  Subject: Testosterone     I would like my testosterone tested because of my excessive facial hair.

## 2023-07-24 NOTE — TELEPHONE ENCOUNTER
From: Xuan Horvath  To: Suri Sydney Leah  Sent: 7/23/2023 11:19 AM EDT  Subject: Contrave    I’m stopping contrave because it kept me constipated for 5 days. I took stool softeners they didn’t work. Then I took a suppository, it finally worked. The pill gave me insomnia, nausea, and the shakes.

## 2023-07-31 RX ORDER — FLUTICASONE PROPIONATE 50 MCG
1 SPRAY, SUSPENSION (ML) NASAL DAILY
Qty: 16 G | Refills: 1 | Status: SHIPPED | OUTPATIENT
Start: 2023-07-31

## 2023-08-02 ENCOUNTER — PATIENT MESSAGE (OUTPATIENT)
Dept: INTERNAL MEDICINE | Facility: CLINIC | Age: 53
End: 2023-08-02
Payer: OTHER GOVERNMENT

## 2023-08-07 RX ORDER — CETIRIZINE HYDROCHLORIDE 10 MG/1
TABLET ORAL
Qty: 90 TABLET | Refills: 3 | Status: SHIPPED | OUTPATIENT
Start: 2023-08-07 | End: 2023-08-08 | Stop reason: SDUPTHER

## 2023-08-08 RX ORDER — LISINOPRIL 10 MG/1
10 TABLET ORAL DAILY
Qty: 90 TABLET | Refills: 1 | Status: SHIPPED | OUTPATIENT
Start: 2023-08-08

## 2023-08-08 RX ORDER — CETIRIZINE HYDROCHLORIDE 10 MG/1
10 TABLET ORAL DAILY
Qty: 90 TABLET | Refills: 3 | Status: SHIPPED | OUTPATIENT
Start: 2023-08-08

## 2023-08-08 NOTE — TELEPHONE ENCOUNTER
From: Xuan Horvath  To: Suri Lynn  Sent: 8/2/2023 11:53 AM EDT  Subject: Lisinipril 10mg    I need lisinipril 10 mg called into [facility] pharmacy. I stopped taking spironolactone for facial hair because it's not working.

## 2023-08-17 ENCOUNTER — CLINICAL SUPPORT (OUTPATIENT)
Dept: INTERNAL MEDICINE | Facility: CLINIC | Age: 53
End: 2023-08-17
Payer: OTHER GOVERNMENT

## 2023-08-17 DIAGNOSIS — I10 ESSENTIAL HYPERTENSION: ICD-10-CM

## 2023-08-17 DIAGNOSIS — L68.0 HIRSUTISM: ICD-10-CM

## 2023-08-17 LAB
ANION GAP SERPL CALCULATED.3IONS-SCNC: 11.5 MMOL/L (ref 5–15)
BUN SERPL-MCNC: 11 MG/DL (ref 6–20)
BUN/CREAT SERPL: 15.9 (ref 7–25)
CALCIUM SPEC-SCNC: 9.5 MG/DL (ref 8.6–10.5)
CHLORIDE SERPL-SCNC: 104 MMOL/L (ref 98–107)
CO2 SERPL-SCNC: 26.5 MMOL/L (ref 22–29)
CREAT SERPL-MCNC: 0.69 MG/DL (ref 0.57–1)
EGFRCR SERPLBLD CKD-EPI 2021: 103.9 ML/MIN/1.73
FSH SERPL-ACNC: 5.93 MIU/ML
GLUCOSE SERPL-MCNC: 87 MG/DL (ref 65–99)
LH SERPL-ACNC: 6.35 MIU/ML
POTASSIUM SERPL-SCNC: 4.2 MMOL/L (ref 3.5–5.2)
PROGEST SERPL-MCNC: 8.15 NG/ML
SODIUM SERPL-SCNC: 142 MMOL/L (ref 136–145)

## 2023-08-17 PROCEDURE — 82672 ASSAY OF ESTROGEN: CPT | Performed by: INTERNAL MEDICINE

## 2023-08-17 PROCEDURE — 83002 ASSAY OF GONADOTROPIN (LH): CPT | Performed by: INTERNAL MEDICINE

## 2023-08-17 PROCEDURE — 84403 ASSAY OF TOTAL TESTOSTERONE: CPT | Performed by: INTERNAL MEDICINE

## 2023-08-17 PROCEDURE — 80048 BASIC METABOLIC PNL TOTAL CA: CPT | Performed by: INTERNAL MEDICINE

## 2023-08-17 PROCEDURE — 84144 ASSAY OF PROGESTERONE: CPT | Performed by: INTERNAL MEDICINE

## 2023-08-17 PROCEDURE — 36415 COLL VENOUS BLD VENIPUNCTURE: CPT | Performed by: INTERNAL MEDICINE

## 2023-08-17 PROCEDURE — 83001 ASSAY OF GONADOTROPIN (FSH): CPT | Performed by: INTERNAL MEDICINE

## 2023-08-17 PROCEDURE — 84402 ASSAY OF FREE TESTOSTERONE: CPT | Performed by: INTERNAL MEDICINE

## 2023-08-17 NOTE — PROGRESS NOTES
Venipuncture Blood Specimen Collection  Venipuncture performed in [facility] by Olesya Garcia RN with good hemostasis. Patient tolerated the procedure well without complications.   08/17/23   Olesya Garcia RN

## 2023-08-18 RX ORDER — CITALOPRAM 20 MG/1
20 TABLET ORAL DAILY
Qty: 90 TABLET | Refills: 0 | Status: SHIPPED | OUTPATIENT
Start: 2023-08-18

## 2023-08-22 RX ORDER — CITALOPRAM 20 MG/1
20 TABLET ORAL DAILY
Qty: 90 TABLET | Refills: 0 | Status: SHIPPED | OUTPATIENT
Start: 2023-08-22

## 2023-08-24 LAB
TESTOST FREE SERPL-MCNC: 0.3 PG/ML (ref 0–4.2)
TESTOST SERPL-MCNC: <3 NG/DL (ref 4–50)

## 2023-08-25 LAB — ESTROGEN SERPL-MCNC: 208 PG/ML

## 2023-08-29 RX ORDER — LEVOTHYROXINE SODIUM 0.15 MG/1
137 TABLET ORAL DAILY
Qty: 90 TABLET | Refills: 1 | Status: SHIPPED | OUTPATIENT
Start: 2023-08-29

## 2023-08-30 NOTE — PRE-PROCEDURE INSTRUCTIONS
"Instructed on date and arrival time of 0800. Come to entrance \"C\".  Must have  over age 18 to drive home.  May have two visitors; however, children under 12 must stay in waiting room.  Discussed diet/NPO.  May take medications as usual except for blood thinners, diabetic medications, or weight loss medications.  Bring list of medications to hospital.  Verbalized understanding of instructions given.  Instructed to call for questions or concerns.  "

## 2023-09-06 ENCOUNTER — ANESTHESIA EVENT (OUTPATIENT)
Dept: GASTROENTEROLOGY | Facility: HOSPITAL | Age: 53
End: 2023-09-06
Payer: OTHER GOVERNMENT

## 2023-09-06 NOTE — ANESTHESIA PREPROCEDURE EVALUATION
Anesthesia Evaluation     NPO Solid Status: > 8 hours  NPO Liquid Status: > 2 hours           Airway   Mallampati: III  Dental - normal exam     Comment: Will remove invisalign    Pulmonary     breath sounds clear to auscultation  (+) ,sleep apnea on CPAP  Cardiovascular     Rhythm: regular  Rate: normal    (+) hypertension      Neuro/Psych  (+) psychiatric history Anxiety and Depression  GI/Hepatic/Renal/Endo    (+) obesity, hiatal hernia, GERD, renal disease stones, thyroid problem hypothyroidism    ROS Comment: Gastric sleeve    Musculoskeletal     Abdominal    Substance History      OB/GYN          Other                      Anesthesia Plan    ASA 3     general   total IV anesthesia  intravenous induction     Anesthetic plan, risks, benefits, and alternatives have been provided, discussed and informed consent has been obtained with: patient and spouse/significant other.    Plan discussed with CRNA.    CODE STATUS:

## 2023-09-07 ENCOUNTER — HOSPITAL ENCOUNTER (OUTPATIENT)
Facility: HOSPITAL | Age: 53
Setting detail: HOSPITAL OUTPATIENT SURGERY
Discharge: HOME | End: 2023-09-07
Attending: INTERNAL MEDICINE | Admitting: INTERNAL MEDICINE
Payer: OTHER GOVERNMENT

## 2023-09-07 ENCOUNTER — ANESTHESIA (OUTPATIENT)
Dept: GASTROENTEROLOGY | Facility: HOSPITAL | Age: 53
End: 2023-09-07
Payer: OTHER GOVERNMENT

## 2023-09-07 VITALS
RESPIRATION RATE: 17 BRPM | HEIGHT: 63 IN | DIASTOLIC BLOOD PRESSURE: 89 MMHG | BODY MASS INDEX: 31.76 KG/M2 | TEMPERATURE: 97.3 F | OXYGEN SATURATION: 97 % | SYSTOLIC BLOOD PRESSURE: 137 MMHG | HEART RATE: 61 BPM | WEIGHT: 179.23 LBS

## 2023-09-07 DIAGNOSIS — K21.9 GASTROESOPHAGEAL REFLUX DISEASE, UNSPECIFIED WHETHER ESOPHAGITIS PRESENT: ICD-10-CM

## 2023-09-07 PROCEDURE — 88305 TISSUE EXAM BY PATHOLOGIST: CPT | Performed by: INTERNAL MEDICINE

## 2023-09-07 PROCEDURE — 25010000002 PROPOFOL 10 MG/ML EMULSION: Performed by: NURSE ANESTHETIST, CERTIFIED REGISTERED

## 2023-09-07 PROCEDURE — 43239 EGD BIOPSY SINGLE/MULTIPLE: CPT | Performed by: INTERNAL MEDICINE

## 2023-09-07 RX ORDER — ESOMEPRAZOLE MAGNESIUM 40 MG/1
40 CAPSULE, DELAYED RELEASE ORAL DAILY
Qty: 30 CAPSULE | Refills: 5 | Status: SHIPPED | OUTPATIENT
Start: 2023-09-07

## 2023-09-07 RX ORDER — SODIUM CHLORIDE, SODIUM LACTATE, POTASSIUM CHLORIDE, CALCIUM CHLORIDE 600; 310; 30; 20 MG/100ML; MG/100ML; MG/100ML; MG/100ML
30 INJECTION, SOLUTION INTRAVENOUS CONTINUOUS
Status: DISCONTINUED | OUTPATIENT
Start: 2023-09-07 | End: 2023-09-07 | Stop reason: HOSPADM

## 2023-09-07 RX ORDER — PROPOFOL 10 MG/ML
VIAL (ML) INTRAVENOUS AS NEEDED
Status: DISCONTINUED | OUTPATIENT
Start: 2023-09-07 | End: 2023-09-07 | Stop reason: SURG

## 2023-09-07 RX ORDER — LIDOCAINE HYDROCHLORIDE 20 MG/ML
INJECTION, SOLUTION EPIDURAL; INFILTRATION; INTRACAUDAL; PERINEURAL AS NEEDED
Status: DISCONTINUED | OUTPATIENT
Start: 2023-09-07 | End: 2023-09-07 | Stop reason: SURG

## 2023-09-07 RX ORDER — SODIUM CHLORIDE, SODIUM LACTATE, POTASSIUM CHLORIDE, CALCIUM CHLORIDE 600; 310; 30; 20 MG/100ML; MG/100ML; MG/100ML; MG/100ML
INJECTION, SOLUTION INTRAVENOUS CONTINUOUS PRN
Status: DISCONTINUED | OUTPATIENT
Start: 2023-09-07 | End: 2023-09-07 | Stop reason: SURG

## 2023-09-07 RX ADMIN — SODIUM CHLORIDE, POTASSIUM CHLORIDE, SODIUM LACTATE AND CALCIUM CHLORIDE 30 ML/HR: 600; 310; 30; 20 INJECTION, SOLUTION INTRAVENOUS at 09:56

## 2023-09-07 RX ADMIN — SODIUM CHLORIDE, POTASSIUM CHLORIDE, SODIUM LACTATE AND CALCIUM CHLORIDE: 600; 310; 30; 20 INJECTION, SOLUTION INTRAVENOUS at 11:52

## 2023-09-07 RX ADMIN — LIDOCAINE HYDROCHLORIDE 80 MG: 20 INJECTION, SOLUTION EPIDURAL; INFILTRATION; INTRACAUDAL; PERINEURAL at 11:55

## 2023-09-07 RX ADMIN — PROPOFOL 175 MCG/KG/MIN: 10 INJECTION, EMULSION INTRAVENOUS at 11:55

## 2023-09-07 RX ADMIN — PROPOFOL 100 MG: 10 INJECTION, EMULSION INTRAVENOUS at 11:55

## 2023-09-07 RX ADMIN — PROPOFOL 30 MG: 10 INJECTION, EMULSION INTRAVENOUS at 11:57

## 2023-09-07 NOTE — H&P
Pre Procedure History & Physical    Chief Complaint:   gerd    Subjective     HPI:   gerd    Past Medical History:   Past Medical History:   Diagnosis Date    Allergy     Anxiety     Colon cancer screening     Depression     Depression 2015    working well, continue citalopram discussed that she can stay on meds long term or go off them whenever she is ready    Essential hypertension 2016    stable will try to decrease atenolol and see if that will give her more energy, need to watch bp closely    Hiatal hernia     Hypertension     Hypothyroidism     Kidney stone     Obesity 2016    she is going to work on weight by trying to go to the gym she is working on this with her son    Sleep apnea     COMPLIANT WITH CPAP     Vitamin D deficiency 2015    will repeat labs continue current OTC meds for now       Past Surgical History:  Past Surgical History:   Procedure Laterality Date    BREAST AUGMENTATION Bilateral 2023    breast implants    CARPAL TUNNEL RELEASE Right 2005     SECTION  ,,2003    x3    COLONOSCOPY N/A 2022    Procedure: COLONOSCOPY WITH POLYPECTOMY COLD SNARE;  Surgeon: Luis Sterling MD;  Location: [facility] ENDOSCOPY;  Service: Gastroenterology;  Laterality: N/A;  COLON POLYP    ENDOSCOPY N/A 2020    Procedure: ESOPHAGOGASTRODUODENOSCOPY WITH BIOPSY;  Surgeon: Ron Cadet Jr., MD;  Location: [facility] ENDOSCOPY;  Service: General;  Laterality: N/A;  PRE- DYSPEPSIA  POST- GASTRITIS, GASTRIC POLYPS, ESOPHAGITIS, HIATAL HERNIA      GASTRIC SLEEVE LAPAROSCOPIC N/A 2020    Procedure: GASTRIC SLEEVE LAPAROSCOPIC With Hiatal Hernia Repair;  Surgeon: Ron Cadet Jr., MD;  Location: [facility] OR [facility];  Service: Bariatric;  Laterality: N/A;    HYSTERECTOMY  2004    PANNICULECTOMY      UPPER GASTROINTESTINAL ENDOSCOPY         Family History:  Family History   Problem Relation Age of Onset    Diabetes Mother     Hypertension Mother      "Hypertension Father     Nash Hyperthermia Neg Hx     Colon cancer Neg Hx        Social History:   reports that she has never smoked. She has never been exposed to tobacco smoke. She has never used smokeless tobacco. She reports that she does not drink alcohol and does not use drugs.    Medications:   Medications Prior to Admission   Medication Sig Dispense Refill Last Dose    citalopram (CeleXA) 20 MG tablet Take 1 tablet by mouth Daily. 90 tablet 0 9/7/2023    levothyroxine (SYNTHROID, LEVOTHROID) 150 MCG tablet Take 1 tablet by mouth Daily. 90 tablet 1 9/7/2023    lisinopril (PRINIVIL,ZESTRIL) 10 MG tablet Take 1 tablet by mouth Daily. 90 tablet 1 9/7/2023    pantoprazole (Protonix) 40 MG EC tablet Take 1 tablet by mouth 2 (Two) Times a Day. 180 tablet 1 9/7/2023    cetirizine (zyrTEC) 10 MG tablet Take 1 tablet by mouth Daily. 90 tablet 3     diclofenac (VOLTAREN) 50 MG EC tablet TAKE 1 TABLET BY MOUTH TWICE DAILY 60 tablet 1     fluticasone (FLONASE) 50 MCG/ACT nasal spray 1 spray by Each Nare route Daily. 16 g 1        Allergies:  Penicillins        Objective     Blood pressure 157/90, pulse 60, temperature 98 °F (36.7 °C), temperature source Temporal, resp. rate 20, height 160 cm (63\"), weight 81.3 kg (179 lb 3.7 oz), SpO2 94 %.    Physical Exam   Constitutional: Pt is oriented to person, place, and time and well-developed, well-nourished, and in no distress.   Mouth/Throat: Oropharynx is clear and moist.   Neck: Normal range of motion.   Cardiovascular: Normal rate, regular rhythm and normal heart sounds.    Pulmonary/Chest: Effort normal and breath sounds normal.   Abdominal: Soft. Nontender  Skin: Skin is warm and dry.   Psychiatric: Mood, memory, affect and judgment normal.     Assessment & Plan     Diagnosis:  gerd    Anticipated Surgical Procedure:  egd    The risks, benefits, and alternatives of this procedure have been discussed with the patient or the responsible party- the patient understands and " agrees to proceed.

## 2023-09-07 NOTE — ANESTHESIA POSTPROCEDURE EVALUATION
Patient: Xuan Horvath    Procedure Summary       Date: 09/07/23 Room / Location: [facility] ENDOSCOPY 2 / [facility] ENDOSCOPY    Anesthesia Start: 1152 Anesthesia Stop: 1204    Procedure: ESOPHAGOGASTRODUODENOSCOPY WITH BIOPSIES Diagnosis:       Gastroesophageal reflux disease, unspecified whether esophagitis present      (Gastroesophageal reflux disease, unspecified whether esophagitis present [K21.9])    Surgeons: Luis Sterling MD Provider: Maribell Pearce CRNA    Anesthesia Type: general ASA Status: 3            Anesthesia Type: general    Vitals  Vitals Value Taken Time   /98 09/07/23 1215   Temp 36.6 °C (97.8 °F) 09/07/23 1204   Pulse 61 09/07/23 1216   Resp 21 09/07/23 1209   SpO2 95 % 09/07/23 1216   Vitals shown include unvalidated device data.        Post Anesthesia Care and Evaluation    Patient location during evaluation: bedside  Patient participation: complete - patient participated  Level of consciousness: awake and alert  Pain score: 0    Airway patency: patent  Anesthetic complications: No anesthetic complications  PONV Status: none  Cardiovascular status: acceptable  Respiratory status: acceptable  Hydration status: acceptable  No anesthesia care post op

## 2023-10-06 ENCOUNTER — OFFICE VISIT (OUTPATIENT)
Dept: INTERNAL MEDICINE | Facility: CLINIC | Age: 53
End: 2023-10-06
Payer: OTHER GOVERNMENT

## 2023-10-06 VITALS
DIASTOLIC BLOOD PRESSURE: 82 MMHG | SYSTOLIC BLOOD PRESSURE: 130 MMHG | HEIGHT: 63 IN | BODY MASS INDEX: 31.75 KG/M2 | TEMPERATURE: 97.7 F | OXYGEN SATURATION: 95 % | HEART RATE: 75 BPM

## 2023-10-06 DIAGNOSIS — E66.9 CLASS 1 OBESITY WITH BODY MASS INDEX (BMI) OF 31.0 TO 31.9 IN ADULT, UNSPECIFIED OBESITY TYPE, UNSPECIFIED WHETHER SERIOUS COMORBIDITY PRESENT: ICD-10-CM

## 2023-10-06 DIAGNOSIS — Z12.31 SCREENING MAMMOGRAM FOR BREAST CANCER: ICD-10-CM

## 2023-10-06 DIAGNOSIS — Z00.00 ANNUAL PHYSICAL EXAM: Primary | ICD-10-CM

## 2023-10-06 DIAGNOSIS — I10 ESSENTIAL HYPERTENSION: ICD-10-CM

## 2023-10-06 PROBLEM — R06.1 STRIDOR: Status: RESOLVED | Noted: 2022-10-28 | Resolved: 2023-10-06

## 2023-10-06 PROBLEM — R50.9 FEVER: Status: RESOLVED | Noted: 2022-10-28 | Resolved: 2023-10-06

## 2023-10-06 PROBLEM — Z71.3 DIETARY COUNSELING: Status: RESOLVED | Noted: 2020-02-17 | Resolved: 2023-10-06

## 2023-10-06 PROCEDURE — 99396 PREV VISIT EST AGE 40-64: CPT | Performed by: INTERNAL MEDICINE

## 2023-10-06 RX ORDER — ONDANSETRON 4 MG/1
4 TABLET, FILM COATED ORAL EVERY 8 HOURS PRN
COMMUNITY
Start: 2023-09-29

## 2023-10-06 RX ORDER — LISINOPRIL AND HYDROCHLOROTHIAZIDE 20; 12.5 MG/1; MG/1
1 TABLET ORAL DAILY
Qty: 90 TABLET | Refills: 1 | Status: SHIPPED | OUTPATIENT
Start: 2023-10-06

## 2023-10-06 NOTE — PROGRESS NOTES
"Chief Complaint  Hypertension (Pt has been taking Lisinopril 20 mg. She been doubling her medication. She will need a refill if she going to continue the dose)    Subjective      Hypertension    Xuan Horvath is a 53 y.o. female who presents to [facility] with a past medical history of  Past Medical History:   Diagnosis Date    Allergy     Anxiety     Colon cancer screening     Depression     Depression 04/07/2015    working well, continue citalopram discussed that she can stay on meds long term or go off them whenever she is ready    Essential hypertension 04/06/2016    stable will try to decrease atenolol and see if that will give her more energy, need to watch bp closely    GERD (gastroesophageal reflux disease) January 2023    Prilosec. Protonix.    Glaucoma 2020    Heart murmur 01/2022    Hiatal hernia     History of medical problems 9/21/22    Panniculectomy    Hypertension     Hypothyroidism     Kidney stone     Obesity 04/06/2016    she is going to work on weight by trying to go to the gym she is working on this with her son    Sleep apnea     COMPLIANT WITH CPAP     Vitamin D deficiency 04/07/2015    will repeat labs continue current OTC meds for now     Has been doing well  Recently got a stomach bug  Had to go to the ER in [place]  Got quite a bit of fluids    Tried the contrave  Hasn't been able to get the wegovy  She is trying to adjust her diet  States that she is trying to get back into exercise    No chest pain  No trouble breathing    GERD-  States that she takes the nexium every night, but at times [facility] uses the lasoproazole on top of it    Objective   Vital Signs:   Vitals:    10/06/23 1516   BP: 130/82   Pulse: 75   Temp: 97.7 °F (36.5 °C)   TempSrc: Temporal   SpO2: 95%   Height: 160 cm (63\")   PainSc: 0-No pain       Wt Readings from Last 3 Encounters:   09/07/23 81.3 kg (179 lb 3.7 oz)   04/05/23 78.2 kg (172 lb 6.4 oz)   01/05/23 76.3 kg " (168 lb 3.2 oz)     BP Readings from Last 3 Encounters:   10/06/23 130/82   09/07/23 137/89   04/05/23 112/74         Physical Exam  Vitals reviewed.   Constitutional:       Appearance: Normal appearance. She is well-developed.   HENT:      Head: Normocephalic and atraumatic.      Right Ear: External ear normal.      Left Ear: External ear normal.   Eyes:      Conjunctiva/sclera: Conjunctivae normal.      Pupils: Pupils are equal, round, and reactive to light.   Cardiovascular:      Rate and Rhythm: Normal rate and regular rhythm.      Heart sounds: No murmur heard.    No friction rub. No gallop.   Pulmonary:      Effort: Pulmonary effort is normal.      Breath sounds: Normal breath sounds. No wheezing or rhonchi.   Skin:     General: Skin is warm and dry.   Neurological:      Mental Status: She is alert and oriented to person, place, and time.   Psychiatric:         Mood and Affect: Affect normal.         Behavior: Behavior normal.         Thought Content: Thought content normal.          Result Review :  The following data was reviewed by: Suri Lynn MD on 10/06/2023:      Procedures        Assessment and Plan   Diagnoses and all orders for this visit:    1. Annual physical exam (Primary)  Comments:  counseled on vaccines and routine screening needs mammo, but will order ultrasound due to implants per her request    2. Essential hypertension  Comments:  will try new bp med ; bmp in 6 weeks    3. Class 1 obesity with body mass index (BMI) of 31.0 to 31.9 in adult, unspecified obesity type, unspecified whether serious comorbidity present  Comments:  she will call nutritionist    4. Screening mammogram for breast cancer  -     US Breast Bilateral Complete; Future    Other orders  -     lisinopril-hydrochlorothiazide (Zestoretic) 20-12.5 MG per tablet; Take 1 tablet by mouth Daily.  Dispense: 90 tablet; Refill: 1                  FOLLOW UP  Return in about 3 months (around 1/6/2024).  Patient was given  instructions and counseling regarding her condition or for health maintenance advice. Please see specific information pulled into the AVS if appropriate.       Suri Lynn MD  10/06/23  15:38 EDT    CURRENT & DISCONTINUED MEDICATIONS  Current Outpatient Medications   Medication Instructions    cetirizine (ZYRTEC) 10 mg, Oral, Daily    citalopram (CELEXA) 20 mg, Oral, Daily    esomeprazole (NEXIUM) 40 mg, Oral, Daily    fluticasone (FLONASE) 50 MCG/ACT nasal spray 1 spray, Each Nare, Daily    Lansoprazole (PREVACID PO) Oral, Daily, otc    levothyroxine (SYNTHROID, LEVOTHROID) 150 mcg, Oral, Daily    lisinopril-hydrochlorothiazide (Zestoretic) 20-12.5 MG per tablet 1 tablet, Oral, Daily    ondansetron (ZOFRAN) 4 mg, Oral, Every 8 Hours PRN       Medications Discontinued During This Encounter   Medication Reason    diclofenac (VOLTAREN) 50 MG EC tablet *Therapy completed    pantoprazole (Protonix) 40 MG EC tablet *Therapy completed    lisinopril (PRINIVIL,ZESTRIL) 10 MG tablet

## 2023-10-11 ENCOUNTER — CLINICAL SUPPORT (OUTPATIENT)
Dept: INTERNAL MEDICINE | Facility: CLINIC | Age: 53
End: 2023-10-11
Payer: OTHER GOVERNMENT

## 2023-10-11 DIAGNOSIS — Z23 ENCOUNTER FOR IMMUNIZATION: Primary | ICD-10-CM

## 2023-10-11 PROCEDURE — 90686 IIV4 VACC NO PRSV 0.5 ML IM: CPT | Performed by: INTERNAL MEDICINE

## 2023-10-11 PROCEDURE — 90471 IMMUNIZATION ADMIN: CPT | Performed by: INTERNAL MEDICINE

## 2023-10-11 NOTE — PROGRESS NOTES
Patient came into office for administration of FLU vaccine; see immunization records for details; tolerated well; left immediately following; no 15 min OBS.

## 2023-11-27 ENCOUNTER — CLINICAL SUPPORT (OUTPATIENT)
Dept: INTERNAL MEDICINE | Facility: CLINIC | Age: 53
End: 2023-11-27
Payer: OTHER GOVERNMENT

## 2023-11-27 DIAGNOSIS — I10 ESSENTIAL HYPERTENSION: ICD-10-CM

## 2023-11-27 LAB
ANION GAP SERPL CALCULATED.3IONS-SCNC: 13.9 MMOL/L (ref 5–15)
BUN SERPL-MCNC: 16 MG/DL (ref 6–20)
BUN/CREAT SERPL: 23.9 (ref 7–25)
CALCIUM SPEC-SCNC: 9.6 MG/DL (ref 8.6–10.5)
CHLORIDE SERPL-SCNC: 101 MMOL/L (ref 98–107)
CO2 SERPL-SCNC: 28.1 MMOL/L (ref 22–29)
CREAT SERPL-MCNC: 0.67 MG/DL (ref 0.57–1)
EGFRCR SERPLBLD CKD-EPI 2021: 104.7 ML/MIN/1.73
GLUCOSE SERPL-MCNC: 111 MG/DL (ref 65–99)
POTASSIUM SERPL-SCNC: 3.9 MMOL/L (ref 3.5–5.2)
SODIUM SERPL-SCNC: 143 MMOL/L (ref 136–145)

## 2023-11-27 PROCEDURE — 36415 COLL VENOUS BLD VENIPUNCTURE: CPT | Performed by: INTERNAL MEDICINE

## 2023-11-27 PROCEDURE — 80048 BASIC METABOLIC PNL TOTAL CA: CPT | Performed by: INTERNAL MEDICINE

## 2023-11-27 NOTE — PROGRESS NOTES
Venipuncture Blood Specimen Collection  Venipuncture performed in [facility] by Olesya Garcia RN with good hemostasis. Patient tolerated the procedure well without complications.   11/27/23   Olesya Garcia RN

## 2023-12-11 ENCOUNTER — PATIENT MESSAGE (OUTPATIENT)
Dept: INTERNAL MEDICINE | Facility: CLINIC | Age: 53
End: 2023-12-11
Payer: OTHER GOVERNMENT

## 2023-12-18 RX ORDER — LIDOCAINE 50 MG/G
1 OINTMENT TOPICAL
Qty: 50 G | Refills: 0 | Status: SHIPPED | OUTPATIENT
Start: 2023-12-18

## 2023-12-18 NOTE — TELEPHONE ENCOUNTER
From: Xuan Horvath  To: Suri Lynn  Sent: 12/11/2023 7:34 PM EST  Subject: Electrolysis    Would you be able to prescribe me 10 % lidocaine cream or ointment for my electrolysis of my face?

## 2023-12-22 RX ORDER — CITALOPRAM 20 MG/1
20 TABLET ORAL DAILY
Qty: 90 TABLET | Refills: 0 | Status: SHIPPED | OUTPATIENT
Start: 2023-12-22

## 2024-01-16 ENCOUNTER — PATIENT MESSAGE (OUTPATIENT)
Dept: INTERNAL MEDICINE | Facility: CLINIC | Age: 54
End: 2024-01-16
Payer: OTHER GOVERNMENT

## 2024-01-16 DIAGNOSIS — G47.30 SLEEP APNEA, UNSPECIFIED TYPE: Primary | ICD-10-CM

## 2024-01-19 ENCOUNTER — TELEMEDICINE (OUTPATIENT)
Dept: INTERNAL MEDICINE | Facility: CLINIC | Age: 54
End: 2024-01-19
Payer: OTHER GOVERNMENT

## 2024-01-19 DIAGNOSIS — M54.50 ACUTE BILATERAL LOW BACK PAIN WITHOUT SCIATICA: ICD-10-CM

## 2024-01-19 DIAGNOSIS — I10 PRIMARY HYPERTENSION: Primary | ICD-10-CM

## 2024-01-19 DIAGNOSIS — F41.9 ANXIETY: ICD-10-CM

## 2024-01-19 DIAGNOSIS — E66.3 OVERWEIGHT: ICD-10-CM

## 2024-01-19 RX ORDER — TAMSULOSIN HYDROCHLORIDE 0.4 MG/1
1 CAPSULE ORAL DAILY
Qty: 30 CAPSULE | Refills: 1 | Status: SHIPPED | OUTPATIENT
Start: 2024-01-19

## 2024-01-19 NOTE — PROGRESS NOTES
TELEHEALTH VISIT  Mode of Visit: Video  Location of patient: home  You have chosen to receive care through a telehealth visit.  Patient understanding that this is a telehealth visit.  I cannot perform a full physical exam, therefore something might be missed, or patient may need to come into the office for further evaluation.  Patient is understanding and consents to this type of visit.  The visit included audio and video interaction.     Chief Complaint  Heartburn ( Would like to see about get her Nexium 40 mg QD changed to 90 days instead of 30 days previously filled by Dr. Luis Sterling) and Discuss Medication (Pt would like to see about taking an Probiotic lactobacillus acidophilus to help with gut issues. If so will need to prescription sent to [facility] for 90 day.)    Subjective          Xuan Horvath presents to [facility]     States that she is still frustrated with her weight  She is still doing her ifit treadmill running  Doing this 5 days a week    She is having some pain in her right lower back  States that the pain is gone now  She has had some kidney stones in the past    No chest pain  No trouble breathing    Mental health is doing well  Feels like the celexa helps    Bp has been running well typcially 120-130's/80's    Objective   Physical Exam   Constitutional: She appears well-developed and well-nourished.   HENT:   Head: Normocephalic and atraumatic.   Nose: Nose normal.   Eyes: Pupils are equal, round, and reactive to light. EOM are normal.   Neck: Neck normal appearance.  Pulmonary/Chest: Effort normal.   Neurological: She is alert.   Psychiatric: She has a normal mood and affect.     Result Review :       Common labs          5/22/2023    14:07 8/17/2023    09:30 11/27/2023    14:01   Common Labs   Glucose 103  87  111    BUN 19  11  16    Creatinine 0.77  0.69  0.67    Sodium 140  142  143    Potassium 4.1  4.2  3.9    Chloride 105   104  101    Calcium 10.0  9.5  9.6        Results for orders placed or performed in visit on 11/27/23   Basic Metabolic Panel    Specimen: Arm, Right; Blood   Result Value Ref Range    Glucose 111 (H) 65 - 99 mg/dL    BUN 16 6 - 20 mg/dL    Creatinine 0.67 0.57 - 1.00 mg/dL    Sodium 143 136 - 145 mmol/L    Potassium 3.9 3.5 - 5.2 mmol/L    Chloride 101 98 - 107 mmol/L    CO2 28.1 22.0 - 29.0 mmol/L    Calcium 9.6 8.6 - 10.5 mg/dL    BUN/Creatinine Ratio 23.9 7.0 - 25.0    Anion Gap 13.9 5.0 - 15.0 mmol/L    eGFR 104.7 >60.0 mL/min/1.73            Procedures        Assessment and Plan    Diagnoses and all orders for this visit:    1. Primary hypertension (Primary)  Comments:  doing well cont current meds    2. Acute bilateral low back pain without sciatica  Comments:  concerning for nephrolithiasis; will give flomax and warned of signs to montior    3. Overweight  Comments:  keep working on the ifit    4. Anxiety  Comments:  doing well; cont celexa    Other orders  -     tamsulosin (FLOMAX) 0.4 MG capsule 24 hr capsule; Take 1 capsule by mouth Daily.  Dispense: 30 capsule; Refill: 1                Follow Up   Return in about 4 months (around 5/19/2024).  Patient was given instructions and counseling regarding her condition or for health maintenance advice. Please see specific information pulled into the AVS if appropriate.

## 2024-01-22 ENCOUNTER — PATIENT MESSAGE (OUTPATIENT)
Dept: INTERNAL MEDICINE | Facility: CLINIC | Age: 54
End: 2024-01-22
Payer: OTHER GOVERNMENT

## 2024-01-31 ENCOUNTER — TELEMEDICINE (OUTPATIENT)
Dept: SLEEP MEDICINE | Facility: HOSPITAL | Age: 54
End: 2024-01-31
Payer: OTHER GOVERNMENT

## 2024-01-31 DIAGNOSIS — G47.33 OSA (OBSTRUCTIVE SLEEP APNEA): Primary | ICD-10-CM

## 2024-01-31 NOTE — PROGRESS NOTES
[facility] Note     This visit is conducted with real-time audiovisual technology.    Chief Complaint: Sleep apnea    Primary Care Physician: Suri Lynn MD    Interval History:   The patient is a 53 y.o. female  who  last saw Dr. Morales in 2022 and that note was reviewed.  She was diagnosed with sleep apnea in December 2021 with an AHI of 5.3/h.  These were both central apneas and hypopneas.  She was treated with auto titrating positive airway pressure spanning 8 to 15 cm of water pressure.  The patient has lost 140 pounds but still likes to use her CPAP machine and wants to continue to do so.  Compliance download is pasted below for the interested reader.  I reviewed the home sleep apnea study from 12/13/2021.    She continues to use the machine diligently and is deriving benefit from it.  Her residual AHI is 1.3.    Review of Systems:    A complete review of systems was done and all were negative with the exception of negative    Social History:    Social History     Socioeconomic History    Marital status:    Tobacco Use    Smoking status: Never     Passive exposure: Never    Smokeless tobacco: Never   Vaping Use    Vaping Use: Never used   Substance and Sexual Activity    Alcohol use: Never    Drug use: Never    Sexual activity: Yes     Partners: Male     Birth control/protection: Other, None, Hysterectomy     Comment: Hysterectomy 2004       Allergies:  Penicillins and Contrave [naltrexone-bupropion hcl er]     Medication Review:  Reviewed.      Vital Signs:  There were no vitals filed for this visit.  There is no height or weight on file to calculate BMI.    Physical Exam:    This patient is a well-looking female in no distress who provides a good history and does not appear short of breath.          I have reviewed the above results and compared them with the patient's last downloads and reviewed with the patient.    Impression:     Encounter Diagnoses   Name Primary?     JODY (obstructive sleep apnea) Yes         Plan:  Good sleep hygiene measures should be maintained.  After evaluating the patient and assessing results available, the patient is benefiting from the treatment being provided.     The patient will continue auto CPAP.  Potential side effects of CPAP therapy reviewed and addressed as needed.  After clinical evaluation and review of downloads, I recommend no changes to the patient's pressures.  A new prescription will be sent to the patient's DME.    I answered all of the patient's questions.  The patient will call the [facility] for any problems and will follow up in 1 year.      Joaquin Corona MD  Sleep Medicine  01/31/24  13:28 EST

## 2024-02-01 RX ORDER — L. ACIDOPHILUS/L.BULGARICUS 1MM CELL
1 TABLET ORAL DAILY
Qty: 90 TABLET | Refills: 1 | Status: SHIPPED | OUTPATIENT
Start: 2024-02-01

## 2024-02-01 NOTE — TELEPHONE ENCOUNTER
From: Xuan Horvath  To: Suri Lynn  Sent: 1/22/2024 1:09 PM EST  Subject: Lactobacillus Acidophilus    I had the nurse add Lactobacillus Acidophilus as a medicine I wanted to start taking on my visit Friday January 19th. I wanted it sent into [facility] pharmacy. The pharmacy says there is no prescription for it.

## 2024-03-18 RX ORDER — LEVOTHYROXINE SODIUM 0.15 MG/1
137 TABLET ORAL DAILY
Qty: 90 TABLET | Refills: 1 | Status: SHIPPED | OUTPATIENT
Start: 2024-03-18

## 2024-03-19 ENCOUNTER — PATIENT MESSAGE (OUTPATIENT)
Dept: INTERNAL MEDICINE | Facility: CLINIC | Age: 54
End: 2024-03-19
Payer: OTHER GOVERNMENT

## 2024-03-19 RX ORDER — ESOMEPRAZOLE MAGNESIUM 40 MG/1
40 CAPSULE, DELAYED RELEASE ORAL DAILY
Qty: 30 CAPSULE | Refills: 5 | Status: SHIPPED | OUTPATIENT
Start: 2024-03-19

## 2024-03-19 NOTE — TELEPHONE ENCOUNTER
From: Xuan Horvath  To: Suri Lynn  Sent: 3/19/2024 3:10 PM EDT  Subject: Nexium 40mg    My refills ran out with dr. Luis Sterling. Can you call in a 90 day supply with 3 refills? I have 5 pills left.

## 2024-03-28 NOTE — PRE-PROCEDURE INSTRUCTIONS
PATIENT INSTRUCTED TO BE:    - NPO AFTER MIDNIGHT EXCEPT CAN HAVE CLEAR LIQUIDS 2 HOURS PRIOR TO SURGERY ARRIVAL TIME     - TO HOLD ALL VITAMINS, SUPPLEMENTS, NSAIDS FOR ONE WEEK PRIOR TO THEIR SURGICAL PROCEDURE    - INSTRUCTED NO LOTIONS, JEWELRY, PIERCINGS, OR DEODORANT DAY OF SURGERY    - IF DIABETIC, CHECK BLOOD GLUCOSE IF LESS THAN 70 CALL PREOP AREA -AM OF SURGERY     - INSTRUCTED PATIENT TO FOLLOW THE SURGEON'S INSTRUCTIONS ON CLEAR LIQUID DIET AND BOWEL PREP, AS WELL AS ANY OTHER INSTRUCTIONS GIVEN PER THE SURGEON'S OFFICE.     - INSTRUCTED TO TAKE THE FOLLOWING MEDICATIONS THE DAY OF SURGERY:        ZYRTEC, CELEXA, FLONASE, SYNTHROID     PATIENT VERBALIZED UNDERSTANDING  
Admitted

## 2024-04-02 RX ORDER — CITALOPRAM 20 MG/1
20 TABLET ORAL DAILY
Qty: 90 TABLET | Refills: 0 | Status: SHIPPED | OUTPATIENT
Start: 2024-04-02

## 2024-04-02 RX ORDER — LEVOTHYROXINE SODIUM 0.15 MG/1
137 TABLET ORAL DAILY
Qty: 90 TABLET | Refills: 1 | Status: SHIPPED | OUTPATIENT
Start: 2024-04-02

## 2024-04-05 RX ORDER — LISINOPRIL AND HYDROCHLOROTHIAZIDE 20; 12.5 MG/1; MG/1
1 TABLET ORAL DAILY
Qty: 90 TABLET | Refills: 1 | Status: SHIPPED | OUTPATIENT
Start: 2024-04-05

## 2024-04-17 ENCOUNTER — PATIENT MESSAGE (OUTPATIENT)
Dept: INTERNAL MEDICINE | Facility: CLINIC | Age: 54
End: 2024-04-17
Payer: OTHER GOVERNMENT

## 2024-04-19 RX ORDER — CETIRIZINE HYDROCHLORIDE, PSEUDOEPHEDRINE HYDROCHLORIDE 5; 120 MG/1; MG/1
1 TABLET, FILM COATED, EXTENDED RELEASE ORAL 2 TIMES DAILY
Qty: 30 TABLET | Refills: 1 | Status: SHIPPED | OUTPATIENT
Start: 2024-04-19

## 2024-04-19 NOTE — TELEPHONE ENCOUNTER
From: Xuan Horvath  To: Suri Lynn  Sent: 4/17/2024 1:42 PM EDT  Subject: Zyrtec D    Can you call me in some Zyrtec d? I am currently taking regular Zyrtec regularly and it’s not helping with my nasal congestion ?

## 2024-04-26 ENCOUNTER — HOSPITAL ENCOUNTER (EMERGENCY)
Facility: HOSPITAL | Age: 54
Discharge: HOME | End: 2024-04-26
Attending: EMERGENCY MEDICINE
Payer: OTHER GOVERNMENT

## 2024-04-26 ENCOUNTER — APPOINTMENT (OUTPATIENT)
Dept: CT IMAGING | Facility: HOSPITAL | Age: 54
End: 2024-04-26
Payer: OTHER GOVERNMENT

## 2024-04-26 VITALS
HEIGHT: 63 IN | OXYGEN SATURATION: 96 % | TEMPERATURE: 98.9 F | RESPIRATION RATE: 16 BRPM | DIASTOLIC BLOOD PRESSURE: 71 MMHG | SYSTOLIC BLOOD PRESSURE: 106 MMHG | WEIGHT: 188.05 LBS | BODY MASS INDEX: 33.32 KG/M2 | HEART RATE: 75 BPM

## 2024-04-26 DIAGNOSIS — R82.71 BACTERIA IN URINE: Primary | ICD-10-CM

## 2024-04-26 DIAGNOSIS — N13.30 HYDRONEPHROSIS, UNSPECIFIED HYDRONEPHROSIS TYPE: ICD-10-CM

## 2024-04-26 DIAGNOSIS — N20.0 MULTIPLE RENAL CALCULI: ICD-10-CM

## 2024-04-26 LAB
ALBUMIN SERPL-MCNC: 3.7 G/DL (ref 3.5–5.2)
ALBUMIN/GLOB SERPL: 1.3 G/DL
ALP SERPL-CCNC: 67 U/L (ref 39–117)
ALT SERPL W P-5'-P-CCNC: 20 U/L (ref 1–33)
ANION GAP SERPL CALCULATED.3IONS-SCNC: 11.4 MMOL/L (ref 5–15)
ANISOCYTOSIS BLD QL: ABNORMAL
AST SERPL-CCNC: 21 U/L (ref 1–32)
BACTERIA UR QL AUTO: ABNORMAL /HPF
BILIRUB SERPL-MCNC: 0.3 MG/DL (ref 0–1.2)
BILIRUB UR QL STRIP: NEGATIVE
BUN SERPL-MCNC: 17 MG/DL (ref 6–20)
BUN/CREAT SERPL: 17.3 (ref 7–25)
CALCIUM SPEC-SCNC: 8.6 MG/DL (ref 8.6–10.5)
CHLORIDE SERPL-SCNC: 100 MMOL/L (ref 98–107)
CLARITY UR: ABNORMAL
CO2 SERPL-SCNC: 24.6 MMOL/L (ref 22–29)
COLOR UR: ABNORMAL
CREAT SERPL-MCNC: 0.98 MG/DL (ref 0.57–1)
D-LACTATE SERPL-SCNC: 1.3 MMOL/L (ref 0.5–2)
DEPRECATED RDW RBC AUTO: 44.8 FL (ref 37–54)
EGFRCR SERPLBLD CKD-EPI 2021: 68.7 ML/MIN/1.73
EOSINOPHIL # BLD MANUAL: 0.13 10*3/MM3 (ref 0–0.4)
EOSINOPHIL NFR BLD MANUAL: 1 % (ref 0.3–6.2)
ERYTHROCYTE [DISTWIDTH] IN BLOOD BY AUTOMATED COUNT: 13.2 % (ref 12.3–15.4)
GLOBULIN UR ELPH-MCNC: 2.9 GM/DL
GLUCOSE SERPL-MCNC: 148 MG/DL (ref 65–99)
GLUCOSE UR STRIP-MCNC: NEGATIVE MG/DL
HCT VFR BLD AUTO: 37.7 % (ref 34–46.6)
HGB BLD-MCNC: 12.9 G/DL (ref 12–15.9)
HGB UR QL STRIP.AUTO: ABNORMAL
HOLD SPECIMEN: NORMAL
HOLD SPECIMEN: NORMAL
HYALINE CASTS UR QL AUTO: ABNORMAL /LPF
KETONES UR QL STRIP: NEGATIVE
LEUKOCYTE ESTERASE UR QL STRIP.AUTO: ABNORMAL
LIPASE SERPL-CCNC: 16 U/L (ref 13–60)
LYMPHOCYTES # BLD MANUAL: 1.45 10*3/MM3 (ref 0.7–3.1)
LYMPHOCYTES NFR BLD MANUAL: 4 % (ref 5–12)
MACROCYTES BLD QL SMEAR: ABNORMAL
MCH RBC QN AUTO: 31.7 PG (ref 26.6–33)
MCHC RBC AUTO-ENTMCNC: 34.2 G/DL (ref 31.5–35.7)
MCV RBC AUTO: 92.6 FL (ref 79–97)
MONOCYTES # BLD: 0.53 10*3/MM3 (ref 0.1–0.9)
NEUTROPHILS # BLD AUTO: 11.05 10*3/MM3 (ref 1.7–7)
NEUTROPHILS NFR BLD MANUAL: 82 % (ref 42.7–76)
NEUTS BAND NFR BLD MANUAL: 2 % (ref 0–5)
NITRITE UR QL STRIP: NEGATIVE
PH UR STRIP.AUTO: 6 [PH] (ref 5–8)
PLATELET # BLD AUTO: 148 10*3/MM3 (ref 140–450)
PMV BLD AUTO: 10.5 FL (ref 6–12)
POTASSIUM SERPL-SCNC: 3.6 MMOL/L (ref 3.5–5.2)
PROT SERPL-MCNC: 6.6 G/DL (ref 6–8.5)
PROT UR QL STRIP: ABNORMAL
RBC # BLD AUTO: 4.07 10*6/MM3 (ref 3.77–5.28)
RBC # UR STRIP: ABNORMAL /HPF
REF LAB TEST METHOD: ABNORMAL
SCAN SLIDE: NORMAL
SMALL PLATELETS BLD QL SMEAR: ADEQUATE
SODIUM SERPL-SCNC: 136 MMOL/L (ref 136–145)
SP GR UR STRIP: 1.02 (ref 1–1.03)
SQUAMOUS #/AREA URNS HPF: ABNORMAL /HPF
UROBILINOGEN UR QL STRIP: ABNORMAL
VARIANT LYMPHS NFR BLD MANUAL: 11 % (ref 19.6–45.3)
WBC # UR STRIP: ABNORMAL /HPF
WBC MORPH BLD: NORMAL
WBC NRBC COR # BLD AUTO: 13.15 10*3/MM3 (ref 3.4–10.8)
WHOLE BLOOD HOLD COAG: NORMAL
WHOLE BLOOD HOLD SPECIMEN: NORMAL

## 2024-04-26 PROCEDURE — 96365 THER/PROPH/DIAG IV INF INIT: CPT

## 2024-04-26 PROCEDURE — 74177 CT ABD & PELVIS W/CONTRAST: CPT

## 2024-04-26 PROCEDURE — 25510000001 IOPAMIDOL PER 1 ML: Performed by: EMERGENCY MEDICINE

## 2024-04-26 PROCEDURE — 36415 COLL VENOUS BLD VENIPUNCTURE: CPT

## 2024-04-26 PROCEDURE — 96375 TX/PRO/DX INJ NEW DRUG ADDON: CPT

## 2024-04-26 PROCEDURE — 83605 ASSAY OF LACTIC ACID: CPT | Performed by: EMERGENCY MEDICINE

## 2024-04-26 PROCEDURE — 85007 BL SMEAR W/DIFF WBC COUNT: CPT | Performed by: EMERGENCY MEDICINE

## 2024-04-26 PROCEDURE — 80053 COMPREHEN METABOLIC PANEL: CPT | Performed by: EMERGENCY MEDICINE

## 2024-04-26 PROCEDURE — 25810000003 SODIUM CHLORIDE 0.9 % SOLUTION

## 2024-04-26 PROCEDURE — 83690 ASSAY OF LIPASE: CPT | Performed by: EMERGENCY MEDICINE

## 2024-04-26 PROCEDURE — 25010000002 ONDANSETRON PER 1 MG

## 2024-04-26 PROCEDURE — 85025 COMPLETE CBC W/AUTO DIFF WBC: CPT | Performed by: EMERGENCY MEDICINE

## 2024-04-26 PROCEDURE — 25010000002 KETOROLAC TROMETHAMINE PER 15 MG

## 2024-04-26 PROCEDURE — 99285 EMERGENCY DEPT VISIT HI MDM: CPT

## 2024-04-26 PROCEDURE — 81001 URINALYSIS AUTO W/SCOPE: CPT | Performed by: EMERGENCY MEDICINE

## 2024-04-26 PROCEDURE — 25010000002 CEFTRIAXONE PER 250 MG

## 2024-04-26 RX ORDER — HYDROCODONE BITARTRATE AND ACETAMINOPHEN 5; 325 MG/1; MG/1
1 TABLET ORAL EVERY 6 HOURS PRN
Qty: 12 TABLET | Refills: 0 | Status: SHIPPED | OUTPATIENT
Start: 2024-04-26

## 2024-04-26 RX ORDER — ONDANSETRON 4 MG/1
4 TABLET, ORALLY DISINTEGRATING ORAL 4 TIMES DAILY PRN
Qty: 20 TABLET | Refills: 0 | Status: SHIPPED | OUTPATIENT
Start: 2024-04-26

## 2024-04-26 RX ORDER — SODIUM CHLORIDE 0.9 % (FLUSH) 0.9 %
10 SYRINGE (ML) INJECTION AS NEEDED
Status: DISCONTINUED | OUTPATIENT
Start: 2024-04-26 | End: 2024-04-26 | Stop reason: HOSPADM

## 2024-04-26 RX ORDER — KETOROLAC TROMETHAMINE 30 MG/ML
30 INJECTION, SOLUTION INTRAMUSCULAR; INTRAVENOUS ONCE
Status: COMPLETED | OUTPATIENT
Start: 2024-04-26 | End: 2024-04-26

## 2024-04-26 RX ORDER — HYDROCODONE BITARTRATE AND ACETAMINOPHEN 5; 325 MG/1; MG/1
1 TABLET ORAL ONCE
Status: COMPLETED | OUTPATIENT
Start: 2024-04-26 | End: 2024-04-26

## 2024-04-26 RX ORDER — CEPHALEXIN 500 MG/1
500 CAPSULE ORAL 4 TIMES DAILY
Qty: 28 CAPSULE | Refills: 0 | Status: ON HOLD | OUTPATIENT
Start: 2024-04-26 | End: 2024-04-30

## 2024-04-26 RX ORDER — TAMSULOSIN HYDROCHLORIDE 0.4 MG/1
1 CAPSULE ORAL DAILY
Qty: 14 CAPSULE | Refills: 0 | Status: SHIPPED | OUTPATIENT
Start: 2024-04-26

## 2024-04-26 RX ORDER — ONDANSETRON 2 MG/ML
4 INJECTION INTRAMUSCULAR; INTRAVENOUS ONCE
Status: COMPLETED | OUTPATIENT
Start: 2024-04-26 | End: 2024-04-26

## 2024-04-26 RX ORDER — KETOROLAC TROMETHAMINE 10 MG/1
10 TABLET, FILM COATED ORAL EVERY 6 HOURS PRN
Qty: 12 TABLET | Refills: 0 | Status: SHIPPED | OUTPATIENT
Start: 2024-04-26 | End: 2024-04-30 | Stop reason: HOSPADM

## 2024-04-26 RX ADMIN — ONDANSETRON 4 MG: 2 INJECTION INTRAMUSCULAR; INTRAVENOUS at 15:23

## 2024-04-26 RX ADMIN — KETOROLAC TROMETHAMINE 30 MG: 30 INJECTION, SOLUTION INTRAMUSCULAR; INTRAVENOUS at 15:23

## 2024-04-26 RX ADMIN — CEFTRIAXONE SODIUM 2000 MG: 2 INJECTION, POWDER, FOR SOLUTION INTRAMUSCULAR; INTRAVENOUS at 17:29

## 2024-04-26 RX ADMIN — HYDROCODONE BITARTRATE AND ACETAMINOPHEN 1 TABLET: 5; 325 TABLET ORAL at 19:01

## 2024-04-26 RX ADMIN — IOPAMIDOL 100 ML: 755 INJECTION, SOLUTION INTRAVENOUS at 15:58

## 2024-04-26 RX ADMIN — SODIUM CHLORIDE 1000 ML: 9 INJECTION, SOLUTION INTRAVENOUS at 15:20

## 2024-04-26 NOTE — ED PROVIDER NOTES
Time: 2:09 PM EDT  Date of encounter:  4/26/2024  Room number: 18/18  Independent Historian/Clinical History and Information was obtained by:   Patient    History is limited by: N/A    Chief Complaint: abdominal pain and burning with urination       History of Present Illness:  Patient is a 54 y.o. year old female who presents to the emergency department for evaluation of abdominal pain and dysuria.  Patient states her pain started at 8 PM on Thursday evening.  It is isolated to the left side.  She has also had a burning sensation with urination and describes her urine to be very concentrated with a strong odor.  She is also had nausea and vomiting but no diarrhea.  Questionable fever last night.  She denies any blood to her urine.  Her discomfort at this time is rated as a 7.  She has had a kidney stone in the past however was on the right.  She does describe the pain to be similar.    HPI    Patient Care Team  Primary Care Provider: Suri Lynn MD    Past Medical History:     Allergies   Allergen Reactions    Penicillins Anaphylaxis     unknown    Contrave [Naltrexone-Bupropion Hcl Er] Other (See Comments)     Severe constipation and insomnia     Past Medical History:   Diagnosis Date    Allergy     Anxiety     Colon cancer screening     Depression     Depression 04/07/2015    working well, continue citalopram discussed that she can stay on meds long term or go off them whenever she is ready    Essential hypertension 04/06/2016    stable will try to decrease atenolol and see if that will give her more energy, need to watch bp closely    GERD (gastroesophageal reflux disease) January 2023    Prilosec. Protonix.    Glaucoma 2020    Heart murmur 01/2022    Hiatal hernia     History of medical problems 9/21/22    Panniculectomy    Hypertension     Hypothyroidism     Kidney stone     Obesity 04/06/2016    she is going to work on weight by trying to go to the gym she is working on this with her son    Sleep  apnea     COMPLIANT WITH CPAP     Vitamin D deficiency 2015    will repeat labs continue current OTC meds for now     Past Surgical History:   Procedure Laterality Date    BARIATRIC SURGERY  20    BREAST AUGMENTATION Bilateral 2023    breast implants    CARPAL TUNNEL RELEASE Right 2005     SECTION  ,,2003    x3    COLONOSCOPY N/A 2022    Procedure: COLONOSCOPY WITH POLYPECTOMY COLD SNARE;  Surgeon: Luis Sterling MD;  Location: [facility] ENDOSCOPY;  Service: Gastroenterology;  Laterality: N/A;  COLON POLYP    COLONOSCOPY      COSMETIC SURGERY  23    Breast implants/breast lift    ENDOSCOPY N/A 2020    Procedure: ESOPHAGOGASTRODUODENOSCOPY WITH BIOPSY;  Surgeon: Ron Cadet Jr., MD;  Location: [facility] ENDOSCOPY;  Service: General;  Laterality: N/A;  PRE- DYSPEPSIA  POST- GASTRITIS, GASTRIC POLYPS, ESOPHAGITIS, HIATAL HERNIA      ENDOSCOPY N/A 2023    Procedure: ESOPHAGOGASTRODUODENOSCOPY WITH BIOPSIES;  Surgeon: Luis Sterling MD;  Location: [facility] ENDOSCOPY;  Service: Gastroenterology;  Laterality: N/A;  PRIOR GASTRIC SLEEVE    GASTRIC SLEEVE LAPAROSCOPIC N/A 2020    Procedure: GASTRIC SLEEVE LAPAROSCOPIC With Hiatal Hernia Repair;  Surgeon: Ron Cadet Jr., MD;  [facility];  Service: Bariatric;  Laterality: N/A;    HERNIA REPAIR  20    Hiatal hernia    HYSTERECTOMY  2004    PANNICULECTOMY      UPPER GASTROINTESTINAL ENDOSCOPY       Family History   Problem Relation Age of Onset    Diabetes Mother     Hypertension Mother     Hypertension Father     Malig Hyperthermia Neg Hx     Colon cancer Neg Hx        Home Medications:  Prior to Admission medications    Medication Sig Start Date End Date Taking? Authorizing Provider   cetirizine (zyrTEC) 10 MG tablet Take 1 tablet by mouth Daily. 23   Suri Lynn MD   cetirizine-pseudoephedrine (ZyrTEC-D) 5-120 MG per 12 hr tablet Take 1 tablet by mouth 2  "(Two) Times a Day. 4/19/24   Suri Lynn MD   citalopram (CeleXA) 20 MG tablet Take 1 tablet by mouth Daily. 4/2/24   Suri Lynn MD   esomeprazole (nexIUM) 40 MG capsule Take 1 capsule by mouth Daily. 3/26/24   Suri Lynn MD   fluticasone (FLONASE) 50 MCG/ACT nasal spray 1 spray by Each Nare route Daily. 7/31/23   Suri Lynn MD   levothyroxine (SYNTHROID, LEVOTHROID) 150 MCG tablet Take 1 tablet by mouth Daily. 4/2/24   Suri Lynn MD   lisinopril-hydrochlorothiazide (Zestoretic) 20-12.5 MG per tablet Take 1 tablet by mouth Daily. 4/5/24   Suri Lynn MD   tamsulosin (FLOMAX) 0.4 MG capsule 24 hr capsule Take 1 capsule by mouth Daily. 1/19/24   Suri Lynn MD        Social History:   Social History     Tobacco Use    Smoking status: Never     Passive exposure: Never    Smokeless tobacco: Never   Vaping Use    Vaping status: Never Used   Substance Use Topics    Alcohol use: Never    Drug use: Never         Review of Systems:  Review of Systems   Constitutional:  Negative for chills and fever.   HENT:  Negative for congestion, ear pain and sore throat.    Eyes:  Negative for pain.   Respiratory:  Negative for cough, chest tightness and shortness of breath.    Cardiovascular:  Negative for chest pain.   Gastrointestinal:  Positive for abdominal pain, nausea and vomiting. Negative for diarrhea.   Genitourinary:  Positive for dysuria and flank pain. Negative for hematuria.   Musculoskeletal:  Negative for joint swelling.   Skin:  Negative for pallor.   Neurological:  Negative for seizures and headaches.   All other systems reviewed and are negative.       Physical Exam:  /71 (BP Location: Right arm, Patient Position: Sitting)   Pulse 75   Temp 98.9 °F (37.2 °C) (Oral)   Resp 16   Ht 160 cm (63\")   Wt 85.3 kg (188 lb 0.8 oz)   SpO2 96%   BMI 33.31 kg/m²     Physical Exam  Vitals and nursing note reviewed.   Constitutional:       General: She is not in " acute distress.     Appearance: Normal appearance. She is not ill-appearing, toxic-appearing or diaphoretic.   HENT:      Head: Normocephalic and atraumatic.      Mouth/Throat:      Mouth: Mucous membranes are moist.   Eyes:      General: No scleral icterus.  Cardiovascular:      Rate and Rhythm: Normal rate and regular rhythm.      Pulses: Normal pulses.      Heart sounds: Normal heart sounds.   Pulmonary:      Effort: Pulmonary effort is normal. No respiratory distress.      Breath sounds: Normal breath sounds. No stridor. No wheezing, rhonchi or rales.   Chest:      Chest wall: No tenderness.   Abdominal:      General: Abdomen is flat. There is no distension. There are no signs of injury.      Palpations: Abdomen is soft.      Tenderness: There is no abdominal tenderness in the left upper quadrant. There is left CVA tenderness. There is no right CVA tenderness or guarding.   Musculoskeletal:         General: Normal range of motion.      Cervical back: Normal range of motion and neck supple.   Skin:     General: Skin is warm and dry.      Capillary Refill: Capillary refill takes less than 2 seconds.      Coloration: Skin is not cyanotic, jaundiced, mottled or pale.      Findings: No erythema or rash.   Neurological:      Mental Status: She is alert and oriented to person, place, and time. Mental status is at baseline.                  Procedures:  Procedures      Medical Decision Making:      Comorbidities that affect care:    Hypertension, hiatal hernia, thyroid disease, depression, kidney stone, GERD    External Notes reviewed:    Previous Radiological Studies: I reviewed patient's last CT of the abdomen which occurred on 4/17/2020 the visit where she was diagnosed with a kidney stone      The following orders were placed and all results were independently analyzed by me:  Orders Placed This Encounter   Procedures    CT Abdomen Pelvis With Contrast    Wilbur Draw    Comprehensive Metabolic Panel    Lipase     Urinalysis With Microscopic If Indicated (No Culture) - Urine, Clean Catch    Lactic Acid, Plasma    CBC Auto Differential    Urinalysis, Microscopic Only - Urine, Clean Catch    Scan Slide    Manual Differential    Ambulatory Referral to Urology    Undress & Gown    Please provide pt with strainer to take home.  Misc Nursing Order (Specify)    CBC & Differential    Green Top (Gel)    Lavender Top    Gold Top - SST    Light Blue Top       Medications Given in the Emergency Department:  Medications   ketorolac (TORADOL) injection 30 mg (30 mg Intravenous Given 4/26/24 1523)   ondansetron (ZOFRAN) injection 4 mg (4 mg Intravenous Given 4/26/24 1523)   sodium chloride 0.9 % bolus 1,000 mL (0 mL Intravenous Stopped 4/26/24 1535)   iopamidol (ISOVUE-370) 76 % injection 100 mL (100 mL Intravenous Given 4/26/24 1558)   cefTRIAXone (ROCEPHIN) 2000 mg/100 mL 0.9% NS IVPB (MBP) (0 mg Intravenous Stopped 4/26/24 1759)   HYDROcodone-acetaminophen (NORCO) 5-325 MG per tablet 1 tablet (1 tablet Oral Given 4/26/24 1901)        ED Course:    ED Course as of 04/27/24 1138   Fri Apr 26, 2024   1640 Consulting urology    [MS]   7975 Spoke with on-call urologist Dr. Rios he advised if patient's fever is below 100.5 and her pain is under control that she could be discharged on antibiotics and then follow-up in clinic.  I personally reevaluated patient's temp and was 98.7 orally.  Her pain continues to be controlled.  I discussed this information with the patient, including strict return instructions and at home treatment. she advises that she already has Flomax at home from her last kidney stone.     [MS]      ED Course User Index  [MS] Beryl Godwin, APRN       Labs:    Lab Results (last 24 hours)       Procedure Component Value Units Date/Time    Urinalysis With Microscopic If Indicated (No Culture) - Urine, Clean Catch [[id number]]  (Abnormal) Collected: 04/26/24 1452    Specimen: Urine, Clean Catch Updated: 04/26/24 7943      Color, UA Dark Yellow     Appearance, UA Cloudy     pH, UA 6.0     Specific Gravity, UA 1.021     Glucose, UA Negative     Ketones, UA Negative     Bilirubin, UA Negative     Blood, UA Moderate (2+)     Protein,  mg/dL (2+)     Leuk Esterase, UA Moderate (2+)     Nitrite, UA Negative     Urobilinogen, UA 1.0 E.U./dL    Urinalysis, Microscopic Only - Urine, Clean Catch [[id number]]  (Abnormal) Collected: 04/26/24 1452    Specimen: Urine, Clean Catch Updated: 04/26/24 1505     RBC, UA 6-10 /HPF      WBC, UA Too Numerous to Count /HPF      Bacteria, UA 1+ /HPF      Squamous Epithelial Cells, UA 0-2 /HPF      Hyaline Casts, UA None Seen /LPF      Methodology Automated Microscopy    CBC & Differential [[id number]]  (Abnormal) Collected: 04/26/24 1502    Specimen: Blood Updated: 04/26/24 1544    Narrative:      The following orders were created for panel order CBC & Differential.  Procedure                               Abnormality         Status                     ---------                               -----------         ------                     CBC Auto Differential[[id number]]        Abnormal            Final result               Scan Slide[[id number]]                                       Final result                 Please view results for these tests on the individual orders.    Comprehensive Metabolic Panel [[id number]]  (Abnormal) Collected: 04/26/24 1502    Specimen: Blood Updated: 04/26/24 1540     Glucose 148 mg/dL      BUN 17 mg/dL      Creatinine 0.98 mg/dL      Sodium 136 mmol/L      Potassium 3.6 mmol/L      Chloride 100 mmol/L      CO2 24.6 mmol/L      Calcium 8.6 mg/dL      Total Protein 6.6 g/dL      Albumin 3.7 g/dL      ALT (SGPT) 20 U/L      AST (SGOT) 21 U/L      Alkaline Phosphatase 67 U/L      Total Bilirubin 0.3 mg/dL      Globulin 2.9 gm/dL      A/G Ratio 1.3 g/dL      BUN/Creatinine Ratio 17.3     Anion Gap 11.4 mmol/L      eGFR 68.7 mL/min/1.73     Narrative:      GFR Normal  >60  Chronic Kidney Disease <60  Kidney Failure <15      Lipase [[id number]]  (Normal) Collected: 04/26/24 1502    Specimen: Blood Updated: 04/26/24 1540     Lipase 16 U/L     Lactic Acid, Plasma [[id number]]  (Normal) Collected: 04/26/24 1502    Specimen: Blood Updated: 04/26/24 1538     Lactate 1.3 mmol/L     CBC Auto Differential [[id number]]  (Abnormal) Collected: 04/26/24 1502    Specimen: Blood Updated: 04/26/24 1544     WBC 13.15 10*3/mm3      RBC 4.07 10*6/mm3      Hemoglobin 12.9 g/dL      Hematocrit 37.7 %      MCV 92.6 fL      MCH 31.7 pg      MCHC 34.2 g/dL      RDW 13.2 %      RDW-SD 44.8 fl      MPV 10.5 fL      Platelets 148 10*3/mm3     Scan Slide [[id number]] Collected: 04/26/24 1502    Specimen: Blood Updated: 04/26/24 1544     Scan Slide --     Comment: See Manual Differential Results       Manual Differential [[id number]]  (Abnormal) Collected: 04/26/24 1502    Specimen: Blood Updated: 04/26/24 1544     Neutrophil % 82.0 %      Lymphocyte % 11.0 %      Monocyte % 4.0 %      Eosinophil % 1.0 %      Bands %  2.0 %      Neutrophils Absolute 11.05 10*3/mm3      Lymphocytes Absolute 1.45 10*3/mm3      Monocytes Absolute 0.53 10*3/mm3      Eosinophils Absolute 0.13 10*3/mm3      Anisocytosis Slight/1+     Macrocytes Slight/1+     WBC Morphology Normal     Platelet Estimate Adequate    CBC & Differential [[id number]]  (Abnormal) Collected: 04/27/24 0931    Specimen: Blood Updated: 04/27/24 0940    Narrative:      The following orders were created for panel order CBC & Differential.  Procedure                               Abnormality         Status                     ---------                               -----------         ------                     CBC Auto Differential[[id number]]        Abnormal            Final result                 Please view results for these tests on the individual orders.    Comprehensive Metabolic Panel [[id number]]  (Abnormal) Collected: 04/27/24 0931    Specimen: Blood Updated:  04/27/24 0957     Glucose 133 mg/dL      BUN 21 mg/dL      Creatinine 1.07 mg/dL      Sodium 136 mmol/L      Potassium 3.6 mmol/L      Chloride 101 mmol/L      CO2 23.7 mmol/L      Calcium 8.1 mg/dL      Total Protein 6.0 g/dL      Albumin 3.3 g/dL      ALT (SGPT) 18 U/L      AST (SGOT) 20 U/L      Alkaline Phosphatase 73 U/L      Total Bilirubin 0.3 mg/dL      Globulin 2.7 gm/dL      A/G Ratio 1.2 g/dL      BUN/Creatinine Ratio 19.6     Anion Gap 11.3 mmol/L      eGFR 61.9 mL/min/1.73     Narrative:      GFR Normal >60  Chronic Kidney Disease <60  Kidney Failure <15      Lipase [[id number]]  (Normal) Collected: 04/27/24 0931    Specimen: Blood Updated: 04/27/24 0957     Lipase 17 U/L     Lactic Acid, Plasma [[id number]]  (Normal) Collected: 04/27/24 0931    Specimen: Blood Updated: 04/27/24 0953     Lactate 1.2 mmol/L     CBC Auto Differential [[id number]]  (Abnormal) Collected: 04/27/24 0931    Specimen: Blood Updated: 04/27/24 0940     WBC 10.55 10*3/mm3      RBC 3.80 10*6/mm3      Hemoglobin 12.2 g/dL      Hematocrit 34.9 %      MCV 91.8 fL      MCH 32.1 pg      MCHC 35.0 g/dL      RDW 13.2 %      RDW-SD 44.2 fl      MPV 10.5 fL      Platelets 128 10*3/mm3      Neutrophil % 86.7 %      Lymphocyte % 5.8 %      Monocyte % 4.4 %      Eosinophil % 0.9 %      Basophil % 0.5 %      Immature Grans % 1.7 %      Neutrophils, Absolute 9.16 10*3/mm3      Lymphocytes, Absolute 0.61 10*3/mm3      Monocytes, Absolute 0.46 10*3/mm3      Eosinophils, Absolute 0.09 10*3/mm3      Basophils, Absolute 0.05 10*3/mm3      Immature Grans, Absolute 0.18 10*3/mm3      nRBC 0.0 /100 WBC     Urinalysis With Microscopic If Indicated (No Culture) - Urine, Clean Catch [[id number]]  (Abnormal) Collected: 04/27/24 1000    Specimen: Urine, Clean Catch Updated: 04/27/24 1008     Color, UA Dark Yellow     Appearance, UA Turbid     pH, UA 5.5     Specific Gravity, UA >1.030     Glucose, UA Negative     Ketones, UA Trace     Bilirubin, UA Small (1+)      Blood, UA Large (3+)     Protein, UA >=300 mg/dL (3+)     Leuk Esterase, UA Large (3+)     Nitrite, UA Negative     Urobilinogen, UA 1.0 E.U./dL    Urinalysis, Microscopic Only - Urine, Clean Catch [[id number]]  (Abnormal) Collected: 04/27/24 1000    Specimen: Urine, Clean Catch Updated: 04/27/24 1014     RBC, UA 6-10 /HPF      WBC, UA Too Numerous to Count /HPF      Bacteria, UA 2+ /HPF      Squamous Epithelial Cells, UA 13-20 /HPF      Hyaline Casts, UA None Seen /LPF      Methodology Manual Light Microscopy    Blood Culture - Blood, Arm, Right [[id number]] Collected: 04/27/24 1130    Specimen: Blood from Arm, Right Updated: 04/27/24 1137    Blood Culture - Blood, Arm, Left [[id number]] Collected: 04/27/24 1130    Specimen: Blood from Arm, Left Updated: 04/27/24 1137             Imaging:    CT Abdomen Pelvis With Contrast    Result Date: 4/26/2024  CT ABDOMEN PELVIS W CONTRAST-  Date of Exam: 4/26/2024 3:39 PM  Indication: left flank pain.  Comparison: 4/17/2020  Technique: Axial CT images were obtained of the abdomen and pelvis following the uneventful intravenous administration of nonionic contrast. Reconstructed coronal and sagittal images were also obtained. Automated exposure control and iterative construction methods were used.  Findings: The lung bases are clear.  The liver is of normal size. The liver is of diffusely diminished density consistent with mild fatty infiltration. The gallbladder is not abnormally distended. No pancreatic or adrenal mass is evident. The spleen is of normal size. 1.5 cm left adrenal nodule is consistent with adenoma, and is unchanged.  The kidneys enhance bilaterally. Single 4 mm nonobstructing stone is seen in the midpole collecting system of the right kidney. No right ureteral stone is seen.  On the left, several calyceal calcifications are seen measuring up to 4 mm. A 6 mm stone is seen in the proximal left ureter with moderate hydronephrosis.  The urinary bladder is not  abnormally distended.  The uterus is absent. No pelvic mass is seen. A physiologic amount of free pelvic fluid is seen.  Bowel loops are not abnormally dilated. Small right paraumbilical hernia containing fat seen on series 201 image 120 is not evident on the prior study. A small umbilical hernia containing fat is evident.  Bone islands in the L3 and L4 vertebral bodies appear stable.      Impression: CT scan of the abdomen and pelvis with IV contrast demonstrating fatty liver.  Post hysterectomy.  Nonobstructing calyceal stones are seen in both kidneys.  A 6 mm stone is seen in the proximal left ureter with moderate left hydronephrosis.    Electronically Signed By-CLARISA WOLFF MD On:4/26/2024 4:23 PM         Differential Diagnosis and Discussion:    Abdominal Pain: Based on the patient's signs and symptoms, I considered abdominal aortic aneurysm, small bowel obstruction, pancreatitis, acute cholecystitis, acute appendecitis, peptic ulcer disease, gastritis, colitis, endocrine disorders, irritable bowel syndrome and other differential diagnosis an etiology of the patient's abdominal pain.  Dysuria: Differential diagnosis includes but is not limited to urethritis, cystitis, pyelonephritis, ureteral calculi, neoplasm, chemical irritant, urethral stricture, and trauma  Flank Pain: Differential diagnosis includes but is not limited to kidney stones, pyelonephritis, musculoskeletal disorders, renal infarction, urinary tract infection, hydronephrosis, radiculopathy, aortic aneurysm, renal cell carcinoma.    All labs were reviewed and interpreted by me.  CT scan radiology impression was interpreted by me.    MDM     Amount and/or Complexity of Data Reviewed  Clinical lab tests: reviewed  Tests in the radiology section of CPT®: reviewed                 Patient Care Considerations:    SEPSIS was considered but is NOT present in the emergency department as SIRS criteria is not present.      Consultants/Shared Management  Plan:    Consultant: I have discussed the case with urologist Dr. Rios who states as long as patient is afebrile and her pain is under control she is appropriate for discharge but should return to his office Monday for follow-up and possible surgical intervention.    Social Determinants of Health:    Patient is independent, reliable, and has access to care.       Disposition and Care Coordination:    Discharged: I considered escalation of care by admitting this patient to the hospital, however patient is afebrile and hemodynamically stable with no signs and symptoms of sepsis therefore she will follow-up with urology, per Dr. Rios suggestions.  She will be given strict return instructions.    I have explained the patient´s condition, diagnoses and treatment plan based on the information available to me at this time. I have answered questions and addressed any concerns. The patient has a good  understanding of the patient´s diagnosis, condition, and treatment plan as can be expected at this point. The vital signs have been stable. The patient´s condition is stable and appropriate for discharge from the emergency department.      The patient will pursue further outpatient evaluation with the primary care physician or other designated or consulting physician as outlined in the discharge instructions. They are agreeable to this plan of care and follow-up instructions have been explained in detail. The patient has received these instructions in written format and has expressed an understanding of the discharge instructions. The patient is aware that any significant change in condition or worsening of symptoms should prompt an immediate return to this or the closest emergency department or call to 911.    Final diagnoses:   Bacteria in urine   Multiple renal calculi   Hydronephrosis, unspecified hydronephrosis type        ED Disposition       ED Disposition   Discharge    Condition   --    Comment   --                This medical record created using voice recognition software.       Beryl Godwin, APRN  04/27/24 1132

## 2024-04-26 NOTE — ED PROVIDER NOTES
"SHARED VISIT NOTE:    Patient is 54 y.o. year old female that presents to the ED for evaluation of abdominal pain and dysuria.     Physical Exam  Vital signs were reviewed under triage note.  General appearance - Patient appears well-developed and well-nourished.  Patient is in no acute distress.  Head - Normocephalic, atraumatic.  Pupils - Equal, round, reactive to light.  Extraocular muscles are intact.  Conjunctiva is clear.  Nasal - Normal inspection.  No evidence of trauma or epistaxis.  Tympanic membranes - Gray, intact without erythema or retractions.  Oral mucosa - Pink and moist without lesions or erythema.  Uvula is midline.  Chest wall - Atraumatic.  Chest wall is nontender.  There are no vesicular rashes noted.  Neck - Supple.  Trachea was midline.  There is no palpable lymphadenopathy or thyromegaly.  There are no meningeal signs  Lungs - Clear to auscultation and percussion bilaterally.  Heart - Regular rate and rhythm without any murmurs, clicks, or gallops.  Abdomen - Soft.  Bowel sounds are present.  There is mild left-sidedpalpable tenderness.  There is no rebound, guarding, or rigidity.  There are no palpable masses.  There are no pulsatile masses.  Back - Spine is straight and midline.  There is mild left-sided CVA tenderness.  Extremities - Intact x4 with full range of motion.  There is no palpable edema.  Pulses are intact x4 and equal.  Neurologic - Patient is awake, alert, and oriented x3.  Cranial nerves II through XII are grossly intact.  Motor and sensory functions grossly intact.  Cerebellar function was normal.  Integument - There are no rashes.  There are no petechia or purpura lesions noted.  There are no vesicular lesions noted.      ED Course:    /60 (BP Location: Right arm, Patient Position: Sitting)   Pulse 78   Temp 98.7 °F (37.1 °C) (Oral)   Resp 17   Ht 160 cm (63\")   Wt 85.3 kg (188 lb 0.8 oz)   SpO2 95%   BMI 33.31 kg/m²   Results for orders placed or performed " during the hospital encounter of 04/26/24   Comprehensive Metabolic Panel    Specimen: Blood   Result Value Ref Range    Glucose 148 (H) 65 - 99 mg/dL    BUN 17 6 - 20 mg/dL    Creatinine 0.98 0.57 - 1.00 mg/dL    Sodium 136 136 - 145 mmol/L    Potassium 3.6 3.5 - 5.2 mmol/L    Chloride 100 98 - 107 mmol/L    CO2 24.6 22.0 - 29.0 mmol/L    Calcium 8.6 8.6 - 10.5 mg/dL    Total Protein 6.6 6.0 - 8.5 g/dL    Albumin 3.7 3.5 - 5.2 g/dL    ALT (SGPT) 20 1 - 33 U/L    AST (SGOT) 21 1 - 32 U/L    Alkaline Phosphatase 67 39 - 117 U/L    Total Bilirubin 0.3 0.0 - 1.2 mg/dL    Globulin 2.9 gm/dL    A/G Ratio 1.3 g/dL    BUN/Creatinine Ratio 17.3 7.0 - 25.0    Anion Gap 11.4 5.0 - 15.0 mmol/L    eGFR 68.7 >60.0 mL/min/1.73   Lipase    Specimen: Blood   Result Value Ref Range    Lipase 16 13 - 60 U/L   Urinalysis With Microscopic If Indicated (No Culture) - Urine, Clean Catch    Specimen: Urine, Clean Catch   Result Value Ref Range    Color, UA Dark Yellow (A) Yellow, Straw    Appearance, UA Cloudy (A) Clear    pH, UA 6.0 5.0 - 8.0    Specific Gravity, UA 1.021 1.005 - 1.030    Glucose, UA Negative Negative    Ketones, UA Negative Negative    Bilirubin, UA Negative Negative    Blood, UA Moderate (2+) (A) Negative    Protein,  mg/dL (2+) (A) Negative    Leuk Esterase, UA Moderate (2+) (A) Negative    Nitrite, UA Negative Negative    Urobilinogen, UA 1.0 E.U./dL 0.2 - 1.0 E.U./dL   Lactic Acid, Plasma    Specimen: Blood   Result Value Ref Range    Lactate 1.3 0.5 - 2.0 mmol/L   CBC Auto Differential    Specimen: Blood   Result Value Ref Range    WBC 13.15 (H) 3.40 - 10.80 10*3/mm3    RBC 4.07 3.77 - 5.28 10*6/mm3    Hemoglobin 12.9 12.0 - 15.9 g/dL    Hematocrit 37.7 34.0 - 46.6 %    MCV 92.6 79.0 - 97.0 fL    MCH 31.7 26.6 - 33.0 pg    MCHC 34.2 31.5 - 35.7 g/dL    RDW 13.2 12.3 - 15.4 %    RDW-SD 44.8 37.0 - 54.0 fl    MPV 10.5 6.0 - 12.0 fL    Platelets 148 140 - 450 10*3/mm3   Urinalysis, Microscopic Only - Urine,  Clean Catch    Specimen: Urine, Clean Catch   Result Value Ref Range    RBC, UA 6-10 (A) None Seen, 0-2 /HPF    WBC, UA Too Numerous to Count (A) None Seen, 0-2 /HPF    Bacteria, UA 1+ (A) None Seen /HPF    Squamous Epithelial Cells, UA 0-2 None Seen, 0-2 /HPF    Hyaline Casts, UA None Seen None Seen /LPF    Methodology Automated Microscopy    Scan Slide    Specimen: Blood   Result Value Ref Range    Scan Slide     Manual Differential    Specimen: Blood   Result Value Ref Range    Neutrophil % 82.0 (H) 42.7 - 76.0 %    Lymphocyte % 11.0 (L) 19.6 - 45.3 %    Monocyte % 4.0 (L) 5.0 - 12.0 %    Eosinophil % 1.0 0.3 - 6.2 %    Bands %  2.0 0.0 - 5.0 %    Neutrophils Absolute 11.05 (H) 1.70 - 7.00 10*3/mm3    Lymphocytes Absolute 1.45 0.70 - 3.10 10*3/mm3    Monocytes Absolute 0.53 0.10 - 0.90 10*3/mm3    Eosinophils Absolute 0.13 0.00 - 0.40 10*3/mm3    Anisocytosis Slight/1+ None Seen    Macrocytes Slight/1+ None Seen    WBC Morphology Normal Normal    Platelet Estimate Adequate Normal   Green Top (Gel)   Result Value Ref Range    Extra Tube Hold for add-ons.    Lavender Top   Result Value Ref Range    Extra Tube hold for add-on    Gold Top - SST   Result Value Ref Range    Extra Tube Hold for add-ons.    Light Blue Top   Result Value Ref Range    Extra Tube Hold for add-ons.      Medications   sodium chloride 0.9 % flush 10 mL (has no administration in time range)   ketorolac (TORADOL) injection 30 mg (30 mg Intravenous Given 4/26/24 1523)   ondansetron (ZOFRAN) injection 4 mg (4 mg Intravenous Given 4/26/24 1523)   sodium chloride 0.9 % bolus 1,000 mL (0 mL Intravenous Stopped 4/26/24 1535)   iopamidol (ISOVUE-370) 76 % injection 100 mL (100 mL Intravenous Given 4/26/24 1558)   cefTRIAXone (ROCEPHIN) 2000 mg/100 mL 0.9% NS IVPB (MBP) (2,000 mg Intravenous New Bag 4/26/24 8086)     CT Abdomen Pelvis With Contrast    Result Date: 4/26/2024  Narrative: CT ABDOMEN PELVIS W CONTRAST-  Date of Exam: 4/26/2024 3:39 PM   Indication: left flank pain.  Comparison: 4/17/2020  Technique: Axial CT images were obtained of the abdomen and pelvis following the uneventful intravenous administration of nonionic contrast. Reconstructed coronal and sagittal images were also obtained. Automated exposure control and iterative construction methods were used.  Findings: The lung bases are clear.  The liver is of normal size. The liver is of diffusely diminished density consistent with mild fatty infiltration. The gallbladder is not abnormally distended. No pancreatic or adrenal mass is evident. The spleen is of normal size. 1.5 cm left adrenal nodule is consistent with adenoma, and is unchanged.  The kidneys enhance bilaterally. Single 4 mm nonobstructing stone is seen in the midpole collecting system of the right kidney. No right ureteral stone is seen.  On the left, several calyceal calcifications are seen measuring up to 4 mm. A 6 mm stone is seen in the proximal left ureter with moderate hydronephrosis.  The urinary bladder is not abnormally distended.  The uterus is absent. No pelvic mass is seen. A physiologic amount of free pelvic fluid is seen.  Bowel loops are not abnormally dilated. Small right paraumbilical hernia containing fat seen on series 201 image 120 is not evident on the prior study. A small umbilical hernia containing fat is evident.  Bone islands in the L3 and L4 vertebral bodies appear stable.      Impression: Impression: CT scan of the abdomen and pelvis with IV contrast demonstrating fatty liver.  Post hysterectomy.  Nonobstructing calyceal stones are seen in both kidneys.  A 6 mm stone is seen in the proximal left ureter with moderate left hydronephrosis.    Electronically Signed By-CLARISA WOLFF MD On:4/26/2024 4:23 PM       MDM:    Procedures    All labs were reviewed and interpreted by me.  CT scan radiology impression was interpreted by me.      SHARED VISIT ATTESTATION:    This visit was performed by both myself and  an APC.  I performed the substantive portion of the medical decision making. The management plan was made or approved by me, and I take responsibility for patient management.           Deuce Villa DO  18:18 EDT  04/26/24         Deuce Villa DO  04/28/24 [id number]

## 2024-04-26 NOTE — DISCHARGE INSTRUCTIONS
You have been provided a urine strainer to strain your urine with each void.  Please continue to increase your water intake and continue to take your Flomax.  You have been prescribed to pain medications 1 to take for severe pain which is a narcotic and 1 that is the same medication you received to your IV while in the emergency department.    Please check your temperature regularly.  If it anytime it increases to, or greater than, 100.5 please report to the emergency department immediately.   Also if you develop severe nausea and vomiting, become unable to urinate, or cannot manage your pain please return to the ER.  Otherwise he will follow-up at the urologist office on Monday.  Someone should reach out to you to first thing Monday morning.  If they do not please contact them yourself.  I have spoke to the urologist Dr. Rios and he is aware that you will be coming to his office on Monday

## 2024-04-27 ENCOUNTER — APPOINTMENT (OUTPATIENT)
Dept: GENERAL RADIOLOGY | Facility: HOSPITAL | Age: 54
DRG: 660 | End: 2024-04-27
Payer: OTHER GOVERNMENT

## 2024-04-27 ENCOUNTER — PREP FOR SURGERY (OUTPATIENT)
Dept: OTHER | Facility: HOSPITAL | Age: 54
End: 2024-04-27
Payer: OTHER GOVERNMENT

## 2024-04-27 ENCOUNTER — ANESTHESIA (OUTPATIENT)
Dept: PERIOP | Facility: HOSPITAL | Age: 54
End: 2024-04-27
Payer: OTHER GOVERNMENT

## 2024-04-27 ENCOUNTER — HOSPITAL ENCOUNTER (INPATIENT)
Facility: HOSPITAL | Age: 54
LOS: 1 days | Discharge: HOME | DRG: 660 | End: 2024-04-30
Attending: EMERGENCY MEDICINE | Admitting: INTERNAL MEDICINE
Payer: OTHER GOVERNMENT

## 2024-04-27 ENCOUNTER — ANESTHESIA EVENT (OUTPATIENT)
Dept: PERIOP | Facility: HOSPITAL | Age: 54
End: 2024-04-27
Payer: OTHER GOVERNMENT

## 2024-04-27 DIAGNOSIS — Z78.9 DECREASED ACTIVITIES OF DAILY LIVING (ADL): ICD-10-CM

## 2024-04-27 DIAGNOSIS — N39.0 ACUTE UTI (URINARY TRACT INFECTION): ICD-10-CM

## 2024-04-27 DIAGNOSIS — N20.1 URETERAL STONE: Primary | ICD-10-CM

## 2024-04-27 DIAGNOSIS — R50.9 ACUTE FEBRILE ILLNESS: ICD-10-CM

## 2024-04-27 DIAGNOSIS — R26.2 DIFFICULTY WALKING: ICD-10-CM

## 2024-04-27 DIAGNOSIS — N20.1 URETERAL STONE: ICD-10-CM

## 2024-04-27 DIAGNOSIS — N20.1 LEFT URETERAL STONE: ICD-10-CM

## 2024-04-27 DIAGNOSIS — N23 RENAL COLIC ON LEFT SIDE: Primary | ICD-10-CM

## 2024-04-27 PROBLEM — N20.0 NEPHROLITHIASIS: Status: ACTIVE | Noted: 2024-04-27

## 2024-04-27 LAB
ALBUMIN SERPL-MCNC: 3.3 G/DL (ref 3.5–5.2)
ALBUMIN/GLOB SERPL: 1.2 G/DL
ALP SERPL-CCNC: 73 U/L (ref 39–117)
ALT SERPL W P-5'-P-CCNC: 18 U/L (ref 1–33)
ANION GAP SERPL CALCULATED.3IONS-SCNC: 11.3 MMOL/L (ref 5–15)
AST SERPL-CCNC: 20 U/L (ref 1–32)
BACTERIA UR QL AUTO: ABNORMAL /HPF
BASOPHILS # BLD AUTO: 0.05 10*3/MM3 (ref 0–0.2)
BASOPHILS NFR BLD AUTO: 0.5 % (ref 0–1.5)
BILIRUB SERPL-MCNC: 0.3 MG/DL (ref 0–1.2)
BILIRUB UR QL STRIP: ABNORMAL
BUN SERPL-MCNC: 21 MG/DL (ref 6–20)
BUN/CREAT SERPL: 19.6 (ref 7–25)
CALCIUM SPEC-SCNC: 8.1 MG/DL (ref 8.6–10.5)
CHLORIDE SERPL-SCNC: 101 MMOL/L (ref 98–107)
CLARITY UR: ABNORMAL
CO2 SERPL-SCNC: 23.7 MMOL/L (ref 22–29)
COLOR UR: ABNORMAL
CREAT SERPL-MCNC: 1.07 MG/DL (ref 0.57–1)
D-LACTATE SERPL-SCNC: 1.2 MMOL/L (ref 0.5–2)
DEPRECATED RDW RBC AUTO: 44.2 FL (ref 37–54)
EGFRCR SERPLBLD CKD-EPI 2021: 61.9 ML/MIN/1.73
EOSINOPHIL # BLD AUTO: 0.09 10*3/MM3 (ref 0–0.4)
EOSINOPHIL NFR BLD AUTO: 0.9 % (ref 0.3–6.2)
ERYTHROCYTE [DISTWIDTH] IN BLOOD BY AUTOMATED COUNT: 13.2 % (ref 12.3–15.4)
GLOBULIN UR ELPH-MCNC: 2.7 GM/DL
GLUCOSE SERPL-MCNC: 133 MG/DL (ref 65–99)
GLUCOSE UR STRIP-MCNC: NEGATIVE MG/DL
HCT VFR BLD AUTO: 34.9 % (ref 34–46.6)
HGB BLD-MCNC: 12.2 G/DL (ref 12–15.9)
HGB UR QL STRIP.AUTO: ABNORMAL
HOLD SPECIMEN: NORMAL
HOLD SPECIMEN: NORMAL
HYALINE CASTS UR QL AUTO: ABNORMAL /LPF
IMM GRANULOCYTES # BLD AUTO: 0.18 10*3/MM3 (ref 0–0.05)
IMM GRANULOCYTES NFR BLD AUTO: 1.7 % (ref 0–0.5)
KETONES UR QL STRIP: ABNORMAL
LEUKOCYTE ESTERASE UR QL STRIP.AUTO: ABNORMAL
LIPASE SERPL-CCNC: 17 U/L (ref 13–60)
LYMPHOCYTES # BLD AUTO: 0.61 10*3/MM3 (ref 0.7–3.1)
LYMPHOCYTES NFR BLD AUTO: 5.8 % (ref 19.6–45.3)
MCH RBC QN AUTO: 32.1 PG (ref 26.6–33)
MCHC RBC AUTO-ENTMCNC: 35 G/DL (ref 31.5–35.7)
MCV RBC AUTO: 91.8 FL (ref 79–97)
MONOCYTES # BLD AUTO: 0.46 10*3/MM3 (ref 0.1–0.9)
MONOCYTES NFR BLD AUTO: 4.4 % (ref 5–12)
NEUTROPHILS NFR BLD AUTO: 86.7 % (ref 42.7–76)
NEUTROPHILS NFR BLD AUTO: 9.16 10*3/MM3 (ref 1.7–7)
NITRITE UR QL STRIP: NEGATIVE
NRBC BLD AUTO-RTO: 0 /100 WBC (ref 0–0.2)
PH UR STRIP.AUTO: 5.5 [PH] (ref 5–8)
PLATELET # BLD AUTO: 128 10*3/MM3 (ref 140–450)
PMV BLD AUTO: 10.5 FL (ref 6–12)
POTASSIUM SERPL-SCNC: 3.6 MMOL/L (ref 3.5–5.2)
PROT SERPL-MCNC: 6 G/DL (ref 6–8.5)
PROT UR QL STRIP: ABNORMAL
RBC # BLD AUTO: 3.8 10*6/MM3 (ref 3.77–5.28)
RBC # UR STRIP: ABNORMAL /HPF
REF LAB TEST METHOD: ABNORMAL
SODIUM SERPL-SCNC: 136 MMOL/L (ref 136–145)
SP GR UR STRIP: >1.03 (ref 1–1.03)
SQUAMOUS #/AREA URNS HPF: ABNORMAL /HPF
UROBILINOGEN UR QL STRIP: ABNORMAL
WBC # UR STRIP: ABNORMAL /HPF
WBC NRBC COR # BLD AUTO: 10.55 10*3/MM3 (ref 3.4–10.8)
WHOLE BLOOD HOLD COAG: NORMAL
WHOLE BLOOD HOLD SPECIMEN: NORMAL

## 2024-04-27 PROCEDURE — 36415 COLL VENOUS BLD VENIPUNCTURE: CPT

## 2024-04-27 PROCEDURE — C1769 GUIDE WIRE: HCPCS | Performed by: UROLOGY

## 2024-04-27 PROCEDURE — 99284 EMERGENCY DEPT VISIT MOD MDM: CPT | Performed by: UROLOGY

## 2024-04-27 PROCEDURE — 80053 COMPREHEN METABOLIC PANEL: CPT | Performed by: EMERGENCY MEDICINE

## 2024-04-27 PROCEDURE — 87186 SC STD MICRODIL/AGAR DIL: CPT | Performed by: EMERGENCY MEDICINE

## 2024-04-27 PROCEDURE — G0378 HOSPITAL OBSERVATION PER HR: HCPCS

## 2024-04-27 PROCEDURE — 25810000003 SODIUM CHLORIDE 0.9 % SOLUTION: Performed by: EMERGENCY MEDICINE

## 2024-04-27 PROCEDURE — 52332 CYSTOSCOPY AND TREATMENT: CPT | Performed by: UROLOGY

## 2024-04-27 PROCEDURE — 25010000002 MIDAZOLAM PER 1MG: Performed by: ANESTHESIOLOGY

## 2024-04-27 PROCEDURE — 94799 UNLISTED PULMONARY SVC/PX: CPT

## 2024-04-27 PROCEDURE — 94761 N-INVAS EAR/PLS OXIMETRY MLT: CPT

## 2024-04-27 PROCEDURE — 25810000003 LACTATED RINGERS PER 1000 ML: Performed by: NURSE ANESTHETIST, CERTIFIED REGISTERED

## 2024-04-27 PROCEDURE — 25010000002 ONDANSETRON PER 1 MG: Performed by: NURSE ANESTHETIST, CERTIFIED REGISTERED

## 2024-04-27 PROCEDURE — 25010000002 KETOROLAC TROMETHAMINE PER 15 MG: Performed by: EMERGENCY MEDICINE

## 2024-04-27 PROCEDURE — 87086 URINE CULTURE/COLONY COUNT: CPT | Performed by: EMERGENCY MEDICINE

## 2024-04-27 PROCEDURE — 0T778DZ DILATION OF LEFT URETER WITH INTRALUMINAL DEVICE, VIA NATURAL OR ARTIFICIAL OPENING ENDOSCOPIC: ICD-10-PCS | Performed by: UROLOGY

## 2024-04-27 PROCEDURE — 85025 COMPLETE CBC W/AUTO DIFF WBC: CPT | Performed by: EMERGENCY MEDICINE

## 2024-04-27 PROCEDURE — 25010000002 CEFTRIAXONE PER 250 MG: Performed by: EMERGENCY MEDICINE

## 2024-04-27 PROCEDURE — 25010000002 ENOXAPARIN PER 10 MG: Performed by: HOSPITALIST

## 2024-04-27 PROCEDURE — 76000 FLUOROSCOPY <1 HR PHYS/QHP: CPT

## 2024-04-27 PROCEDURE — 25010000002 FENTANYL CITRATE (PF) 50 MCG/ML SOLUTION: Performed by: NURSE ANESTHETIST, CERTIFIED REGISTERED

## 2024-04-27 PROCEDURE — 25010000002 DEXAMETHASONE PER 1 MG: Performed by: NURSE ANESTHETIST, CERTIFIED REGISTERED

## 2024-04-27 PROCEDURE — C2617 STENT, NON-COR, TEM W/O DEL: HCPCS | Performed by: UROLOGY

## 2024-04-27 PROCEDURE — 81001 URINALYSIS AUTO W/SCOPE: CPT | Performed by: EMERGENCY MEDICINE

## 2024-04-27 PROCEDURE — 25010000002 PROPOFOL 10 MG/ML EMULSION: Performed by: NURSE ANESTHETIST, CERTIFIED REGISTERED

## 2024-04-27 PROCEDURE — 83690 ASSAY OF LIPASE: CPT | Performed by: EMERGENCY MEDICINE

## 2024-04-27 PROCEDURE — 25810000003 SODIUM CHLORIDE 0.9 % SOLUTION: Performed by: HOSPITALIST

## 2024-04-27 PROCEDURE — 25810000003 SODIUM CHLORIDE 0.9 % SOLUTION: Performed by: NURSE ANESTHETIST, CERTIFIED REGISTERED

## 2024-04-27 PROCEDURE — 25010000002 CEFTRIAXONE PER 250 MG: Performed by: HOSPITALIST

## 2024-04-27 PROCEDURE — 99291 CRITICAL CARE FIRST HOUR: CPT

## 2024-04-27 PROCEDURE — 83605 ASSAY OF LACTIC ACID: CPT | Performed by: EMERGENCY MEDICINE

## 2024-04-27 PROCEDURE — 87040 BLOOD CULTURE FOR BACTERIA: CPT | Performed by: EMERGENCY MEDICINE

## 2024-04-27 DEVICE — URETERAL STENT
Type: IMPLANTABLE DEVICE | Site: URETER | Status: FUNCTIONAL
Brand: ASCERTA™

## 2024-04-27 RX ORDER — PANTOPRAZOLE SODIUM 40 MG/1
40 TABLET, DELAYED RELEASE ORAL
Status: DISCONTINUED | OUTPATIENT
Start: 2024-04-28 | End: 2024-04-30 | Stop reason: HOSPADM

## 2024-04-27 RX ORDER — HYDROCODONE BITARTRATE AND ACETAMINOPHEN 5; 325 MG/1; MG/1
1 TABLET ORAL EVERY 4 HOURS PRN
Status: DISCONTINUED | OUTPATIENT
Start: 2024-04-27 | End: 2024-04-30 | Stop reason: HOSPADM

## 2024-04-27 RX ORDER — CETIRIZINE HYDROCHLORIDE 10 MG/1
10 TABLET ORAL NIGHTLY
Status: DISCONTINUED | OUTPATIENT
Start: 2024-04-27 | End: 2024-04-30 | Stop reason: HOSPADM

## 2024-04-27 RX ORDER — LISINOPRIL 10 MG/1
20 TABLET ORAL DAILY
COMMUNITY
Start: 2024-04-05 | End: 2024-04-30 | Stop reason: HOSPADM

## 2024-04-27 RX ORDER — HYDROCODONE BITARTRATE AND ACETAMINOPHEN 7.5; 325 MG/1; MG/1
2 TABLET ORAL EVERY 4 HOURS PRN
Status: DISCONTINUED | OUTPATIENT
Start: 2024-04-27 | End: 2024-04-30 | Stop reason: HOSPADM

## 2024-04-27 RX ORDER — SODIUM CHLORIDE, SODIUM LACTATE, POTASSIUM CHLORIDE, CALCIUM CHLORIDE 600; 310; 30; 20 MG/100ML; MG/100ML; MG/100ML; MG/100ML
INJECTION, SOLUTION INTRAVENOUS CONTINUOUS PRN
Status: DISCONTINUED | OUTPATIENT
Start: 2024-04-27 | End: 2024-04-27 | Stop reason: SURG

## 2024-04-27 RX ORDER — LEVOFLOXACIN 5 MG/ML
500 INJECTION, SOLUTION INTRAVENOUS ONCE
OUTPATIENT
Start: 2024-04-27 | End: 2024-04-27

## 2024-04-27 RX ORDER — SODIUM CHLORIDE 9 MG/ML
75 INJECTION, SOLUTION INTRAVENOUS CONTINUOUS
Status: DISCONTINUED | OUTPATIENT
Start: 2024-04-27 | End: 2024-04-28

## 2024-04-27 RX ORDER — SODIUM CHLORIDE 0.9 % (FLUSH) 0.9 %
10 SYRINGE (ML) INJECTION AS NEEDED
Status: DISCONTINUED | OUTPATIENT
Start: 2024-04-27 | End: 2024-04-30 | Stop reason: HOSPADM

## 2024-04-27 RX ORDER — KETOROLAC TROMETHAMINE 30 MG/ML
30 INJECTION, SOLUTION INTRAMUSCULAR; INTRAVENOUS ONCE
Status: COMPLETED | OUTPATIENT
Start: 2024-04-27 | End: 2024-04-27

## 2024-04-27 RX ORDER — MEPERIDINE HYDROCHLORIDE 25 MG/ML
12.5 INJECTION INTRAMUSCULAR; INTRAVENOUS; SUBCUTANEOUS
Status: DISCONTINUED | OUTPATIENT
Start: 2024-04-27 | End: 2024-04-27 | Stop reason: HOSPADM

## 2024-04-27 RX ORDER — KETAMINE HCL IN NACL, ISO-OSM 100MG/10ML
SYRINGE (ML) INJECTION AS NEEDED
Status: DISCONTINUED | OUTPATIENT
Start: 2024-04-27 | End: 2024-04-27 | Stop reason: SURG

## 2024-04-27 RX ORDER — MIDAZOLAM HYDROCHLORIDE 2 MG/2ML
INJECTION, SOLUTION INTRAMUSCULAR; INTRAVENOUS AS NEEDED
Status: DISCONTINUED | OUTPATIENT
Start: 2024-04-27 | End: 2024-04-27 | Stop reason: SURG

## 2024-04-27 RX ORDER — OXYCODONE HYDROCHLORIDE 5 MG/1
5 TABLET ORAL
Status: DISCONTINUED | OUTPATIENT
Start: 2024-04-27 | End: 2024-04-27 | Stop reason: HOSPADM

## 2024-04-27 RX ORDER — SODIUM CHLORIDE 0.9 % (FLUSH) 0.9 %
10 SYRINGE (ML) INJECTION EVERY 12 HOURS SCHEDULED
Status: DISCONTINUED | OUTPATIENT
Start: 2024-04-27 | End: 2024-04-30 | Stop reason: HOSPADM

## 2024-04-27 RX ORDER — FENTANYL CITRATE 50 UG/ML
INJECTION, SOLUTION INTRAMUSCULAR; INTRAVENOUS AS NEEDED
Status: DISCONTINUED | OUTPATIENT
Start: 2024-04-27 | End: 2024-04-27 | Stop reason: SURG

## 2024-04-27 RX ORDER — AMOXICILLIN 250 MG
2 CAPSULE ORAL 2 TIMES DAILY
Status: DISCONTINUED | OUTPATIENT
Start: 2024-04-27 | End: 2024-04-30 | Stop reason: HOSPADM

## 2024-04-27 RX ORDER — PROMETHAZINE HYDROCHLORIDE 12.5 MG/1
25 TABLET ORAL ONCE AS NEEDED
Status: DISCONTINUED | OUTPATIENT
Start: 2024-04-27 | End: 2024-04-27 | Stop reason: HOSPADM

## 2024-04-27 RX ORDER — SODIUM CHLORIDE, SODIUM LACTATE, POTASSIUM CHLORIDE, CALCIUM CHLORIDE 600; 310; 30; 20 MG/100ML; MG/100ML; MG/100ML; MG/100ML
9 INJECTION, SOLUTION INTRAVENOUS CONTINUOUS PRN
Status: CANCELLED | OUTPATIENT
Start: 2024-04-27

## 2024-04-27 RX ORDER — SODIUM CHLORIDE 9 MG/ML
40 INJECTION, SOLUTION INTRAVENOUS AS NEEDED
OUTPATIENT
Start: 2024-04-27

## 2024-04-27 RX ORDER — ACETAMINOPHEN 325 MG/1
650 TABLET ORAL EVERY 4 HOURS PRN
Status: DISCONTINUED | OUTPATIENT
Start: 2024-04-27 | End: 2024-04-30 | Stop reason: HOSPADM

## 2024-04-27 RX ORDER — ONDANSETRON 2 MG/ML
INJECTION INTRAMUSCULAR; INTRAVENOUS AS NEEDED
Status: DISCONTINUED | OUTPATIENT
Start: 2024-04-27 | End: 2024-04-27 | Stop reason: SURG

## 2024-04-27 RX ORDER — SODIUM CHLORIDE 9 MG/ML
100 INJECTION, SOLUTION INTRAVENOUS CONTINUOUS
OUTPATIENT
Start: 2024-04-27

## 2024-04-27 RX ORDER — LIDOCAINE HYDROCHLORIDE 20 MG/ML
INJECTION, SOLUTION EPIDURAL; INFILTRATION; INTRACAUDAL; PERINEURAL AS NEEDED
Status: DISCONTINUED | OUTPATIENT
Start: 2024-04-27 | End: 2024-04-27 | Stop reason: SURG

## 2024-04-27 RX ORDER — SODIUM CHLORIDE 9 MG/ML
40 INJECTION, SOLUTION INTRAVENOUS AS NEEDED
Status: DISCONTINUED | OUTPATIENT
Start: 2024-04-27 | End: 2024-04-30 | Stop reason: HOSPADM

## 2024-04-27 RX ORDER — TAMSULOSIN HYDROCHLORIDE 0.4 MG/1
0.4 CAPSULE ORAL DAILY
Status: DISCONTINUED | OUTPATIENT
Start: 2024-04-27 | End: 2024-04-30 | Stop reason: HOSPADM

## 2024-04-27 RX ORDER — BISACODYL 10 MG
10 SUPPOSITORY, RECTAL RECTAL DAILY PRN
Status: DISCONTINUED | OUTPATIENT
Start: 2024-04-27 | End: 2024-04-30 | Stop reason: HOSPADM

## 2024-04-27 RX ORDER — ONDANSETRON 2 MG/ML
4 INJECTION INTRAMUSCULAR; INTRAVENOUS ONCE AS NEEDED
Status: DISCONTINUED | OUTPATIENT
Start: 2024-04-27 | End: 2024-04-27 | Stop reason: HOSPADM

## 2024-04-27 RX ORDER — LISINOPRIL 10 MG/1
10 TABLET ORAL
Status: DISCONTINUED | OUTPATIENT
Start: 2024-04-27 | End: 2024-04-28

## 2024-04-27 RX ORDER — DEXAMETHASONE SODIUM PHOSPHATE 4 MG/ML
INJECTION, SOLUTION INTRA-ARTICULAR; INTRALESIONAL; INTRAMUSCULAR; INTRAVENOUS; SOFT TISSUE AS NEEDED
Status: DISCONTINUED | OUTPATIENT
Start: 2024-04-27 | End: 2024-04-27 | Stop reason: SURG

## 2024-04-27 RX ORDER — SODIUM CHLORIDE 9 MG/ML
INJECTION, SOLUTION INTRAVENOUS CONTINUOUS PRN
Status: DISCONTINUED | OUTPATIENT
Start: 2024-04-27 | End: 2024-04-27 | Stop reason: SURG

## 2024-04-27 RX ORDER — CHOLECALCIFEROL (VITAMIN D3) 125 MCG
5 CAPSULE ORAL NIGHTLY PRN
Status: DISCONTINUED | OUTPATIENT
Start: 2024-04-27 | End: 2024-04-30 | Stop reason: HOSPADM

## 2024-04-27 RX ORDER — SODIUM CHLORIDE 0.9 % (FLUSH) 0.9 %
3 SYRINGE (ML) INJECTION EVERY 12 HOURS SCHEDULED
OUTPATIENT
Start: 2024-04-27

## 2024-04-27 RX ORDER — ONDANSETRON 4 MG/1
4 TABLET, ORALLY DISINTEGRATING ORAL EVERY 6 HOURS PRN
Status: DISCONTINUED | OUTPATIENT
Start: 2024-04-27 | End: 2024-04-30 | Stop reason: HOSPADM

## 2024-04-27 RX ORDER — FLUTICASONE PROPIONATE 50 MCG
1 SPRAY, SUSPENSION (ML) NASAL DAILY
Status: DISCONTINUED | OUTPATIENT
Start: 2024-04-27 | End: 2024-04-30 | Stop reason: HOSPADM

## 2024-04-27 RX ORDER — ENOXAPARIN SODIUM 100 MG/ML
40 INJECTION SUBCUTANEOUS DAILY
Status: DISCONTINUED | OUTPATIENT
Start: 2024-04-27 | End: 2024-04-30 | Stop reason: HOSPADM

## 2024-04-27 RX ORDER — ACETAMINOPHEN 500 MG
1000 TABLET ORAL ONCE
Status: COMPLETED | OUTPATIENT
Start: 2024-04-27 | End: 2024-04-27

## 2024-04-27 RX ORDER — POLYETHYLENE GLYCOL 3350 17 G/17G
17 POWDER, FOR SOLUTION ORAL DAILY PRN
Status: DISCONTINUED | OUTPATIENT
Start: 2024-04-27 | End: 2024-04-30 | Stop reason: HOSPADM

## 2024-04-27 RX ORDER — NALOXONE HCL 0.4 MG/ML
0.4 VIAL (ML) INJECTION
Status: DISCONTINUED | OUTPATIENT
Start: 2024-04-27 | End: 2024-04-30 | Stop reason: HOSPADM

## 2024-04-27 RX ORDER — MORPHINE SULFATE 2 MG/ML
1 INJECTION, SOLUTION INTRAMUSCULAR; INTRAVENOUS EVERY 4 HOURS PRN
Status: DISCONTINUED | OUTPATIENT
Start: 2024-04-27 | End: 2024-04-30 | Stop reason: HOSPADM

## 2024-04-27 RX ORDER — ONDANSETRON 2 MG/ML
4 INJECTION INTRAMUSCULAR; INTRAVENOUS EVERY 6 HOURS PRN
Status: DISCONTINUED | OUTPATIENT
Start: 2024-04-27 | End: 2024-04-30 | Stop reason: HOSPADM

## 2024-04-27 RX ORDER — HYDROCODONE BITARTRATE AND ACETAMINOPHEN 5; 325 MG/1; MG/1
1 TABLET ORAL EVERY 6 HOURS PRN
Status: DISCONTINUED | OUTPATIENT
Start: 2024-04-27 | End: 2024-04-30 | Stop reason: HOSPADM

## 2024-04-27 RX ORDER — BISACODYL 5 MG/1
5 TABLET, DELAYED RELEASE ORAL DAILY PRN
Status: DISCONTINUED | OUTPATIENT
Start: 2024-04-27 | End: 2024-04-30 | Stop reason: HOSPADM

## 2024-04-27 RX ORDER — PROMETHAZINE HYDROCHLORIDE 25 MG/1
25 SUPPOSITORY RECTAL ONCE AS NEEDED
Status: DISCONTINUED | OUTPATIENT
Start: 2024-04-27 | End: 2024-04-27 | Stop reason: HOSPADM

## 2024-04-27 RX ORDER — SODIUM CHLORIDE 0.9 % (FLUSH) 0.9 %
10 SYRINGE (ML) INJECTION AS NEEDED
OUTPATIENT
Start: 2024-04-27

## 2024-04-27 RX ORDER — MIDAZOLAM HYDROCHLORIDE 2 MG/2ML
2 INJECTION, SOLUTION INTRAMUSCULAR; INTRAVENOUS ONCE
Status: CANCELLED | OUTPATIENT
Start: 2024-04-27 | End: 2024-04-27

## 2024-04-27 RX ORDER — CITALOPRAM 20 MG/1
20 TABLET ORAL DAILY
Status: DISCONTINUED | OUTPATIENT
Start: 2024-04-27 | End: 2024-04-30 | Stop reason: HOSPADM

## 2024-04-27 RX ORDER — CETIRIZINE HYDROCHLORIDE 10 MG/1
10 TABLET ORAL DAILY
Status: DISCONTINUED | OUTPATIENT
Start: 2024-04-27 | End: 2024-04-27

## 2024-04-27 RX ADMIN — ONDANSETRON HYDROCHLORIDE 4 MG: 2 SOLUTION INTRAMUSCULAR; INTRAVENOUS at 11:58

## 2024-04-27 RX ADMIN — CEFTRIAXONE SODIUM 2 G: 2 INJECTION, POWDER, FOR SOLUTION INTRAMUSCULAR; INTRAVENOUS at 11:57

## 2024-04-27 RX ADMIN — ACETAMINOPHEN 1000 MG: 500 TABLET ORAL at 12:44

## 2024-04-27 RX ADMIN — SODIUM CHLORIDE 100 ML/HR: 9 INJECTION, SOLUTION INTRAVENOUS at 22:32

## 2024-04-27 RX ADMIN — CETIRIZINE HYDROCHLORIDE 10 MG: 10 TABLET, FILM COATED ORAL at 22:31

## 2024-04-27 RX ADMIN — SODIUM CHLORIDE, POTASSIUM CHLORIDE, SODIUM LACTATE AND CALCIUM CHLORIDE: 600; 310; 30; 20 INJECTION, SOLUTION INTRAVENOUS at 11:57

## 2024-04-27 RX ADMIN — OXYCODONE HYDROCHLORIDE 5 MG: 5 TABLET ORAL at 12:42

## 2024-04-27 RX ADMIN — SODIUM CHLORIDE 100 ML/HR: 9 INJECTION, SOLUTION INTRAVENOUS at 14:15

## 2024-04-27 RX ADMIN — HYDROCODONE BITARTRATE AND ACETAMINOPHEN 1 TABLET: 5; 325 TABLET ORAL at 22:31

## 2024-04-27 RX ADMIN — LIDOCAINE HYDROCHLORIDE 60 MG: 20 INJECTION, SOLUTION INTRAVENOUS at 12:01

## 2024-04-27 RX ADMIN — Medication 10 ML: at 22:32

## 2024-04-27 RX ADMIN — SODIUM CHLORIDE 2000 ML: 9 INJECTION, SOLUTION INTRAVENOUS at 11:30

## 2024-04-27 RX ADMIN — ENOXAPARIN SODIUM 40 MG: 100 INJECTION SUBCUTANEOUS at 14:15

## 2024-04-27 RX ADMIN — CEFTRIAXONE SODIUM 2000 MG: 2 INJECTION, POWDER, FOR SOLUTION INTRAMUSCULAR; INTRAVENOUS at 11:30

## 2024-04-27 RX ADMIN — Medication 50 MG: at 12:01

## 2024-04-27 RX ADMIN — MIDAZOLAM HYDROCHLORIDE 2 MG: 1 INJECTION, SOLUTION INTRAMUSCULAR; INTRAVENOUS at 11:54

## 2024-04-27 RX ADMIN — FENTANYL CITRATE 25 MCG: 50 INJECTION, SOLUTION INTRAMUSCULAR; INTRAVENOUS at 12:01

## 2024-04-27 RX ADMIN — KETOROLAC TROMETHAMINE 30 MG: 30 INJECTION, SOLUTION INTRAMUSCULAR; INTRAVENOUS at 11:31

## 2024-04-27 RX ADMIN — FENTANYL CITRATE 50 MCG: 50 INJECTION, SOLUTION INTRAMUSCULAR; INTRAVENOUS at 11:58

## 2024-04-27 RX ADMIN — PROPOFOL 250 MCG/KG/MIN: 10 INJECTION, EMULSION INTRAVENOUS at 12:01

## 2024-04-27 RX ADMIN — SODIUM CHLORIDE, POTASSIUM CHLORIDE, SODIUM LACTATE AND CALCIUM CHLORIDE: 600; 310; 30; 20 INJECTION, SOLUTION INTRAVENOUS at 12:23

## 2024-04-27 RX ADMIN — DEXAMETHASONE SODIUM PHOSPHATE 4 MG: 4 INJECTION, SOLUTION INTRAMUSCULAR; INTRAVENOUS at 11:58

## 2024-04-27 RX ADMIN — FENTANYL CITRATE 25 MCG: 50 INJECTION, SOLUTION INTRAMUSCULAR; INTRAVENOUS at 12:05

## 2024-04-27 RX ADMIN — SODIUM CHLORIDE: 9 INJECTION, SOLUTION INTRAVENOUS at 11:57

## 2024-04-27 NOTE — PLAN OF CARE
Goal Outcome Evaluation:  Plan of Care Reviewed With: patient        Progress: improving       VSS, room air. Up adlib. No c/o pain. Intermittent burning with urination. Cloudy yellow urine output noted. Continued IVF and antibiotics. Tolerating diet. Will CTM and provide care.

## 2024-04-27 NOTE — H&P
[facility] HISTORY AND PHYSICAL  Date: 2024   Patient Name: Xuan Horvath  : 1970  MRN: [id number]  Primary Care Physician:  Suri Lynn MD  Date of admission: 2024    Subjective fever, continued flank pain, kidney stone  Subjective     Chief Complaint: Fever, continued flank pain kidney stone    HPI:  Xuan Horvath is a 54 y.o. female with a past medical history of hypothyroidism, depression, hypertension who presents with a 6 mm left-sided ureteral stone that was seen on CT scan of the abdomen yesterday in the ED.  Patient was given a dose of Rocephin for concurrent UTI.  Patient was supposed to follow-up with Dr. Rios on Monday.  Unfortunately, patient had a fever; therefore, will need to go to surgery immediately.    On arrival to the ED, patient had a temperature nine 9.9, pulse of 89, respiratory rate of 18, blood pressure 152/90, and she saturating 97% on room air.    Patient has a sodium of 139, creatinine of 1.07, glucose of 133, albumin of 3.3, lactate of 1.2, lipase of 17, white blood cell count of 10.55.  Personal History     Past Medical History:  Past Medical History:   Diagnosis Date    Allergy     Anxiety     Colon cancer screening     Depression     Depression 2015    working well, continue citalopram discussed that she can stay on meds long term or go off them whenever she is ready    Essential hypertension 2016    stable will try to decrease atenolol and see if that will give her more energy, need to watch bp closely    GERD (gastroesophageal reflux disease) 2023    Prilosec. Protonix.    Glaucoma     Heart murmur 2022    Hiatal hernia     History of medical problems 22    Panniculectomy    Hypertension     Hypothyroidism     Kidney stone     Obesity 2016    she is going to work on weight by trying to go to the gym she is working on this with her son    Sleep apnea     COMPLIANT WITH CPAP      Vitamin D deficiency 2015    will repeat labs continue current OTC meds for now       Past Surgical History:  Past Surgical History:   Procedure Laterality Date    BARIATRIC SURGERY  20    BREAST AUGMENTATION Bilateral 2023    breast implants    CARPAL TUNNEL RELEASE Right 2005     SECTION  ,,2003    x3    COLONOSCOPY N/A 2022    Procedure: COLONOSCOPY WITH POLYPECTOMY COLD SNARE;  Surgeon: Luis Sterling MD;  Location: [facility] ENDOSCOPY;  Service: Gastroenterology;  Laterality: N/A;  COLON POLYP    COLONOSCOPY      COSMETIC SURGERY  23    Breast implants/breast lift    ENDOSCOPY N/A 2020    Procedure: ESOPHAGOGASTRODUODENOSCOPY WITH BIOPSY;  Surgeon: Ron Cadet Jr., MD;  Location: [facility] ENDOSCOPY;  Service: General;  Laterality: N/A;  PRE- DYSPEPSIA  POST- GASTRITIS, GASTRIC POLYPS, ESOPHAGITIS, HIATAL HERNIA      ENDOSCOPY N/A 2023    Procedure: ESOPHAGOGASTRODUODENOSCOPY WITH BIOPSIES;  Surgeon: Luis Sterling MD;  Location: [facility] ENDOSCOPY;  Service: Gastroenterology;  Laterality: N/A;  PRIOR GASTRIC SLEEVE    GASTRIC SLEEVE LAPAROSCOPIC N/A 2020    Procedure: GASTRIC SLEEVE LAPAROSCOPIC With Hiatal Hernia Repair;  Surgeon: Ron Cadet Jr., MD;  [place] [facility] [place] [facility]  Service: Bariatric;  Laterality: N/A;    HERNIA REPAIR  20    Hiatal hernia    HYSTERECTOMY  2004    PANNICULECTOMY      UPPER GASTROINTESTINAL ENDOSCOPY         Family History:   Family History   Problem Relation Age of Onset    Diabetes Mother     Hypertension Mother     Hypertension Father     Malig Hyperthermia Neg Hx     Colon cancer Neg Hx        Social History:   Social History     Socioeconomic History    Marital status:    Tobacco Use    Smoking status: Never     Passive exposure: Never    Smokeless tobacco: Never   Vaping Use    Vaping status: Never Used   Substance and Sexual Activity    Alcohol use: Never    Drug use: Never     Sexual activity: Yes     Partners: Male     Birth control/protection: Other, None, Hysterectomy     Comment: Hysterectomy 2004       Home Medications:  HYDROcodone-acetaminophen, cephalexin, cetirizine, cetirizine-pseudoephedrine, citalopram, esomeprazole, fluticasone, ketorolac, levothyroxine, ondansetron ODT, and tamsulosin    Allergies:  Allergies   Allergen Reactions    Penicillins Anaphylaxis     unknown    Contrave [Naltrexone-Bupropion Hcl Er] Other (See Comments)     Severe constipation and insomnia       Review of Systems   All systems were reviewed and negative except for: Fever    Objective   Objective     Vitals:   Temp:  [98.7 °F (37.1 °C)-100.2 °F (37.9 °C)] 99.9 °F (37.7 °C)  Heart Rate:  [75-98] 78  Resp:  [16-18] 18  BP: (106-152)/(60-90) 129/78    Physical Exam    Constitutional: Awake, alert, no acute distress   Eyes: Pupils equal, sclerae anicteric, no conjunctival injection   HENT: NCAT, mucous membranes moist   Neck: Supple, no thyromegaly, no lymphadenopathy, trachea midline   Respiratory: Clear to auscultation bilaterally, nonlabored respirations    Cardiovascular: RRR, no murmurs, rubs, or gallops, palpable pedal pulses bilaterally   Gastrointestinal: Positive bowel sounds, soft, nontender, nondistended   Musculoskeletal: No bilateral ankle edema, no clubbing or cyanosis to extremities   Psychiatric: Appropriate affect, cooperative   Neurologic: Oriented x 3, strength symmetric in all extremities, Cranial Nerves grossly intact to confrontation, speech clear   Skin: No rashes     Result Review    Result Review:  I have personally reviewed the results from the time of this admission to 4/27/2024 11:48 EDT and agree with these findings:  [x]  Laboratory  [x]  Microbiology  [x]  Radiology  [x]  EKG/Telemetry   [x]  Cardiology/Vascular   [x]  Pathology  [x]  Old records  [x]  Other:      Assessment & Plan   Assessment / Plan   #1 6 mm kidney stone  -Patient is to go to the OR on  4/27/2024.  -Pain control, antiemetics, IV fluids.  Continue Flomax.  -Urology consulted.  Dr. Rios is following the case.    #2 UTI  -Started on Rocephin.  Patient has a BMI greater than 30; therefore, will use 2 g.  -IV fluids    #3 MAR secondary to 1 and 2    #4 depression continue Celexa    #5 GERD continue Nexium    #6 hypothyroidism continue Synthroid    #7 hypertension continue home lisinopril      DVT prophylaxis:  Mechanical and medical DVT prophylaxis orders are signed and held.         CODE STATUS:    Level Of Support Discussed With: Patient  Code Status (Patient has no pulse and is not breathing): CPR (Attempt to Resuscitate)  Medical Interventions (Patient has pulse or is breathing): Full Support      Admission Status:  I believe this patient meets observation status.    Electronically signed by Keaton Roman DO, 04/27/24, 11:48 AM EDT.

## 2024-04-27 NOTE — OP NOTE
URETEROSCOPY STENT INSERTION  Procedure Report    Patient Name:  Xuan Horvath  YOB: 1970    Date of Surgery:  4/27/2024      Pre-op Diagnosis:   Left ureteral stone [N20.1]       Postop diagnosis:    Same    Procedure/CPT® Codes:      Procedure(s):    Cystoscopy with left ureteral stent placement  6 x 26 with no string      Staff:  Surgeon(s):  Salazar Rios MD         Anesthesia: Monitored Anesthesia Care    Estimated Blood Loss: none    Implants:    Implant Name Type Inv. Item Serial No.  Lot No. LRB No. Used Action   STNT URETRL ASCERTA 6F 26CM - [id number] Stent STNT URETRL ASCERTA 6F 26CM  [employer] [id number] Left 1 Implanted       Specimen:          None        Findings:     none      Complications: none    Description of Procedure:     After informed consent patient taken to the operating room.  Patient was laid supine and placed under general anesthesia by the anesthesia team.  At this point patient was placed in dorsal lithotomy position and prepped and draped in normal sterile fashion.  A multidisciplinary timeout was undertaken documenting the correct patient site and procedure.  At this point a 22 rigid cystoscope was placed into the urethra . Bladder was normal.  At this point a Glidewire was placed up the Left     ureter without any issue under fluoroscopic guidance.   A 6 x 26 ureteral stent was then placed over the Glidewire through a rigid cystoscope without issue and had a good curl in the bladder under direct vision and a good curl in the left   renal pelvis under fluoroscopy.  Bladder was drained.  Patient tolerated the procedure well, he was taken to the postanesthesia care unit without issue.          Salazar Rios MD     Date: 4/27/2024  Time: 12:20 EDT

## 2024-04-27 NOTE — CONSULTS
[facility]   UROLOGY Consult Note    Patient Name: Xuan Horvath  : 1970  MRN: [id number]  Primary Care Physician:  Suri Lynn MD  Referring Physician: No ref. provider found  Date of admission: 2024    Subjective   Subjective     Chief Complaint:     Ureteral stone  Fever    HPI:  Xuan Horvath is a 54 y.o. female     Nephrolithiasis  Ureteral stone  Fever  Possible urinary sepsis    Fever to 101 this a.m.  Left flank pain starting 1.5 days ago    No nausea/vomiting    2024   CT abdomen/pelvis with - 6 mm stone seen in the proximal left ureter.  3  -2 mm stones in the lower pole left kidney.  4 mm nonobstructing stone right kidney.  Images reviewed  2024 UA - nitrite negative, 2+ bacteria 6-10 RBCs  2024 1.0, GFR   61    2 previous stones passed spontaneously, no urologic surgery    No cardiopulmonary history  No anticoagulation        Review of Systems     10 systems reviewed and are negative other than what is listed in the HPI    Personal History     Past Medical History:   Diagnosis Date    Allergy     Anxiety     Colon cancer screening     Depression     Depression 2015    working well, continue citalopram discussed that she can stay on meds long term or go off them whenever she is ready    Essential hypertension 2016    stable will try to decrease atenolol and see if that will give her more energy, need to watch bp closely    GERD (gastroesophageal reflux disease) 2023    Prilosec. Protonix.    Glaucoma     Heart murmur 2022    Hiatal hernia     History of medical problems 22    Panniculectomy    Hypertension     Hypothyroidism     Kidney stone     Obesity 2016    she is going to work on weight by trying to go to the gym she is working on this with her son    Sleep apnea     COMPLIANT WITH CPAP     Vitamin D deficiency 2015    will repeat labs continue current OTC meds for now       Past Surgical History:    Procedure Laterality Date    BARIATRIC SURGERY  20    BREAST AUGMENTATION Bilateral 2023    breast implants    CARPAL TUNNEL RELEASE Right 2005     SECTION  ,,2003    x3    COLONOSCOPY N/A 2022    Procedure: COLONOSCOPY WITH POLYPECTOMY COLD SNARE;  Surgeon: Luis Sterling MD;  Location: [facility] ENDOSCOPY;  Service: Gastroenterology;  Laterality: N/A;  COLON POLYP    COLONOSCOPY      COSMETIC SURGERY  23    Breast implants/breast lift    ENDOSCOPY N/A 2020    Procedure: ESOPHAGOGASTRODUODENOSCOPY WITH BIOPSY;  Surgeon: Ron Cadet Jr., MD;  Location: [facility] ENDOSCOPY;  Service: General;  Laterality: N/A;  PRE- DYSPEPSIA  POST- GASTRITIS, GASTRIC POLYPS, ESOPHAGITIS, HIATAL HERNIA      ENDOSCOPY N/A 2023    Procedure: ESOPHAGOGASTRODUODENOSCOPY WITH BIOPSIES;  Surgeon: Luis Sterling MD;  Location: [facility] ENDOSCOPY;  Service: Gastroenterology;  Laterality: N/A;  PRIOR GASTRIC SLEEVE    GASTRIC SLEEVE LAPAROSCOPIC N/A 2020    Procedure: GASTRIC SLEEVE LAPAROSCOPIC With Hiatal Hernia Repair;  Surgeon: Ron Cadet Jr., MD;  Location: [facility] OR [facility];  Service: Bariatric;  Laterality: N/A;    HERNIA REPAIR  20    Hiatal hernia    HYSTERECTOMY  2004    PANNICULECTOMY      UPPER GASTROINTESTINAL ENDOSCOPY         Family History: family history includes Diabetes in her mother; Hypertension in her father and mother. Otherwise pertinent FHx was reviewed and not pertinent to current issue.    Social History:  reports that she has never smoked. She has never been exposed to tobacco smoke. She has never used smokeless tobacco. She reports that she does not drink alcohol and does not use drugs.    Home Medications:  HYDROcodone-acetaminophen, cephalexin, cetirizine, cetirizine-pseudoephedrine, citalopram, esomeprazole, fluticasone, ketorolac, levothyroxine, ondansetron ODT, and tamsulosin    Allergies:  Allergies   Allergen Reactions     Penicillins Anaphylaxis     unknown    Contrave [Naltrexone-Bupropion Hcl Er] Other (See Comments)     Severe constipation and insomnia       Objective    Objective     Vitals:   Temp:  [98.7 °F (37.1 °C)-100.2 °F (37.9 °C)] 99.9 °F (37.7 °C)  Heart Rate:  [75-98] 78  Resp:  [16-18] 18  BP: (106-152)/(60-90) 152/90    Physical Exam:   Constitutional: Awake, alert    Respiratory: Clear to auscultation bilaterally, nonlabored respirations    Cardiovascular: RRR, no murmurs, rubs, or gallops, palpable pedal pulses bilaterally   Gastrointestinal: Positive bowel sounds, soft, nontender, nondistended     Result Review    Result Review:  I have personally reviewed the results from the time of this admission to 4/27/2024 11:15 EDT and agree with these findings:  []  Laboratory  []  Microbiology  []  Radiology  []  EKG/Telemetry   []  Cardiology/Vascular   []  Pathology  []  Old records  []  Other:      Assessment & Plan   Assessment / Plan     Brief Patient Summary:  Xuan Horvath is a 54 y.o. female     Active Hospital Problems:  Active Hospital Problems    Diagnosis     **Left ureteral stone        Patient with possible urinary sepsis    We will plan on emergent cystoscopy with left ureteral stent placement.  Risks and benefits were discussed including bleeding, infection and damage to the urinary system.  We also discussed the risk of anesthesia up to and including death.  Patient voiced understanding and would like to proceed.      Patient understands a stent is only temporizing measure will need to come back for left ureteroscopy with laser lithotripsy.  We will do this in a few weeks after antibiotic treatment   failure to remove the stent could cause loss of damage of kidney.  Patient voiced understanding    Continue n.p.o.          Electronically signed by Salazar Rios MD, 04/27/24, 11:15 AM EDT.

## 2024-04-27 NOTE — ANESTHESIA POSTPROCEDURE EVALUATION
Patient: Xuan Horvath    Procedure Summary       Date: 04/27/24 Room / Location: [facility] OR 07 / [facility] MAIN OR    Anesthesia Start: 1157 Anesthesia Stop: 1224    Procedure: Cystoscopy with left ureteral stent placement (Left) Diagnosis:       Left ureteral stone      (Left ureteral stone [N20.1])    Surgeons: Salazar Rios MD Provider: Sachin Dumont MD    Anesthesia Type: general ASA Status: 3 - Emergent            Anesthesia Type: general    Vitals  Vitals Value Taken Time   /58 04/27/24 1246   Temp 36.3 °C (97.4 °F) 04/27/24 1225   Pulse 79 04/27/24 1251   Resp 20 04/27/24 1240   SpO2 94 % 04/27/24 1251   Vitals shown include unfiled device data.        Post Anesthesia Care and Evaluation    Patient location during evaluation: bedside  Patient participation: complete - patient participated  Level of consciousness: awake  Pain score: 2  Pain management: adequate    Airway patency: patent  PONV Status: none  Cardiovascular status: acceptable and stable  Respiratory status: acceptable  Hydration status: acceptable    Comments: An Anesthesiologist personally participated in the most demanding procedures (including induction and emergence if applicable) in the anesthesia plan, monitored the course of anesthesia administration at frequent intervals and remained physically present and available for immediate diagnosis and treatment of emergencies.

## 2024-04-27 NOTE — H&P
H + P for surgery      Ureteral stone  Fever     HPI:  Xuan Horvath is a 54 y.o. female      Nephrolithiasis  Ureteral stone  Fever  Possible urinary sepsis     Fever to 101 this a.m.  Left flank pain starting 1.5 days ago     No nausea/vomiting     2024   CT abdomen/pelvis with - 6 mm stone seen in the proximal left ureter.  3  -2 mm stones in the lower pole left kidney.  4 mm nonobstructing stone right kidney.  Images reviewed  2024 UA - nitrite negative, 2+ bacteria 6-10 RBCs  2024 1.0, GFR   61     2 previous stones passed spontaneously, no urologic surgery     No cardiopulmonary history  No anticoagulation           Review of Systems                10 systems reviewed and are negative other than what is listed in the HPI     Personal History      Medical History        Past Medical History:   Diagnosis Date    Allergy      Anxiety      Colon cancer screening      Depression      Depression 2015     working well, continue citalopram discussed that she can stay on meds long term or go off them whenever she is ready    Essential hypertension 2016     stable will try to decrease atenolol and see if that will give her more energy, need to watch bp closely    GERD (gastroesophageal reflux disease) 2023     Prilosec. Protonix.    Glaucoma     Heart murmur 2022    Hiatal hernia      History of medical problems 22     Panniculectomy    Hypertension      Hypothyroidism      Kidney stone      Obesity 2016     she is going to work on weight by trying to go to the gym she is working on this with her son    Sleep apnea       COMPLIANT WITH CPAP     Vitamin D deficiency 2015     will repeat labs continue current OTC meds for now            Surgical History         Past Surgical History:   Procedure Laterality Date    BARIATRIC SURGERY   20    BREAST AUGMENTATION Bilateral 2023     breast implants    CARPAL TUNNEL RELEASE Right 2005      SECTION   1999,2002,2003     x3    COLONOSCOPY N/A 01/13/2022     Procedure: COLONOSCOPY WITH POLYPECTOMY COLD SNARE;  Surgeon: Luis Sterling MD;  Location: [facility] ENDOSCOPY;  Service: Gastroenterology;  Laterality: N/A;  COLON POLYP    COLONOSCOPY   2022    COSMETIC SURGERY   2/21/23     Breast implants/breast lift    ENDOSCOPY N/A 06/09/2020     Procedure: ESOPHAGOGASTRODUODENOSCOPY WITH BIOPSY;  Surgeon: Ron Cadet Jr., MD;  Location: [facility] ENDOSCOPY;  Service: General;  Laterality: N/A;  PRE- DYSPEPSIA  POST- GASTRITIS, GASTRIC POLYPS, ESOPHAGITIS, HIATAL HERNIA       ENDOSCOPY N/A 09/07/2023     Procedure: ESOPHAGOGASTRODUODENOSCOPY WITH BIOPSIES;  Surgeon: Luis Sterling MD;  Location: [facility] ENDOSCOPY;  Service: Gastroenterology;  Laterality: N/A;  PRIOR GASTRIC SLEEVE    GASTRIC SLEEVE LAPAROSCOPIC N/A 07/08/2020     Procedure: GASTRIC SLEEVE LAPAROSCOPIC With Hiatal Hernia Repair;  Surgeon: Ron Cadet Jr., MD;  Location: [facility] OR [facility];  Service: Bariatric;  Laterality: N/A;    HERNIA REPAIR   7/8/20     Hiatal hernia    HYSTERECTOMY   2004    PANNICULECTOMY        UPPER GASTROINTESTINAL ENDOSCOPY                Family History: family history includes Diabetes in her mother; Hypertension in her father and mother. Otherwise pertinent FHx was reviewed and not pertinent to current issue.     Social History:  reports that she has never smoked. She has never been exposed to tobacco smoke. She has never used smokeless tobacco. She reports that she does not drink alcohol and does not use drugs.     Home Medications:  HYDROcodone-acetaminophen, cephalexin, cetirizine, cetirizine-pseudoephedrine, citalopram, esomeprazole, fluticasone, ketorolac, levothyroxine, ondansetron ODT, and tamsulosin     Allergies:  Allergies         Allergies   Allergen Reactions    Penicillins Anaphylaxis       unknown    Contrave [Naltrexone-Bupropion Hcl Er] Other (See Comments)       Severe  constipation and insomnia                  Objective  Objective      Vitals:   Temp:  [98.7 °F (37.1 °C)-100.2 °F (37.9 °C)] 99.9 °F (37.7 °C)  Heart Rate:  [75-98] 78  Resp:  [16-18] 18  BP: (106-152)/(60-90) 152/90     Physical Exam:              Constitutional: Awake, alert                          Respiratory: Clear to auscultation bilaterally, nonlabored respirations               Cardiovascular: RRR, no murmurs, rubs, or gallops, palpable pedal pulses bilaterally              Gastrointestinal: Positive bowel sounds, soft, nontender, nondistended                      Result Review  Result Review:  I have personally reviewed the results from the time of this admission to 4/27/2024 11:15 EDT and agree with these findings:  []  Laboratory  []  Microbiology  []  Radiology  []  EKG/Telemetry   []  Cardiology/Vascular   []  Pathology  []  Old records  []  Other:              Assessment & Plan  Assessment / Plan      Brief Patient Summary:  Xuan Horvath is a 54 y.o. female      Active Hospital Problems:       Active Hospital Problems     Diagnosis      **Left ureteral stone           Patient with possible urinary sepsis     We will plan on emergent cystoscopy with left ureteral stent placement.  Risks and benefits were discussed including bleeding, infection and damage to the urinary system.  We also discussed the risk of anesthesia up to and including death.  Patient voiced understanding and would like to proceed.        Patient understands a stent is only temporizing measure will need to come back for left ureteroscopy with laser lithotripsy.  We will do this in a few weeks after antibiotic treatment   failure to remove the stent could cause loss of damage of kidney.  Patient voiced understanding     Continue n.p.o.

## 2024-04-27 NOTE — ANESTHESIA PREPROCEDURE EVALUATION
Anesthesia Evaluation     Patient summary reviewed and Nursing notes reviewed   no history of anesthetic complications:   NPO Solid Status: > 8 hours  NPO Liquid Status: > 2 hours           Airway   Mallampati: III  TM distance: >3 FB  Neck ROM: full  No difficulty expected  Dental      Pulmonary - normal exam    breath sounds clear to auscultation  (+) ,sleep apnea  Cardiovascular - normal exam  Exercise tolerance: good (4-7 METS)    Rhythm: regular  Rate: normal    (+) hypertension      Neuro/Psych  (+) psychiatric history Anxiety and Depression  GI/Hepatic/Renal/Endo    (+) hiatal hernia, GERD, renal disease- stones, thyroid problem hypothyroidism    Musculoskeletal (-) negative ROS    Abdominal    Substance History - negative use     OB/GYN negative ob/gyn ROS         Other - negative ROS       ROS/Med Hx Other: PAT Nursing Notes unavailable.           Latest Reference Range & Units 04/27/24 09:31   Sodium 136 - 145 mmol/L 136   Potassium 3.5 - 5.2 mmol/L 3.6   Chloride 98 - 107 mmol/L 101   CO2 22.0 - 29.0 mmol/L 23.7   Anion Gap 5.0 - 15.0 mmol/L 11.3   BUN 6 - 20 mg/dL 21 (H)   Creatinine 0.57 - 1.00 mg/dL 1.07 (H)   BUN/Creatinine Ratio 7.0 - 25.0  19.6   eGFR >60.0 mL/min/1.73 61.9   Glucose 65 - 99 mg/dL 133 (H)   Calcium 8.6 - 10.5 mg/dL 8.1 (L)   Alkaline Phosphatase 39 - 117 U/L 73   Total Protein 6.0 - 8.5 g/dL 6.0   Albumin 3.5 - 5.2 g/dL 3.3 (L)   Globulin gm/dL 2.7   A/G Ratio g/dL 1.2   AST (SGOT) 1 - 32 U/L 20   ALT (SGPT) 1 - 33 U/L 18   Total Bilirubin 0.0 - 1.2 mg/dL 0.3   (H): Data is abnormally high  (L): Data is abnormally low     Latest Reference Range & Units 04/27/24 09:31   Hemoglobin 12.0 - 15.9 g/dL 12.2   Hematocrit 34.0 - 46.6 % 34.9     EKG 2020  Nsr low voltage t wave changes inferiorly    Interpretation Summary echo 3/2022    Estimated right ventricular systolic pressure from tricuspid regurgitation is normal (<35 mmHg).  Left ventricular ejection fraction appears to be 56 -  60%.  Left ventricular diastolic function is consistent with (grade I) impaired relaxation.  Trivial to mild aortic and tricuspid regurgitation     IMPRESSION:  Impression:  CT scan of the abdomen and pelvis with IV contrast demonstrating fatty  liver.     Post hysterectomy.     Nonobstructing calyceal stones are seen in both kidneys.     A 6 mm stone is seen in the proximal left ureter with moderate left  hydronephrosis.             Anesthesia Plan    ASA 3 - emergent     general     (Patient understands anesthesia not responsible for dental damage.    Moderate left sided hydronephrosis w/ hyperthermia, dehydration, to or for cysto, stent insertion, fluid bolus)  intravenous induction     Anesthetic plan, risks, benefits, and alternatives have been provided, discussed and informed consent has been obtained with: patient.    Use of blood products discussed with patient .    Plan discussed with CRNA.      CODE STATUS:

## 2024-04-27 NOTE — ED PROVIDER NOTES
Time: 10:49 AM EDT  Date of encounter:  4/27/2024  Independent Historian/Clinical History and Information was obtained by:   Patient and Family    History is limited by: N/A    Chief Complaint: Fever, continued left flank pain, kidney stone      History of Present Illness:  Patient is a 54 y.o. year old female who presents to the emergency department for evaluation of fever of 101 Fahrenheit when waking up along with cold chills and bodyaches and sweats.    She was recently diagnosed with 6 mm left-sided ureteral stone seen on CT scan of the abdomen yesterday in the ED and also given a dose of IV ceftriaxone for concurrent UTI.    She was given strict return precautions for any fevers and Juanita presents today for this.    She has plans to follow-up with Dr. Rios of urology after the weekend.    HPI    Patient Care Team  Primary Care Provider: Suri Lynn MD    Past Medical History:     Allergies   Allergen Reactions    Penicillins Anaphylaxis     unknown    Contrave [Naltrexone-Bupropion Hcl Er] Other (See Comments)     Severe constipation and insomnia     Past Medical History:   Diagnosis Date    Allergy     Anxiety     Colon cancer screening     Depression     Depression 04/07/2015    working well, continue citalopram discussed that she can stay on meds long term or go off them whenever she is ready    Essential hypertension 04/06/2016    stable will try to decrease atenolol and see if that will give her more energy, need to watch bp closely    GERD (gastroesophageal reflux disease) January 2023    Prilosec. Protonix.    Glaucoma 2020    Heart murmur 01/2022    Hiatal hernia     History of medical problems 9/21/22    Panniculectomy    Hypertension     Hypothyroidism     Kidney stone     Obesity 04/06/2016    she is going to work on weight by trying to go to the gym she is working on this with her son    Sleep apnea     COMPLIANT WITH CPAP     Vitamin D deficiency 04/07/2015    will repeat labs continue  current OTC meds for now     Past Surgical History:   Procedure Laterality Date    BARIATRIC SURGERY  20    BREAST AUGMENTATION Bilateral 2023    breast implants    CARPAL TUNNEL RELEASE Right 2005     SECTION  ,,2003    x3    COLONOSCOPY N/A 2022    Procedure: COLONOSCOPY WITH POLYPECTOMY COLD SNARE;  Surgeon: Luis Sterling MD;  Location: [facility] ENDOSCOPY;  Service: Gastroenterology;  Laterality: N/A;  COLON POLYP    COLONOSCOPY      COSMETIC SURGERY  23    Breast implants/breast lift    ENDOSCOPY N/A 2020    Procedure: ESOPHAGOGASTRODUODENOSCOPY WITH BIOPSY;  Surgeon: Ron Cadet Jr., MD;  Location: [facility] ENDOSCOPY;  Service: General;  Laterality: N/A;  PRE- DYSPEPSIA  POST- GASTRITIS, GASTRIC POLYPS, ESOPHAGITIS, HIATAL HERNIA      ENDOSCOPY N/A 2023    Procedure: ESOPHAGOGASTRODUODENOSCOPY WITH BIOPSIES;  Surgeon: Luis Sterling MD;  Location: [facility] ENDOSCOPY;  Service: Gastroenterology;  Laterality: N/A;  PRIOR GASTRIC SLEEVE    GASTRIC SLEEVE LAPAROSCOPIC N/A 2020    Procedure: GASTRIC SLEEVE LAPAROSCOPIC With Hiatal Hernia Repair;  Surgeon: Ron Cadet Jr., MD;  [place]  [facility] [place]  Service: Bariatric;  Laterality: N/A;    HERNIA REPAIR  20    Hiatal hernia    HYSTERECTOMY      PANNICULECTOMY      UPPER GASTROINTESTINAL ENDOSCOPY       Family History   Problem Relation Age of Onset    Diabetes Mother     Hypertension Mother     Hypertension Father     Malig Hyperthermia Neg Hx     Colon cancer Neg Hx        Home Medications:  Prior to Admission medications    Medication Sig Start Date End Date Taking? Authorizing Provider   cephalexin (KEFLEX) 500 MG capsule Take 1 capsule by mouth 4 (Four) Times a Day for 7 days. 4/26/24 5/3/24  Beryl Godwin APRN   cetirizine (zyrTEC) 10 MG tablet Take 1 tablet by mouth Daily. 23   Suri Lynn MD   cetirizine-pseudoephedrine (ZyrTEC-D) 5-120 MG per  "12 hr tablet Take 1 tablet by mouth 2 (Two) Times a Day. 4/19/24   Suri Lynn MD   citalopram (CeleXA) 20 MG tablet Take 1 tablet by mouth Daily. 4/2/24   Suri Lynn MD   esomeprazole (nexIUM) 40 MG capsule Take 1 capsule by mouth Daily. 3/26/24   Suri Lynn MD   fluticasone (FLONASE) 50 MCG/ACT nasal spray 1 spray by Each Nare route Daily. 7/31/23   Suri Lynn MD   HYDROcodone-acetaminophen (NORCO) 5-325 MG per tablet Take 1 tablet by mouth Every 6 (Six) Hours As Needed for Severe Pain. 4/26/24   Deuce Villa DO   ketorolac (TORADOL) 10 MG tablet Take 1 tablet by mouth Every 6 (Six) Hours As Needed for Moderate Pain. 4/26/24   Beryl Godwin APRN   levothyroxine (SYNTHROID, LEVOTHROID) 150 MCG tablet Take 1 tablet by mouth Daily. 4/2/24   Suri Lynn MD   ondansetron ODT (ZOFRAN-ODT) 4 MG disintegrating tablet Place 1 tablet on the tongue 4 (Four) Times a Day As Needed for Nausea or Vomiting. 4/26/24   Beryl Godwin APRN   tamsulosin (FLOMAX) 0.4 MG capsule 24 hr capsule Take 1 capsule by mouth Daily. 4/26/24   Beryl Godwin APRN        Social History:   Social History     Tobacco Use    Smoking status: Never     Passive exposure: Never    Smokeless tobacco: Never   Vaping Use    Vaping status: Never Used   Substance Use Topics    Alcohol use: Never    Drug use: Never         Review of Systems:  Review of Systems   I performed a 10 point review of systems which was all negative, except for the positives found in the HPI above.    Physical Exam:  /90 (BP Location: Right arm, Patient Position: Sitting)   Pulse 89   Temp 99.9 °F (37.7 °C) (Oral)   Resp 18   Ht 160 cm (63\")   Wt 87.2 kg (192 lb 3.9 oz)   SpO2 97%   BMI 34.05 kg/m²     Physical Exam   General: Awake alert and in mild distress, feels warm to the touch    HEENT: Head normocephalic atraumatic, eyes PERRLA EOMI, nose normal, oropharynx normal.    Neck: Supple full range of " motion, no meningismus, no lymphadenopathy    Heart: Regular rate and rhythm, no murmurs or rubs, 2+ radial pulses bilaterally    Lungs: Clear to auscultation bilaterally without wheezes or crackles, no respiratory distress    Abdomen: Soft, mild left-sided lateral abdominal tenderness and flank tenderness, nondistended, no rebound or guarding    Skin: Warm, dry, no rash    Musculoskeletal: Normal range of motion, no lower extremity edema    Neurologic: Oriented x3, no motor deficits no sensory deficits    Psychiatric: Mood appears stable, no psychosis          Procedures:  Procedures      Medical Decision Making:      Comorbidities that affect care:    Kidney stone    External Notes reviewed:    Previous Radiological Studies: I reviewed her CT scan of the abdomen and pelvis done yesterday in the ED showing an obstructing left 6 mm ureteral stone with hydronephrosis.      The following orders were placed and all results were independently analyzed by me:  Orders Placed This Encounter   Procedures    Blood Culture - Blood,    Blood Culture - Blood,    Urine Culture - Urine, Urine, Clean Catch    [place] Draw    Comprehensive Metabolic Panel    Lipase    Urinalysis With Microscopic If Indicated (No Culture) - Urine, Clean Catch    Lactic Acid, Plasma    CBC Auto Differential    Urinalysis, Microscopic Only - Urine, Clean Catch    NPO Diet NPO Type: Strict NPO    Undress & Gown    Obtain Informed Consent    Urology (on-call MD unless specified)    Hospitalist (on-call MD unless specified)    Insert Peripheral IV    CBC & Differential    Green Top (Gel)    Lavender Top    Gold Top - SST    Light Blue Top       Medications Given in the Emergency Department:  Medications   sodium chloride 0.9 % flush 10 mL (has no administration in time range)   cefTRIAXone (ROCEPHIN) 2000 mg/100 mL 0.9% NS IVPB (MBP) (has no administration in time range)   sodium chloride 0.9 % bolus 2,000 mL (has no administration in time range)    ketorolac (TORADOL) injection 30 mg (has no administration in time range)        ED Course:         Labs:    Lab Results (last 24 hours)       Procedure Component Value Units Date/Time    Urinalysis With Microscopic If Indicated (No Culture) - Urine, Clean Catch [[id number]]  (Abnormal) Collected: 04/26/24 1452    Specimen: Urine, Clean Catch Updated: 04/26/24 1505     Color, UA Dark Yellow     Appearance, UA Cloudy     pH, UA 6.0     Specific Gravity, UA 1.021     Glucose, UA Negative     Ketones, UA Negative     Bilirubin, UA Negative     Blood, UA Moderate (2+)     Protein,  mg/dL (2+)     Leuk Esterase, UA Moderate (2+)     Nitrite, UA Negative     Urobilinogen, UA 1.0 E.U./dL    Urinalysis, Microscopic Only - Urine, Clean Catch [[id number]]  (Abnormal) Collected: 04/26/24 1452    Specimen: Urine, Clean Catch Updated: 04/26/24 1505     RBC, UA 6-10 /HPF      WBC, UA Too Numerous to Count /HPF      Bacteria, UA 1+ /HPF      Squamous Epithelial Cells, UA 0-2 /HPF      Hyaline Casts, UA None Seen /LPF      Methodology Automated Microscopy    CBC & Differential [[id number]]  (Abnormal) Collected: 04/26/24 1502    Specimen: Blood Updated: 04/26/24 1544    Narrative:      The following orders were created for panel order CBC & Differential.  Procedure                               Abnormality         Status                     ---------                               -----------         ------                     CBC Auto Differential[[id number]]        Abnormal            Final result               Scan Slide[[id number]]                                       Final result                 Please view results for these tests on the individual orders.    Comprehensive Metabolic Panel [[id number]]  (Abnormal) Collected: 04/26/24 1502    Specimen: Blood Updated: 04/26/24 1540     Glucose 148 mg/dL      BUN 17 mg/dL      Creatinine 0.98 mg/dL      Sodium 136 mmol/L      Potassium 3.6 mmol/L      Chloride 100 mmol/L       CO2 24.6 mmol/L      Calcium 8.6 mg/dL      Total Protein 6.6 g/dL      Albumin 3.7 g/dL      ALT (SGPT) 20 U/L      AST (SGOT) 21 U/L      Alkaline Phosphatase 67 U/L      Total Bilirubin 0.3 mg/dL      Globulin 2.9 gm/dL      A/G Ratio 1.3 g/dL      BUN/Creatinine Ratio 17.3     Anion Gap 11.4 mmol/L      eGFR 68.7 mL/min/1.73     Narrative:      GFR Normal >60  Chronic Kidney Disease <60  Kidney Failure <15      Lipase [[id number]]  (Normal) Collected: 04/26/24 1502    Specimen: Blood Updated: 04/26/24 1540     Lipase 16 U/L     Lactic Acid, Plasma [[id number]]  (Normal) Collected: 04/26/24 1502    Specimen: Blood Updated: 04/26/24 1538     Lactate 1.3 mmol/L     CBC Auto Differential [[id number]]  (Abnormal) Collected: 04/26/24 1502    Specimen: Blood Updated: 04/26/24 1544     WBC 13.15 10*3/mm3      RBC 4.07 10*6/mm3      Hemoglobin 12.9 g/dL      Hematocrit 37.7 %      MCV 92.6 fL      MCH 31.7 pg      MCHC 34.2 g/dL      RDW 13.2 %      RDW-SD 44.8 fl      MPV 10.5 fL      Platelets 148 10*3/mm3     Scan Slide [[id number]] Collected: 04/26/24 1502    Specimen: Blood Updated: 04/26/24 1544     Scan Slide --     Comment: See Manual Differential Results       Manual Differential [[id number]]  (Abnormal) Collected: 04/26/24 1502    Specimen: Blood Updated: 04/26/24 1544     Neutrophil % 82.0 %      Lymphocyte % 11.0 %      Monocyte % 4.0 %      Eosinophil % 1.0 %      Bands %  2.0 %      Neutrophils Absolute 11.05 10*3/mm3      Lymphocytes Absolute 1.45 10*3/mm3      Monocytes Absolute 0.53 10*3/mm3      Eosinophils Absolute 0.13 10*3/mm3      Anisocytosis Slight/1+     Macrocytes Slight/1+     WBC Morphology Normal     Platelet Estimate Adequate    CBC & Differential [[id number]]  (Abnormal) Collected: 04/27/24 0931    Specimen: Blood Updated: 04/27/24 0940    Narrative:      The following orders were created for panel order CBC & Differential.  Procedure                               Abnormality         Status                      ---------                               -----------         ------                     CBC Auto Differential[[id number]]        Abnormal            Final result                 Please view results for these tests on the individual orders.    Comprehensive Metabolic Panel [[id number]]  (Abnormal) Collected: 04/27/24 0931    Specimen: Blood Updated: 04/27/24 0957     Glucose 133 mg/dL      BUN 21 mg/dL      Creatinine 1.07 mg/dL      Sodium 136 mmol/L      Potassium 3.6 mmol/L      Chloride 101 mmol/L      CO2 23.7 mmol/L      Calcium 8.1 mg/dL      Total Protein 6.0 g/dL      Albumin 3.3 g/dL      ALT (SGPT) 18 U/L      AST (SGOT) 20 U/L      Alkaline Phosphatase 73 U/L      Total Bilirubin 0.3 mg/dL      Globulin 2.7 gm/dL      A/G Ratio 1.2 g/dL      BUN/Creatinine Ratio 19.6     Anion Gap 11.3 mmol/L      eGFR 61.9 mL/min/1.73     Narrative:      GFR Normal >60  Chronic Kidney Disease <60  Kidney Failure <15      Lipase [[id number]]  (Normal) Collected: 04/27/24 0931    Specimen: Blood Updated: 04/27/24 0957     Lipase 17 U/L     Lactic Acid, Plasma [[id number]]  (Normal) Collected: 04/27/24 0931    Specimen: Blood Updated: 04/27/24 0953     Lactate 1.2 mmol/L     CBC Auto Differential [[id number]]  (Abnormal) Collected: 04/27/24 0931    Specimen: Blood Updated: 04/27/24 0940     WBC 10.55 10*3/mm3      RBC 3.80 10*6/mm3      Hemoglobin 12.2 g/dL      Hematocrit 34.9 %      MCV 91.8 fL      MCH 32.1 pg      MCHC 35.0 g/dL      RDW 13.2 %      RDW-SD 44.2 fl      MPV 10.5 fL      Platelets 128 10*3/mm3      Neutrophil % 86.7 %      Lymphocyte % 5.8 %      Monocyte % 4.4 %      Eosinophil % 0.9 %      Basophil % 0.5 %      Immature Grans % 1.7 %      Neutrophils, Absolute 9.16 10*3/mm3      Lymphocytes, Absolute 0.61 10*3/mm3      Monocytes, Absolute 0.46 10*3/mm3      Eosinophils, Absolute 0.09 10*3/mm3      Basophils, Absolute 0.05 10*3/mm3      Immature Grans, Absolute 0.18 10*3/mm3      nRBC 0.0 /100  WBC     Urinalysis With Microscopic If Indicated (No Culture) - Urine, Clean Catch [[id number]]  (Abnormal) Collected: 04/27/24 1000    Specimen: Urine, Clean Catch Updated: 04/27/24 1008     Color, UA Dark Yellow     Appearance, UA Turbid     pH, UA 5.5     Specific Gravity, UA >1.030     Glucose, UA Negative     Ketones, UA Trace     Bilirubin, UA Small (1+)     Blood, UA Large (3+)     Protein, UA >=300 mg/dL (3+)     Leuk Esterase, UA Large (3+)     Nitrite, UA Negative     Urobilinogen, UA 1.0 E.U./dL    Urinalysis, Microscopic Only - Urine, Clean Catch [[id number]]  (Abnormal) Collected: 04/27/24 1000    Specimen: Urine, Clean Catch Updated: 04/27/24 1014     RBC, UA 6-10 /HPF      WBC, UA Too Numerous to Count /HPF      Bacteria, UA 2+ /HPF      Squamous Epithelial Cells, UA 13-20 /HPF      Hyaline Casts, UA None Seen /LPF      Methodology Manual Light Microscopy             Imaging:    CT Abdomen Pelvis With Contrast    Result Date: 4/26/2024  CT ABDOMEN PELVIS W CONTRAST-  Date of Exam: 4/26/2024 3:39 PM  Indication: left flank pain.  Comparison: 4/17/2020  Technique: Axial CT images were obtained of the abdomen and pelvis following the uneventful intravenous administration of nonionic contrast. Reconstructed coronal and sagittal images were also obtained. Automated exposure control and iterative construction methods were used.  Findings: The lung bases are clear.  The liver is of normal size. The liver is of diffusely diminished density consistent with mild fatty infiltration. The gallbladder is not abnormally distended. No pancreatic or adrenal mass is evident. The spleen is of normal size. 1.5 cm left adrenal nodule is consistent with adenoma, and is unchanged.  The kidneys enhance bilaterally. Single 4 mm nonobstructing stone is seen in the midpole collecting system of the right kidney. No right ureteral stone is seen.  On the left, several calyceal calcifications are seen measuring up to 4 mm. A 6 mm  stone is seen in the proximal left ureter with moderate hydronephrosis.  The urinary bladder is not abnormally distended.  The uterus is absent. No pelvic mass is seen. A physiologic amount of free pelvic fluid is seen.  Bowel loops are not abnormally dilated. Small right paraumbilical hernia containing fat seen on series 201 image 120 is not evident on the prior study. A small umbilical hernia containing fat is evident.  Bone islands in the L3 and L4 vertebral bodies appear stable.      Impression: CT scan of the abdomen and pelvis with IV contrast demonstrating fatty liver.  Post hysterectomy.  Nonobstructing calyceal stones are seen in both kidneys.  A 6 mm stone is seen in the proximal left ureter with moderate left hydronephrosis.    Electronically Signed By-CLARISA WOLFF MD On:4/26/2024 4:23 PM         Differential Diagnosis and Discussion:    Fever: Based on the complaint of fever, differential diagnosis includes but is not limited to meningitis, pneumonia, pyelonephritis, acute uti,  systemic immune response syndrome, sepsis, viral syndrome, fungal infection, tick born illness and other bacterial infections.  Flank Pain: Differential diagnosis includes but is not limited to kidney stones, pyelonephritis, musculoskeletal disorders, renal infarction, urinary tract infection, hydronephrosis, radiculopathy, aortic aneurysm, renal cell carcinoma.    All labs were reviewed and interpreted by me.  CT scan radiology impression was interpreted by me.    MDM     Amount and/or Complexity of Data Reviewed  Clinical lab tests: reviewed  Decide to obtain previous medical records or to obtain history from someone other than the patient: yes           This patient is a pleasant 54-year-old female who was just seen in our ED yesterday for flank pain and found to have an obstructing 6 mm left proximal ureteral stone with moderate hydronephrosis on CT.    It looks like she may have also had a UTI and was given some IV  ceftriaxone and discharged home with strict return precautions.    Today she represents because she awoke with a fever of 101 Fahrenheit and chills and sweats and feeling poorly.    My concern is that she has an obstructive uropathy from ureteral stone with concurrent UTI and starting to get septic and I will reach out to our on-call urologist regarding intervention.    In the meantime I am treating her with IV fluid bolus and IV ceftriaxone.  I am sending off blood and urine cultures and lactic acid.      I have spoken with Dr. Rios of urology who is coming in to evaluate her for urologic intervention.        Critical Care Note: Total Critical Care time of 30 minutes. Total critical care time documented does not include time spent on separately billed procedures for services of nurses or physician assistants. I personally saw and examined the patient. I have reviewed all diagnostic interpretations and treatment plans as written. I was present for the key portions of any procedures performed and the inclusive time noted in any critical care statement. Critical care time includes patient management by me, time spent at the patients bedside,  time to review lab and imaging results, discussing patient care, documentation in the medical record, and time spent with family or caregiver.        Patient Care Considerations:          Consultants/Shared Management Plan:    Hospitalist: I have discussed the case with the admitting hospitalist who agrees to accept the patient for admission.  Consultant: I have discussed the case with Dr. Rios of urology who agrees to consult on the patient.    Social Determinants of Health:    Patient is independent, reliable, and has access to care.       Disposition and Care Coordination:    Admit:   Through independent evaluation of the patient's history, physical, and imperical data, the patient meets criteria for inpatient admission to the hospital.        Final diagnoses:   Renal  colic on left side   Left ureteral stone   Acute febrile illness   Acute UTI (urinary tract infection)        ED Disposition       ED Disposition   Intended Admit    Condition   --    Comment   --               This medical record created using voice recognition software.             Gareth Mejia MD  04/27/24 9133

## 2024-04-28 ENCOUNTER — APPOINTMENT (OUTPATIENT)
Dept: GENERAL RADIOLOGY | Facility: HOSPITAL | Age: 54
DRG: 660 | End: 2024-04-28
Payer: OTHER GOVERNMENT

## 2024-04-28 LAB
ANION GAP SERPL CALCULATED.3IONS-SCNC: 10.7 MMOL/L (ref 5–15)
BASOPHILS # BLD AUTO: 0.04 10*3/MM3 (ref 0–0.2)
BASOPHILS NFR BLD AUTO: 0.4 % (ref 0–1.5)
BUN SERPL-MCNC: 21 MG/DL (ref 6–20)
BUN/CREAT SERPL: 29.6 (ref 7–25)
CALCIUM SPEC-SCNC: 7.9 MG/DL (ref 8.6–10.5)
CHLORIDE SERPL-SCNC: 108 MMOL/L (ref 98–107)
CO2 SERPL-SCNC: 19.3 MMOL/L (ref 22–29)
CREAT SERPL-MCNC: 0.71 MG/DL (ref 0.57–1)
DEPRECATED RDW RBC AUTO: 46.1 FL (ref 37–54)
EGFRCR SERPLBLD CKD-EPI 2021: 101.2 ML/MIN/1.73
EOSINOPHIL # BLD AUTO: 0.01 10*3/MM3 (ref 0–0.4)
EOSINOPHIL NFR BLD AUTO: 0.1 % (ref 0.3–6.2)
ERYTHROCYTE [DISTWIDTH] IN BLOOD BY AUTOMATED COUNT: 13.2 % (ref 12.3–15.4)
GLUCOSE SERPL-MCNC: 148 MG/DL (ref 65–99)
HCT VFR BLD AUTO: 33.3 % (ref 34–46.6)
HGB BLD-MCNC: 11.2 G/DL (ref 12–15.9)
IMM GRANULOCYTES # BLD AUTO: 0.11 10*3/MM3 (ref 0–0.05)
IMM GRANULOCYTES NFR BLD AUTO: 1.2 % (ref 0–0.5)
L PNEUMO1 AG UR QL IA: NEGATIVE
LARGE PLATELETS: NORMAL
LYMPHOCYTES # BLD AUTO: 0.62 10*3/MM3 (ref 0.7–3.1)
LYMPHOCYTES NFR BLD AUTO: 6.9 % (ref 19.6–45.3)
MAGNESIUM SERPL-MCNC: 1.7 MG/DL (ref 1.6–2.6)
MCH RBC QN AUTO: 31.6 PG (ref 26.6–33)
MCHC RBC AUTO-ENTMCNC: 33.6 G/DL (ref 31.5–35.7)
MCV RBC AUTO: 94.1 FL (ref 79–97)
MONOCYTES # BLD AUTO: 0.56 10*3/MM3 (ref 0.1–0.9)
MONOCYTES NFR BLD AUTO: 6.2 % (ref 5–12)
NEUTROPHILS NFR BLD AUTO: 7.67 10*3/MM3 (ref 1.7–7)
NEUTROPHILS NFR BLD AUTO: 85.2 % (ref 42.7–76)
NRBC BLD AUTO-RTO: 0 /100 WBC (ref 0–0.2)
PHOSPHATE SERPL-MCNC: 2.5 MG/DL (ref 2.5–4.5)
PLATELET # BLD AUTO: 121 10*3/MM3 (ref 140–450)
PMV BLD AUTO: 11.7 FL (ref 6–12)
POTASSIUM SERPL-SCNC: 4.1 MMOL/L (ref 3.5–5.2)
PROCALCITONIN SERPL-MCNC: 1.65 NG/ML (ref 0–0.25)
RBC # BLD AUTO: 3.54 10*6/MM3 (ref 3.77–5.28)
RBC MORPH BLD: NORMAL
S PNEUM AG SPEC QL LA: NEGATIVE
SMALL PLATELETS BLD QL SMEAR: ADEQUATE
SODIUM SERPL-SCNC: 138 MMOL/L (ref 136–145)
TOXIC GRANULATION: NORMAL
WBC NRBC COR # BLD AUTO: 9.01 10*3/MM3 (ref 3.4–10.8)

## 2024-04-28 PROCEDURE — 83735 ASSAY OF MAGNESIUM: CPT | Performed by: HOSPITALIST

## 2024-04-28 PROCEDURE — 84145 PROCALCITONIN (PCT): CPT | Performed by: PHYSICIAN ASSISTANT

## 2024-04-28 PROCEDURE — 84100 ASSAY OF PHOSPHORUS: CPT | Performed by: HOSPITALIST

## 2024-04-28 PROCEDURE — 87899 AGENT NOS ASSAY W/OPTIC: CPT | Performed by: INTERNAL MEDICINE

## 2024-04-28 PROCEDURE — 25010000002 FUROSEMIDE PER 20 MG: Performed by: INTERNAL MEDICINE

## 2024-04-28 PROCEDURE — 97161 PT EVAL LOW COMPLEX 20 MIN: CPT

## 2024-04-28 PROCEDURE — 99232 SBSQ HOSP IP/OBS MODERATE 35: CPT | Performed by: UROLOGY

## 2024-04-28 PROCEDURE — 94761 N-INVAS EAR/PLS OXIMETRY MLT: CPT

## 2024-04-28 PROCEDURE — G0378 HOSPITAL OBSERVATION PER HR: HCPCS

## 2024-04-28 PROCEDURE — 25010000002 MAGNESIUM SULFATE IN D5W 1G/100ML (PREMIX) 1-5 GM/100ML-% SOLUTION: Performed by: PHYSICIAN ASSISTANT

## 2024-04-28 PROCEDURE — 25010000002 CEFTRIAXONE PER 250 MG: Performed by: HOSPITALIST

## 2024-04-28 PROCEDURE — 85007 BL SMEAR W/DIFF WBC COUNT: CPT | Performed by: HOSPITALIST

## 2024-04-28 PROCEDURE — 85025 COMPLETE CBC W/AUTO DIFF WBC: CPT | Performed by: HOSPITALIST

## 2024-04-28 PROCEDURE — 25010000002 ENOXAPARIN PER 10 MG: Performed by: HOSPITALIST

## 2024-04-28 PROCEDURE — 94799 UNLISTED PULMONARY SVC/PX: CPT

## 2024-04-28 PROCEDURE — 87449 NOS EACH ORGANISM AG IA: CPT | Performed by: INTERNAL MEDICINE

## 2024-04-28 PROCEDURE — 71045 X-RAY EXAM CHEST 1 VIEW: CPT

## 2024-04-28 PROCEDURE — 80048 BASIC METABOLIC PNL TOTAL CA: CPT | Performed by: HOSPITALIST

## 2024-04-28 RX ORDER — MAGNESIUM SULFATE 1 G/100ML
1 INJECTION INTRAVENOUS ONCE
Status: COMPLETED | OUTPATIENT
Start: 2024-04-28 | End: 2024-04-28

## 2024-04-28 RX ORDER — FUROSEMIDE 10 MG/ML
40 INJECTION INTRAMUSCULAR; INTRAVENOUS
Status: DISCONTINUED | OUTPATIENT
Start: 2024-04-28 | End: 2024-04-29

## 2024-04-28 RX ORDER — LISINOPRIL 20 MG/1
20 TABLET ORAL
Status: DISCONTINUED | OUTPATIENT
Start: 2024-04-29 | End: 2024-04-30 | Stop reason: HOSPADM

## 2024-04-28 RX ORDER — LISINOPRIL AND HYDROCHLOROTHIAZIDE 20; 12.5 MG/1; MG/1
1 TABLET ORAL DAILY
COMMUNITY
End: 2024-05-03 | Stop reason: SDUPTHER

## 2024-04-28 RX ORDER — LISINOPRIL 10 MG/1
10 TABLET ORAL ONCE
Status: COMPLETED | OUTPATIENT
Start: 2024-04-28 | End: 2024-04-28

## 2024-04-28 RX ORDER — CALCIUM CARBONATE 500 MG/1
2 TABLET, CHEWABLE ORAL 3 TIMES DAILY PRN
Status: DISCONTINUED | OUTPATIENT
Start: 2024-04-28 | End: 2024-04-30 | Stop reason: HOSPADM

## 2024-04-28 RX ORDER — FUROSEMIDE 10 MG/ML
20 INJECTION INTRAMUSCULAR; INTRAVENOUS ONCE
Status: COMPLETED | OUTPATIENT
Start: 2024-04-28 | End: 2024-04-28

## 2024-04-28 RX ORDER — FUROSEMIDE 10 MG/ML
20 INJECTION INTRAMUSCULAR; INTRAVENOUS
Status: DISCONTINUED | OUTPATIENT
Start: 2024-04-28 | End: 2024-04-28

## 2024-04-28 RX ADMIN — FUROSEMIDE 40 MG: 10 INJECTION, SOLUTION INTRAMUSCULAR; INTRAVENOUS at 12:25

## 2024-04-28 RX ADMIN — LISINOPRIL 10 MG: 10 TABLET ORAL at 10:11

## 2024-04-28 RX ADMIN — CITALOPRAM HYDROBROMIDE 20 MG: 20 TABLET ORAL at 07:40

## 2024-04-28 RX ADMIN — HYDROCODONE BITARTRATE AND ACETAMINOPHEN 1 TABLET: 5; 325 TABLET ORAL at 20:22

## 2024-04-28 RX ADMIN — DOCUSATE SODIUM 50MG AND SENNOSIDES 8.6MG 2 TABLET: 8.6; 5 TABLET, FILM COATED ORAL at 07:39

## 2024-04-28 RX ADMIN — FUROSEMIDE 40 MG: 10 INJECTION, SOLUTION INTRAMUSCULAR; INTRAVENOUS at 17:32

## 2024-04-28 RX ADMIN — PANTOPRAZOLE SODIUM 40 MG: 40 TABLET, DELAYED RELEASE ORAL at 06:29

## 2024-04-28 RX ADMIN — CEFTRIAXONE SODIUM 2000 MG: 2 INJECTION, POWDER, FOR SOLUTION INTRAMUSCULAR; INTRAVENOUS at 07:37

## 2024-04-28 RX ADMIN — CALCIUM CARBONATE 2 TABLET: 500 TABLET, CHEWABLE ORAL at 16:41

## 2024-04-28 RX ADMIN — LEVOTHYROXINE SODIUM 137 MCG: 112 TABLET ORAL at 06:29

## 2024-04-28 RX ADMIN — HYDROCODONE BITARTRATE AND ACETAMINOPHEN 1 TABLET: 5; 325 TABLET ORAL at 13:57

## 2024-04-28 RX ADMIN — ENOXAPARIN SODIUM 40 MG: 100 INJECTION SUBCUTANEOUS at 07:40

## 2024-04-28 RX ADMIN — LISINOPRIL 10 MG: 10 TABLET ORAL at 07:40

## 2024-04-28 RX ADMIN — FUROSEMIDE 20 MG: 10 INJECTION, SOLUTION INTRAMUSCULAR; INTRAVENOUS at 10:56

## 2024-04-28 RX ADMIN — TAMSULOSIN HYDROCHLORIDE 0.4 MG: 0.4 CAPSULE ORAL at 07:40

## 2024-04-28 RX ADMIN — CETIRIZINE HYDROCHLORIDE 10 MG: 10 TABLET, FILM COATED ORAL at 20:21

## 2024-04-28 RX ADMIN — Medication 10 ML: at 20:22

## 2024-04-28 RX ADMIN — MAGNESIUM SULFATE HEPTAHYDRATE 1 G: 1 INJECTION, SOLUTION INTRAVENOUS at 10:11

## 2024-04-28 NOTE — THERAPY EVALUATION
Acute Care - Physical Therapy Initial Evaluation   [place]     Patient Name: Xuan Horvath  : 1970  MRN: [id number]  Today's Date: 2024      Visit Dx:     ICD-10-CM ICD-9-CM   1. Renal colic on left side  N23 788.0   2. Left ureteral stone  N20.1 592.1   3. Acute febrile illness  R50.9 780.60   4. Acute UTI (urinary tract infection)  N39.0 599.0   5. Difficulty walking  R26.2 719.7     Patient Active Problem List   Diagnosis    Chronic fatigue    Essential hypertension    Sleep apnea    Anxiety    Hypothyroid    Obesity, Class I, BMI 30-34.9    Gastric polyps    History of sleeve gastrectomy    Vitamin D deficiency    Chronic pain of both shoulders    Impingement syndrome of shoulder region    Shoulder tendonitis, Bilaterally    Unintended weight gain    Seasonal allergies    Calculus of kidney    Gastroesophageal reflux disease    Left ureteral stone    Nephrolithiasis     Past Medical History:   Diagnosis Date    Allergy     Anxiety     Colon cancer screening     Depression     Depression 2015    working well, continue citalopram discussed that she can stay on meds long term or go off them whenever she is ready    Essential hypertension 2016    stable will try to decrease atenolol and see if that will give her more energy, need to watch bp closely    GERD (gastroesophageal reflux disease) 2023    Prilosec. Protonix.    Glaucoma     Heart murmur 2022    Hiatal hernia     History of medical problems 22    Panniculectomy    Hypertension     Hypothyroidism     Kidney stone     Obesity 2016    she is going to work on weight by trying to go to the gym she is working on this with her son    Sleep apnea     COMPLIANT WITH CPAP     Vitamin D deficiency 2015    will repeat labs continue current OTC meds for now     Past Surgical History:   Procedure Laterality Date    BARIATRIC SURGERY  20    BREAST AUGMENTATION Bilateral 2023    breast implants     CARPAL TUNNEL RELEASE Right 2005     SECTION  ,,2003    x3    COLONOSCOPY N/A 2022    Procedure: COLONOSCOPY WITH POLYPECTOMY COLD SNARE;  Surgeon: Luis Sterling MD;  Location: [facility] ENDOSCOPY;  Service: Gastroenterology;  Laterality: N/A;  COLON POLYP    COLONOSCOPY      COSMETIC SURGERY  23    Breast implants/breast lift    ENDOSCOPY N/A 2020    Procedure: ESOPHAGOGASTRODUODENOSCOPY WITH BIOPSY;  Surgeon: Ron Cadet Jr., MD;  Location: [facility] ENDOSCOPY;  Service: General;  Laterality: N/A;  PRE- DYSPEPSIA  POST- GASTRITIS, GASTRIC POLYPS, ESOPHAGITIS, HIATAL HERNIA      ENDOSCOPY N/A 2023    Procedure: ESOPHAGOGASTRODUODENOSCOPY WITH BIOPSIES;  Surgeon: Luis Sterling MD;  Location: [facility] ENDOSCOPY;  Service: Gastroenterology;  Laterality: N/A;  PRIOR GASTRIC SLEEVE    GASTRIC SLEEVE LAPAROSCOPIC N/A 2020    Procedure: GASTRIC SLEEVE LAPAROSCOPIC With Hiatal Hernia Repair;  Surgeon: Ron Cadet Jr., MD;  Location: [facility] OR [facility];  Service: Bariatric;  Laterality: N/A;    HERNIA REPAIR  20    Hiatal hernia    HYSTERECTOMY      PANNICULECTOMY      UPPER GASTROINTESTINAL ENDOSCOPY       PT Assessment (Last 12 Hours)       PT Evaluation and Treatment       Row Name 24 1232          Physical Therapy Time and Intention    Subjective Information no complaints  -     Document Type evaluation  -     Mode of Treatment individual therapy;physical therapy  -     Patient Effort excellent  -     Symptoms Noted During/After Treatment shortness of breath  -       Row Name 24 1232          General Information    Patient Profile Reviewed yes  -     Patient Observations alert;cooperative;agree to therapy  -     Prior Level of Function independent:;all household mobility;community mobility  -     Barriers to Rehab none identified  -       Row Name 24 1232          Living Environment    Current Living  Arrangements home  -     People in Home spouse  -     Primary Care Provided by self  -       Row Name 04/28/24 1232          Home Use of Assistive/Adaptive Equipment    Equipment Currently Used at Home cpap  -       Row Name 04/28/24 1232          Range of Motion (ROM)    Range of Motion bilateral lower extremities;ROM is WFL  -[facility] Name 04/28/24 1232          Strength (Manual Muscle Testing)    Strength (Manual Muscle Testing) bilateral lower extremities;strength is [facility]  -       Row Name 04/28/24 1232          Bed Mobility    Bed Mobility bed mobility (all) activities  -     All Activities, [place] (Bed Mobility) independent  -       Row Name 04/28/24 1232          Transfers    Transfers sit-stand transfer;stand-sit transfer  -       Row Name 04/28/24 1232          Sit-Stand Transfer    Sit-Stand [place] (Transfers) independent  -       Row Name 04/28/24 1232          Stand-Sit Transfer    Stand-Sit [place] (Transfers) independent  -[facility] Name 04/28/24 1232          Gait/Stairs (Locomotion)    Gait/Stairs Locomotion gait/ambulation independence  -     [place] Level (Gait) standby assist  -     Patient was able to Ambulate yes  -     Distance in Feet (Gait) 200  -     Pattern (Gait) step-through  -       Row Name 04/28/24 1232          Balance    Balance Assessment standing dynamic balance  -     Dynamic Standing Balance standby assist  -     Position/Device Used, Standing Balance unsupported  -       Row Name 04/28/24 1232          Plan of Care Review    Plan of Care Reviewed With patient;spouse  -     Outcome Evaluation Pt does not present with any deficits requiring skilled physical therapy. Pt will be discharged from therapy services at this time.  -       Row Name 04/28/24 1232          Positioning and Restraints    Pre-Treatment Position in bed  -     Post Treatment Position bed  -     In Bed call light within reach;encouraged to call  for assist;with family/caregiver  -       Row Name 04/28/24 1232          Therapy Assessment/Plan (PT)    Criteria for Skilled Interventions Met (PT) no problems identified which require skilled intervention  -     Therapy Frequency (PT) evaluation only  -               User Key  (r) = Recorded By, (t) = Taken By, (c) = Cosigned By      Initials Name Provider Type     Yaya Red, PT Physical Therapist                    Physical Therapy Education       Title: PT OT SLP Therapies (Done)       Topic: Physical Therapy (Done)       Point: Mobility training (Done)       Learning Progress Summary             Patient Acceptance, E, VU by  at 4/28/2024 1234                         Point: Home exercise program (Done)       Learning Progress Summary             Patient Acceptance, E, VU by  at 4/28/2024 1234                         Point: Body mechanics (Done)       Learning Progress Summary             Patient Acceptance, E, VU by  at 4/28/2024 1234                         Point: Precautions (Done)       Learning Progress Summary             Patient Acceptance, E, VU by  at 4/28/2024 1234                                         User Key       Initials Effective Dates Name Provider Type [facility] 05/08/23 -  Yaya Red, DANIEL Physical Therapist PT                  PT Recommendation and Plan  Anticipated Discharge Disposition (PT): home with outpatient therapy services  Therapy Frequency (PT): evaluation only  Plan of Care Reviewed With: patient, spouse  Outcome Evaluation: Pt does not present with any deficits requiring skilled physical therapy. Pt will be discharged from therapy services at this time.   Outcome Measures       Row Name 04/28/24 1200             How much help from another person do you currently need...    Turning from your back to your side while in flat bed without using bedrails? 4  -      Moving from lying on back to sitting on the side of a flat bed without bedrails? 4  -       Moving to and from a bed to a chair (including a wheelchair)? 4  -JH      Standing up from a chair using your arms (e.g., wheelchair, bedside chair)? 4  -JH      Climbing 3-5 steps with a railing? 4  -JH      To walk in hospital room? 4  -JH      AM-PAC 6 Clicks Score (PT) 24  -      Highest Level of Mobility Goal 8 --> Walked 250 feet or more  -         Functional Assessment    Outcome Measure Options AM-PAC 6 Clicks Basic Mobility (PT)  -                User Key  (r) = Recorded By, (t) = Taken By, (c) = Cosigned By      Initials Name Provider Type    Yaya Early, PT Physical Therapist                     Time Calculation:    PT Charges       Row Name 04/28/24 1232             Time Calculation    PT Received On 04/28/24  -         SNF Physical Therapy Minutes    Skilled Minutes- PT 25 min  -                User Key  (r) = Recorded By, (t) = Taken By, (c) = Cosigned By      Initials Name Provider Type    Yaya Early PT Physical Therapist                  Therapy Charges for Today       Code Description Service Date Service Provider Modifiers Qty    86509804684 HC PT EVAL LOW COMPLEXITY 2 4/28/2024 Yaya Red, PT GP 1            PT G-Codes  Outcome Measure Options: AM-PAC 6 Clicks Basic Mobility (PT)  AM-PAC 6 Clicks Score (PT): 24    Yaya Red PT  4/28/2024     Yes

## 2024-04-28 NOTE — PROGRESS NOTES
[facility]   Urology Progress Note    Patient Name: Xuan Horvath  : 1970  MRN: [id number]  Primary Care Physician:  Suri Lynn MD  Date of admission: 2024    Subjective   Subjective     Minimal pain  Some chills overnight      Objective   Objective     Vitals:   Temp:  [97.4 °F (36.3 °C)-99.9 °F (37.7 °C)] 98 °F (36.7 °C)  Heart Rate:  [50-89] 56  Resp:  [18-20] 18  BP: (109-178)/(61-92) 178/87  Flow (L/min):  [6] 6  Physical Exam     Alert and oriented x3  No acute distress  Unlabored respirations  Nontender/nondistended       Result Review    Result Review:  I have personally reviewed the results from the time of this admission to 2024 06:48 EDT and agree with these findings:  []  Laboratory  []  Microbiology  []  Radiology  []  EKG/Telemetry   []  Cardiology/Vascular   []  Pathology  []  Old records  []  Other:      Assessment & Plan   Assessment / Plan     Brief Patient Summary:  Xuan Horvath is a 54 y.o. female     Active Hospital Problems:  Active Hospital Problems    Diagnosis     **Left ureteral stone     Nephrolithiasis          Can be discharge whenever thought medically ready by primary service on antibiotics    I have sent follow-up email to my office staff to get set up for Cystoscopy with left ureteroscopy with laser and left ureteral stent exchange in a couple weeks after antibiotics    Patient voiced understanding    Call with questions            Electronically signed by Salazar Rios MD, 24, 6:48 AM EDT.

## 2024-04-28 NOTE — PLAN OF CARE
Goal Outcome Evaluation:  Plan of Care Reviewed With: patient        Progress: improving          Alert oriented able to make needs known.   C/o SOA with bilateral expiratory wheezing . Requiring 3L NC at this time to maintain O2 sats >90%, will wean as tolerated. Patient does not require oxygen at baseline. Continuous IVF stopped. Chest xray obtained. x1 dose 20 mg IV lasix given, 40 mg IV lasix scheduled and given per MAR. BNP in a.m.   C/o lower abdominal pain and burning during urination, Norco given x1. Urine output clear/pale yellow.   Hypertension improving following home dose lisinopril and IV lasix, will CTM.

## 2024-04-28 NOTE — PROGRESS NOTES
[facility]   Hospitalist Progress Note  Date: 2024  Patient Name: Xuan Horvath  : 1970  MRN: [id number]  Date of admission: 2024  Room/Bed: [facility]      Subjective   Subjective     Chief Complaint: Flank pain    Summary:Xuan Horvath is a 54 y.o. female presented to the ED with flank pain, found to have 6 mm left-sided ureteral stone, was given a dose of Rocephin.  Patient was supposed to have outpatient follow-up but she developed a fever and therefore presented back and went to surgery.  Postoperatively she has had issues with some hypotension and elevated blood pressure.    Interval Followup: Feeling short of breath today, oxygen levels dropped to the 80s, chest x-ray ordered, placed on oxygen with nice improvement.        Objective   Objective     Vitals:   Temp:  [97.4 °F (36.3 °C)-99.1 °F (37.3 °C)] 98.2 °F (36.8 °C)  Heart Rate:  [50-89] 79  Resp:  [18-20] 18  BP: (109-196)/(61-97) 177/97  Flow (L/min):  [3-6] 3    Physical Exam   General: Awake, alert, NAD  HENT: NCAT, MMM  Eyes: pupils equal, no scleral icterus  Cardiovascular: RRR, no murmurs   Pulmonary: No distress, no conversational dyspnea, was just placed on oxygen.  Does have some bibasilar crackles  Gastrointestinal: S/ND/NT, +BS  Musculoskeletal: No gross deformities  Skin: No jaundice, no rash on exposed skin appreciated  Neuro: CN II through XII grossly intact; speech clear; no tremor  Psych: Mood and affect appropriate  : No Cain catheter; no suprapubic tenderness    Result Review    Result Review:  I have personally reviewed these results:  [x]  Laboratory      Lab 24  0343 24  0931 24  1502   WBC 9.01 10.55 13.15*   HEMOGLOBIN 11.2* 12.2 12.9   HEMATOCRIT 33.3* 34.9 37.7   PLATELETS 121* 128* 148   NEUTROS ABS 7.67* 9.16* 11.05*   IMMATURE GRANS (ABS) 0.11* 0.18*  --    LYMPHS ABS 0.62* 0.61*  --    MONOS ABS 0.56 0.46  --    EOS ABS 0.01 0.09 0.13   MCV 94.1 91.8 92.6   PROCALCITONIN  1.65*  --   --    LACTATE  --  1.2 1.3         Lab 04/28/24  0343 04/27/24  0931 04/26/24  1502   SODIUM 138 136 136   POTASSIUM 4.1 3.6 3.6   CHLORIDE 108* 101 100   CO2 19.3* 23.7 24.6   ANION GAP 10.7 11.3 11.4   BUN 21* 21* 17   CREATININE 0.71 1.07* 0.98   EGFR 101.2 61.9 68.7   GLUCOSE 148* 133* 148*   CALCIUM 7.9* 8.1* 8.6   MAGNESIUM 1.7  --   --    PHOSPHORUS 2.5  --   --          Lab 04/27/24  0931 04/26/24  1502   TOTAL PROTEIN 6.0 6.6   ALBUMIN 3.3* 3.7   GLOBULIN 2.7 2.9   ALT (SGPT) 18 20   AST (SGOT) 20 21   BILIRUBIN 0.3 0.3   ALK PHOS 73 67   LIPASE 17 16                     Brief Urine Lab Results  (Last result in the past 365 days)        Color   Clarity   Blood   Leuk Est   Nitrite   Protein   CREAT   Urine HCG        04/27/24 1000 Dark Yellow   Turbid   Large (3+)   Large (3+)   Negative   >=300 mg/dL (3+)                 [x]  Microbiology   Microbiology Results (last 10 days)       Procedure Component Value - Date/Time    Blood Culture - Blood, Arm, Right [[id number]]  (Normal) Collected: 04/27/24 1130    Lab Status: Preliminary result Specimen: Blood from Arm, Right Updated: 04/28/24 1145     Blood Culture No growth at 24 hours    Blood Culture - Blood, Arm, Left [[id number]]  (Normal) Collected: 04/27/24 1130    Lab Status: Preliminary result Specimen: Blood from Arm, Left Updated: 04/28/24 1145     Blood Culture No growth at 24 hours          [x]  Radiology  XR Chest 1 View    Result Date: 4/28/2024  Impression: Slight increased interstitial opacities and right lower lobe alveolar opacities not clearly seen on recent CT comparison. This could reflect mild edema or early infectious process in the right clinical setting.   Electronically Signed By-Yaya Crawford MD On:4/28/2024 9:41 AM      CT Abdomen Pelvis With Contrast    Result Date: 4/26/2024  Impression: CT scan of the abdomen and pelvis with IV contrast demonstrating fatty liver.  Post hysterectomy.  Nonobstructing calyceal stones are  seen in both kidneys.  A 6 mm stone is seen in the proximal left ureter with moderate left hydronephrosis.    Electronically Signed By-CLARISA WOLFF MD On:4/26/2024 4:23 PM     []  EKG/Telemetry   []  Cardiology/Vascular   []  Pathology  []  Old records  []  Other:    Assessment & Plan   Assessment / Plan     Assessment:  Hypoxia, likely volume overload  Possible pneumonia, seems less likely  Ureteral stone status post surgical intervention  Hypertension    Plan:  Chest x-ray ordered  Will give Lasix IV x 1  Based on oxygen  Check BNP  Continue antibiotics  Suspect she will improve with Lasix       Discussed care plan with RN.    DVT prophylaxis:  Mechanical DVT prophylaxis orders are signed and held. Medical DVT prophylaxis orders are present.        CODE STATUS:   Level Of Support Discussed With: Patient  Code Status (Patient has no pulse and is not breathing): CPR (Attempt to Resuscitate)  Medical Interventions (Patient has pulse or is breathing): Full Support      Electronically signed by Ron Fernandez MD, 4/28/2024, 12:09 EDT.

## 2024-04-28 NOTE — PLAN OF CARE
Goal Outcome Evaluation:  Plan of Care Reviewed With: patient, spouse           Outcome Evaluation: Pt does not present with any deficits requiring skilled physical therapy. Pt will be discharged from therapy services at this time.      Anticipated Discharge Disposition (PT): home with outpatient therapy services

## 2024-04-28 NOTE — PLAN OF CARE
Goal Outcome Evaluation:  Plan of Care Reviewed With: patient        Progress: improving  Outcome Evaluation: PAIN WELL CONTROLLED W PO MEDS, PT UP AD ANISH TO RESTROOM, STRAINING URINE (NO STONE PASSED). SHIFT UNEVENTFUL.Rukhsana oJ RN

## 2024-04-28 NOTE — DISCHARGE INSTRUCTIONS
follow-up with Dr. Rios  for Cystoscopy with left ureteroscopy with laser and left ureteral stent exchange in a couple weeks after antibiotics

## 2024-04-29 ENCOUNTER — APPOINTMENT (OUTPATIENT)
Dept: CARDIOLOGY | Facility: HOSPITAL | Age: 54
DRG: 660 | End: 2024-04-29
Payer: OTHER GOVERNMENT

## 2024-04-29 PROBLEM — N20.1 URETERAL STONE: Status: ACTIVE | Noted: 2024-04-29

## 2024-04-29 LAB
ALBUMIN SERPL-MCNC: 3.3 G/DL (ref 3.5–5.2)
ANION GAP SERPL CALCULATED.3IONS-SCNC: 11.5 MMOL/L (ref 5–15)
BACTERIA SPEC AEROBE CULT: ABNORMAL
BASOPHILS # BLD AUTO: 0.04 10*3/MM3 (ref 0–0.2)
BASOPHILS NFR BLD AUTO: 0.4 % (ref 0–1.5)
BH CV ECHO MEAS - ACS: 1.91 CM
BH CV ECHO MEAS - AO MAX PG: 6.6 MMHG
BH CV ECHO MEAS - AO MEAN PG: 3.6 MMHG
BH CV ECHO MEAS - AO ROOT DIAM: 2.6 CM
BH CV ECHO MEAS - AO V2 MAX: 128.6 CM/SEC
BH CV ECHO MEAS - AO V2 VTI: 24.4 CM
BH CV ECHO MEAS - AVA(I,D): 1.8 CM2
BH CV ECHO MEAS - EDV(CUBED): 64.2 ML
BH CV ECHO MEAS - EDV(MOD-SP2): 83.4 ML
BH CV ECHO MEAS - EDV(MOD-SP4): 122 ML
BH CV ECHO MEAS - EF(MOD-BP): 58.8 %
BH CV ECHO MEAS - EF(MOD-SP2): 55 %
BH CV ECHO MEAS - EF(MOD-SP4): 60.7 %
BH CV ECHO MEAS - ESV(CUBED): 23.6 ML
BH CV ECHO MEAS - ESV(MOD-SP2): 37.5 ML
BH CV ECHO MEAS - ESV(MOD-SP4): 47.9 ML
BH CV ECHO MEAS - FS: 28.4 %
BH CV ECHO MEAS - IVS/LVPW: 0.92 CM
BH CV ECHO MEAS - IVSD: 1.02 CM
BH CV ECHO MEAS - LA DIMENSION: 3.8 CM
BH CV ECHO MEAS - LAT PEAK E' VEL: 10.7 CM/SEC
BH CV ECHO MEAS - LV DIASTOLIC VOL/BSA (35-75): 64.2 CM2
BH CV ECHO MEAS - LV MASS(C)D: 137.7 GRAMS
BH CV ECHO MEAS - LV MAX PG: 4.3 MMHG
BH CV ECHO MEAS - LV MEAN PG: 2.34 MMHG
BH CV ECHO MEAS - LV SYSTOLIC VOL/BSA (12-30): 25.2 CM2
BH CV ECHO MEAS - LV V1 MAX: 104.1 CM/SEC
BH CV ECHO MEAS - LV V1 VTI: 18.4 CM
BH CV ECHO MEAS - LVIDD: 4 CM
BH CV ECHO MEAS - LVIDS: 2.9 CM
BH CV ECHO MEAS - LVOT AREA: 2.39 CM2
BH CV ECHO MEAS - LVOT DIAM: 1.75 CM
BH CV ECHO MEAS - LVPWD: 1.1 CM
BH CV ECHO MEAS - MED PEAK E' VEL: 7.3 CM/SEC
BH CV ECHO MEAS - MV A MAX VEL: 75.8 CM/SEC
BH CV ECHO MEAS - MV DEC SLOPE: 159.8 CM/SEC2
BH CV ECHO MEAS - MV DEC TIME: 0.34 SEC
BH CV ECHO MEAS - MV E MAX VEL: 54.8 CM/SEC
BH CV ECHO MEAS - MV E/A: 0.72
BH CV ECHO MEAS - SV(LVOT): 43.9 ML
BH CV ECHO MEAS - SV(MOD-SP2): 45.9 ML
BH CV ECHO MEAS - SV(MOD-SP4): 74.1 ML
BH CV ECHO MEAS - SVI(LVOT): 23.1 ML/M2
BH CV ECHO MEAS - SVI(MOD-SP2): 24.2 ML/M2
BH CV ECHO MEAS - SVI(MOD-SP4): 39 ML/M2
BH CV ECHO MEAS - TAPSE (>1.6): 1.47 CM
BH CV ECHO MEAS - TR MAX PG: 30.7 MMHG
BH CV ECHO MEAS - TR MAX VEL: 276.9 CM/SEC
BH CV ECHO MEASUREMENTS AVERAGE E/E' RATIO: 6.09
BUN SERPL-MCNC: 18 MG/DL (ref 6–20)
BUN/CREAT SERPL: 20.7 (ref 7–25)
CALCIUM SPEC-SCNC: 8.6 MG/DL (ref 8.6–10.5)
CHLORIDE SERPL-SCNC: 101 MMOL/L (ref 98–107)
CO2 SERPL-SCNC: 27.5 MMOL/L (ref 22–29)
CREAT SERPL-MCNC: 0.87 MG/DL (ref 0.57–1)
DEPRECATED RDW RBC AUTO: 42.5 FL (ref 37–54)
EGFRCR SERPLBLD CKD-EPI 2021: 79.3 ML/MIN/1.73
EOSINOPHIL # BLD AUTO: 0.13 10*3/MM3 (ref 0–0.4)
EOSINOPHIL NFR BLD AUTO: 1.4 % (ref 0.3–6.2)
ERYTHROCYTE [DISTWIDTH] IN BLOOD BY AUTOMATED COUNT: 12.7 % (ref 12.3–15.4)
GLUCOSE SERPL-MCNC: 99 MG/DL (ref 65–99)
HCT VFR BLD AUTO: 37.1 % (ref 34–46.6)
HGB BLD-MCNC: 12.7 G/DL (ref 12–15.9)
IMM GRANULOCYTES # BLD AUTO: 0.04 10*3/MM3 (ref 0–0.05)
IMM GRANULOCYTES NFR BLD AUTO: 0.4 % (ref 0–0.5)
IVRT: 60 MS
LEFT ATRIUM VOLUME INDEX: 16.2 ML/M2
LYMPHOCYTES # BLD AUTO: 1.87 10*3/MM3 (ref 0.7–3.1)
LYMPHOCYTES NFR BLD AUTO: 20.3 % (ref 19.6–45.3)
MAGNESIUM SERPL-MCNC: 1.3 MG/DL (ref 1.6–2.6)
MAGNESIUM SERPL-MCNC: 1.5 MG/DL (ref 1.6–2.6)
MCH RBC QN AUTO: 31.4 PG (ref 26.6–33)
MCHC RBC AUTO-ENTMCNC: 34.2 G/DL (ref 31.5–35.7)
MCV RBC AUTO: 91.8 FL (ref 79–97)
MONOCYTES # BLD AUTO: 0.65 10*3/MM3 (ref 0.1–0.9)
MONOCYTES NFR BLD AUTO: 7 % (ref 5–12)
NEUTROPHILS NFR BLD AUTO: 6.5 10*3/MM3 (ref 1.7–7)
NEUTROPHILS NFR BLD AUTO: 70.5 % (ref 42.7–76)
NRBC BLD AUTO-RTO: 0 /100 WBC (ref 0–0.2)
NT-PROBNP SERPL-MCNC: 3411 PG/ML (ref 0–900)
PHOSPHATE SERPL-MCNC: 3 MG/DL (ref 2.5–4.5)
PLATELET # BLD AUTO: 186 10*3/MM3 (ref 140–450)
PMV BLD AUTO: 11.1 FL (ref 6–12)
POTASSIUM SERPL-SCNC: 3 MMOL/L (ref 3.5–5.2)
POTASSIUM SERPL-SCNC: 3.9 MMOL/L (ref 3.5–5.2)
RBC # BLD AUTO: 4.04 10*6/MM3 (ref 3.77–5.28)
SODIUM SERPL-SCNC: 140 MMOL/L (ref 136–145)
WBC NRBC COR # BLD AUTO: 9.23 10*3/MM3 (ref 3.4–10.8)

## 2024-04-29 PROCEDURE — 25010000002 ENOXAPARIN PER 10 MG: Performed by: HOSPITALIST

## 2024-04-29 PROCEDURE — 83735 ASSAY OF MAGNESIUM: CPT | Performed by: HOSPITALIST

## 2024-04-29 PROCEDURE — 84132 ASSAY OF SERUM POTASSIUM: CPT | Performed by: INTERNAL MEDICINE

## 2024-04-29 PROCEDURE — 93306 TTE W/DOPPLER COMPLETE: CPT | Performed by: INTERNAL MEDICINE

## 2024-04-29 PROCEDURE — 25010000002 FUROSEMIDE PER 20 MG: Performed by: PHYSICIAN ASSISTANT

## 2024-04-29 PROCEDURE — 25010000002 FUROSEMIDE PER 20 MG: Performed by: INTERNAL MEDICINE

## 2024-04-29 PROCEDURE — 25010000002 CEFTRIAXONE PER 250 MG: Performed by: HOSPITALIST

## 2024-04-29 PROCEDURE — 25010000002 SULFUR HEXAFLUORIDE MICROSPH 60.7-25 MG RECONSTITUTED SUSPENSION: Performed by: INTERNAL MEDICINE

## 2024-04-29 PROCEDURE — 83880 ASSAY OF NATRIURETIC PEPTIDE: CPT | Performed by: PHYSICIAN ASSISTANT

## 2024-04-29 PROCEDURE — 80069 RENAL FUNCTION PANEL: CPT | Performed by: PHYSICIAN ASSISTANT

## 2024-04-29 PROCEDURE — 83735 ASSAY OF MAGNESIUM: CPT | Performed by: INTERNAL MEDICINE

## 2024-04-29 PROCEDURE — 93306 TTE W/DOPPLER COMPLETE: CPT

## 2024-04-29 PROCEDURE — 97165 OT EVAL LOW COMPLEX 30 MIN: CPT

## 2024-04-29 PROCEDURE — 85025 COMPLETE CBC W/AUTO DIFF WBC: CPT | Performed by: HOSPITALIST

## 2024-04-29 PROCEDURE — 25010000002 MAGNESIUM SULFATE 2 GM/50ML SOLUTION: Performed by: INTERNAL MEDICINE

## 2024-04-29 PROCEDURE — 25010000002 MAGNESIUM SULFATE 2 GM/50ML SOLUTION: Performed by: PHYSICIAN ASSISTANT

## 2024-04-29 RX ORDER — POTASSIUM CHLORIDE 750 MG/1
40 CAPSULE, EXTENDED RELEASE ORAL ONCE
Status: COMPLETED | OUTPATIENT
Start: 2024-04-29 | End: 2024-04-29

## 2024-04-29 RX ORDER — FUROSEMIDE 10 MG/ML
20 INJECTION INTRAMUSCULAR; INTRAVENOUS ONCE
Status: COMPLETED | OUTPATIENT
Start: 2024-04-29 | End: 2024-04-29

## 2024-04-29 RX ORDER — MAGNESIUM SULFATE HEPTAHYDRATE 40 MG/ML
2 INJECTION, SOLUTION INTRAVENOUS ONCE
Status: COMPLETED | OUTPATIENT
Start: 2024-04-29 | End: 2024-04-29

## 2024-04-29 RX ADMIN — MAGNESIUM SULFATE HEPTAHYDRATE 2 G: 40 INJECTION, SOLUTION INTRAVENOUS at 09:00

## 2024-04-29 RX ADMIN — FUROSEMIDE 20 MG: 10 INJECTION, SOLUTION INTRAMUSCULAR; INTRAVENOUS at 16:08

## 2024-04-29 RX ADMIN — LEVOTHYROXINE SODIUM 137 MCG: 112 TABLET ORAL at 05:33

## 2024-04-29 RX ADMIN — DOCUSATE SODIUM 50MG AND SENNOSIDES 8.6MG 2 TABLET: 8.6; 5 TABLET, FILM COATED ORAL at 20:20

## 2024-04-29 RX ADMIN — CITALOPRAM HYDROBROMIDE 20 MG: 20 TABLET ORAL at 08:14

## 2024-04-29 RX ADMIN — LISINOPRIL 20 MG: 20 TABLET ORAL at 08:14

## 2024-04-29 RX ADMIN — HYDROCODONE BITARTRATE AND ACETAMINOPHEN 2 TABLET: 7.5; 325 TABLET ORAL at 20:22

## 2024-04-29 RX ADMIN — CETIRIZINE HYDROCHLORIDE 10 MG: 10 TABLET, FILM COATED ORAL at 20:20

## 2024-04-29 RX ADMIN — TAMSULOSIN HYDROCHLORIDE 0.4 MG: 0.4 CAPSULE ORAL at 08:15

## 2024-04-29 RX ADMIN — HYDROCODONE BITARTRATE AND ACETAMINOPHEN 2 TABLET: 7.5; 325 TABLET ORAL at 04:05

## 2024-04-29 RX ADMIN — Medication 10 ML: at 20:24

## 2024-04-29 RX ADMIN — POTASSIUM CHLORIDE 40 MEQ: 750 CAPSULE, EXTENDED RELEASE ORAL at 09:00

## 2024-04-29 RX ADMIN — PANTOPRAZOLE SODIUM 40 MG: 40 TABLET, DELAYED RELEASE ORAL at 05:34

## 2024-04-29 RX ADMIN — ENOXAPARIN SODIUM 40 MG: 100 INJECTION SUBCUTANEOUS at 08:14

## 2024-04-29 RX ADMIN — HYDROCODONE BITARTRATE AND ACETAMINOPHEN 1 TABLET: 5; 325 TABLET ORAL at 16:08

## 2024-04-29 RX ADMIN — Medication 10 ML: at 08:15

## 2024-04-29 RX ADMIN — MAGNESIUM SULFATE HEPTAHYDRATE 2 G: 40 INJECTION, SOLUTION INTRAVENOUS at 17:06

## 2024-04-29 RX ADMIN — FUROSEMIDE 40 MG: 10 INJECTION, SOLUTION INTRAMUSCULAR; INTRAVENOUS at 08:14

## 2024-04-29 RX ADMIN — SULFUR HEXAFLUORIDE 2 ML: KIT at 11:00

## 2024-04-29 RX ADMIN — POTASSIUM CHLORIDE 40 MEQ: 750 CAPSULE, EXTENDED RELEASE ORAL at 14:41

## 2024-04-29 RX ADMIN — CEFTRIAXONE SODIUM 2000 MG: 2 INJECTION, POWDER, FOR SOLUTION INTRAMUSCULAR; INTRAVENOUS at 08:14

## 2024-04-29 NOTE — PROGRESS NOTES
[facility]   Hospitalist Progress Note  Date: 2024  Patient Name: Xuan Horvath  : 1970  MRN: [id number]  Date of admission: 2024  Room/Bed: [facility] [place]      Subjective   Subjective     Chief Complaint: Flank pain    Summary:Xuan Horvath is a 54 y.o. female presented to the ED with flank pain, found to have 6 mm left-sided ureteral stone, was given a dose of Rocephin.  Patient was supposed to have outpatient follow-up but she developed a fever and therefore presented back and went to surgery.  Postoperatively she has had issues with some hypotension and elevated blood pressure.    Interval Followup: Respiratory status improved after IV Lasix potassium is low replacing orally urine culture positive for E. coli and cities pending blood cultures negative continuing Rocephin.  Suspect respiratory symptoms secondary to volume BNP elevated 2D echocardiogram ordered results pending.   Possible discharge home tomorrow on oral antibiotics pending chemistries and echo results    Objective   Objective     Vitals:   Temp:  [98.6 °F (37 °C)-99.5 °F (37.5 °C)] 98.8 °F (37.1 °C)  Heart Rate:  [57-74] 58  Resp:  [18] 18  BP: (134-179)/() 161/104  Flow (L/min):  [1-3] 1    Physical Exam   General: Awake, alert, NAD  HENT: NCAT, MMM  Eyes: pupils equal, no scleral icterus  Cardiovascular: RRR, no murmurs   Pulmonary: Clearing  Gastrointestinal: S/ND/NT, +BS  Musculoskeletal: No gross deformities  Skin: No jaundice, no rash on exposed skin appreciated  Neuro: CN II through XII grossly intact; speech clear; no tremor  Psych: Mood and affect appropriate  : No Cain catheter; no suprapubic tenderness    Result Review    Result Review:  I have personally reviewed these results:  [x]  Laboratory      Lab 24  0355 24  0343 24  0931 24  1502   WBC 9.23 9.01 10.55 13.15*   HEMOGLOBIN 12.7 11.2* 12.2 12.9   HEMATOCRIT 37.1 33.3* 34.9 37.7   PLATELETS 186 121* 128* 148   NEUTROS  ABS 6.50 7.67* 9.16* 11.05*   IMMATURE GRANS (ABS) 0.04 0.11* 0.18*  --    LYMPHS ABS 1.87 0.62* 0.61*  --    MONOS ABS 0.65 0.56 0.46  --    EOS ABS 0.13 0.01 0.09 0.13   MCV 91.8 94.1 91.8 92.6   PROCALCITONIN  --  1.65*  --   --    LACTATE  --   --  1.2 1.3         Lab 04/29/24  0355 04/28/24  0343 04/27/24  0931   SODIUM 140 138 136   POTASSIUM 3.0* 4.1 3.6   CHLORIDE 101 108* 101   CO2 27.5 19.3* 23.7   ANION GAP 11.5 10.7 11.3   BUN 18 21* 21*   CREATININE 0.87 0.71 1.07*   EGFR 79.3 101.2 61.9   GLUCOSE 99 148* 133*   CALCIUM 8.6 7.9* 8.1*   MAGNESIUM 1.3* 1.7  --    PHOSPHORUS 3.0 2.5  --          Lab 04/29/24  0355 04/27/24  0931 04/26/24  1502   TOTAL PROTEIN  --  6.0 6.6   ALBUMIN 3.3* 3.3* 3.7   GLOBULIN  --  2.7 2.9   ALT (SGPT)  --  18 20   AST (SGOT)  --  20 21   BILIRUBIN  --  0.3 0.3   ALK PHOS  --  73 67   LIPASE  --  17 16         Lab 04/29/24  0355   PROBNP 3,411.0*                 Brief Urine Lab Results  (Last result in the past 365 days)        Color   Clarity   Blood   Leuk Est   Nitrite   Protein   CREAT   Urine HCG        04/27/24 1000 Dark Yellow   Turbid   Large (3+)   Large (3+)   Negative   >=300 mg/dL (3+)                 [x]  Microbiology   Microbiology Results (last 10 days)       Procedure Component Value - Date/Time    S. Pneumo Ag Urine or CSF - Urine, Urine, Clean Catch [[id number]]  (Normal) Collected: 04/28/24 1242    Lab Status: Final result Specimen: Urine, Clean Catch Updated: 04/28/24 1346     Strep Pneumo Ag Negative    Legionella Antigen, Urine - Urine, Urine, Clean Catch [[id number]]  (Normal) Collected: 04/28/24 1242    Lab Status: Final result Specimen: Urine, Clean Catch Updated: 04/28/24 1346     LEGIONELLA ANTIGEN, URINE Negative    Blood Culture - Blood, Arm, Right [[id number]]  (Normal) Collected: 04/27/24 1130    Lab Status: Preliminary result Specimen: Blood from Arm, Right Updated: 04/29/24 1145     Blood Culture No growth at 2 days    Blood Culture - Blood, Arm,  Left [[id number]]  (Normal) Collected: 04/27/24 1130    Lab Status: Preliminary result Specimen: Blood from Arm, Left Updated: 04/29/24 1145     Blood Culture No growth at 2 days    Urine Culture - Urine, Urine, Clean Catch [[id number]]  (Abnormal)  (Susceptibility) Collected: 04/27/24 1000    Lab Status: Final result Specimen: Urine, Clean Catch Updated: 04/29/24 1024     Urine Culture 25,000 CFU/mL Escherichia coli    Narrative:      Colonization of the urinary tract without infection is common. Treatment is discouraged unless the patient is symptomatic, pregnant, or undergoing an invasive urologic procedure.    Susceptibility        Escherichia coli      NISHA      Amoxicillin + Clavulanate Susceptible      Ampicillin Susceptible      Ampicillin + Sulbactam Susceptible      Cefazolin Susceptible      Cefepime Susceptible      Ceftazidime Susceptible      Ceftriaxone Susceptible      Gentamicin Susceptible      Levofloxacin Susceptible      Nitrofurantoin Susceptible      Piperacillin + Tazobactam Susceptible      Trimethoprim + Sulfamethoxazole Susceptible                                 [x]  Radiology  XR Chest 1 View    Result Date: 4/28/2024  Impression: Slight increased interstitial opacities and right lower lobe alveolar opacities not clearly seen on recent CT comparison. This could reflect mild edema or early infectious process in the right clinical setting.   Electronically Signed By-Yaya Crawford MD On:4/28/2024 9:41 AM      CT Abdomen Pelvis With Contrast    Result Date: 4/26/2024  Impression: CT scan of the abdomen and pelvis with IV contrast demonstrating fatty liver.  Post hysterectomy.  Nonobstructing calyceal stones are seen in both kidneys.  A 6 mm stone is seen in the proximal left ureter with moderate left hydronephrosis.    Electronically Signed By-CLARISA WOLFF MD On:4/26/2024 4:23 PM     []  EKG/Telemetry   []  Cardiology/Vascular   []  Pathology  []  Old records  []  Other:    Assessment &  Plan   Assessment / Plan     Assessment:  Hypoxia, likely volume overload improved with Lasix  Possible pneumonia, seems less likely  Ureteral stone status post surgical intervention  Hypertension  Urinary tract infection secondary to E. Coli  Elevated BNP    Plan:  Has been given repeating doses of Lasix hold for now as respiratory status and volume status are improved  Replacing potassium  Replacing magnesium intravenously  Continuing telemetry  Weaned off of oxygen  Checking 2D echocardiogram results pending  Continuing Rocephin pending final urine culture results  Likely home tomorrow on oral antibiotics pending echo results and chemistries  Follow-up outpatient with urology and PCP  Discussed with RN         DVT prophylaxis:  Medical DVT prophylaxis orders are present.        CODE STATUS:   Level Of Support Discussed With: Patient  Code Status (Patient has no pulse and is not breathing): CPR (Attempt to Resuscitate)  Medical Interventions (Patient has pulse or is breathing): Full Support      Electronically signed by LITTLE Martinez, 4/29/2024, 12:24 EDT.      Attending Documentation:  Patient independently seen and evaluated, above documentation reflects plan put forth during bedside rounds.  More than 51% of the time of this patient encounter was performed by me. I discussed the care plan with ORAL Carrizales PA-C, I agree with his findings and plan as documented, what I have added to the care plan and modified is as follows in my documentation and my medical decision making; pleasant 54-year-old female presented with ureteral stone, postoperatively the following day had some hypoxia, likely this is volume overload.  We gave her Lasix and that improved.  She is now ambulating without dyspnea.  Her BNP was elevated so I have ordered a 2D echocardiogram, results which are pending.  Potassium was low at 3.0, magnesium low 1.3 this morning, replacing both of those.  I will recheck them this evening just to  ensure that they are being adequately corrected.    Electronically signed by Ron Fernandez MD, 04/29/24, 3:16 PM EDT.

## 2024-04-29 NOTE — PLAN OF CARE
Goal Outcome Evaluation:  Plan of Care Reviewed With: patient, spouse        Progress: no change  Outcome Evaluation: Patient does not present with any significant decline in function and does not require inpatient occupational therapy, therefore they will be discharged from services at this time. She is independent with all BADLs, functional transfers, and mobility without a device. It is recommended they discharge home when medically able to do so. Patient is aware and agreeable with treatment plan at this time.      Anticipated Discharge Disposition (OT): home

## 2024-04-29 NOTE — THERAPY EVALUATION
Patient Name: Xuan Horvath  : 1970    MRN: [id number]                              Today's Date: 2024       Admit Date: 2024    Visit Dx:     ICD-10-CM ICD-9-CM   1. Renal colic on left side  N23 788.0   2. Left ureteral stone  N20.1 592.1   3. Acute febrile illness  R50.9 780.60   4. Acute UTI (urinary tract infection)  N39.0 599.0   5. Difficulty walking  R26.2 719.7   6. Decreased activities of daily living (ADL)  Z78.9 V49.89     Patient Active Problem List   Diagnosis    Chronic fatigue    Essential hypertension    Sleep apnea    Anxiety    Hypothyroid    Obesity, Class I, BMI 30-34.9    Gastric polyps    History of sleeve gastrectomy    Vitamin D deficiency    Chronic pain of both shoulders    Impingement syndrome of shoulder region    Shoulder tendonitis, Bilaterally    Unintended weight gain    Seasonal allergies    Calculus of kidney    Gastroesophageal reflux disease    Left ureteral stone    Nephrolithiasis    Ureteral stone     Past Medical History:   Diagnosis Date    Allergy     Anxiety     Colon cancer screening     Depression     Depression 2015    working well, continue citalopram discussed that she can stay on meds long term or go off them whenever she is ready    Essential hypertension 2016    stable will try to decrease atenolol and see if that will give her more energy, need to watch bp closely    GERD (gastroesophageal reflux disease) 2023    Prilosec. Protonix.    Glaucoma     Heart murmur 2022    Hiatal hernia     History of medical problems 22    Panniculectomy    Hypertension     Hypothyroidism     Kidney stone     Obesity 2016    she is going to work on weight by trying to go to the gym she is working on this with her son    Sleep apnea     COMPLIANT WITH CPAP     Vitamin D deficiency 2015    will repeat labs continue current OTC meds for now     Past Surgical History:   Procedure Laterality Date    BARIATRIC SURGERY   20    BREAST AUGMENTATION Bilateral 2023    breast implants    CARPAL TUNNEL RELEASE Right 2005     SECTION  ,,2003    x3    COLONOSCOPY N/A 2022    Procedure: COLONOSCOPY WITH POLYPECTOMY COLD SNARE;  Surgeon: Luis Sterling MD;  Location: [facility] ENDOSCOPY;  Service: Gastroenterology;  Laterality: N/A;  COLON POLYP    COLONOSCOPY      COSMETIC SURGERY  23    Breast implants/breast lift    ENDOSCOPY N/A 2020    Procedure: ESOPHAGOGASTRODUODENOSCOPY WITH BIOPSY;  Surgeon: Ron Cadet Jr., MD;  Location: [facility] ENDOSCOPY;  Service: General;  Laterality: N/A;  PRE- DYSPEPSIA  POST- GASTRITIS, GASTRIC POLYPS, ESOPHAGITIS, HIATAL HERNIA      ENDOSCOPY N/A 2023    Procedure: ESOPHAGOGASTRODUODENOSCOPY WITH BIOPSIES;  Surgeon: Luis Sterling MD;  Location: [facility] ENDOSCOPY;  Service: Gastroenterology;  Laterality: N/A;  PRIOR GASTRIC SLEEVE    GASTRIC SLEEVE LAPAROSCOPIC N/A 2020    Procedure: GASTRIC SLEEVE LAPAROSCOPIC With Hiatal Hernia Repair;  Surgeon: Ron Cadet Jr., MD;  Location: [facility] OR OSC;  Service: Bariatric;  Laterality: N/A;    HERNIA REPAIR  20    Hiatal hernia    HYSTERECTOMY      PANNICULECTOMY      UPPER GASTROINTESTINAL ENDOSCOPY      URETEROSCOPY STENT INSERTION Left 2024    Procedure: Cystoscopy with left ureteral stent placement;  Surgeon: Salazar Rios MD;  Location: [facility] MAIN OR;  Service: Urology;  Laterality: Left;      General Information       Row Name 24 1412          OT Time and Intention    Document Type evaluation  -LF     Mode of Treatment individual therapy;occupational therapy  -LF       Row Name 24 1412          General Information    Patient Profile Reviewed yes  -LF     Prior Level of Function --  (I) with ADLs, ambulated w/o a device, has a walk-in shower where she stands to shower with handheld shower head in place, elevated commode, stands to groom, drives,  and no home O2.  -     Existing Precautions/Restrictions no known precautions/restrictions  -     Barriers to Rehab none identified  -       Row Name 04/29/24 1412          Occupational Profile    Reason for Services/Referral (Occupational Profile) Patient is a 54 year old female who is currently status post cystoscopy with left ureteral stent placement on April 7th, 2024. Occupational therapy consulted due to recent decline in ADLs/functional transfers. No previous occupational therapy services for current condition.  -       Row Name 04/29/24 1412          Living Environment    People in Home spouse  -       Row Name 04/29/24 1412          Cognition    Orientation Status (Cognition) oriented x 4  -       Row Name 04/29/24 1412          Safety Issues, Functional Mobility    Impairments Affecting Function (Mobility) other (see comments)  N/A  -               User Key  (r) = Recorded By, (t) = Taken By, (c) = Cosigned By      Initials Name Provider Type     Jacklyn Wade OT Occupational Therapist                     Mobility/ADL's       Row Name 04/29/24 1419          Bed Mobility    Bed Mobility sit-supine  -     All Activities, Power (Bed Mobility) independent  -       Row Name 04/29/24 1419          Transfers    Transfers sit-stand transfer;stand-sit transfer;toilet transfer  -       Row Name 04/29/24 1419          Sit-Stand Transfer    Sit-Stand Power (Transfers) independent  -       Row Name 04/29/24 1419          Stand-Sit Transfer    Stand-Sit Power (Transfers) independent  -[facility] Name 04/29/24 1419          Toilet Transfer    Type (Toilet Transfer) sit-stand;stand-sit  -     Power Level (Toilet Transfer) independent  -       Row Name 04/29/24 1419          Functional Mobility    Functional Mobility- Ind. Level independent  -[facility] Name 04/29/24 1419          Activities of Daily Living    BADL Assessment/Intervention bathing;upper body  dressing;lower body dressing;grooming;feeding;toileting  -LF       Row Name 04/29/24 1419          Bathing Assessment/Intervention    [place] Level (Bathing) bathing skills;upper body;lower body;independent  -LF       Row Name 04/29/24 1419          Upper Body Dressing Assessment/Training    [place] Level (Upper Body Dressing) upper body dressing skills;independent  -LF       Row Name 04/29/24 1419          Lower Body Dressing Assessment/Training    [place] Level (Lower Body Dressing) lower body dressing skills;independent  -LF       Row Name 04/29/24 1419          Grooming Assessment/Training    [place] Level (Grooming) grooming skills;independent  -LF       Row Name 04/29/24 1419          Self-Feeding Assessment/Training    [place] Level (Feeding) feeding skills;independent  -LF       Row Name 04/29/24 1419          Toileting Assessment/Training    [place] Level (Toileting) toileting skills;independent  -LF               User Key  (r) = Recorded By, (t) = Taken By, (c) = Cosigned By      Initials Name Provider Type     Jacklyn Wade OT Occupational Therapist                   Obj/Interventions       Row Name 04/29/24 1420          Sensory Assessment (Somatosensory)    Sensory Assessment (Somatosensory) UE sensation intact  -LF       Row Name 04/29/24 1420          Vision Assessment/Intervention    Visual Impairment/Limitations WFL  -LF       Row Name 04/29/24 1420          Range of Motion Comprehensive    General Range of Motion bilateral upper extremity ROM L  -LF       Row Name 04/29/24 1420          Strength Comprehensive (MMT)    Comment, General Manual Muscle Testing (MMT) Assessment 5/5 BUEs  -LF       Row Name 04/29/24 1420          Motor Skills    Motor Skills coordination;functional endurance  -     Coordination bilateral;upper extremity;WFL  -     Functional Endurance Good for BADLs  -LF       Row Name 04/29/24 1420          Balance    Balance Assessment sitting  dynamic balance;standing dynamic balance  -LF     Dynamic Sitting Balance independent  -LF     Position, Sitting Balance unsupported;sitting edge of bed  -LF     Dynamic Standing Balance independent  -LF     Position/Device Used, Standing Balance unsupported  -LF               User Key  (r) = Recorded By, (t) = Taken By, (c) = Cosigned By      Initials Name Provider Type    LF Jacklyn Wade, OT Occupational Therapist                   Goals/Plan    No documentation.                  Clinical Impression       [facility] Name 04/29/24 1420          Pain Assessment    Additional Documentation Pain Scale: FACES Pre/Post-Treatment (Group)  -LF       Row Name 04/29/24 1420          Pain Scale: FACES Pre/Post-Treatment    Pain: FACES Scale, Pretreatment 0-->no hurt  -LF     Posttreatment Pain Rating 0-->no hurt  -LF       Row Name 04/29/24 1420          Plan of Care Review    Plan of Care Reviewed With patient;spouse  -     Progress no change  -     Outcome Evaluation Patient does not present with any significant decline in function and does not require inpatient occupational therapy, therefore they will be discharged from services at this time. She is independent with all BADLs, functional transfers, and mobility without a device. It is recommended they discharge home when medically able to do so. Patient is aware and agreeable with treatment plan at this time.  -       Row Name 04/29/24 1420          Therapy Assessment/Plan (OT)    Patient/Family Therapy Goal Statement (OT) None reported  -     Rehab Potential (OT) other (see comments)  N/A  -     Criteria for Skilled Therapeutic Interventions Met (OT) no problems identified which require skilled intervention  -     Therapy Frequency (OT) evaluation only  -       Row Name 04/29/24 1420          Therapy Plan Review/Discharge Plan (OT)    Anticipated Discharge Disposition (OT) home  -LF       Row Name 04/29/24 1420          Vital Signs    O2 Delivery Pre  Treatment room air  -LF     O2 Delivery Intra Treatment room air  -LF     O2 Delivery Post Treatment room air  -LF       Row Name 04/29/24 1420          Positioning and Restraints    Pre-Treatment Position standing in room  -LF     Post Treatment Position bed  -LF     In Bed sitting;call light within reach;encouraged to call for assist;with family/caregiver  -               User Key  (r) = Recorded By, (t) = Taken By, (c) = Cosigned By      Initials Name Provider Type    LF Jacklyn Wade OT Occupational Therapist                   Outcome Measures       Row Name 04/29/24 1422          How much help from another is currently needed...    Putting on and taking off regular lower body clothing? 4  -LF     Bathing (including washing, rinsing, and drying) 4  -LF     Toileting (which includes using toilet bed pan or urinal) 4  -LF     Putting on and taking off regular upper body clothing 4  -LF     Taking care of personal grooming (such as brushing teeth) 4  -LF     Eating meals 4  -LF     AM-PAC 6 Clicks Score (OT) 24  -LF       Row Name 04/29/24 0805          How much help from another person do you currently need...    Turning from your back to your side while in flat bed without using bedrails? 4  -PW     Moving from lying on back to sitting on the side of a flat bed without bedrails? 4  -PW     Moving to and from a bed to a chair (including a wheelchair)? 4  -PW     Standing up from a chair using your arms (e.g., wheelchair, bedside chair)? 4  -PW     Climbing 3-5 steps with a railing? 4  -PW     To walk in hospital room? 4  -PW     AM-PAC 6 Clicks Score (PT) 24  -PW     Highest Level of Mobility Goal 8 --> Walked 250 feet or more  -PW       Row Name 04/29/24 1422          Functional Assessment    Outcome Measure Options AM-PAC 6 Clicks Daily Activity (OT);Optimal Instrument  -LF       Row Name 04/29/24 1422          Optimal Instrument    Optimal Instrument Optimal - 3  -LF     Bending/Stooping 1  -LF      Standing 1  -LF     Reaching 1  -LF     From the list, choose the 3 activities you would most like to be able to do without any difficulty Bending/stooping;Standing;Reaching  -LF     Total Score Optimal - 3 3  -LF               User Key  (r) = Recorded By, (t) = Taken By, (c) = Cosigned By      Initials Name Provider Type     Jacklyn Wade OT Occupational Therapist    Cande Aleman, RN Registered Nurse                    Occupational Therapy Education       Title: PT OT SLP Therapies (Done)       Topic: Occupational Therapy (Done)       Point: ADL training (Done)       Description:   Instruct learner(s) on proper safety adaptation and remediation techniques during self care or transfers.   Instruct in proper use of assistive devices.                  Learning Progress Summary             Patient Acceptance, E,TB, VU by  at 4/29/2024 1422                         Point: Precautions (Done)       Description:   Instruct learner(s) on prescribed precautions during self-care and functional transfers.                  Learning Progress Summary             Patient Acceptance, E,TB, VU by  at 4/29/2024 1422                         Point: Body mechanics (Done)       Description:   Instruct learner(s) on proper positioning and spine alignment during self-care, functional mobility activities and/or exercises.                  Learning Progress Summary             Patient Acceptance, E,TB, VU by  at 4/29/2024 1422                                         User Key       Initials Effective Dates Name Provider Type [facility] -  Jacklyn Wade OT Occupational Therapist OT                  OT Recommendation and Plan  Therapy Frequency (OT): evaluation only  Plan of Care Review  Plan of Care Reviewed With: patient, spouse  Progress: no change  Outcome Evaluation: Patient does not present with any significant decline in function and does not require inpatient occupational therapy, therefore  they will be discharged from services at this time. She is independent with all BADLs, functional transfers, and mobility without a device. It is recommended they discharge home when medically able to do so. Patient is aware and agreeable with treatment plan at this time.     Time Calculation:   Evaluation Complexity (OT)  Review Occupational Profile/Medical/Therapy History Complexity: brief/low complexity  Assessment, Occupational Performance/Identification of Deficit Complexity: 1-3 performance deficits  Clinical Decision Making Complexity (OT): problem focused assessment/low complexity  Overall Complexity of Evaluation (OT): low complexity     Time Calculation- OT       Row Name 04/29/24 1422             Time Calculation- OT    OT Received On 04/29/24  -                User Key  (r) = Recorded By, (t) = Taken By, (c) = Cosigned By      Initials Name Provider Type     Jacklyn Wade OT Occupational Therapist                  Therapy Charges for Today       Code Description Service Date Service Provider Modifiers Qty    [id number]  OT EVAL LOW COMPLEXITY 3 4/29/2024 Jacklyn Wade OT GO 1                 Jacklyn Wade OT  4/29/2024

## 2024-04-29 NOTE — SIGNIFICANT NOTE
04/29/24 1230   Coping/Psychosocial   Observed Emotional State calm;cooperative   Verbalized Emotional State relief;hopefulness   Trust Relationship/Rapport empathic listening provided   Family/Support Persons spouse   Involvement in Care interacting with patient   Additional Documentation Spiritual Care (Group)   Spiritual Care   Use of Spiritual Resources non-Zoroastrian use of spiritual care   Spiritual Care Source  initiative   Spiritual Care Follow-Up follow-up, none required as presently assessed   Response to Spiritual Care receptive of support   Spiritual Care Interventions supportive conversation provided   Spiritual Care Visit Type initial   Receptivity to Spiritual Care visit welcomed

## 2024-04-29 NOTE — PLAN OF CARE
Goal Outcome Evaluation:  Plan of Care Reviewed With: patient        Progress: improving  Outcome Evaluation: VSS, no acute changes this shift. Pain controlled with prn medication. Patient able to make needs known. Patient in bed resting with eyes closed, call light within reach, no s/s of discomfort or distress. Patient continues to progress towards discharge.Deidre Kirby LPN

## 2024-04-30 ENCOUNTER — TELEPHONE (OUTPATIENT)
Dept: UROLOGY | Facility: CLINIC | Age: 54
End: 2024-04-30
Payer: OTHER GOVERNMENT

## 2024-04-30 ENCOUNTER — TELEPHONE (OUTPATIENT)
Dept: INTERNAL MEDICINE | Facility: CLINIC | Age: 54
End: 2024-04-30
Payer: OTHER GOVERNMENT

## 2024-04-30 ENCOUNTER — READMISSION MANAGEMENT (OUTPATIENT)
Dept: CALL CENTER | Facility: HOSPITAL | Age: 54
End: 2024-04-30
Payer: OTHER GOVERNMENT

## 2024-04-30 VITALS
DIASTOLIC BLOOD PRESSURE: 94 MMHG | TEMPERATURE: 99 F | RESPIRATION RATE: 18 BRPM | HEIGHT: 63 IN | OXYGEN SATURATION: 95 % | SYSTOLIC BLOOD PRESSURE: 151 MMHG | WEIGHT: 192.24 LBS | HEART RATE: 87 BPM | BODY MASS INDEX: 34.06 KG/M2

## 2024-04-30 DIAGNOSIS — N20.0 NEPHROLITHIASIS: Primary | ICD-10-CM

## 2024-04-30 LAB
ANION GAP SERPL CALCULATED.3IONS-SCNC: 10.3 MMOL/L (ref 5–15)
BASOPHILS # BLD AUTO: 0.09 10*3/MM3 (ref 0–0.2)
BASOPHILS NFR BLD AUTO: 1 % (ref 0–1.5)
BUN SERPL-MCNC: 13 MG/DL (ref 6–20)
BUN/CREAT SERPL: 18.8 (ref 7–25)
CALCIUM SPEC-SCNC: 9 MG/DL (ref 8.6–10.5)
CHLORIDE SERPL-SCNC: 97 MMOL/L (ref 98–107)
CO2 SERPL-SCNC: 28.7 MMOL/L (ref 22–29)
CREAT SERPL-MCNC: 0.69 MG/DL (ref 0.57–1)
DEPRECATED RDW RBC AUTO: 41.6 FL (ref 37–54)
EGFRCR SERPLBLD CKD-EPI 2021: 103.3 ML/MIN/1.73
EOSINOPHIL # BLD AUTO: 0.3 10*3/MM3 (ref 0–0.4)
EOSINOPHIL NFR BLD AUTO: 3.5 % (ref 0.3–6.2)
ERYTHROCYTE [DISTWIDTH] IN BLOOD BY AUTOMATED COUNT: 12.4 % (ref 12.3–15.4)
GLUCOSE SERPL-MCNC: 103 MG/DL (ref 65–99)
HCT VFR BLD AUTO: 37 % (ref 34–46.6)
HGB BLD-MCNC: 12.8 G/DL (ref 12–15.9)
IMM GRANULOCYTES # BLD AUTO: 0.09 10*3/MM3 (ref 0–0.05)
IMM GRANULOCYTES NFR BLD AUTO: 1 % (ref 0–0.5)
LYMPHOCYTES # BLD AUTO: 2.7 10*3/MM3 (ref 0.7–3.1)
LYMPHOCYTES NFR BLD AUTO: 31.5 % (ref 19.6–45.3)
MAGNESIUM SERPL-MCNC: 1.9 MG/DL (ref 1.6–2.6)
MCH RBC QN AUTO: 31.4 PG (ref 26.6–33)
MCHC RBC AUTO-ENTMCNC: 34.6 G/DL (ref 31.5–35.7)
MCV RBC AUTO: 90.9 FL (ref 79–97)
MONOCYTES # BLD AUTO: 1.11 10*3/MM3 (ref 0.1–0.9)
MONOCYTES NFR BLD AUTO: 12.9 % (ref 5–12)
NEUTROPHILS NFR BLD AUTO: 4.29 10*3/MM3 (ref 1.7–7)
NEUTROPHILS NFR BLD AUTO: 50.1 % (ref 42.7–76)
NRBC BLD AUTO-RTO: 0 /100 WBC (ref 0–0.2)
PHOSPHATE SERPL-MCNC: 3.8 MG/DL (ref 2.5–4.5)
PLATELET # BLD AUTO: 194 10*3/MM3 (ref 140–450)
PMV BLD AUTO: 10.4 FL (ref 6–12)
POTASSIUM SERPL-SCNC: 3.2 MMOL/L (ref 3.5–5.2)
RBC # BLD AUTO: 4.07 10*6/MM3 (ref 3.77–5.28)
SODIUM SERPL-SCNC: 136 MMOL/L (ref 136–145)
WBC NRBC COR # BLD AUTO: 8.58 10*3/MM3 (ref 3.4–10.8)

## 2024-04-30 PROCEDURE — 80048 BASIC METABOLIC PNL TOTAL CA: CPT | Performed by: HOSPITALIST

## 2024-04-30 PROCEDURE — 85025 COMPLETE CBC W/AUTO DIFF WBC: CPT | Performed by: HOSPITALIST

## 2024-04-30 PROCEDURE — 25010000002 CEFTRIAXONE PER 250 MG: Performed by: UROLOGY

## 2024-04-30 PROCEDURE — 94799 UNLISTED PULMONARY SVC/PX: CPT

## 2024-04-30 PROCEDURE — 83735 ASSAY OF MAGNESIUM: CPT | Performed by: HOSPITALIST

## 2024-04-30 PROCEDURE — 25010000002 ENOXAPARIN PER 10 MG: Performed by: HOSPITALIST

## 2024-04-30 PROCEDURE — 84100 ASSAY OF PHOSPHORUS: CPT | Performed by: HOSPITALIST

## 2024-04-30 RX ORDER — POTASSIUM CHLORIDE 750 MG/1
40 CAPSULE, EXTENDED RELEASE ORAL ONCE
Status: COMPLETED | OUTPATIENT
Start: 2024-04-30 | End: 2024-04-30

## 2024-04-30 RX ORDER — CEPHALEXIN 500 MG/1
500 CAPSULE ORAL 4 TIMES DAILY
Qty: 24 CAPSULE | Refills: 0 | Status: SHIPPED | OUTPATIENT
Start: 2024-04-30 | End: 2024-05-06

## 2024-04-30 RX ADMIN — CITALOPRAM HYDROBROMIDE 20 MG: 20 TABLET ORAL at 08:52

## 2024-04-30 RX ADMIN — CEFTRIAXONE SODIUM 2000 MG: 2 INJECTION, POWDER, FOR SOLUTION INTRAMUSCULAR; INTRAVENOUS at 11:03

## 2024-04-30 RX ADMIN — ENOXAPARIN SODIUM 40 MG: 100 INJECTION SUBCUTANEOUS at 08:52

## 2024-04-30 RX ADMIN — PANTOPRAZOLE SODIUM 40 MG: 40 TABLET, DELAYED RELEASE ORAL at 06:31

## 2024-04-30 RX ADMIN — TAMSULOSIN HYDROCHLORIDE 0.4 MG: 0.4 CAPSULE ORAL at 08:52

## 2024-04-30 RX ADMIN — LISINOPRIL 20 MG: 20 TABLET ORAL at 08:52

## 2024-04-30 RX ADMIN — LEVOTHYROXINE SODIUM 137 MCG: 112 TABLET ORAL at 06:31

## 2024-04-30 RX ADMIN — POTASSIUM CHLORIDE 40 MEQ: 750 CAPSULE, EXTENDED RELEASE ORAL at 07:43

## 2024-04-30 RX ADMIN — Medication 10 ML: at 08:53

## 2024-04-30 NOTE — TELEPHONE ENCOUNTER
Patient called wanting to know if she needs to keep appointment that is scheduled for 5/7/24. Per Ottoniel she does not need to keep appointment. She voiced understanding.

## 2024-04-30 NOTE — PLAN OF CARE
Goal Outcome Evaluation:      Education complete. Patient is discharging.   Meron Ferreira RN

## 2024-04-30 NOTE — DISCHARGE SUMMARY
[facility]         HOSPITALIST  DISCHARGE SUMMARY    Patient Name: Xuan Horvath  : 1970  MRN: [id number]    Date of Admission: 2024  Date of Discharge: 2024  Primary Care Physician: Suri Lynn MD  Reason for admission:  Flank pain    Final diagnosis:  Ureteral stone status post ureteral stent placement definitive treatment of stone and stent removal to be done outpatient after completion of antibiotics  Hypoxia likely secondary to volume overload resolved\  Urinary tract infection secondary to E. Coli  Grade 1 diastolic dysfunction  Hypokalemia      Consults       Date and Time Order Name Status Description    2024 11:14 AM Hospitalist (on-call MD unless specified)      2024 10:51 AM Urology (on-call MD unless specified)              Active and Resolved Hospital Problems:  Active Hospital Problems    Diagnosis POA    **Left ureteral stone [N20.1] Unknown    Ureteral stone [N20.1] Yes    Nephrolithiasis [N20.0] Yes      Resolved Hospital Problems   No resolved problems to display.       Hospital Course     Hospital Course:  Xuan Horvath is a 54 y.o. female past medical history significant for hypothyroidism, depression, hypertension, hyperlipidemia that initially presented to the ER with 6 mm left-sided ureteral stone patient was given Rocephin started on Flomax and told to follow-up with Dr. Rios on Monday however the patient presents back to the ED with worsening pain fevers was noted to have worsening renal failure admitted to the hospitalist service started on IV antibiotic started on IV fluids urology consulted patient taken to the operating room left ureteral stent placed definitive treatment of stone and stent removal to be done outpatient after antibiotic course completed.  Patient brought back to the floor developed some hypoxia chest x-ray obtained concern for volume overload fluids discontinued started on IV Lasix BN P was checked  which was abnormally elevated proceeded with 2D echocardiogram which showed some diastolic dysfunction with diuresis respiratory status improved hypoxia resolved we will resume patient on previous blood pressure medication may require further titration of diuretic in outpatient setting.  Patient seen and examined on 4/30/2024 hemodynamically and clinically stable for discharge patient to follow-up with urology as directed follow-up with PCP in the next week complete an additional 6-day course of antibiotics for total of 10 return to ED for worsening symptoms        DISCHARGE Follow Up Recommendations for labs and diagnostics: As above      Day of Discharge     Vital Signs:  Temp:  [98.2 °F (36.8 °C)-99.9 °F (37.7 °C)] 99 °F (37.2 °C)  Heart Rate:  [66-87] 87  Resp:  [18] 18  BP: (145-184)/() 151/94  Physical Exam: Constitutional: Awake alert oriented no acute distress  Respiratory: Clear  Cardiovascular RRR  GI: Abdomen soft nontender      Discharge Details        Discharge Medications        Continue These Medications        Instructions Start Date   cephalexin 500 MG capsule  Commonly known as: KEFLEX   500 mg, Oral, 4 Times Daily      cetirizine 10 MG tablet  Commonly known as: zyrTEC   10 mg, Oral, Daily      citalopram 20 MG tablet  Commonly known as: CeleXA   20 mg, Oral, Daily      esomeprazole 40 MG capsule  Commonly known as: nexIUM   40 mg, Oral, Daily      fluticasone 50 MCG/ACT nasal spray  Commonly known as: FLONASE   1 spray, Each Nare, Daily      HYDROcodone-acetaminophen 5-325 MG per tablet  Commonly known as: NORCO   1 tablet, Oral, Every 6 Hours PRN      levothyroxine 150 MCG tablet  Commonly known as: SYNTHROID, LEVOTHROID   150 mcg, Oral, Daily      lisinopril-hydrochlorothiazide 20-12.5 MG per tablet  Commonly known as: PRINZIDE,ZESTORETIC   1 tablet, Oral, Daily      ondansetron ODT 4 MG disintegrating tablet  Commonly known as: ZOFRAN-ODT   4 mg, Translingual, 4 Times Daily PRN       tamsulosin 0.4 MG capsule 24 hr capsule  Commonly known as: FLOMAX   0.4 mg, Oral, Daily             Stop These Medications      ketorolac 10 MG tablet  Commonly known as: TORADOL     lisinopril 10 MG tablet  Commonly known as: PRINIVIL,ZESTRIL              Allergies   Allergen Reactions    Penicillins Anaphylaxis     unknown    Contrave [Naltrexone-Bupropion Hcl Er] Other (See Comments)     Severe constipation and insomnia       Discharge Disposition:  Home or Self Care    Diet:  Hospital:  Diet Order   Procedures    Diet: Diabetic; Consistent Carbohydrate; Fluid Consistency: Thin (IDDSI 0)       Discharge Activity: As tolerated      CODE STATUS:  Code Status and Medical Interventions:   Ordered at: 04/27/24 1147     Level Of Support Discussed With:    Patient     Code Status (Patient has no pulse and is not breathing):    CPR (Attempt to Resuscitate)     Medical Interventions (Patient has pulse or is breathing):    Full Support         Future Appointments   Date Time Provider Department Center   5/6/2024  9:00 AM Myah Perez APRN [facility]   5/7/2024 11:00 AM [facility]   5/24/2024  1:00 PM Suri Lynn MD [facility]       Additional Instructions for the Follow-ups that You Need to Schedule       Discharge Follow-up with PCP   As directed       Currently Documented PCP:    Suri Lynn MD    PCP Phone Number:    [phone]     Follow Up Details: 3-5 days                Pertinent  and/or Most Recent Results     PROCEDURES:   Left ureteral stent placement    LAB RESULTS:      Lab 04/30/24  0342 04/29/24  0355 04/28/24  0343 04/27/24  0931 04/26/24  1502   WBC 8.58 9.23 9.01 10.55 13.15*   HEMOGLOBIN 12.8 12.7 11.2* 12.2 12.9   HEMATOCRIT 37.0 37.1 33.3* 34.9 37.7   PLATELETS 194 186 121* 128* 148   NEUTROS ABS 4.29 6.50 7.67* 9.16* 11.05*   IMMATURE GRANS (ABS) 0.09* 0.04 0.11* 0.18*  --    LYMPHS ABS 2.70 1.87 0.62* 0.61*  --    MONOS ABS 1.11* 0.65  0.56 0.46  --    EOS ABS 0.30 0.13 0.01 0.09 0.13   MCV 90.9 91.8 94.1 91.8 92.6   PROCALCITONIN  --   --  1.65*  --   --    LACTATE  --   --   --  1.2 1.3         Lab 04/30/24  0342 04/29/24  1552 04/29/24  0355 04/28/24  0343 04/27/24  0931 04/26/24  1502   SODIUM 136  --  140 138 136 136   POTASSIUM 3.2* 3.9 3.0* 4.1 3.6 3.6   CHLORIDE 97*  --  101 108* 101 100   CO2 28.7  --  27.5 19.3* 23.7 24.6   ANION GAP 10.3  --  11.5 10.7 11.3 11.4   BUN 13  --  18 21* 21* 17   CREATININE 0.69  --  0.87 0.71 1.07* 0.98   EGFR 103.3  --  79.3 101.2 61.9 68.7   GLUCOSE 103*  --  99 148* 133* 148*   CALCIUM 9.0  --  8.6 7.9* 8.1* 8.6   MAGNESIUM 1.9 1.5* 1.3* 1.7  --   --    PHOSPHORUS 3.8  --  3.0 2.5  --   --          Lab 04/29/24  0355 04/27/24  0931 04/26/24  1502   TOTAL PROTEIN  --  6.0 6.6   ALBUMIN 3.3* 3.3* 3.7   GLOBULIN  --  2.7 2.9   ALT (SGPT)  --  18 20   AST (SGOT)  --  20 21   BILIRUBIN  --  0.3 0.3   ALK PHOS  --  73 67   LIPASE  --  17 16         Lab 04/29/24  0355   PROBNP 3,411.0*                 Brief Urine Lab Results  (Last result in the past 365 days)        Color   Clarity   Blood   Leuk Est   Nitrite   Protein   CREAT   Urine HCG        04/27/24 1000 Dark Yellow   Turbid   Large (3+)   Large (3+)   Negative   >=300 mg/dL (3+)                 Microbiology Results (last 10 days)       Procedure Component Value - Date/Time    S. Pneumo Ag Urine or CSF - Urine, Urine, Clean Catch [[id number]]  (Normal) Collected: 04/28/24 1242    Lab Status: Final result Specimen: Urine, Clean Catch Updated: 04/28/24 1346     Strep Pneumo Ag Negative    Legionella Antigen, Urine - Urine, Urine, Clean Catch [[id number]]  (Normal) Collected: 04/28/24 1242    Lab Status: Final result Specimen: Urine, Clean Catch Updated: 04/28/24 1346     LEGIONELLA ANTIGEN, URINE Negative    Blood Culture - Blood, Arm, Right [[id number]]  (Normal) Collected: 04/27/24 1130    Lab Status: Preliminary result Specimen: Blood from Arm, Right  Updated: 04/30/24 1145     Blood Culture No growth at 3 days    Blood Culture - Blood, Arm, Left [[id number]]  (Normal) Collected: 04/27/24 1130    Lab Status: Preliminary result Specimen: Blood from Arm, Left Updated: 04/30/24 1145     Blood Culture No growth at 3 days    Urine Culture - Urine, Urine, Clean Catch [[id number]]  (Abnormal)  (Susceptibility) Collected: 04/27/24 1000    Lab Status: Final result Specimen: Urine, Clean Catch Updated: 04/29/24 1024     Urine Culture 25,000 CFU/mL Escherichia coli    Narrative:      Colonization of the urinary tract without infection is common. Treatment is discouraged unless the patient is symptomatic, pregnant, or undergoing an invasive urologic procedure.    Susceptibility        Escherichia coli      NISHA      Amoxicillin + Clavulanate Susceptible      Ampicillin Susceptible      Ampicillin + Sulbactam Susceptible      Cefazolin Susceptible      Cefepime Susceptible      Ceftazidime Susceptible      Ceftriaxone Susceptible      Gentamicin Susceptible      Levofloxacin Susceptible      Nitrofurantoin Susceptible      Piperacillin + Tazobactam Susceptible      Trimethoprim + Sulfamethoxazole Susceptible                                   XR Chest 1 View    Result Date: 4/28/2024  Impression: Impression: Slight increased interstitial opacities and right lower lobe alveolar opacities not clearly seen on recent CT comparison. This could reflect mild edema or early infectious process in the right clinical setting.   Electronically Signed By-Yaya Crawford MD On:4/28/2024 9:41 AM      CT Abdomen Pelvis With Contrast    Result Date: 4/26/2024  Impression: Impression: CT scan of the abdomen and pelvis with IV contrast demonstrating fatty liver.  Post hysterectomy.  Nonobstructing calyceal stones are seen in both kidneys.  A 6 mm stone is seen in the proximal left ureter with moderate left hydronephrosis.    Electronically Signed ByKILEY WOLFF MD On:4/26/2024 4:23 PM                Results for orders placed during the hospital encounter of 04/27/24    Adult Transthoracic Echo Complete W/ Cont if Necessary Per Protocol    Interpretation Summary    Left ventricular systolic function is normal. Calculated left ventricular EF = 58.8%    Left ventricular wall thickness is consistent with borderline concentric hypertrophy.    Left ventricular diastolic function is consistent with (grade I) impaired relaxation.    The left atrial cavity is mildly dilated.      Labs Pending at Discharge:  Pending Labs       Order Current Status    Blood Culture - Blood, Arm, Left Preliminary result    Blood Culture - Blood, Arm, Right Preliminary result              Time spent on Discharge including face to face service: 45 minutes    Electronically signed by LITTLE Martinez, 04/30/24, 1:16 PM EDT.    Attending documentation:  I reviewed the above documentation and independently reviewed and rounded and evaluated the patient and discussed the care plan with ORAL Carrizales PA-C, I agree with his findings and plan as documented, what I have added to the care plan and modified is as follows in my documentation and my medical decision making and discharge plan; 54-year-old female hospitalized on 4/27/2024 with chief complaint of flank pain, had a 6 mm left-sided ureteral stone, with concern for superimposed infection, placed on antibiotics, urology consulted, status postsurgical intervention, had some volume overload, Lasix ordered, improved volume overload, discharged in hemodynamically stable condition on 4/30/2024, to follow-up with urology as outpatient.  Interval follow-up: Seen and examined on day of discharge, no acute distress, no acute major night events, patient was eager to go home.  Review of systems obtained, all systems reviewed and negative except for weakness, fatigue, mild abdominal pain.  Vitals reviewed, labs reviewed, and physical exam well-appearing female, no acute distress, regular  rate rhythm, clear to auscultation bilaterally soft nontender abdomen.  Assessment as above, plan, discharged home, to follow-up with urology, all questions answered.  Total discharge time 45 minutes.  More than 90 % of the time of this patient's encounter was performed by me, this included face-to-face time, planning and coordinating, medical decision making and critical thinking personally done by me.    Electronically signed by Avni Melchor MD, 04/30/24, 2:23 PM EDT.  Portions of this documentation were transcribed electronically from a voice recognition software.  I confirm all data accurately represents the service(s) I performed at today's visit.

## 2024-04-30 NOTE — TELEPHONE ENCOUNTER
Notified pt of scheduled surgery on 5/22/24. She will receive a call on 5/21 with arrival time and a separate call from PAT with preop instructions. She will need to do a ucx 2 weeks prior. Ucx order placed. She verbalized understanding via teach back.     ----- Message from Ottoniel RUIZ sent at 4/27/2024 12:24 PM EDT -----  Regarding: surgery    Patient had left stent placed for urinary sepsis/ureteral stone.  Needs to be scheduled for    Cystoscopy with left ureteroscopy with laser and left renal stent exchange in 2 to 3 weeks    Needs to repeat culture after antibiotics

## 2024-04-30 NOTE — PLAN OF CARE
Goal Outcome Evaluation:      No acute events overnight, VSS, no complaints of pain. Up ad afshan, good urine output. Possible DC home today pending lab results this am. Chitra George RN

## 2024-05-01 ENCOUNTER — TRANSITIONAL CARE MANAGEMENT TELEPHONE ENCOUNTER (OUTPATIENT)
Dept: CALL CENTER | Facility: HOSPITAL | Age: 54
End: 2024-05-01
Payer: OTHER GOVERNMENT

## 2024-05-01 NOTE — OUTREACH NOTE
Call Center TCM Note      Flowsheet Row Responses   [facility] patient discharged from? [place]   Does the patient have one of the following disease processes/diagnoses(primary or secondary)? Other   TCM attempt successful? No  [VR for Greg Horvath, spouse]   Unsuccessful attempts Attempt 1   Call Status Left message            Meaghan Sauer RN    5/1/2024, 09:16 EDT

## 2024-05-01 NOTE — OUTREACH NOTE
Call Center TCM Note      Flowsheet Row Responses   [facility] patient discharged from? [place]   Does the patient have one of the following disease processes/diagnoses(primary or secondary)? Other   TCM attempt successful? Yes   Call start time 1303   Call end time 1311   Discharge diagnosis Left ureteral stone-Cystoscopy with left ureteral stent placement   Meds reviewed with patient/caregiver? Yes   Is the patient having any side effects they believe may be caused by any medication additions or changes? No   Does the patient have all medications ordered at discharge? Yes   Prescription comments Pt taking hydrocodone as ordered   Is the patient taking all medications as directed (includes completed medication regime)? Yes   Medication comments Pt taking Cephalexin as ordered   Comments Pt will f/u with Urology--Dr. Rios.  Will keep appt with PCP on 5/24/24   Does the patient have an appointment with their PCP within 7-14 days of discharge? No   Nursing Interventions Patient desires to follow up with specialty only   Has home health visited the patient within 72 hours of discharge? N/A   Psychosocial issues? No   Did the patient receive a copy of their discharge instructions? Yes   Nursing interventions Reviewed instructions with patient   What is the patient's perception of their health status since discharge? Same  [Pt reports she is still having some pain at times, taking meds as ordered.  Denies hematuria, denies fever, drinking plenty of fluids.  Plans are for another stent and lithotripsy upcoming near the end of May.  Aware of return precautions.]   Is the patient/caregiver able to teach back signs and symptoms related to disease process for when to call PCP? Yes   Is the patient/caregiver able to teach back signs and symptoms related to disease process for when to call 911? Yes   TCM call completed? Yes   Call end time 1311            Meaghan Sauer RN    5/1/2024, 13:13 EDT

## 2024-05-02 ENCOUNTER — TELEPHONE (OUTPATIENT)
Dept: UROLOGY | Facility: CLINIC | Age: 54
End: 2024-05-02
Payer: OTHER GOVERNMENT

## 2024-05-02 DIAGNOSIS — R30.0 DYSURIA: Primary | ICD-10-CM

## 2024-05-02 LAB
BACTERIA SPEC AEROBE CULT: NORMAL
BACTERIA SPEC AEROBE CULT: NORMAL

## 2024-05-02 NOTE — TELEPHONE ENCOUNTER
PATIENT ASKED IF SOMETHING CAN BE PRESCRIBED FOR CONSTANT BURNING.  SHE SAID IT IS WHERE SHE URINATES, BUT IT IS ALL THE TIME.    SHE IS NOT SCHEDULED FOR SURGERY FOR STONE UNTIL 05/22/24.    [facility]JOSEPH MONZON.

## 2024-05-06 DIAGNOSIS — N20.0 CALCULUS OF KIDNEY: Primary | ICD-10-CM

## 2024-05-06 RX ORDER — LISINOPRIL AND HYDROCHLOROTHIAZIDE 20; 12.5 MG/1; MG/1
1 TABLET ORAL DAILY
Qty: 90 TABLET | Refills: 1 | Status: SHIPPED | OUTPATIENT
Start: 2024-05-06

## 2024-05-06 RX ORDER — PHENAZOPYRIDINE HYDROCHLORIDE 100 MG/1
100 TABLET, FILM COATED ORAL 3 TIMES DAILY PRN
Qty: 20 TABLET | Refills: 1 | Status: SHIPPED | OUTPATIENT
Start: 2024-05-06

## 2024-05-06 RX ORDER — HYDROCODONE BITARTRATE AND ACETAMINOPHEN 5; 325 MG/1; MG/1
1 TABLET ORAL EVERY 6 HOURS PRN
Qty: 12 TABLET | Refills: 0 | Status: SHIPPED | OUTPATIENT
Start: 2024-05-06

## 2024-05-06 NOTE — TELEPHONE ENCOUNTER
Notified pt regarding Pyridium rx we are sending in. She verbalized understanding and verified pharmacy. She is also asking for pain medication. I advised her that I will have to ask Dr Rios about this as well but will let her know.

## 2024-05-08 ENCOUNTER — LAB (OUTPATIENT)
Dept: LAB | Facility: HOSPITAL | Age: 54
End: 2024-05-08
Payer: OTHER GOVERNMENT

## 2024-05-08 DIAGNOSIS — N20.0 NEPHROLITHIASIS: ICD-10-CM

## 2024-05-08 PROCEDURE — 87088 URINE BACTERIA CULTURE: CPT

## 2024-05-08 PROCEDURE — 87086 URINE CULTURE/COLONY COUNT: CPT

## 2024-05-08 PROCEDURE — 87186 SC STD MICRODIL/AGAR DIL: CPT

## 2024-05-10 ENCOUNTER — TELEPHONE (OUTPATIENT)
Dept: UROLOGY | Facility: CLINIC | Age: 54
End: 2024-05-10
Payer: OTHER GOVERNMENT

## 2024-05-10 DIAGNOSIS — N30.00 ACUTE CYSTITIS WITHOUT HEMATURIA: Primary | ICD-10-CM

## 2024-05-10 LAB — BACTERIA SPEC AEROBE CULT: ABNORMAL

## 2024-05-10 RX ORDER — LEVOFLOXACIN 500 MG/1
500 TABLET, FILM COATED ORAL DAILY
Qty: 12 TABLET | Refills: 0 | Status: SHIPPED | OUTPATIENT
Start: 2024-05-10 | End: 2024-05-22

## 2024-05-10 RX ORDER — L.ACIDOPH/B.ANIMALIS/B.LONGUM 15B CELL
1 CAPSULE ORAL DAILY
Qty: 12 CAPSULE | Refills: 0 | Status: SHIPPED | OUTPATIENT
Start: 2024-05-10 | End: 2024-05-22

## 2024-05-20 RX ORDER — MULTIPLE VITAMINS W/ MINERALS TAB 9MG-400MCG
1 TAB ORAL DAILY
COMMUNITY

## 2024-05-20 NOTE — PRE-PROCEDURE INSTRUCTIONS
PRE-OP INSTRUCTIONS REVIEWED WITH PATIENT: FASTING/BATHING/ARRIVAL PROCEDURES.  INSTRUCTED TO TAKE A.M. DAY OF SURGERY: CELEXA, SYNTHROID, FLOMAX  UNDERSTANDING VERBALIZED.

## 2024-05-22 ENCOUNTER — ANESTHESIA (OUTPATIENT)
Dept: PERIOP | Facility: HOSPITAL | Age: 54
End: 2024-05-22
Payer: OTHER GOVERNMENT

## 2024-05-22 ENCOUNTER — ANESTHESIA EVENT (OUTPATIENT)
Dept: PERIOP | Facility: HOSPITAL | Age: 54
End: 2024-05-22
Payer: OTHER GOVERNMENT

## 2024-05-22 ENCOUNTER — HOSPITAL ENCOUNTER (OUTPATIENT)
Facility: HOSPITAL | Age: 54
Discharge: HOME | End: 2024-05-22
Attending: UROLOGY | Admitting: UROLOGY
Payer: OTHER GOVERNMENT

## 2024-05-22 ENCOUNTER — APPOINTMENT (OUTPATIENT)
Dept: GENERAL RADIOLOGY | Facility: HOSPITAL | Age: 54
End: 2024-05-22
Payer: OTHER GOVERNMENT

## 2024-05-22 VITALS
SYSTOLIC BLOOD PRESSURE: 131 MMHG | DIASTOLIC BLOOD PRESSURE: 69 MMHG | RESPIRATION RATE: 16 BRPM | WEIGHT: 184.53 LBS | HEIGHT: 63 IN | OXYGEN SATURATION: 96 % | HEART RATE: 74 BPM | TEMPERATURE: 98.3 F | BODY MASS INDEX: 32.7 KG/M2

## 2024-05-22 DIAGNOSIS — N20.0 NEPHROLITHIASIS: Primary | ICD-10-CM

## 2024-05-22 DIAGNOSIS — N30.00 ACUTE CYSTITIS WITHOUT HEMATURIA: ICD-10-CM

## 2024-05-22 DIAGNOSIS — N20.1 URETERAL STONE: ICD-10-CM

## 2024-05-22 PROCEDURE — C1769 GUIDE WIRE: HCPCS | Performed by: UROLOGY

## 2024-05-22 PROCEDURE — 25010000002 PROPOFOL 10 MG/ML EMULSION: Performed by: NURSE ANESTHETIST, CERTIFIED REGISTERED

## 2024-05-22 PROCEDURE — 25010000002 ONDANSETRON PER 1 MG: Performed by: NURSE ANESTHETIST, CERTIFIED REGISTERED

## 2024-05-22 PROCEDURE — 76000 FLUOROSCOPY <1 HR PHYS/QHP: CPT

## 2024-05-22 PROCEDURE — 52356 CYSTO/URETERO W/LITHOTRIPSY: CPT | Performed by: UROLOGY

## 2024-05-22 PROCEDURE — 25010000002 FENTANYL CITRATE (PF) 50 MCG/ML SOLUTION: Performed by: NURSE ANESTHETIST, CERTIFIED REGISTERED

## 2024-05-22 PROCEDURE — C1758 CATHETER, URETERAL: HCPCS | Performed by: UROLOGY

## 2024-05-22 PROCEDURE — 25010000002 KETOROLAC TROMETHAMINE PER 15 MG: Performed by: NURSE ANESTHETIST, CERTIFIED REGISTERED

## 2024-05-22 PROCEDURE — 93005 ELECTROCARDIOGRAM TRACING: CPT | Performed by: UROLOGY

## 2024-05-22 PROCEDURE — 82365 CALCULUS SPECTROSCOPY: CPT | Performed by: UROLOGY

## 2024-05-22 PROCEDURE — 88300 SURGICAL PATH GROSS: CPT | Performed by: UROLOGY

## 2024-05-22 PROCEDURE — C2617 STENT, NON-COR, TEM W/O DEL: HCPCS | Performed by: UROLOGY

## 2024-05-22 PROCEDURE — 25010000002 MIDAZOLAM PER 1MG: Performed by: ANESTHESIOLOGY

## 2024-05-22 PROCEDURE — 25010000002 DEXAMETHASONE PER 1 MG: Performed by: NURSE ANESTHETIST, CERTIFIED REGISTERED

## 2024-05-22 PROCEDURE — C1894 INTRO/SHEATH, NON-LASER: HCPCS | Performed by: UROLOGY

## 2024-05-22 PROCEDURE — 25810000003 LACTATED RINGERS PER 1000 ML: Performed by: ANESTHESIOLOGY

## 2024-05-22 PROCEDURE — 25010000002 LEVOFLOXACIN PER 250 MG: Performed by: UROLOGY

## 2024-05-22 DEVICE — URETERAL STENT
Type: IMPLANTABLE DEVICE | Site: URETER | Status: FUNCTIONAL
Brand: ASCERTA™

## 2024-05-22 RX ORDER — LIDOCAINE HYDROCHLORIDE 20 MG/ML
INJECTION, SOLUTION EPIDURAL; INFILTRATION; INTRACAUDAL; PERINEURAL AS NEEDED
Status: DISCONTINUED | OUTPATIENT
Start: 2024-05-22 | End: 2024-05-22 | Stop reason: SURG

## 2024-05-22 RX ORDER — MEPERIDINE HYDROCHLORIDE 25 MG/ML
12.5 INJECTION INTRAMUSCULAR; INTRAVENOUS; SUBCUTANEOUS
Status: DISCONTINUED | OUTPATIENT
Start: 2024-05-22 | End: 2024-05-22 | Stop reason: HOSPADM

## 2024-05-22 RX ORDER — PHENYLEPHRINE HCL IN 0.9% NACL 1 MG/10 ML
SYRINGE (ML) INTRAVENOUS AS NEEDED
Status: DISCONTINUED | OUTPATIENT
Start: 2024-05-22 | End: 2024-05-22 | Stop reason: SURG

## 2024-05-22 RX ORDER — HYDROCODONE BITARTRATE AND ACETAMINOPHEN 5; 325 MG/1; MG/1
1 TABLET ORAL ONCE AS NEEDED
Status: DISCONTINUED | OUTPATIENT
Start: 2024-05-22 | End: 2024-05-22 | Stop reason: HOSPADM

## 2024-05-22 RX ORDER — ACETAMINOPHEN 500 MG
1000 TABLET ORAL ONCE
Status: COMPLETED | OUTPATIENT
Start: 2024-05-22 | End: 2024-05-22

## 2024-05-22 RX ORDER — DEXAMETHASONE SODIUM PHOSPHATE 4 MG/ML
INJECTION, SOLUTION INTRA-ARTICULAR; INTRALESIONAL; INTRAMUSCULAR; INTRAVENOUS; SOFT TISSUE AS NEEDED
Status: DISCONTINUED | OUTPATIENT
Start: 2024-05-22 | End: 2024-05-22 | Stop reason: SURG

## 2024-05-22 RX ORDER — OXYCODONE HYDROCHLORIDE 5 MG/1
5 TABLET ORAL
Status: DISCONTINUED | OUTPATIENT
Start: 2024-05-22 | End: 2024-05-22 | Stop reason: HOSPADM

## 2024-05-22 RX ORDER — SODIUM CHLORIDE 0.9 % (FLUSH) 0.9 %
3 SYRINGE (ML) INJECTION EVERY 12 HOURS SCHEDULED
Status: DISCONTINUED | OUTPATIENT
Start: 2024-05-22 | End: 2024-05-22 | Stop reason: HOSPADM

## 2024-05-22 RX ORDER — MAGNESIUM HYDROXIDE 1200 MG/15ML
LIQUID ORAL AS NEEDED
Status: DISCONTINUED | OUTPATIENT
Start: 2024-05-22 | End: 2024-05-22 | Stop reason: HOSPADM

## 2024-05-22 RX ORDER — ACETAMINOPHEN 325 MG/1
650 TABLET ORAL ONCE
Status: DISCONTINUED | OUTPATIENT
Start: 2024-05-22 | End: 2024-05-22

## 2024-05-22 RX ORDER — SODIUM CHLORIDE 0.9 % (FLUSH) 0.9 %
10 SYRINGE (ML) INJECTION AS NEEDED
Status: DISCONTINUED | OUTPATIENT
Start: 2024-05-22 | End: 2024-05-22 | Stop reason: HOSPADM

## 2024-05-22 RX ORDER — PROMETHAZINE HYDROCHLORIDE 12.5 MG/1
25 TABLET ORAL ONCE AS NEEDED
Status: DISCONTINUED | OUTPATIENT
Start: 2024-05-22 | End: 2024-05-22 | Stop reason: HOSPADM

## 2024-05-22 RX ORDER — PROMETHAZINE HYDROCHLORIDE 12.5 MG/1
12.5 TABLET ORAL ONCE AS NEEDED
Status: DISCONTINUED | OUTPATIENT
Start: 2024-05-22 | End: 2024-05-22 | Stop reason: HOSPADM

## 2024-05-22 RX ORDER — HYDROCODONE BITARTRATE AND ACETAMINOPHEN 5; 325 MG/1; MG/1
1 TABLET ORAL EVERY 6 HOURS PRN
Qty: 12 TABLET | Refills: 0 | Status: SHIPPED | OUTPATIENT
Start: 2024-05-22

## 2024-05-22 RX ORDER — PROMETHAZINE HYDROCHLORIDE 25 MG/1
25 SUPPOSITORY RECTAL ONCE AS NEEDED
Status: DISCONTINUED | OUTPATIENT
Start: 2024-05-22 | End: 2024-05-22 | Stop reason: HOSPADM

## 2024-05-22 RX ORDER — ONDANSETRON 2 MG/ML
4 INJECTION INTRAMUSCULAR; INTRAVENOUS ONCE AS NEEDED
Status: DISCONTINUED | OUTPATIENT
Start: 2024-05-22 | End: 2024-05-22 | Stop reason: HOSPADM

## 2024-05-22 RX ORDER — ONDANSETRON 2 MG/ML
INJECTION INTRAMUSCULAR; INTRAVENOUS AS NEEDED
Status: DISCONTINUED | OUTPATIENT
Start: 2024-05-22 | End: 2024-05-22 | Stop reason: SURG

## 2024-05-22 RX ORDER — LEVOFLOXACIN 500 MG/1
500 TABLET, FILM COATED ORAL DAILY
Qty: 12 TABLET | Refills: 0 | Status: SHIPPED | OUTPATIENT
Start: 2024-05-22 | End: 2024-06-03

## 2024-05-22 RX ORDER — SODIUM CHLORIDE 9 MG/ML
100 INJECTION, SOLUTION INTRAVENOUS CONTINUOUS
Status: DISCONTINUED | OUTPATIENT
Start: 2024-05-22 | End: 2024-05-22 | Stop reason: HOSPADM

## 2024-05-22 RX ORDER — KETOROLAC TROMETHAMINE 30 MG/ML
INJECTION, SOLUTION INTRAMUSCULAR; INTRAVENOUS AS NEEDED
Status: DISCONTINUED | OUTPATIENT
Start: 2024-05-22 | End: 2024-05-22 | Stop reason: SURG

## 2024-05-22 RX ORDER — ONDANSETRON 4 MG/1
4 TABLET, ORALLY DISINTEGRATING ORAL ONCE AS NEEDED
Status: DISCONTINUED | OUTPATIENT
Start: 2024-05-22 | End: 2024-05-22 | Stop reason: HOSPADM

## 2024-05-22 RX ORDER — MIDAZOLAM HYDROCHLORIDE 2 MG/2ML
2 INJECTION, SOLUTION INTRAMUSCULAR; INTRAVENOUS ONCE
Status: COMPLETED | OUTPATIENT
Start: 2024-05-22 | End: 2024-05-22

## 2024-05-22 RX ORDER — FENTANYL CITRATE 50 UG/ML
INJECTION, SOLUTION INTRAMUSCULAR; INTRAVENOUS AS NEEDED
Status: DISCONTINUED | OUTPATIENT
Start: 2024-05-22 | End: 2024-05-22 | Stop reason: SURG

## 2024-05-22 RX ORDER — PROPOFOL 10 MG/ML
VIAL (ML) INTRAVENOUS AS NEEDED
Status: DISCONTINUED | OUTPATIENT
Start: 2024-05-22 | End: 2024-05-22 | Stop reason: SURG

## 2024-05-22 RX ORDER — LEVOFLOXACIN 5 MG/ML
500 INJECTION, SOLUTION INTRAVENOUS ONCE
Status: COMPLETED | OUTPATIENT
Start: 2024-05-22 | End: 2024-05-22

## 2024-05-22 RX ORDER — SODIUM CHLORIDE, SODIUM LACTATE, POTASSIUM CHLORIDE, CALCIUM CHLORIDE 600; 310; 30; 20 MG/100ML; MG/100ML; MG/100ML; MG/100ML
9 INJECTION, SOLUTION INTRAVENOUS CONTINUOUS PRN
Status: DISCONTINUED | OUTPATIENT
Start: 2024-05-22 | End: 2024-05-22 | Stop reason: HOSPADM

## 2024-05-22 RX ORDER — SODIUM CHLORIDE 9 MG/ML
40 INJECTION, SOLUTION INTRAVENOUS AS NEEDED
Status: DISCONTINUED | OUTPATIENT
Start: 2024-05-22 | End: 2024-05-22 | Stop reason: HOSPADM

## 2024-05-22 RX ADMIN — DEXAMETHASONE SODIUM PHOSPHATE 4 MG: 4 INJECTION, SOLUTION INTRAMUSCULAR; INTRAVENOUS at 08:44

## 2024-05-22 RX ADMIN — KETOROLAC TROMETHAMINE 15 MG: 30 INJECTION, SOLUTION INTRAMUSCULAR; INTRAVENOUS at 09:02

## 2024-05-22 RX ADMIN — MIDAZOLAM HYDROCHLORIDE 2 MG: 1 INJECTION, SOLUTION INTRAMUSCULAR; INTRAVENOUS at 08:26

## 2024-05-22 RX ADMIN — OXYCODONE HYDROCHLORIDE 5 MG: 5 TABLET ORAL at 09:33

## 2024-05-22 RX ADMIN — FENTANYL CITRATE 25 MCG: 50 INJECTION, SOLUTION INTRAMUSCULAR; INTRAVENOUS at 08:42

## 2024-05-22 RX ADMIN — ONDANSETRON HYDROCHLORIDE 4 MG: 2 SOLUTION INTRAMUSCULAR; INTRAVENOUS at 09:05

## 2024-05-22 RX ADMIN — FENTANYL CITRATE 75 MCG: 50 INJECTION, SOLUTION INTRAMUSCULAR; INTRAVENOUS at 08:45

## 2024-05-22 RX ADMIN — LEVOFLOXACIN 500 MG: 5 INJECTION, SOLUTION INTRAVENOUS at 08:45

## 2024-05-22 RX ADMIN — ACETAMINOPHEN 1000 MG: 500 TABLET ORAL at 08:17

## 2024-05-22 RX ADMIN — PROPOFOL 170 MG: 10 INJECTION, EMULSION INTRAVENOUS at 08:43

## 2024-05-22 RX ADMIN — Medication 200 MCG: at 08:52

## 2024-05-22 RX ADMIN — LIDOCAINE HYDROCHLORIDE 60 MG: 20 INJECTION, SOLUTION INTRAVENOUS at 08:41

## 2024-05-22 RX ADMIN — SODIUM CHLORIDE, POTASSIUM CHLORIDE, SODIUM LACTATE AND CALCIUM CHLORIDE 9 ML/HR: 600; 310; 30; 20 INJECTION, SOLUTION INTRAVENOUS at 08:17

## 2024-05-22 NOTE — ANESTHESIA PREPROCEDURE EVALUATION
Anesthesia Evaluation     Patient summary reviewed and Nursing notes reviewed   no history of anesthetic complications:   NPO Solid Status: > 8 hours  NPO Liquid Status: > 2 hours           Airway   Mallampati: II  TM distance: >3 FB  Neck ROM: full  Dental - normal exam     Pulmonary - normal exam   (+) ,sleep apnea  Cardiovascular - normal exam  Exercise tolerance: good (4-7 METS)    (+) hypertension, valvular problems/murmurs murmur      Neuro/Psych  (+) psychiatric history Anxiety and Depression  GI/Hepatic/Renal/Endo    (+) obesity, hiatal hernia, GERD, renal disease- stones, thyroid problem     Musculoskeletal (-) negative ROS    Abdominal   (+) obese   Substance History - negative use     OB/GYN negative ob/gyn ROS         Other - negative ROS       ROS/Med Hx Other: PAT Nursing Notes unavailable.               Anesthesia Plan    ASA 3     general     intravenous induction     Anesthetic plan, risks, benefits, and alternatives have been provided, discussed and informed consent has been obtained with: patient.    Plan discussed with CRNA.    CODE STATUS:

## 2024-05-22 NOTE — H&P
[facility]   UROLOGY HISTORY AND PHYSICAL    Patient Name: Xuan Horvath  : 1970  MRN: [id number]  Primary Care Physician:  Suri Lynn MD  Date of admission: 2024    Subjective   Subjective     Chief Complaint:     Left ureteral stone    HPI:    Xuan Horvath is a 54 y.o. female     Left ureteral stone    No change in H&P    Review of Systems     10 systems reviewed and are negative other than what is listed in HPI    Personal History     Past Medical History:   Diagnosis Date    Allergy     Anxiety     Colon cancer screening     Depression     Depression 2015    working well, continue citalopram discussed that she can stay on meds long term or go off them whenever she is ready    Essential hypertension 2016    stable will try to decrease atenolol and see if that will give her more energy, need to watch bp closely    GERD (gastroesophageal reflux disease) 2023    Prilosec. Protonix.    Glaucoma     NO DROPS NEEDED    Heart murmur 2022    Hiatal hernia     NO CARDIOLOGIST NEEDED    History of medical problems 22    Panniculectomy    Hypertension     Hypothyroidism     Kidney stone     Obesity 2016    she is going to work on weight by trying to go to the gym she is working on this with her son    Sleep apnea     COMPLIANT WITH CPAP     UTI (urinary tract infection)     CURRENTLY ON ABX BY AGUSTIN, NO FEVER NOW    Vitamin D deficiency 2015    will repeat labs continue current OTC meds for now       Past Surgical History:   Procedure Laterality Date    BREAST AUGMENTATION Bilateral 2023    breast implants    CARPAL TUNNEL RELEASE Right 2005     SECTION  ,,2003    x3    COLONOSCOPY N/A 2022    Procedure: COLONOSCOPY WITH POLYPECTOMY COLD SNARE;  Surgeon: Luis Sterling MD;  Location: [facility] ENDOSCOPY;  Service: Gastroenterology;  Laterality: N/A;  COLON POLYP    COLONOSCOPY      COSMETIC SURGERY   2/21/23    Breast implants/breast lift    ENDOSCOPY N/A 06/09/2020    Procedure: ESOPHAGOGASTRODUODENOSCOPY WITH BIOPSY;  Surgeon: Ron Cadet Jr., MD;  Location: [facility] ENDOSCOPY;  Service: General;  Laterality: N/A;  PRE- DYSPEPSIA  POST- GASTRITIS, GASTRIC POLYPS, ESOPHAGITIS, HIATAL HERNIA      ENDOSCOPY N/A 09/07/2023    Procedure: ESOPHAGOGASTRODUODENOSCOPY WITH BIOPSIES;  Surgeon: Luis Sterling MD;  Location: [facility] ENDOSCOPY;  Service: Gastroenterology;  Laterality: N/A;  PRIOR GASTRIC SLEEVE    GASTRIC SLEEVE LAPAROSCOPIC N/A 07/08/2020    Procedure: GASTRIC SLEEVE LAPAROSCOPIC With Hiatal Hernia Repair;  Surgeon: Ron Cadet Jr., MD;  Location: [facility] OR [facility];  Service: Bariatric;  Laterality: N/A;    HERNIA REPAIR  7/8/20    Hiatal hernia    HYSTERECTOMY  2004    PANNICULECTOMY      UPPER GASTROINTESTINAL ENDOSCOPY      URETEROSCOPY STENT INSERTION Left 04/27/2024    Procedure: Cystoscopy with left ureteral stent placement;  Surgeon: Salazar Rios MD;  Location: [facility] MAIN OR;  Service: Urology;  Laterality: Left;       Family History: family history includes Diabetes in her mother; Hypertension in her father and mother. Otherwise pertinent FHx was reviewed and not pertinent to current issue.    Social History:  reports that she has never smoked. She has never been exposed to tobacco smoke. She has never used smokeless tobacco. She reports that she does not drink alcohol and does not use drugs.    Home Medications:  Florajen Digestion, HYDROcodone-acetaminophen, cetirizine, citalopram, esomeprazole, fluticasone, levoFLOXacin, levothyroxine, lisinopril-hydrochlorothiazide, multivitamin with minerals, ondansetron ODT, phenazopyridine, and tamsulosin      Allergies:  Allergies   Allergen Reactions    Penicillins Anaphylaxis     unknown    Contrave [Naltrexone-Bupropion Hcl Er] Other (See Comments)     Severe constipation and insomnia       Objective   Objective     Vitals:    Temp:  [97.1 °F (36.2 °C)] 97.1 °F (36.2 °C)  Heart Rate:  [62] 62  Resp:  [18] 18  BP: (141)/(77) 141/77  Physical Exam    Constitutional: Awake, alert    Respiratory: Clear to auscultation bilaterally, nonlabored respirations    Cardiovascular: RRR, no murmurs, rubs, or gallops, palpable pedal pulses bilaterally   Gastrointestinal: Positive bowel sounds, soft, nontender, nondistended     Result Review    Result Review:  I have personally reviewed the results from the time of this admission to 5/22/2024 06:47 EDT and agree with these findings:  []  Laboratory  []  Microbiology  []  Radiology  []  EKG/Telemetry   []  Cardiology/Vascular   []  Pathology  []  Old records  []  Other:    Assessment & Plan   Assessment / Plan     Brief Patient Summary:  Xuan Horvath is a 54 y.o. female     Active Hospital Problems:  Active Hospital Problems    Diagnosis     **Ureteral stone        Cystoscopy with left ureteroscopy with laser and left ureteral stent exchange.  Risks and benefits were discussed including bleeding, infection and damage to the urinary system.  We also discussed the risk of anesthesia up to and including death.  Patient voiced understanding and would like to proceed.    Electronically signed by Salazar Rios MD, 05/22/24, 6:47 AM EDT.

## 2024-05-22 NOTE — OP NOTE
URETEROSCOPY LASER LITHOTRIPSY WITH STENT INSERTION  Procedure Report    Patient Name:  Xuan Horvath  YOB: 1970    Date of Surgery:  5/22/2024      Pre-op Diagnosis:   Ureteral stone [N20.1]       Postop diagnosis:    Same    Procedure/CPT® Codes:      Procedure(s):    Cystoscopy   left ureteroscopy with laser and basket stone extraction   Left  ureteral stent exchange 6 x 26 with string left on    Staff:  Surgeon(s):  Salazar Rios MD         Anesthesia: General    Estimated Blood Loss: 0 mL    Implants:    Implant Name Type Inv. Item Serial No.  Lot No. LRB No. Used Action   STNT URETRL ASCERTA 6F 26CM - [id number] Stent STNT URETRL ASCERTA 6F 26CM  [employer] [id number] Left 1 Explanted   STNT URETRL ASCERTA 6F 26CM - [id number] Stent STNT URETRL ASCERTA 6F 26CM  [employer] [id number] Left 1 Implanted       Specimen:          Specimens       ID Source Type Tests Collected By Collected At Frozen?    A Ureter, Left Calculus TISSUE PATHOLOGY EXAM  STONE ANALYSIS   Salazar Rios MD 5/22/24 0853     Description: LEFT URETERAL STONE    This specimen was not marked as sent.                Findings:     Normal bladder    All stones removed from the left side    Stent placed with string      Complications: none    Description of Procedure:       After informed consent patient taken to the operating room.  Patient was laid supine and placed under general anesthesia by the anesthesia team.  At this point patient was placed in dorsal lithotomy position and prepped and draped in normal sterile fashion.  A multidisciplinary timeout was undertaken documenting the correct patient site and procedure.  At this point a 22 rigid cystoscope was placed into the urethra . Bladder was normal.  Stent was grasped with stent graspers and brought to urethral meatus.        At this point a Glidewire was placed up the stent without issue stent was passed off the field.     I  then placed a dual-lumen catheter and a stiff wire alongside the Glidewire under fluoroscopic guidance.  The dual-lumen was removed and a ureteral access sheath was placed into the distal ureter without any problem.  I removed the obturator and wire and placed a flexible ureteroscope up the ueter.  The stone was identified.  Stone was lasered into multiple small fragments with a 272 µm laser fiber and then these pieces were basketed out with a no tip nitinol basket.  I then took the flexible ureteroscope up and check the rest of the ureter and the upper collecting system.  There were no further stones.  Brought the actual sheath out under direct vision and there was no further stones.      Left side was free of stones.  A 6 x 26 ureteral stent was then placed over the Glidewire through a rigid cystoscope without issue and had a good curl in the bladder under direct vision and a good curl in the left renal pelvis under fluoroscopy.  Bladder was drained.  Patient tolerated the procedure well, he was taken to the postanesthesia care unit without issue.      String left on stent    Salazar Rios MD     Date: 5/22/2024  Time: 09:11 EDT

## 2024-05-22 NOTE — ANESTHESIA POSTPROCEDURE EVALUATION
Patient: Xuan Horvath    Procedure Summary       Date: 05/22/24 Room / Location: [facility] OR  / [facility] MAIN OR    Anesthesia Start: 0840 Anesthesia Stop: 0915    Procedure: Cystoscopy with left ureteroscopy with laser and left renal stent exchange (Left) Diagnosis:       Ureteral stone      (Ureteral stone [N20.1])    Surgeons: Salazar Rios MD Provider: Sandip Friend MD    Anesthesia Type: general ASA Status: 3            Anesthesia Type: general    Vitals  Vitals Value Taken Time   /64 05/22/24 0945   Temp 37.1 °C (98.7 °F) 05/22/24 0945   Pulse 67 05/22/24 0948   Resp 16 05/22/24 0945   SpO2 95 % 05/22/24 0948   Vitals shown include unfiled device data.        Post Anesthesia Care and Evaluation    Patient location during evaluation: bedside  Patient participation: complete - patient participated  Level of consciousness: awake  Pain management: adequate    Airway patency: patent  PONV Status: none  Cardiovascular status: acceptable and stable  Respiratory status: acceptable  Hydration status: acceptable

## 2024-05-22 NOTE — DISCHARGE INSTRUCTIONS
DISCHARGE INSTRUCTIONS  Extracorporeal Shock Wave Lithotripsy (ESWL)/ Ureteroscopy Lasertripsy      For your surgery you had:  Monitored anesthesia care  You may experience dizziness, drowsiness, or lightheadedness for several hours following surgery.  Do not stay alone today or tonight.  Limit your activity for 24 hours.  You should not drive or operate machinery, drink alcohol, or sign legally binding documents for 24 hours or while you are taking pain medication.  Resume your diet slowly.  Follow any special dietary instructions you may have been given by your doctor.     NOTIFY YOUR DOCTOR IF YOU EXPERIENCE ANY OF THE FOLLOWING:  Temperature greater than 101 degrees Fahrenheit  Shaking Chills  Redness or excessive drainage from incision  Nausea, vomiting and/or pain that is not controlled by prescribed medications  Increase in bleeding or bleeding that is excessive  Unable to urinate in 6 hours after surgery  If unable to reach your doctor, please go to the closest Emergency Room  Strain urine if instructed by physician.  Collect any fragments and take with you on your scheduled appointment. You may pass stone pieces or small blood clots.  Blood in your urine is normal.  It could be light pink to cherry color.  Urine will be bloody for several days.  Drink 6-8 glasses of fluid each day to assist with passing of stone fragments.  Back pain is common.  It may feel like a dull ache or back spasm.  Do not take baths or swim while stent is present.      SPECIAL INSTRUCTIONS:  Pull stent by string on Sunday morning.  your shower, grasp string and with slow steady pressure pull straight out. Do not yank.      Last dose of pain medication was given at:   Tylenol (1000mg) last at 8:17am. Do not exceed 4000mg of tylenol in a 24 hour period.  Toradol last at 9:02am. May take ibuprofen next at 3:02pm if needed.  Oxycodone last at 9:34am. May take norco next at 1:34pm if needed.

## 2024-05-23 LAB
LAB AP CASE REPORT: NORMAL
LAB AP CLINICAL INFORMATION: NORMAL
PATH REPORT.FINAL DX SPEC: NORMAL
PATH REPORT.GROSS SPEC: NORMAL

## 2024-05-27 LAB
QT INTERVAL: 408 MS
QTC INTERVAL: 412 MS

## 2024-06-03 LAB
CALCIUM OXALATE DIHYDRATE MFR STONE IR: 10 %
COLOR STONE: NORMAL
COM MFR STONE: 85 %
COMPN STONE: NORMAL
HYDROXYAPATITE: 5 %
LABORATORY COMMENT REPORT: NORMAL
LABORATORY COMMENT REPORT: NORMAL
Lab: NORMAL
Lab: NORMAL
PHOTO: NORMAL
SIZE STONE: NORMAL MM
SPEC SOURCE SUBJ: NORMAL
STONE ANALYSIS-IMP: NORMAL
WT STONE: 43 MG

## 2024-06-19 ENCOUNTER — HOSPITAL ENCOUNTER (OUTPATIENT)
Dept: ULTRASOUND IMAGING | Facility: HOSPITAL | Age: 54
Discharge: HOME | End: 2024-06-19
Admitting: UROLOGY
Payer: OTHER GOVERNMENT

## 2024-06-19 DIAGNOSIS — N20.0 NEPHROLITHIASIS: ICD-10-CM

## 2024-06-19 PROCEDURE — 76775 US EXAM ABDO BACK WALL LIM: CPT

## 2024-06-24 ENCOUNTER — CLINICAL SUPPORT (OUTPATIENT)
Dept: INTERNAL MEDICINE | Facility: CLINIC | Age: 54
End: 2024-06-24
Payer: OTHER GOVERNMENT

## 2024-06-24 DIAGNOSIS — Z11.1 ENCOUNTER FOR PPD TEST: Primary | ICD-10-CM

## 2024-06-24 PROCEDURE — 86580 TB INTRADERMAL TEST: CPT | Performed by: INTERNAL MEDICINE

## 2024-06-24 NOTE — PROGRESS NOTES
Patient came into the office to get a PPD Skin Test. Will return to clinic on Wednesday for reading and results. Tolerated well. See immunizations for details. Left immediately following no 15 min OBS.     Amy Deluca MA  [facility]

## 2024-06-26 ENCOUNTER — CLINICAL SUPPORT (OUTPATIENT)
Dept: INTERNAL MEDICINE | Facility: CLINIC | Age: 54
End: 2024-06-26
Payer: OTHER GOVERNMENT

## 2024-06-26 LAB
INDURATION: 0 MM (ref 0–10)
Lab: NORMAL
Lab: NORMAL
TB SKIN TEST: NEGATIVE

## 2024-06-26 NOTE — PROGRESS NOTES
Patient RTC this afternoon for reading / results of TB / PPD skin test administered on 6/24/2024.  See results for details.  Copy of certificate given to patient for her records.    Olesya Garcia RN BSN  [facility], [place] office

## 2024-07-01 RX ORDER — CITALOPRAM 20 MG/1
20 TABLET ORAL DAILY
Qty: 90 TABLET | Refills: 0 | Status: SHIPPED | OUTPATIENT
Start: 2024-07-01

## 2024-07-14 NOTE — PROGRESS NOTES
Chief Complaint: Urologic complaint    Subjective         History of Present Illness  Xuan Horvath is a 54 y.o. female         Nephrolithiasis    No pain, no gross hematuria      Stent is out.    4/24 came in the ED  with SIRS- stent placed    6/24 renal ultrasound - no hydronephrosis, simple cyst left kidney.    6/24 calcium oxalate monohydrate 85, calcium oxalate dihydrate 10    5/22/2024 left ureteroscopy - stent with string - all stones removed in the left    5/10/2024 25,000 E. coli - pansensitive -   Place patient on Levaquin 500 mg daily x 5 days leading up to surgery.  Florajen    6/24 renal ultrasound-simple cyst left kidney, no hydronephrosis    4/27/2024 left ureteral stent placement [id number]    4/26/2024   CT abdomen/pelvis with - 6 mm stone seen in the proximal left ureter.  3  -2 mm stones in the lower pole left kidney.  4 mm nonobstructing stone right kidney.  Images reviewed  4/27/2024 UA - nitrite negative, 2+ bacteria 6-10 RBCs  4/27/2024 1.0, GFR   61    2 previous stones passed spontaneously, 1 lithotripsy     no cardiopulmonary history  No anticoagulation        Objective             Current Outpatient Medications:     cetirizine (zyrTEC) 10 MG tablet, Take 1 tablet by mouth Daily. (Patient taking differently: Take 1 tablet by mouth Every Night.), Disp: 90 tablet, Rfl: 3    citalopram (CeleXA) 20 MG tablet, Take 1 tablet by mouth Daily., Disp: 90 tablet, Rfl: 0    esomeprazole (nexIUM) 40 MG capsule, Take 1 capsule by mouth Daily. (Patient taking differently: Take 1 capsule by mouth Every Night.), Disp: 30 capsule, Rfl: 5    fluticasone (FLONASE) 50 MCG/ACT nasal spray, 1 spray by Each Nare route Daily. (Patient taking differently: 1 spray by Each Nare route Every Night.), Disp: 16 g, Rfl: 1    HYDROcodone-acetaminophen (NORCO) 5-325 MG per tablet, Take 1 tablet by mouth Every 6 (Six) Hours As Needed for Severe Pain., Disp: 12 tablet, Rfl: 0    HYDROcodone-acetaminophen (NORCO) 5-325 MG per  tablet, Take 1 tablet by mouth Every 6 (Six) Hours As Needed for Moderate Pain (Pain)., Disp: 12 tablet, Rfl: 0    levothyroxine (SYNTHROID, LEVOTHROID) 150 MCG tablet, Take 1 tablet by mouth Daily., Disp: 90 tablet, Rfl: 1    lisinopril-hydrochlorothiazide (PRINZIDE,ZESTORETIC) 20-12.5 MG per tablet, Take 1 tablet by mouth Daily. (Patient taking differently: Take 1 tablet by mouth Daily. LAST DOSE 5/21/24), Disp: 90 tablet, Rfl: 1    multivitamin with minerals tablet tablet, Take 1 tablet by mouth Daily. LAST DOSE 5/20/24, Disp: , Rfl:     ondansetron ODT (ZOFRAN-ODT) 4 MG disintegrating tablet, Place 1 tablet on the tongue 4 (Four) Times a Day As Needed for Nausea or Vomiting., Disp: 20 tablet, Rfl: 0    phenazopyridine (PYRIDIUM) 100 MG tablet, Take 1 tablet by mouth 3 (Three) Times a Day As Needed for Dysuria., Disp: 20 tablet, Rfl: 1    tamsulosin (FLOMAX) 0.4 MG capsule 24 hr capsule, Take 1 capsule by mouth Daily., Disp: 14 capsule, Rfl: 0    Allergies   Allergen Reactions    Penicillins Anaphylaxis     unknown    Contrave [Naltrexone-Bupropion Hcl Er] Other (See Comments)     Severe constipation and insomnia        Family History   Problem Relation Age of Onset    Diabetes Mother     Hypertension Mother     Hypertension Father     Malig Hyperthermia Neg Hx     Colon cancer Neg Hx        Social History     Socioeconomic History    Marital status:    Tobacco Use    Smoking status: Never     Passive exposure: Never    Smokeless tobacco: Never   Vaping Use    Vaping status: Never Used   Substance and Sexual Activity    Alcohol use: Never    Drug use: Never    Sexual activity: Defer     Partners: Male     Birth control/protection: Other, None, Hysterectomy     Comment: Hysterectomy 2004       Vital Signs:   There were no vitals taken for this visit.                 Assessment and Plan    Diagnoses and all orders for this visit:    1. Nephrolithiasis (Primary)      The patient was counseled on the  preventative measures of kidney stones today.  This included increasing fluid intake to make at least 1.5 ml daily, decreasing meat intake, decreasing salt intake and taking in a normal amount of calcium (1000 mg daily).  Information handout given on this today.      We also discussed the DASH diet today for stone prevention and handout was given    Follow-up as needed

## 2024-07-16 ENCOUNTER — OFFICE VISIT (OUTPATIENT)
Dept: UROLOGY | Facility: CLINIC | Age: 54
End: 2024-07-16
Payer: OTHER GOVERNMENT

## 2024-07-16 VITALS — BODY MASS INDEX: 32.6 KG/M2 | HEIGHT: 63 IN | WEIGHT: 184 LBS

## 2024-07-16 DIAGNOSIS — N20.0 NEPHROLITHIASIS: Primary | ICD-10-CM

## 2024-08-12 ENCOUNTER — PATIENT MESSAGE (OUTPATIENT)
Dept: INTERNAL MEDICINE | Facility: CLINIC | Age: 54
End: 2024-08-12
Payer: OTHER GOVERNMENT

## 2024-08-15 RX ORDER — CITALOPRAM 40 MG/1
40 TABLET ORAL DAILY
Qty: 90 TABLET | Refills: 1 | Status: SHIPPED | OUTPATIENT
Start: 2024-08-15

## 2024-08-15 NOTE — TELEPHONE ENCOUNTER
From: Xuan Horvath  To: Suri Lynn  Sent: 8/12/2024 11:58 AM EDT  Subject: Celexa, pink eye    I need to have my celexa increased. Let me know if this is possible. Also I’m pretty sure I have pinkeye. Can you call in medicine for the pink eye?

## 2024-08-18 RX ORDER — AMLODIPINE BESYLATE 5 MG/1
5 TABLET ORAL DAILY
Qty: 90 TABLET | Refills: 1 | Status: SHIPPED | OUTPATIENT
Start: 2024-08-18 | End: 2024-08-18

## 2024-08-18 RX ORDER — AMLODIPINE BESYLATE 5 MG/1
5 TABLET ORAL DAILY
Qty: 90 TABLET | Refills: 1 | Status: SHIPPED | OUTPATIENT
Start: 2024-08-18

## 2024-08-21 ENCOUNTER — OFFICE VISIT (OUTPATIENT)
Dept: INTERNAL MEDICINE | Facility: CLINIC | Age: 54
End: 2024-08-21
Payer: OTHER GOVERNMENT

## 2024-08-21 VITALS
TEMPERATURE: 97.9 F | BODY MASS INDEX: 33.1 KG/M2 | HEART RATE: 77 BPM | OXYGEN SATURATION: 95 % | DIASTOLIC BLOOD PRESSURE: 82 MMHG | RESPIRATION RATE: 20 BRPM | HEIGHT: 63 IN | SYSTOLIC BLOOD PRESSURE: 128 MMHG | WEIGHT: 186.8 LBS

## 2024-08-21 DIAGNOSIS — Z90.3 HISTORY OF SLEEVE GASTRECTOMY: ICD-10-CM

## 2024-08-21 DIAGNOSIS — R73.01 IMPAIRED FASTING GLUCOSE: ICD-10-CM

## 2024-08-21 DIAGNOSIS — E66.9 OBESITY, CLASS I, BMI 30-34.9: ICD-10-CM

## 2024-08-21 DIAGNOSIS — N20.0 CALCULUS OF KIDNEY: ICD-10-CM

## 2024-08-21 DIAGNOSIS — K21.9 GASTROESOPHAGEAL REFLUX DISEASE, UNSPECIFIED WHETHER ESOPHAGITIS PRESENT: ICD-10-CM

## 2024-08-21 DIAGNOSIS — I10 ESSENTIAL HYPERTENSION: Primary | ICD-10-CM

## 2024-08-21 DIAGNOSIS — E03.9 HYPOTHYROIDISM, UNSPECIFIED TYPE: ICD-10-CM

## 2024-08-21 DIAGNOSIS — J30.2 SEASONAL ALLERGIES: ICD-10-CM

## 2024-08-21 LAB
ALBUMIN SERPL-MCNC: 4.5 G/DL (ref 3.5–5.2)
ALBUMIN/GLOB SERPL: 1.7 G/DL
ALP SERPL-CCNC: 79 U/L (ref 39–117)
ALT SERPL W P-5'-P-CCNC: 84 U/L (ref 1–33)
ANION GAP SERPL CALCULATED.3IONS-SCNC: 10 MMOL/L (ref 5–15)
AST SERPL-CCNC: 43 U/L (ref 1–32)
BASOPHILS # BLD AUTO: 0.05 10*3/MM3 (ref 0–0.2)
BASOPHILS NFR BLD AUTO: 0.7 % (ref 0–1.5)
BILIRUB SERPL-MCNC: 0.2 MG/DL (ref 0–1.2)
BUN SERPL-MCNC: 23 MG/DL (ref 6–20)
BUN/CREAT SERPL: 25.6 (ref 7–25)
CALCIUM SPEC-SCNC: 10.1 MG/DL (ref 8.6–10.5)
CHLORIDE SERPL-SCNC: 104 MMOL/L (ref 98–107)
CHOLEST SERPL-MCNC: 234 MG/DL (ref 0–200)
CO2 SERPL-SCNC: 28 MMOL/L (ref 22–29)
CREAT SERPL-MCNC: 0.9 MG/DL (ref 0.57–1)
DEPRECATED RDW RBC AUTO: 44.2 FL (ref 37–54)
EGFRCR SERPLBLD CKD-EPI 2021: 76.1 ML/MIN/1.73
EOSINOPHIL # BLD AUTO: 0.22 10*3/MM3 (ref 0–0.4)
EOSINOPHIL NFR BLD AUTO: 2.9 % (ref 0.3–6.2)
ERYTHROCYTE [DISTWIDTH] IN BLOOD BY AUTOMATED COUNT: 12.9 % (ref 12.3–15.4)
GLOBULIN UR ELPH-MCNC: 2.6 GM/DL
GLUCOSE SERPL-MCNC: 112 MG/DL (ref 65–99)
HBA1C MFR BLD: 5.2 % (ref 4.8–5.6)
HCT VFR BLD AUTO: 41.7 % (ref 34–46.6)
HDLC SERPL-MCNC: 63 MG/DL (ref 40–60)
HGB BLD-MCNC: 13.9 G/DL (ref 12–15.9)
IMM GRANULOCYTES # BLD AUTO: 0.02 10*3/MM3 (ref 0–0.05)
IMM GRANULOCYTES NFR BLD AUTO: 0.3 % (ref 0–0.5)
LDLC SERPL CALC-MCNC: 122 MG/DL (ref 0–100)
LDLC/HDLC SERPL: 1.83 {RATIO}
LYMPHOCYTES # BLD AUTO: 2.86 10*3/MM3 (ref 0.7–3.1)
LYMPHOCYTES NFR BLD AUTO: 37.5 % (ref 19.6–45.3)
MCH RBC QN AUTO: 31.5 PG (ref 26.6–33)
MCHC RBC AUTO-ENTMCNC: 33.3 G/DL (ref 31.5–35.7)
MCV RBC AUTO: 94.6 FL (ref 79–97)
MONOCYTES # BLD AUTO: 0.65 10*3/MM3 (ref 0.1–0.9)
MONOCYTES NFR BLD AUTO: 8.5 % (ref 5–12)
NEUTROPHILS NFR BLD AUTO: 3.82 10*3/MM3 (ref 1.7–7)
NEUTROPHILS NFR BLD AUTO: 50.1 % (ref 42.7–76)
NRBC BLD AUTO-RTO: 0 /100 WBC (ref 0–0.2)
PLATELET # BLD AUTO: 271 10*3/MM3 (ref 140–450)
PMV BLD AUTO: 11.1 FL (ref 6–12)
POTASSIUM SERPL-SCNC: 3.7 MMOL/L (ref 3.5–5.2)
PROT SERPL-MCNC: 7.1 G/DL (ref 6–8.5)
RBC # BLD AUTO: 4.41 10*6/MM3 (ref 3.77–5.28)
SODIUM SERPL-SCNC: 142 MMOL/L (ref 136–145)
TRIGL SERPL-MCNC: 279 MG/DL (ref 0–150)
TSH SERPL DL<=0.05 MIU/L-ACNC: 1.15 UIU/ML (ref 0.27–4.2)
VLDLC SERPL-MCNC: 49 MG/DL (ref 5–40)
WBC NRBC COR # BLD AUTO: 7.62 10*3/MM3 (ref 3.4–10.8)

## 2024-08-21 PROCEDURE — 85025 COMPLETE CBC W/AUTO DIFF WBC: CPT | Performed by: INTERNAL MEDICINE

## 2024-08-21 PROCEDURE — 80061 LIPID PANEL: CPT | Performed by: INTERNAL MEDICINE

## 2024-08-21 PROCEDURE — 84443 ASSAY THYROID STIM HORMONE: CPT | Performed by: INTERNAL MEDICINE

## 2024-08-21 PROCEDURE — 36415 COLL VENOUS BLD VENIPUNCTURE: CPT | Performed by: INTERNAL MEDICINE

## 2024-08-21 PROCEDURE — 80053 COMPREHEN METABOLIC PANEL: CPT | Performed by: INTERNAL MEDICINE

## 2024-08-21 PROCEDURE — 99214 OFFICE O/P EST MOD 30 MIN: CPT | Performed by: INTERNAL MEDICINE

## 2024-08-21 PROCEDURE — 83036 HEMOGLOBIN GLYCOSYLATED A1C: CPT | Performed by: INTERNAL MEDICINE

## 2024-08-21 RX ORDER — FLUTICASONE PROPIONATE 50 MCG
1 SPRAY, SUSPENSION (ML) NASAL DAILY
Qty: 16 G | Refills: 1 | Status: SHIPPED | OUTPATIENT
Start: 2024-08-21

## 2024-08-21 RX ORDER — LISINOPRIL AND HYDROCHLOROTHIAZIDE 20; 12.5 MG/1; MG/1
1 TABLET ORAL DAILY
Qty: 90 TABLET | Refills: 1 | Status: SHIPPED | OUTPATIENT
Start: 2024-08-21

## 2024-08-21 RX ORDER — ESOMEPRAZOLE MAGNESIUM 40 MG/1
40 CAPSULE, DELAYED RELEASE ORAL NIGHTLY
Qty: 90 CAPSULE | Refills: 1 | Status: SHIPPED | OUTPATIENT
Start: 2024-08-21

## 2024-08-21 RX ORDER — CETIRIZINE HYDROCHLORIDE 10 MG/1
10 TABLET ORAL DAILY
Qty: 90 TABLET | Refills: 3 | Status: SHIPPED | OUTPATIENT
Start: 2024-08-21

## 2024-08-21 RX ORDER — LEVOTHYROXINE SODIUM 0.15 MG/1
137 TABLET ORAL DAILY
Qty: 90 TABLET | Refills: 1 | Status: SHIPPED | OUTPATIENT
Start: 2024-08-21

## 2024-08-21 NOTE — PROGRESS NOTES
"Chief Complaint  Heartburn (Medicine isnt working too well ), Weight Loss, and Hypertension (Follow up )    Subjective      Xuan Horvath is a 54 y.o. female who presents to [facility]     HTN: Eating low carb diet and exercising regularly. BP meds were adjusted earlier this week and BP has since improved. Before BP med changes, reports she was having headache. Now that BP has improved, symptoms resolved. No chest pain, palpitations, lightheadedness, or dizziness.    GERD: Hx of gastric sleeve 4 years ago. States that she is having episodes of severe reflux 2-3 times a week that is waking her up at night. Tums helps at the time of flare.     Obesity: Works out 5x a week on treadmill and eats a low carb diet. She has tried Contrave previously, did not tolerate due to severe insomnia and constipation.    Hx of kidney stones: admitted in April for kidney stones. She had a 6mm stone in the left kidney she was unable to pass. Had stent placed. Has a stable 4mm stone in the right kidney.    Hypothyroid: stable on current medication. No new changes.      Objective   Vital Signs:   Vitals:    08/21/24 1430   BP: 128/82   BP Location: Left arm   Patient Position: Sitting   Cuff Size: Adult   Pulse: 77   Resp: 20   Temp: 97.9 °F (36.6 °C)   TempSrc: Temporal   SpO2: 95%   Weight: 84.7 kg (186 lb 12.8 oz)   Height: 160 cm (62.99\")     Body mass index is 33.1 kg/m².    Wt Readings from Last 3 Encounters:   08/21/24 84.7 kg (186 lb 12.8 oz)   07/16/24 83.5 kg (184 lb)   05/22/24 83.7 kg (184 lb 8.4 oz)     BP Readings from Last 3 Encounters:   08/21/24 128/82   05/22/24 131/69   04/30/24 151/94       Health Maintenance   Topic Date Due    BMI FOLLOWUP  04/05/2024    INFLUENZA VACCINE  08/01/2024    ANNUAL PHYSICAL  10/06/2024    MAMMOGRAM  11/02/2025    COLORECTAL CANCER SCREENING  01/13/2027    TDAP/TD VACCINES (2 - Td or Tdap) 08/04/2031    HEPATITIS C SCREENING  " Completed    COVID-19 Vaccine  Completed    ZOSTER VACCINE  Completed    Pneumococcal Vaccine 0-64  Aged Out    PAP SMEAR  Discontinued       Physical Exam  Vitals reviewed.   Constitutional:       Appearance: Normal appearance. She is well-developed.   HENT:      Head: Normocephalic and atraumatic.      Right Ear: External ear normal.      Left Ear: External ear normal.   Eyes:      Conjunctiva/sclera: Conjunctivae normal.      Pupils: Pupils are equal, round, and reactive to light.   Cardiovascular:      Rate and Rhythm: Normal rate and regular rhythm.      Heart sounds: No murmur heard.     No friction rub. No gallop.   Pulmonary:      Effort: Pulmonary effort is normal.      Breath sounds: Normal breath sounds. No wheezing or rhonchi.   Skin:     General: Skin is warm and dry.   Neurological:      Mental Status: She is alert and oriented to person, place, and time.   Psychiatric:         Mood and Affect: Affect normal.         Behavior: Behavior normal.         Thought Content: Thought content normal.          Result Review :  The following data was reviewed by: Suri Lynn MD on 08/21/2024:         Procedures          Assessment & Plan  Essential hypertension  Stable on current medication. Will check labs  Hypothyroidism, unspecified type  Stable on current medication, will check thyroid levels  and adjust medication as needed  Obesity, Class I, BMI 30-34.9  Will check lab work. Has tried Contrave in the past and did not tolerate due to severe insomnia and constipation  History of sleeve gastrectomy  Doing well, keep working low dose meds  Gastroesophageal reflux disease, unspecified whether esophagitis present  Will try dietary management  Calculus of kidney   stable  Impaired fasting glucose  Will check A1C  Seasonal allergies  Doing well cont current meds    Orders Placed This Encounter   Procedures    Comprehensive Metabolic Panel    TSH    Lipid Panel    Hemoglobin A1c    CBC Auto Differential     CBC & Differential     New Medications Ordered This Visit   Medications    cetirizine (zyrTEC) 10 MG tablet     Sig: Take 1 tablet by mouth Daily.     Dispense:  90 tablet     Refill:  3    levothyroxine (SYNTHROID, LEVOTHROID) 150 MCG tablet     Sig: Take 1 tablet by mouth Daily.     Dispense:  90 tablet     Refill:  1    fluticasone (FLONASE) 50 MCG/ACT nasal spray     Si spray by Each Nare route Daily.     Dispense:  16 g     Refill:  1    lisinopril-hydrochlorothiazide (PRINZIDE,ZESTORETIC) 20-12.5 MG per tablet     Sig: Take 1 tablet by mouth Daily.     Dispense:  90 tablet     Refill:  1    esomeprazole (nexIUM) 40 MG capsule     Sig: Take 1 capsule by mouth Every Night.     Dispense:  90 capsule     Refill:  1                   FOLLOW UP  No follow-ups on file.  Patient was given instructions and counseling regarding her condition or for health maintenance advice. Please see specific information pulled into the AVS if appropriate.       Suri Lynn MD  24  16:09 EDT    CURRENT & DISCONTINUED MEDICATIONS  Current Outpatient Medications   Medication Instructions    amLODIPine (NORVASC) 5 mg, Oral, Daily    cetirizine (ZYRTEC) 10 mg, Oral, Daily    citalopram (CELEXA) 40 mg, Oral, Daily    esomeprazole (NEXIUM) 40 mg, Oral, Nightly    fluticasone (FLONASE) 50 MCG/ACT nasal spray 1 spray, Each Nare, Daily    levothyroxine (SYNTHROID, LEVOTHROID) 150 mcg, Oral, Daily    lisinopril-hydrochlorothiazide (PRINZIDE,ZESTORETIC) 20-12.5 MG per tablet 1 tablet, Oral, Daily    multivitamin with minerals tablet tablet 1 tablet, Oral, Daily, LAST DOSE 24       Medications Discontinued During This Encounter   Medication Reason    fluticasone (FLONASE) 50 MCG/ACT nasal spray Reorder    cetirizine (zyrTEC) 10 MG tablet Reorder    esomeprazole (nexIUM) 40 MG capsule Reorder    levothyroxine (SYNTHROID, LEVOTHROID) 150 MCG tablet Reorder    lisinopril-hydrochlorothiazide (PRINZIDE,ZESTORETIC) 20-12.5 MG per  tablet Reorder

## 2024-08-21 NOTE — ASSESSMENT & PLAN NOTE
Will check lab work. Has tried Contrave in the past and did not tolerate due to severe insomnia and constipation

## 2024-09-09 ENCOUNTER — PATIENT MESSAGE (OUTPATIENT)
Dept: INTERNAL MEDICINE | Facility: CLINIC | Age: 54
End: 2024-09-09
Payer: OTHER GOVERNMENT

## 2024-09-09 RX ORDER — SEMAGLUTIDE 0.25 MG/.5ML
0.25 INJECTION, SOLUTION SUBCUTANEOUS WEEKLY
Qty: 2 ML | Refills: 1 | Status: SHIPPED | OUTPATIENT
Start: 2024-09-09

## 2024-09-09 NOTE — TELEPHONE ENCOUNTER
From: Xuan Horvath  To: Suri Lynn  Sent: 9/9/2024 10:57 AM EDT  Subject: Wegovy    Has wegovy been approved yet?

## 2024-09-10 ENCOUNTER — PRIOR AUTHORIZATION (OUTPATIENT)
Dept: INTERNAL MEDICINE | Facility: CLINIC | Age: 54
End: 2024-09-10
Payer: OTHER GOVERNMENT

## 2024-09-10 NOTE — TELEPHONE ENCOUNTER
Wegovy 0.25MG/0.5ML auto-injectors    Coverage Start Date:08/11/2024;Coverage End Date:09/10/2025;. Authorization Expiration Date: September 10, 2025.

## 2024-09-11 ENCOUNTER — PATIENT MESSAGE (OUTPATIENT)
Dept: INTERNAL MEDICINE | Facility: CLINIC | Age: 54
End: 2024-09-11
Payer: OTHER GOVERNMENT

## 2024-09-11 DIAGNOSIS — G56.02 CARPAL TUNNEL SYNDROME OF LEFT WRIST: Primary | ICD-10-CM

## 2024-09-28 DIAGNOSIS — J30.2 SEASONAL ALLERGIES: ICD-10-CM

## 2024-09-30 ENCOUNTER — PATIENT MESSAGE (OUTPATIENT)
Dept: INTERNAL MEDICINE | Facility: CLINIC | Age: 54
End: 2024-09-30
Payer: OTHER GOVERNMENT

## 2024-09-30 DIAGNOSIS — N20.0 NEPHROLITHIASIS: ICD-10-CM

## 2024-09-30 RX ORDER — CETIRIZINE HYDROCHLORIDE 10 MG/1
10 TABLET ORAL DAILY
Qty: 90 TABLET | Refills: 3 | Status: SHIPPED | OUTPATIENT
Start: 2024-09-30

## 2024-10-03 ENCOUNTER — TELEPHONE (OUTPATIENT)
Dept: UROLOGY | Age: 54
End: 2024-10-03

## 2024-10-03 DIAGNOSIS — N20.0 NEPHROLITHIASIS: Primary | ICD-10-CM

## 2024-10-03 DIAGNOSIS — N20.0 CALCULUS OF KIDNEY: Primary | ICD-10-CM

## 2024-10-03 DIAGNOSIS — N20.0 NEPHROLITHIASIS: ICD-10-CM

## 2024-10-03 RX ORDER — TAMSULOSIN HYDROCHLORIDE 0.4 MG/1
1 CAPSULE ORAL DAILY
Qty: 30 CAPSULE | Refills: 0 | Status: SHIPPED | OUTPATIENT
Start: 2024-10-03

## 2024-10-03 RX ORDER — TAMSULOSIN HYDROCHLORIDE 0.4 MG/1
1 CAPSULE ORAL DAILY
Qty: 30 CAPSULE | Refills: 0 | Status: SHIPPED | OUTPATIENT
Start: 2024-10-03 | End: 2024-10-03 | Stop reason: SDUPTHER

## 2024-10-03 NOTE — TELEPHONE ENCOUNTER
Provider: DR VELEZ    Caller: GRIFFIN ROMANO    Relationship to Patient: SELF    Pharmacy: [id number]     Phone Number: [phone]    Reason for Call: PT THINKS SHE MAY HAVE ANOTHER KIDNEY STONE - LEFT SIDE AND BACK PAIN.      When was the patient last seen: 7/16/24    When did it start: 3 DAYS AGO    Where is it located: LEFT BACK PAIN    Characteristics of symptom/severity: 8    Timing- Is it constant or intermittent: CONSTANT    What makes it worse: MOVEMENT    What makes it better: NA    What therapies/medications have you tried: PAIN MEDICATION-LEFT OVER FROM LAST STONE, TYLENOL

## 2024-10-03 NOTE — TELEPHONE ENCOUNTER
From: Xuan Horvath  To: Suri Lynn  Sent: 9/30/2024 2:53 PM EDT  Subject: Flomax    Would you call in more flomax sad to say I’m having kidney stone pain again on my left side.

## 2024-10-09 ENCOUNTER — HOSPITAL ENCOUNTER (EMERGENCY)
Facility: HOSPITAL | Age: 54
Discharge: HOME | End: 2024-10-09
Attending: EMERGENCY MEDICINE
Payer: OTHER GOVERNMENT

## 2024-10-09 ENCOUNTER — APPOINTMENT (OUTPATIENT)
Dept: CT IMAGING | Facility: HOSPITAL | Age: 54
End: 2024-10-09
Payer: OTHER GOVERNMENT

## 2024-10-09 VITALS
HEIGHT: 63 IN | TEMPERATURE: 98.1 F | SYSTOLIC BLOOD PRESSURE: 133 MMHG | OXYGEN SATURATION: 96 % | BODY MASS INDEX: 31.37 KG/M2 | HEART RATE: 73 BPM | DIASTOLIC BLOOD PRESSURE: 81 MMHG | RESPIRATION RATE: 16 BRPM | WEIGHT: 177.03 LBS

## 2024-10-09 DIAGNOSIS — N23 RENAL COLIC: Primary | ICD-10-CM

## 2024-10-09 DIAGNOSIS — N20.0 NEPHROLITHIASIS: ICD-10-CM

## 2024-10-09 DIAGNOSIS — R82.71 BACTERIA IN URINE: ICD-10-CM

## 2024-10-09 LAB
ALBUMIN SERPL-MCNC: 4.1 G/DL (ref 3.5–5.2)
ALBUMIN/GLOB SERPL: 1.4 G/DL
ALP SERPL-CCNC: 60 U/L (ref 39–117)
ALT SERPL W P-5'-P-CCNC: 18 U/L (ref 1–33)
ANION GAP SERPL CALCULATED.3IONS-SCNC: 11.9 MMOL/L (ref 5–15)
AST SERPL-CCNC: 22 U/L (ref 1–32)
BACTERIA UR QL AUTO: ABNORMAL /HPF
BASOPHILS # BLD AUTO: 0.06 10*3/MM3 (ref 0–0.2)
BASOPHILS NFR BLD AUTO: 0.8 % (ref 0–1.5)
BILIRUB SERPL-MCNC: 0.4 MG/DL (ref 0–1.2)
BILIRUB UR QL STRIP: NEGATIVE
BUN SERPL-MCNC: 16 MG/DL (ref 6–20)
BUN/CREAT SERPL: 23.5 (ref 7–25)
CALCIUM SPEC-SCNC: 8.9 MG/DL (ref 8.6–10.5)
CHLORIDE SERPL-SCNC: 98 MMOL/L (ref 98–107)
CLARITY UR: CLEAR
CO2 SERPL-SCNC: 28.1 MMOL/L (ref 22–29)
COLOR UR: YELLOW
CREAT SERPL-MCNC: 0.68 MG/DL (ref 0.57–1)
D-LACTATE SERPL-SCNC: 1.3 MMOL/L (ref 0.5–2)
DEPRECATED RDW RBC AUTO: 45.6 FL (ref 37–54)
EGFRCR SERPLBLD CKD-EPI 2021: 103.6 ML/MIN/1.73
EOSINOPHIL # BLD AUTO: 0.19 10*3/MM3 (ref 0–0.4)
EOSINOPHIL NFR BLD AUTO: 2.4 % (ref 0.3–6.2)
ERYTHROCYTE [DISTWIDTH] IN BLOOD BY AUTOMATED COUNT: 14 % (ref 12.3–15.4)
GLOBULIN UR ELPH-MCNC: 2.9 GM/DL
GLUCOSE SERPL-MCNC: 100 MG/DL (ref 65–99)
GLUCOSE UR STRIP-MCNC: NEGATIVE MG/DL
HCT VFR BLD AUTO: 37.1 % (ref 34–46.6)
HGB BLD-MCNC: 13.2 G/DL (ref 12–15.9)
HGB UR QL STRIP.AUTO: NEGATIVE
HOLD SPECIMEN: NORMAL
HOLD SPECIMEN: NORMAL
HYALINE CASTS UR QL AUTO: ABNORMAL /LPF
IMM GRANULOCYTES # BLD AUTO: 0.01 10*3/MM3 (ref 0–0.05)
IMM GRANULOCYTES NFR BLD AUTO: 0.1 % (ref 0–0.5)
KETONES UR QL STRIP: NEGATIVE
LEUKOCYTE ESTERASE UR QL STRIP.AUTO: ABNORMAL
LIPASE SERPL-CCNC: 54 U/L (ref 13–60)
LYMPHOCYTES # BLD AUTO: 2.4 10*3/MM3 (ref 0.7–3.1)
LYMPHOCYTES NFR BLD AUTO: 30.8 % (ref 19.6–45.3)
MCH RBC QN AUTO: 32.1 PG (ref 26.6–33)
MCHC RBC AUTO-ENTMCNC: 35.6 G/DL (ref 31.5–35.7)
MCV RBC AUTO: 90.3 FL (ref 79–97)
MONOCYTES # BLD AUTO: 0.54 10*3/MM3 (ref 0.1–0.9)
MONOCYTES NFR BLD AUTO: 6.9 % (ref 5–12)
NEUTROPHILS NFR BLD AUTO: 4.6 10*3/MM3 (ref 1.7–7)
NEUTROPHILS NFR BLD AUTO: 59 % (ref 42.7–76)
NITRITE UR QL STRIP: NEGATIVE
NRBC BLD AUTO-RTO: 0 /100 WBC (ref 0–0.2)
PH UR STRIP.AUTO: 7 [PH] (ref 5–8)
PLATELET # BLD AUTO: 245 10*3/MM3 (ref 140–450)
PMV BLD AUTO: 10.2 FL (ref 6–12)
POTASSIUM SERPL-SCNC: 3.2 MMOL/L (ref 3.5–5.2)
PROT SERPL-MCNC: 7 G/DL (ref 6–8.5)
PROT UR QL STRIP: NEGATIVE
RBC # BLD AUTO: 4.11 10*6/MM3 (ref 3.77–5.28)
RBC # UR STRIP: ABNORMAL /HPF
REF LAB TEST METHOD: ABNORMAL
SODIUM SERPL-SCNC: 138 MMOL/L (ref 136–145)
SP GR UR STRIP: 1.02 (ref 1–1.03)
SQUAMOUS #/AREA URNS HPF: ABNORMAL /HPF
UROBILINOGEN UR QL STRIP: ABNORMAL
WBC # UR STRIP: ABNORMAL /HPF
WBC NRBC COR # BLD AUTO: 7.8 10*3/MM3 (ref 3.4–10.8)
WHOLE BLOOD HOLD COAG: NORMAL
WHOLE BLOOD HOLD SPECIMEN: NORMAL

## 2024-10-09 PROCEDURE — 83605 ASSAY OF LACTIC ACID: CPT | Performed by: EMERGENCY MEDICINE

## 2024-10-09 PROCEDURE — 85025 COMPLETE CBC W/AUTO DIFF WBC: CPT | Performed by: EMERGENCY MEDICINE

## 2024-10-09 PROCEDURE — 83690 ASSAY OF LIPASE: CPT | Performed by: EMERGENCY MEDICINE

## 2024-10-09 PROCEDURE — 87086 URINE CULTURE/COLONY COUNT: CPT

## 2024-10-09 PROCEDURE — 80053 COMPREHEN METABOLIC PANEL: CPT | Performed by: EMERGENCY MEDICINE

## 2024-10-09 PROCEDURE — 74176 CT ABD & PELVIS W/O CONTRAST: CPT

## 2024-10-09 PROCEDURE — 96374 THER/PROPH/DIAG INJ IV PUSH: CPT

## 2024-10-09 PROCEDURE — 99284 EMERGENCY DEPT VISIT MOD MDM: CPT

## 2024-10-09 PROCEDURE — 81001 URINALYSIS AUTO W/SCOPE: CPT | Performed by: EMERGENCY MEDICINE

## 2024-10-09 PROCEDURE — 25010000002 KETOROLAC TROMETHAMINE PER 15 MG

## 2024-10-09 RX ORDER — OXYCODONE HYDROCHLORIDE 5 MG/1
5 TABLET ORAL EVERY 4 HOURS PRN
Status: DISCONTINUED | OUTPATIENT
Start: 2024-10-09 | End: 2024-10-09

## 2024-10-09 RX ORDER — TAMSULOSIN HYDROCHLORIDE 0.4 MG/1
1 CAPSULE ORAL DAILY
Qty: 30 CAPSULE | Refills: 0 | Status: SHIPPED | OUTPATIENT
Start: 2024-10-09 | End: 2024-10-09

## 2024-10-09 RX ORDER — CIPROFLOXACIN 500 MG/1
500 TABLET, FILM COATED ORAL 2 TIMES DAILY
Qty: 14 TABLET | Refills: 0 | Status: SHIPPED | OUTPATIENT
Start: 2024-10-09 | End: 2024-10-09

## 2024-10-09 RX ORDER — SODIUM CHLORIDE 0.9 % (FLUSH) 0.9 %
10 SYRINGE (ML) INJECTION AS NEEDED
Status: DISCONTINUED | OUTPATIENT
Start: 2024-10-09 | End: 2024-10-09 | Stop reason: HOSPADM

## 2024-10-09 RX ORDER — KETOROLAC TROMETHAMINE 15 MG/ML
15 INJECTION, SOLUTION INTRAMUSCULAR; INTRAVENOUS ONCE
Status: COMPLETED | OUTPATIENT
Start: 2024-10-09 | End: 2024-10-09

## 2024-10-09 RX ORDER — KETOROLAC TROMETHAMINE 10 MG/1
10 TABLET, FILM COATED ORAL EVERY 6 HOURS PRN
Qty: 20 TABLET | Refills: 0 | Status: SHIPPED | OUTPATIENT
Start: 2024-10-09 | End: 2024-10-09

## 2024-10-09 RX ORDER — OXYCODONE HYDROCHLORIDE 5 MG/1
5 TABLET ORAL ONCE
Status: COMPLETED | OUTPATIENT
Start: 2024-10-09 | End: 2024-10-09

## 2024-10-09 RX ORDER — KETOROLAC TROMETHAMINE 10 MG/1
10 TABLET, FILM COATED ORAL EVERY 6 HOURS PRN
Qty: 20 TABLET | Refills: 0 | Status: SHIPPED | OUTPATIENT
Start: 2024-10-09 | End: 2024-10-14

## 2024-10-09 RX ORDER — OXYCODONE AND ACETAMINOPHEN 7.5; 325 MG/1; MG/1
1 TABLET ORAL EVERY 8 HOURS PRN
Qty: 8 TABLET | Refills: 0 | Status: SHIPPED | OUTPATIENT
Start: 2024-10-09 | End: 2024-10-12

## 2024-10-09 RX ORDER — CIPROFLOXACIN 500 MG/1
500 TABLET, FILM COATED ORAL 2 TIMES DAILY
Qty: 14 TABLET | Refills: 0 | Status: SHIPPED | OUTPATIENT
Start: 2024-10-09 | End: 2024-10-16

## 2024-10-09 RX ORDER — TAMSULOSIN HYDROCHLORIDE 0.4 MG/1
1 CAPSULE ORAL DAILY
Qty: 30 CAPSULE | Refills: 0 | Status: SHIPPED | OUTPATIENT
Start: 2024-10-09

## 2024-10-09 RX ADMIN — KETOROLAC TROMETHAMINE 15 MG: 15 INJECTION, SOLUTION INTRAMUSCULAR; INTRAVENOUS at 11:58

## 2024-10-09 RX ADMIN — OXYCODONE HYDROCHLORIDE 5 MG: 5 TABLET ORAL at 14:28

## 2024-10-09 NOTE — DISCHARGE INSTRUCTIONS
You were evaluated in the emergency department today.  Your scan shows stones in your kidneys but none in your ureters.  You could be experiencing renal colic.  Please follow-up with urology as we discussed I have placed a new referral.  I have sent medications to your pharmacy as well as antibiotics.  Please take these as directed.  Return to the emergency department if you develop fevers, worsening pain, inability to tolerate oral intake, or any other symptoms that concern you.

## 2024-10-09 NOTE — ED PROVIDER NOTES
Time: 11:07 AM EDT  Date of encounter:  10/9/2024  Independent Historian/Clinical History and Information was obtained by:   Patient    History is limited by: N/A    Chief Complaint   Patient presents with    Flank Pain         History of Present Illness:  Patient is a 54 y.o. year old female who presents to the emergency department for evaluation of left flank pain.  Patient reports pain began on Monday.  Pain comes and goes and does not necessarily associated with movement.  Not affected by diet.  She reports she has extensive history of kidney stones and states this feels exactly the same.  She has previously had lithotripsy performed, most recently 3 months ago.  She reports she has been trying to wait until she can make it to her outpatient appointment on Friday but states the pain is increasing and does not feel she can wait until then.  She reports that the pain has been increasing steadily.  She denies hematuria, fever, dysuria, chills, nausea, vomiting, shortness of breath, chest pain.  She reports appetite has been normal and has had no difficulty with eating or drinking.    Patient Care Team  Primary Care Provider: Suri Lynn MD    Past Medical History:     Allergies   Allergen Reactions    Penicillins Anaphylaxis     unknown    Contrave [Naltrexone-Bupropion Hcl Er] Other (See Comments)     Severe constipation and insomnia     Past Medical History:   Diagnosis Date    Allergy     Anxiety     Colon cancer screening     Depression     Depression 04/07/2015    working well, continue citalopram discussed that she can stay on meds long term or go off them whenever she is ready    Essential hypertension 04/06/2016    stable will try to decrease atenolol and see if that will give her more energy, need to watch bp closely    GERD (gastroesophageal reflux disease) January 2023    Prilosec. Protonix.    Glaucoma 2020    NO DROPS NEEDED    Heart murmur 01/2022    Hiatal hernia     NO CARDIOLOGIST NEEDED     History of medical problems 22    Panniculectomy    Hypertension     Hypothyroidism     Kidney stone     Obesity 2016    she is going to work on weight by trying to go to the gym she is working on this with her son    Sleep apnea     COMPLIANT WITH CPAP     UTI (urinary tract infection)     CURRENTLY ON ABX BY AGUSTIN, NO FEVER NOW    Vitamin D deficiency 2015    will repeat labs continue current OTC meds for now     Past Surgical History:   Procedure Laterality Date    BARIATRIC SURGERY  2020    BREAST AUGMENTATION Bilateral 2023    breast implants    CARPAL TUNNEL RELEASE Right 2005     SECTION  ,,2003    x3    COLONOSCOPY N/A 2022    Procedure: COLONOSCOPY WITH POLYPECTOMY COLD SNARE;  Surgeon: Luis Sterling MD;  Location: [facility] ENDOSCOPY;  Service: Gastroenterology;  Laterality: N/A;  COLON POLYP    COLONOSCOPY      COSMETIC SURGERY  23    Breast implants/breast lift    ENDOSCOPY N/A 2020    Procedure: ESOPHAGOGASTRODUODENOSCOPY WITH BIOPSY;  Surgeon: Ron Cadet Jr., MD;  Location: [facility] ENDOSCOPY;  Service: General;  Laterality: N/A;  PRE- DYSPEPSIA  POST- GASTRITIS, GASTRIC POLYPS, ESOPHAGITIS, HIATAL HERNIA      ENDOSCOPY N/A 2023    Procedure: ESOPHAGOGASTRODUODENOSCOPY WITH BIOPSIES;  Surgeon: Luis Sterling MD;  Location: [facility] ENDOSCOPY;  Service: Gastroenterology;  Laterality: N/A;  PRIOR GASTRIC SLEEVE    GASTRIC SLEEVE LAPAROSCOPIC N/A 2020    Procedure: GASTRIC SLEEVE LAPAROSCOPIC With Hiatal Hernia Repair;  Surgeon: Ron Cadet Jr., MD;  [place] [facility] [place] [facility]  Service: Bariatric;  Laterality: N/A;    HERNIA REPAIR  20    Hiatal hernia    HYSTERECTOMY  2004    PANNICULECTOMY      SUBTOTAL HYSTERECTOMY      UPPER GASTROINTESTINAL ENDOSCOPY      URETEROSCOPY LASER LITHOTRIPSY WITH STENT INSERTION Left 2024    Procedure: Cystoscopy with left ureteroscopy with laser and left  renal stent exchange;  Surgeon: Salazar Rios MD;  Location: [facility] MAIN OR;  Service: Urology;  Laterality: Left;    URETEROSCOPY STENT INSERTION Left 04/27/2024    Procedure: Cystoscopy with left ureteral stent placement;  Surgeon: Salazar Rios MD;  Location: [facility] MAIN OR;  Service: Urology;  Laterality: Left;     Family History   Problem Relation Age of Onset    Diabetes Mother     Hypertension Mother     Hypertension Father     Malig Hyperthermia Neg Hx     Colon cancer Neg Hx        Home Medications:  Prior to Admission medications    Medication Sig Start Date End Date Taking? Authorizing Provider   amLODIPine (NORVASC) 5 MG tablet Take 1 tablet by mouth Daily. 8/18/24   Suri Lynn MD   cetirizine (zyrTEC) 10 MG tablet Take 1 tablet by mouth Daily. 9/30/24   Suri Lynn MD   citalopram (CeleXA) 40 MG tablet Take 1 tablet by mouth Daily. 8/15/24   Suri Lynn MD   esomeprazole (nexIUM) 40 MG capsule Take 1 capsule by mouth Every Night. 8/21/24   Suri Lynn MD   fluticasone (FLONASE) 50 MCG/ACT nasal spray 1 spray by Each Nare route Daily. 8/21/24   Suri Lynn MD   levothyroxine (SYNTHROID, LEVOTHROID) 150 MCG tablet Take 1 tablet by mouth Daily. 8/21/24   Suri Lynn MD   lisinopril-hydrochlorothiazide (PRINZIDE,ZESTORETIC) 20-12.5 MG per tablet Take 1 tablet by mouth Daily. 8/21/24   Suri Lynn MD   multivitamin with minerals tablet tablet Take 1 tablet by mouth Daily. LAST DOSE 5/20/24    Provider, MD Izabella   Semaglutide-Weight Management (Wegovy) 0.25 MG/0.5ML solution auto-injector Inject 0.5 mL under the skin into the appropriate area as directed 1 (One) Time Per Week. 9/9/24   Suri Lynn MD   tamsulosin (FLOMAX) 0.4 MG capsule 24 hr capsule Take 1 capsule by mouth Daily. 10/3/24   Suri Lynn MD        Social History:   Social History     Tobacco Use    Smoking status: Never     Passive exposure: Never     "Smokeless tobacco: Never   Vaping Use    Vaping status: Never Used   Substance Use Topics    Alcohol use: Never    Drug use: Never         Review of Systems:  Review of Systems   Constitutional:  Negative for activity change, appetite change, chills, fatigue and fever.   HENT:  Negative for congestion, ear pain and rhinorrhea.    Eyes:  Negative for photophobia and visual disturbance.   Respiratory:  Negative for cough, shortness of breath and wheezing.    Cardiovascular:  Negative for chest pain, palpitations and leg swelling.   Gastrointestinal:  Positive for abdominal pain. Negative for abdominal distention, constipation, diarrhea, nausea and vomiting.   Endocrine: Negative for polydipsia and polyphagia.   Genitourinary:  Positive for flank pain. Negative for dysuria, hematuria, pelvic pain and urgency.   Musculoskeletal:  Negative for arthralgias, back pain and joint swelling.   Skin:  Negative for color change, rash and wound.   Allergic/Immunologic: Negative for immunocompromised state.   Neurological:  Negative for dizziness, tremors and numbness.   Hematological:  Negative for adenopathy.   Psychiatric/Behavioral:  Negative for agitation and confusion.         Physical Exam:  /81   Pulse 73   Temp 98.1 °F (36.7 °C)   Resp 16   Ht 160 cm (63\")   Wt 80.3 kg (177 lb 0.5 oz)   SpO2 96%   BMI 31.36 kg/m²         Physical Exam  Vitals and nursing note reviewed.   Constitutional:       Appearance: Normal appearance.   HENT:      Head: Normocephalic and atraumatic.      Right Ear: External ear normal.      Left Ear: External ear normal.      Nose: Nose normal.      Mouth/Throat:      Mouth: Mucous membranes are moist.   Eyes:      Extraocular Movements: Extraocular movements intact.      Conjunctiva/sclera: Conjunctivae normal.   Cardiovascular:      Rate and Rhythm: Normal rate and regular rhythm.      Heart sounds: Normal heart sounds.   Pulmonary:      Effort: Pulmonary effort is normal.      Breath " sounds: Normal breath sounds.   Abdominal:      General: There is no distension.      Palpations: Abdomen is soft.      Tenderness: There is abdominal tenderness. There is left CVA tenderness. There is no right CVA tenderness, guarding or rebound.   Musculoskeletal:         General: Normal range of motion.      Cervical back: Normal range of motion and neck supple.      Right lower leg: No edema.      Left lower leg: No edema.   Skin:     General: Skin is warm.      Capillary Refill: Capillary refill takes less than 2 seconds.   Neurological:      General: No focal deficit present.      Mental Status: She is oriented to person, place, and time.   Psychiatric:         Mood and Affect: Mood normal.         Behavior: Behavior normal.                    Procedures:  Procedures      Medical Decision Making:      Comorbidities that affect care:    Hypertension    External Notes reviewed:    Previous Admission Note: Discharge on 4/30/2024 for ureteral stone, E. coli UTI.  Patient was initially evaluated in the ED and discharged however returned with worsening pain and fevers and having worsening kidney function.  He was admitted, stent placed, discharged 3 days later.      The following orders were placed and all results were independently analyzed by me:  Orders Placed This Encounter   Procedures    CT Abdomen Pelvis Without Contrast    Henning Draw    Comprehensive Metabolic Panel    Lipase    Urinalysis With Microscopic If Indicated (No Culture) - Urine, Clean Catch    Lactic Acid, Plasma    CBC Auto Differential    Urinalysis, Microscopic Only - Urine, Clean Catch    Urinalysis With Culture If Indicated -    Ambulatory Referral to Urology    NPO Diet NPO Type: Strict NPO    Undress & Gown    Insert Peripheral IV    CBC & Differential    Green Top (Gel)    Lavender Top    Gold Top - SST    Light Blue Top       Medications Given in the Emergency Department:  Medications   sodium chloride 0.9 % flush 10 mL (has no  administration in time range)   ketorolac (TORADOL) injection 15 mg (15 mg Intravenous Given 10/9/24 1158)   oxyCODONE (ROXICODONE) immediate release tablet 5 mg (5 mg Oral Given 10/9/24 1428)        ED Course:    The patient was initially evaluated in the triage area where orders were placed. The patient was later dispositioned by Nazia Connelly PA-C.      The patient was advised to stay for completion of workup which includes but is not limited to communication of labs and radiological results, reassessment and plan. The patient was advised that leaving prior to disposition by a provider could result in critical findings that are not communicated to the patient.     ED Course as of 10/09/24 1604   Wed Oct 09, 2024   1100 Temp: 98.2 °F (36.8 °C)  Patient arrives to the emergency department hemodynamically stable, afebrile. [AS]   1115 CBC & Differential  No leukocytosis, no anemia, no thrombocytopenia [AS]   1132 Lactate: 1.3  WNL [AS]   1136 Comprehensive Metabolic Panel(!)  Mild hypokalemia, otherwise normal kidney function, normal liver function, no anion gap [AS]   1136 Lipase: 54  WNL [AS]   1359 Urinalysis With Microscopic If Indicated (No Culture) - Urine, Clean Catch(!)  1+ leukocytes, negative nitrite.  7-12 squamous cells could be contamination.  Patient denies dysuria. [AS]   1539 CT Abdomen Pelvis Without Contrast  Bilateral nonobstructing nephrolithiasis [AS]      ED Course User Index  [AS] Nazia Connelly PA-C       Labs:    Lab Results (last 24 hours)       Procedure Component Value Units Date/Time    CBC & Differential [[id number]]  (Normal) Collected: 10/09/24 1106    Specimen: Blood Updated: 10/09/24 1113    Narrative:      The following orders were created for panel order CBC & Differential.  Procedure                               Abnormality         Status                     ---------                               -----------         ------                     CBC Auto  Differential[[id number]]        Normal              Final result                 Please view results for these tests on the individual orders.    Comprehensive Metabolic Panel [[id number]]  (Abnormal) Collected: 10/09/24 1106    Specimen: Blood Updated: 10/09/24 1136     Glucose 100 mg/dL      BUN 16 mg/dL      Creatinine 0.68 mg/dL      Sodium 138 mmol/L      Potassium 3.2 mmol/L      Chloride 98 mmol/L      CO2 28.1 mmol/L      Calcium 8.9 mg/dL      Total Protein 7.0 g/dL      Albumin 4.1 g/dL      ALT (SGPT) 18 U/L      AST (SGOT) 22 U/L      Alkaline Phosphatase 60 U/L      Total Bilirubin 0.4 mg/dL      Globulin 2.9 gm/dL      A/G Ratio 1.4 g/dL      BUN/Creatinine Ratio 23.5     Anion Gap 11.9 mmol/L      eGFR 103.6 mL/min/1.73     Narrative:      GFR Normal >60  Chronic Kidney Disease <60  Kidney Failure <15      Lipase [[id number]]  (Normal) Collected: 10/09/24 1106    Specimen: Blood Updated: 10/09/24 1136     Lipase 54 U/L     Lactic Acid, Plasma [[id number]]  (Normal) Collected: 10/09/24 1106    Specimen: Blood Updated: 10/09/24 1132     Lactate 1.3 mmol/L     CBC Auto Differential [[id number]]  (Normal) Collected: 10/09/24 1106    Specimen: Blood Updated: 10/09/24 1113     WBC 7.80 10*3/mm3      RBC 4.11 10*6/mm3      Hemoglobin 13.2 g/dL      Hematocrit 37.1 %      MCV 90.3 fL      MCH 32.1 pg      MCHC 35.6 g/dL      RDW 14.0 %      RDW-SD 45.6 fl      MPV 10.2 fL      Platelets 245 10*3/mm3      Neutrophil % 59.0 %      Lymphocyte % 30.8 %      Monocyte % 6.9 %      Eosinophil % 2.4 %      Basophil % 0.8 %      Immature Grans % 0.1 %      Neutrophils, Absolute 4.60 10*3/mm3      Lymphocytes, Absolute 2.40 10*3/mm3      Monocytes, Absolute 0.54 10*3/mm3      Eosinophils, Absolute 0.19 10*3/mm3      Basophils, Absolute 0.06 10*3/mm3      Immature Grans, Absolute 0.01 10*3/mm3      nRBC 0.0 /100 WBC     Urinalysis With Microscopic If Indicated (No Culture) - Urine, Clean Catch [[id number]]  (Abnormal)  Collected: 10/09/24 1336    Specimen: Urine, Clean Catch Updated: 10/09/24 1352     Color, UA Yellow     Appearance, UA Clear     pH, UA 7.0     Specific Gravity, UA 1.019     Glucose, UA Negative     Ketones, UA Negative     Bilirubin, UA Negative     Blood, UA Negative     Protein, UA Negative     Leuk Esterase, UA Small (1+)     Nitrite, UA Negative     Urobilinogen, UA 0.2 E.U./dL    Urinalysis, Microscopic Only - Urine, Clean Catch [[id number]]  (Abnormal) Collected: 10/09/24 1336    Specimen: Urine, Clean Catch Updated: 10/09/24 1357     RBC, UA 0-2 /HPF      WBC, UA 11-20 /HPF      Bacteria, UA 1+ /HPF      Squamous Epithelial Cells, UA 7-12 /HPF      Hyaline Casts, UA 3-6 /LPF      Methodology Automated Microscopy             Imaging:    CT Abdomen Pelvis Without Contrast    Result Date: 10/9/2024  CT ABDOMEN PELVIS WO CONTRAST Date of Exam: 10/9/2024 12:22 PM EDT Indication: left flank pain. Comparison: None available. Technique: Axial CT images were obtained of the abdomen and pelvis without the administration of contrast. Reconstructed coronal and sagittal images were also obtained. Automated exposure control and iterative construction methods were used. Findings: Lung Bases:  The visualized lung bases and lower mediastinal structures are unremarkable. Bilateral breast implants. Small hiatal hernia with surgical changes status post sleeve gastrectomy Liver: Diffuse fatty replacement. There are no focal liver lesions Biliary/Gallbladder: The gallbladder is without evidence of radiopaque stones. The biliary tree is nondilated. Spleen: Multiple calcified granulomas Pancreas: Pancreas shows homogeneous density. There is no evidence of pancreatic mass or peripancreatic fluid. Kidneys: The kidneys show a normal position. The right kidney shows a 6 mm nonobstructive stone. No evidence of hydronephrosis. No focal renal lesion. The left kidney shows a 4 mm nonobstructive stone. No evidence of hydronephrosis or  renal lesion Adrenals: Adrenal glands are unremarkable. Retroperitoneal/Lymph Nodes/Vasculature: No retroperitoneal adenopathy is identified by size criteria. Gastrointestinal/Mesentery: The bowel loops are non-dilated without definite wall thickening or mass. The appendix appears within normal limits. No evidence of obstruction. No free air. Bladder: The bladder is unremarkable Hysterectomy changes. Trace free fluid in the pelvis   Bony Structures:  Visualized bony structures are consistent with the patient's age.     Impression: 1. Hepatic steatosis 2. Small hiatal hernia and surgical changes post sleeve gastrectomy 3. Bilateral nonobstructive nephrolithiasis 4. Trace free fluid in the pelvis Electronically Signed: Efrain Walls MD  10/9/2024 12:38 PM EDT  Workstation ID: [id number]       Differential Diagnosis and Discussion:      Flank Pain: Differential diagnosis includes but is not limited to kidney stones, pyelonephritis, musculoskeletal disorders, renal infarction, urinary tract infection, hydronephrosis, radiculopathy, aortic aneurysm, renal cell carcinoma.    All labs were reviewed and interpreted by me.  CT scan radiology impression was interpreted by me.    MDM  Number of Diagnoses or Management Options  Bacteria in urine: minor  Nephrolithiasis: established and improving  Renal colic: established and improving     Amount and/or Complexity of Data Reviewed  Clinical lab tests: ordered and reviewed  Tests in the radiology section of CPT®: ordered and reviewed  Tests in the medicine section of CPT®: ordered and reviewed    Risk of Complications, Morbidity, and/or Mortality  Presenting problems: low  Diagnostic procedures: moderate  Management options: low    Patient Progress  Patient progress: improved       In summary this is a 54-year-old female with history of multiple kidney stones most recently in May 2024 requiring lithotripsy with stent placement.  Presenting to the ED today with 1 week of  left-sided flank pain.  No other symptoms including no dysuria, no fevers, no hematuria.  Overall well-appearing.  She arrived to the emergency department awake, alert, hemodynamically stable.  Physical examination with left-sided CVA tenderness, no other tenderness on abdominal exam.  No rebound or guarding on abdominal exam, further physical examination noncontributory.  Based on history and physical my differential diagnosis is included above.  Initial workup includes CBC, CMP, lipase, lactate, UA.  Labs are relatively nonactionable, small leukocytes in urine and 1 bacteria otherwise negative nitrite.  Patient has no dysuria, no hesitancy or frequency.  There are presence of moderate amount of squamous cells therefore this could be a contaminated sample.  CT scan without contrast was obtained, reveals bilateral nonobstructing stones and none within the ureters.  No hydronephrosis or hydroureter.  Given reassuring examination and vital, do feel patient is appropriate for outpatient management.  She was initially treated with Toradol which did not control her pain, oral oxycodone was given with significant relief of pain.  Given questionable urinalysis findings, will treat patient for urinary tract infection with questionable stone.  Her labs and vitals are stable, do not feel indication to reach out to urology in the emergency department.  Patient reports that she will call Dr. Rios office to get in this week.  Med sent to pharmacy including pain medications, tamsulosin, Toradol.  She remains well-appearing throughout her stay in the ED has been tolerating p.o. appropriate for discharge.              Patient Care Considerations:    SEPSIS was considered but is NOT present in the emergency department as SIRS criteria is not present.      Consultants/Shared Management Plan:    None    Social Determinants of Health:    Patient is independent, reliable, and has access to care.       Disposition and Care  Coordination:    Discharged: The patient is suitable and stable for discharge with no need for consideration of admission.    I have explained the patient´s condition, diagnoses and treatment plan based on the information available to me at this time. I have answered questions and addressed any concerns. The patient has a good  understanding of the patient´s diagnosis, condition, and treatment plan as can be expected at this point. The vital signs have been stable. The patient´s condition is stable and appropriate for discharge from the emergency department.      The patient will pursue further outpatient evaluation with the primary care physician or other designated or consulting physician as outlined in the discharge instructions. They are agreeable to this plan of care and follow-up instructions have been explained in detail. The patient has received these instructions in written format and has expressed an understanding of the discharge instructions. The patient is aware that any significant change in condition or worsening of symptoms should prompt an immediate return to this or the closest emergency department or call to 911.    Final diagnoses:   Renal colic   Bacteria in urine   Nephrolithiasis        ED Disposition       ED Disposition   Discharge    Condition   Stable    Comment   --               This medical record created using voice recognition software.             Nazia Connelly PA-C  10/09/24 [id number]

## 2024-10-09 NOTE — ED PROVIDER NOTES
"SHARED VISIT NOTE:    Patient is 54 y.o. year old female that presents to the ED for evaluation of flank pain.  Patient has a history of kidney stones and this feels very like the kidney stone pain in the past..         ED Course:    /81   Pulse 73   Temp 98.1 °F (36.7 °C)   Resp 16   Ht 160 cm (63\")   Wt 80.3 kg (177 lb 0.5 oz)   SpO2 96%   BMI 31.36 kg/m²   Results for orders placed or performed during the hospital encounter of 10/09/24   Urine Culture - Urine, Urine, Clean Catch    Specimen: Urine, Clean Catch   Result Value Ref Range    Urine Culture 25,000 CFU/mL Normal Urogenital Rajwinder    Comprehensive Metabolic Panel    Specimen: Blood   Result Value Ref Range    Glucose 100 (H) 65 - 99 mg/dL    BUN 16 6 - 20 mg/dL    Creatinine 0.68 0.57 - 1.00 mg/dL    Sodium 138 136 - 145 mmol/L    Potassium 3.2 (L) 3.5 - 5.2 mmol/L    Chloride 98 98 - 107 mmol/L    CO2 28.1 22.0 - 29.0 mmol/L    Calcium 8.9 8.6 - 10.5 mg/dL    Total Protein 7.0 6.0 - 8.5 g/dL    Albumin 4.1 3.5 - 5.2 g/dL    ALT (SGPT) 18 1 - 33 U/L    AST (SGOT) 22 1 - 32 U/L    Alkaline Phosphatase 60 39 - 117 U/L    Total Bilirubin 0.4 0.0 - 1.2 mg/dL    Globulin 2.9 gm/dL    A/G Ratio 1.4 g/dL    BUN/Creatinine Ratio 23.5 7.0 - 25.0    Anion Gap 11.9 5.0 - 15.0 mmol/L    eGFR 103.6 >60.0 mL/min/1.73   Lipase    Specimen: Blood   Result Value Ref Range    Lipase 54 13 - 60 U/L   Urinalysis With Microscopic If Indicated (No Culture) - Urine, Clean Catch    Specimen: Urine, Clean Catch   Result Value Ref Range    Color, UA Yellow Yellow, Straw    Appearance, UA Clear Clear    pH, UA 7.0 5.0 - 8.0    Specific Gravity, UA 1.019 1.005 - 1.030    Glucose, UA Negative Negative    Ketones, UA Negative Negative    Bilirubin, UA Negative Negative    Blood, UA Negative Negative    Protein, UA Negative Negative    Leuk Esterase, UA Small (1+) (A) Negative    Nitrite, UA Negative Negative    Urobilinogen, UA 0.2 E.U./dL 0.2 - 1.0 E.U./dL   Lactic Acid, " Plasma    Specimen: Blood   Result Value Ref Range    Lactate 1.3 0.5 - 2.0 mmol/L   CBC Auto Differential    Specimen: Blood   Result Value Ref Range    WBC 7.80 3.40 - 10.80 10*3/mm3    RBC 4.11 3.77 - 5.28 10*6/mm3    Hemoglobin 13.2 12.0 - 15.9 g/dL    Hematocrit 37.1 34.0 - 46.6 %    MCV 90.3 79.0 - 97.0 fL    MCH 32.1 26.6 - 33.0 pg    MCHC 35.6 31.5 - 35.7 g/dL    RDW 14.0 12.3 - 15.4 %    RDW-SD 45.6 37.0 - 54.0 fl    MPV 10.2 6.0 - 12.0 fL    Platelets 245 140 - 450 10*3/mm3    Neutrophil % 59.0 42.7 - 76.0 %    Lymphocyte % 30.8 19.6 - 45.3 %    Monocyte % 6.9 5.0 - 12.0 %    Eosinophil % 2.4 0.3 - 6.2 %    Basophil % 0.8 0.0 - 1.5 %    Immature Grans % 0.1 0.0 - 0.5 %    Neutrophils, Absolute 4.60 1.70 - 7.00 10*3/mm3    Lymphocytes, Absolute 2.40 0.70 - 3.10 10*3/mm3    Monocytes, Absolute 0.54 0.10 - 0.90 10*3/mm3    Eosinophils, Absolute 0.19 0.00 - 0.40 10*3/mm3    Basophils, Absolute 0.06 0.00 - 0.20 10*3/mm3    Immature Grans, Absolute 0.01 0.00 - 0.05 10*3/mm3    nRBC 0.0 0.0 - 0.2 /100 WBC   Urinalysis, Microscopic Only - Urine, Clean Catch    Specimen: Urine, Clean Catch   Result Value Ref Range    RBC, UA 0-2 None Seen, 0-2 /HPF    WBC, UA 11-20 (A) None Seen, 0-2 /HPF    Bacteria, UA 1+ (A) None Seen /HPF    Squamous Epithelial Cells, UA 7-12 (A) None Seen, 0-2 /HPF    Hyaline Casts, UA 3-6 None Seen /LPF    Methodology Automated Microscopy    Green Top (Gel)   Result Value Ref Range    Extra Tube Hold for add-ons.    Lavender Top   Result Value Ref Range    Extra Tube hold for add-on    Gold Top - SST   Result Value Ref Range    Extra Tube Hold for add-ons.    Light Blue Top   Result Value Ref Range    Extra Tube Hold for add-ons.      Medications   ketorolac (TORADOL) injection 15 mg (15 mg Intravenous Given 10/9/24 1158)   oxyCODONE (ROXICODONE) immediate release tablet 5 mg (5 mg Oral Given 10/9/24 1428)     CT Abdomen Pelvis Without Contrast    Result Date: 10/9/2024  Narrative: CT ABDOMEN  PELVIS WO CONTRAST Date of Exam: 10/9/2024 12:22 PM EDT Indication: left flank pain. Comparison: None available. Technique: Axial CT images were obtained of the abdomen and pelvis without the administration of contrast. Reconstructed coronal and sagittal images were also obtained. Automated exposure control and iterative construction methods were used. Findings: Lung Bases:  The visualized lung bases and lower mediastinal structures are unremarkable. Bilateral breast implants. Small hiatal hernia with surgical changes status post sleeve gastrectomy Liver: Diffuse fatty replacement. There are no focal liver lesions Biliary/Gallbladder: The gallbladder is without evidence of radiopaque stones. The biliary tree is nondilated. Spleen: Multiple calcified granulomas Pancreas: Pancreas shows homogeneous density. There is no evidence of pancreatic mass or peripancreatic fluid. Kidneys: The kidneys show a normal position. The right kidney shows a 6 mm nonobstructive stone. No evidence of hydronephrosis. No focal renal lesion. The left kidney shows a 4 mm nonobstructive stone. No evidence of hydronephrosis or renal lesion Adrenals: Adrenal glands are unremarkable. Retroperitoneal/Lymph Nodes/Vasculature: No retroperitoneal adenopathy is identified by size criteria. Gastrointestinal/Mesentery: The bowel loops are non-dilated without definite wall thickening or mass. The appendix appears within normal limits. No evidence of obstruction. No free air. Bladder: The bladder is unremarkable Hysterectomy changes. Trace free fluid in the pelvis   Bony Structures:  Visualized bony structures are consistent with the patient's age.     Impression: Impression: 1. Hepatic steatosis 2. Small hiatal hernia and surgical changes post sleeve gastrectomy 3. Bilateral nonobstructive nephrolithiasis 4. Trace free fluid in the pelvis Electronically Signed: Efrain Walls MD  10/9/2024 12:38 PM EDT  Workstation ID: [id number]      MDM:    Procedures              SHARED VISIT ATTESTATION:    This visit was performed by both myself and an APC.  I performed the substantive portion of the medical decision making. The management plan was made or approved by me, and I take responsibility for patient management.           Keenan Noguera,   19:28 EDT  10/10/24         Keenan Noguera,   10/10/24 1928

## 2024-10-10 ENCOUNTER — TELEPHONE (OUTPATIENT)
Dept: UROLOGY | Facility: CLINIC | Age: 54
End: 2024-10-10
Payer: OTHER GOVERNMENT

## 2024-10-10 LAB — BACTERIA SPEC AEROBE CULT: NORMAL

## 2024-10-10 NOTE — TELEPHONE ENCOUNTER
Spoke to patient she is in a lot of pain. Her stones are non obstructing. Patient will try alternating between tylenol and Advil. Patient is aware if she has fever, chills, nausea or vomiting to go to the ER. Patient verbalized understanding of information.

## 2024-10-11 ENCOUNTER — PATIENT MESSAGE (OUTPATIENT)
Dept: INTERNAL MEDICINE | Facility: CLINIC | Age: 54
End: 2024-10-11
Payer: OTHER GOVERNMENT

## 2024-10-11 RX ORDER — SEMAGLUTIDE 0.5 MG/.5ML
0.5 INJECTION, SOLUTION SUBCUTANEOUS WEEKLY
Qty: 6 ML | Refills: 1 | Status: SHIPPED | OUTPATIENT
Start: 2024-10-11

## 2024-10-11 NOTE — TELEPHONE ENCOUNTER
From: Xuan Horvath  To: Suri Lynn  Sent: 10/11/2024 11:20 AM EDT  Subject: Wegovy    Are you going to increase my dose of wegovy for next four weeks? Monday is the last injection of 0.25.

## 2024-10-14 ENCOUNTER — OFFICE VISIT (OUTPATIENT)
Dept: ORTHOPEDIC SURGERY | Facility: CLINIC | Age: 54
End: 2024-10-14
Payer: OTHER GOVERNMENT

## 2024-10-14 VITALS
SYSTOLIC BLOOD PRESSURE: 128 MMHG | BODY MASS INDEX: 34.73 KG/M2 | HEART RATE: 84 BPM | OXYGEN SATURATION: 96 % | DIASTOLIC BLOOD PRESSURE: 82 MMHG | WEIGHT: 196 LBS | HEIGHT: 63 IN

## 2024-10-14 DIAGNOSIS — G56.02 CARPAL TUNNEL SYNDROME ON LEFT: Primary | ICD-10-CM

## 2024-10-14 PROCEDURE — 99213 OFFICE O/P EST LOW 20 MIN: CPT | Performed by: STUDENT IN AN ORGANIZED HEALTH CARE EDUCATION/TRAINING PROGRAM

## 2024-10-14 NOTE — PROGRESS NOTES
"Chief Complaint  Initial Evaluation and Pain of the Left Wrist    Subjective          Xuan Horvath presents to [facility] for an evaluation of her left hand.     History of Present Illness    The patient presents here today for an evaluation  of her left hand. She has numbness and tingling to her left hand for several months. She has been dropping things and has pain when driving as well as nighttime. She has had prior carpal tunnel release done on her right hand several years ago.     Allergies   Allergen Reactions    Penicillins Anaphylaxis     unknown    Contrave [Naltrexone-Bupropion Hcl Er] Other (See Comments)     Severe constipation and insomnia        Social History     Socioeconomic History    Marital status:    Tobacco Use    Smoking status: Never     Passive exposure: Never    Smokeless tobacco: Never   Vaping Use    Vaping status: Never Used   Substance and Sexual Activity    Alcohol use: Never    Drug use: Never    Sexual activity: Yes     Partners: Male     Birth control/protection: None     Comment: Hysterectomy 2004        I reviewed the patient's chief complaint, history of present illness, review of systems, past medical history, surgical history, family history, social history, medications, and allergy list.     REVIEW OF SYSTEMS    Constitutional: Denies fevers, chills, weight loss  Cardiovascular: Denies chest pain, shortness of breath  Skin: Denies rashes, acute skin changes  Neurologic: Denies headache, loss of consciousness  MSK: left hand pain       Objective   Vital Signs:   /82   Pulse 84   Ht 160 cm (63\")   Wt 88.9 kg (196 lb)   SpO2 96%   BMI 34.72 kg/m²     Body mass index is 34.72 kg/m².    Physical Exam    General: Alert. No acute distress.   Left upper extremity: Mild muscle atrophy, positive compression, negative  Tinel's, decreased sensation to light touch to the median nerve, neurovascularly intact, full finger range of " motion     Procedures    Imaging Results (Most Recent)       None                     Assessment and Plan        CT Abdomen Pelvis Without Contrast    Result Date: 10/9/2024  Narrative: CT ABDOMEN PELVIS WO CONTRAST Date of Exam: 10/9/2024 12:22 PM EDT Indication: left flank pain. Comparison: None available. Technique: Axial CT images were obtained of the abdomen and pelvis without the administration of contrast. Reconstructed coronal and sagittal images were also obtained. Automated exposure control and iterative construction methods were used. Findings: Lung Bases:  The visualized lung bases and lower mediastinal structures are unremarkable. Bilateral breast implants. Small hiatal hernia with surgical changes status post sleeve gastrectomy Liver: Diffuse fatty replacement. There are no focal liver lesions Biliary/Gallbladder: The gallbladder is without evidence of radiopaque stones. The biliary tree is nondilated. Spleen: Multiple calcified granulomas Pancreas: Pancreas shows homogeneous density. There is no evidence of pancreatic mass or peripancreatic fluid. Kidneys: The kidneys show a normal position. The right kidney shows a 6 mm nonobstructive stone. No evidence of hydronephrosis. No focal renal lesion. The left kidney shows a 4 mm nonobstructive stone. No evidence of hydronephrosis or renal lesion Adrenals: Adrenal glands are unremarkable. Retroperitoneal/Lymph Nodes/Vasculature: No retroperitoneal adenopathy is identified by size criteria. Gastrointestinal/Mesentery: The bowel loops are non-dilated without definite wall thickening or mass. The appendix appears within normal limits. No evidence of obstruction. No free air. Bladder: The bladder is unremarkable Hysterectomy changes. Trace free fluid in the pelvis   Bony Structures:  Visualized bony structures are consistent with the patient's age.     Impression: Impression: 1. Hepatic steatosis 2. Small hiatal hernia and surgical changes post sleeve  gastrectomy 3. Bilateral nonobstructive nephrolithiasis 4. Trace free fluid in the pelvis Electronically Signed: Efrain Walls MD  10/9/2024 12:38 PM EDT  Workstation ID: [id number]      Diagnoses and all orders for this visit:    1. Carpal tunnel syndrome on left (Primary)        The patient presents here today for an evaluation  of her left wrist.     EMG order placed today.     Carpal tunnel brace given.       Call or return if worsening symptoms.    Scribed for Cisco Isaacs MD by Fely Marmolejo  10/14/2024   13:12 EDT         Follow Up       After EMG     Patient was given instructions and counseling regarding her condition or for health maintenance advice. Please see specific information pulled into the AVS if appropriate.     I have personally performed the services described in this document as scribed by the above individual and it is both accurate and complete. Cisco Isaacs MD 10/14/24 13:52 EDT        Answers submitted by the patient for this visit:  Other (Submitted on 10/7/2024)  Please describe your symptoms.: Left hand goes numb.  Have you had these symptoms before?: Yes  How long have you been having these symptoms?: Greater than 2 weeks  Please list any medications you are currently taking for this condition.: None  Primary Reason for Visit (Submitted on 10/7/2024)  What is the primary reason for your visit?: Problem Not Listed

## 2024-10-26 NOTE — PROGRESS NOTES
Chief Complaint: Urologic complaint    Subjective         History of Present Illness  Xuan Horvath is a 54 y.o. female         Nephrolithiasis      Patient with left flank pain  the week before the CT    No pain currently    Pain change with movement.      No GH      10/9/2024   CT abdomen/pelvis without - hepatic steatosis.  Small hiatal hernia.  Surgical changes post sleeve gastrectomy.  Left kidney with a 2 mm nonobstructing, right kidney has a 4 mm nonobstructing.  No obstructing stones.  Images reviewed  10/9/24 UA 0-2 RBCs, 1+ bacteria, 0.6,       no cardiopulmonary history  No anticoagulation        Previous    4/24 came in the ED  with SIRS- stent placed    6/24 renal ultrasound - no hydronephrosis, simple cyst left kidney.    6/24 calcium oxalate monohydrate 85, calcium oxalate dihydrate 10    5/22/2024 left ureteroscopy - stent with string - all stones removed in the left    5/10/2024 25,000 E. coli - pansensitive -   Place patient on Levaquin 500 mg daily x 5 days leading up to surgery.  Florajen    6/24 renal ultrasound-simple cyst left kidney, no hydronephrosis    4/27/2024 left ureteral stent placement [id number]    4/26/2024   CT abdomen/pelvis with - 6 mm stone seen in the proximal left ureter.  3  -2 mm stones in the lower pole left kidney.  4 mm nonobstructing stone right kidney.  Images reviewed  4/27/2024 UA - nitrite negative, 2+ bacteria 6-10 RBCs  4/27/2024 1.0, GFR   61    2 previous stones passed spontaneously, 1 lithotripsy         Objective             Current Outpatient Medications:     amLODIPine (NORVASC) 5 MG tablet, Take 1 tablet by mouth Daily., Disp: 90 tablet, Rfl: 1    cetirizine (zyrTEC) 10 MG tablet, Take 1 tablet by mouth Daily., Disp: 90 tablet, Rfl: 3    citalopram (CeleXA) 40 MG tablet, Take 1 tablet by mouth Daily., Disp: 90 tablet, Rfl: 1    esomeprazole (nexIUM) 40 MG capsule, Take 1 capsule by mouth Every Night., Disp: 90 capsule, Rfl: 1    fluticasone (FLONASE)  50 MCG/ACT nasal spray, 1 spray by Each Nare route Daily., Disp: 16 g, Rfl: 1    levothyroxine (SYNTHROID, LEVOTHROID) 150 MCG tablet, Take 1 tablet by mouth Daily., Disp: 90 tablet, Rfl: 1    lisinopril-hydrochlorothiazide (PRINZIDE,ZESTORETIC) 20-12.5 MG per tablet, Take 1 tablet by mouth Daily., Disp: 90 tablet, Rfl: 1    multivitamin with minerals tablet tablet, Take 1 tablet by mouth Daily. LAST DOSE 5/20/24, Disp: , Rfl:     Semaglutide-Weight Management (Wegovy) 0.5 MG/0.5ML solution auto-injector, Inject 0.5 mL under the skin into the appropriate area as directed 1 (One) Time Per Week., Disp: 6 mL, Rfl: 1    tamsulosin (FLOMAX) 0.4 MG capsule 24 hr capsule, Take 1 capsule by mouth Daily., Disp: 30 capsule, Rfl: 0    tamsulosin (FLOMAX) 0.4 MG capsule 24 hr capsule, Take 1 capsule by mouth Daily., Disp: 30 capsule, Rfl: 0    Allergies   Allergen Reactions    Penicillins Anaphylaxis     unknown    Contrave [Naltrexone-Bupropion Hcl Er] Other (See Comments)     Severe constipation and insomnia              Vital Signs:   There were no vitals taken for this visit.                 Assessment and Plan    Diagnoses and all orders for this visit:    1. Nephrolithiasis (Primary)      The patient was counseled on the preventative measures of kidney stones today.  This included increasing fluid intake to make at least 1.5 ml daily, decreasing meat intake, decreasing salt intake and taking in a normal amount of calcium (1000 mg daily).  Information handout given on this today.      We also discussed the DASH diet today for stone prevention and handout was given    Follow-up as needed

## 2024-10-28 ENCOUNTER — OFFICE VISIT (OUTPATIENT)
Dept: UROLOGY | Age: 54
End: 2024-10-28
Payer: OTHER GOVERNMENT

## 2024-10-28 DIAGNOSIS — N20.0 NEPHROLITHIASIS: Primary | ICD-10-CM

## 2024-10-28 PROCEDURE — 99213 OFFICE O/P EST LOW 20 MIN: CPT | Performed by: UROLOGY

## 2024-11-04 ENCOUNTER — LAB (OUTPATIENT)
Facility: HOSPITAL | Age: 54
End: 2024-11-04
Payer: OTHER GOVERNMENT

## 2024-11-04 ENCOUNTER — TELEPHONE (OUTPATIENT)
Dept: UROLOGY | Age: 54
End: 2024-11-04
Payer: OTHER GOVERNMENT

## 2024-11-04 ENCOUNTER — HOSPITAL ENCOUNTER (OUTPATIENT)
Facility: HOSPITAL | Age: 54
Discharge: HOME | End: 2024-11-04
Payer: OTHER GOVERNMENT

## 2024-11-04 DIAGNOSIS — R30.0 DYSURIA: ICD-10-CM

## 2024-11-04 DIAGNOSIS — G56.02 CARPAL TUNNEL SYNDROME ON LEFT: ICD-10-CM

## 2024-11-04 DIAGNOSIS — R30.0 DYSURIA: Primary | ICD-10-CM

## 2024-11-04 PROCEDURE — 95886 MUSC TEST DONE W/N TEST COMP: CPT

## 2024-11-04 PROCEDURE — 95909 NRV CNDJ TST 5-6 STUDIES: CPT

## 2024-11-04 PROCEDURE — 87086 URINE CULTURE/COLONY COUNT: CPT

## 2024-11-04 RX ORDER — CITALOPRAM HYDROBROMIDE 40 MG/1
40 TABLET ORAL DAILY
Qty: 90 TABLET | Refills: 1 | Status: SHIPPED | OUTPATIENT
Start: 2024-11-04

## 2024-11-04 NOTE — TELEPHONE ENCOUNTER
PT THINKS SHE HAS A UTI, SHE IS HAVING URGENCY, BURNING AND FREQUENCY. ASKING IF SHE CAN GET AN ORDER TO DO A URINE CULTURE.

## 2024-11-05 LAB — BACTERIA SPEC AEROBE CULT: NORMAL

## 2024-11-05 RX ORDER — SEMAGLUTIDE 1 MG/.5ML
1 INJECTION, SOLUTION SUBCUTANEOUS WEEKLY
Qty: 6 ML | Refills: 1 | Status: SHIPPED | OUTPATIENT
Start: 2024-11-05

## 2024-11-09 DIAGNOSIS — I10 ESSENTIAL HYPERTENSION: ICD-10-CM

## 2024-11-11 ENCOUNTER — OFFICE VISIT (OUTPATIENT)
Dept: ORTHOPEDIC SURGERY | Facility: CLINIC | Age: 54
End: 2024-11-11
Payer: OTHER GOVERNMENT

## 2024-11-11 ENCOUNTER — PREP FOR SURGERY (OUTPATIENT)
Dept: OTHER | Facility: HOSPITAL | Age: 54
End: 2024-11-11
Payer: OTHER GOVERNMENT

## 2024-11-11 VITALS — DIASTOLIC BLOOD PRESSURE: 99 MMHG | HEART RATE: 99 BPM | SYSTOLIC BLOOD PRESSURE: 150 MMHG | OXYGEN SATURATION: 94 %

## 2024-11-11 DIAGNOSIS — G56.02 CARPAL TUNNEL SYNDROME ON LEFT: Primary | ICD-10-CM

## 2024-11-11 DIAGNOSIS — G56.02 CARPAL TUNNEL SYNDROME OF LEFT WRIST: Primary | ICD-10-CM

## 2024-11-11 RX ORDER — CLINDAMYCIN PHOSPHATE 900 MG/50ML
900 INJECTION, SOLUTION INTRAVENOUS ONCE
OUTPATIENT
Start: 2024-11-11 | End: 2024-11-11

## 2024-11-11 NOTE — PROGRESS NOTES
Chief Complaint  Follow-up of the Left Hand    Subjective          Xuan Horvath presents to [facility]   History of Present Illness    Xuan Horvath presents today for a follow-up of her left hand.  Patient presented today to review her left upper extremity EMG results.  Patient reports continued numbness and tingling as well as pain to her left hand.  She describes dropping things.  She has symptoms while driving and at night.  She has noted some relief with wearing the brace at night.      Allergies   Allergen Reactions    Penicillins Anaphylaxis     unknown    Contrave [Naltrexone-Bupropion Hcl Er] Other (See Comments)     Severe constipation and insomnia        Social History     Socioeconomic History    Marital status:    Tobacco Use    Smoking status: Never     Passive exposure: Never    Smokeless tobacco: Never   Vaping Use    Vaping status: Never Used   Substance and Sexual Activity    Alcohol use: Never    Drug use: Never    Sexual activity: Yes     Partners: Male     Birth control/protection: Hysterectomy     Comment: Hysterectomy 2004        I reviewed the patient's chief complaint, history of present illness, review of systems, past medical history, surgical history, family history, social history, medications, and allergy list.     REVIEW OF SYSTEMS    Constitutional: Denies fevers, chills, weight loss  Cardiovascular: Denies chest pain, shortness of breath  Skin: Denies rashes, acute skin changes  Neurologic: Denies headache, loss of consciousness  MSK: Left hand pain      Objective   Vital Signs:   /99   Pulse 99   SpO2 94%     There is no height or weight on file to calculate BMI.    Physical Exam    General: Alert. No acute distress.   Left upper extremity: Mild muscle atrophy.  Full finger extension.  Full finger flexion.  Able to make a tight fist.  Forearm soft.  Active wrist range of motion.  Thumb opposition intact.  Palmar abduction  is intact.  Sensation intact to the median, radial, and ulnar nerve distributions with associated paresthesias.  Palpable radial pulse.    Procedures    Imaging Results (Most Recent)       None                   Assessment and Plan    Diagnoses and all orders for this visit:    1. Carpal tunnel syndrome of left wrist (Primary)        Xuan Horvath presents today to review her left upper extremity EMG results.  Patient has moderate to severe left carpal tunnel syndrome.  We discussed conservative management with bracing, injections, and exercises versus surgical management. We specifically discussed left carpal tunnel release today.  We discussed that the primary benefit of surgery is relieving the compression on the median nerve.  We discussed surgical risk including bleeding, infection, damage to nerves and blood vessels, persistent pain, recurrent numbness, stiffness, anesthesia risk, DVT/PE, and need for additional procedures.  Patient expressed understanding and elected to proceed with surgical management for left carpal tunnel release        Patient will follow up 2 weeks postoperatively.    Call or return if symptoms worsen or patient has any concerns.       Follow Up   Return for 2 weeks postoperatively.  Patient was given instructions and counseling regarding her condition or for health maintenance advice. Please see specific information pulled into the AVS if appropriate.     Maranda Estes PA-C  11/13/24  12:31 EST

## 2024-11-13 RX ORDER — LISINOPRIL AND HYDROCHLOROTHIAZIDE 12.5; 2 MG/1; MG/1
1 TABLET ORAL DAILY
Qty: 90 TABLET | Refills: 1 | Status: SHIPPED | OUTPATIENT
Start: 2024-11-13

## 2024-12-07 ENCOUNTER — PATIENT MESSAGE (OUTPATIENT)
Dept: INTERNAL MEDICINE | Facility: CLINIC | Age: 54
End: 2024-12-07
Payer: OTHER GOVERNMENT

## 2024-12-12 RX ORDER — SEMAGLUTIDE 1.7 MG/.75ML
1.7 INJECTION, SOLUTION SUBCUTANEOUS WEEKLY
Qty: 6 ML | Refills: 3 | Status: SHIPPED | OUTPATIENT
Start: 2024-12-12

## 2024-12-13 PROBLEM — G56.02 CARPAL TUNNEL SYNDROME ON LEFT: Status: ACTIVE | Noted: 2024-11-11

## 2024-12-18 ENCOUNTER — PATIENT ROUNDING (BHMG ONLY) (OUTPATIENT)
Dept: URGENT CARE | Facility: CLINIC | Age: 54
End: 2024-12-18
Payer: OTHER GOVERNMENT

## 2024-12-18 ENCOUNTER — OFFICE VISIT (OUTPATIENT)
Dept: INTERNAL MEDICINE | Facility: CLINIC | Age: 54
End: 2024-12-18
Payer: OTHER GOVERNMENT

## 2024-12-18 VITALS
WEIGHT: 180.6 LBS | SYSTOLIC BLOOD PRESSURE: 138 MMHG | HEART RATE: 96 BPM | HEIGHT: 63 IN | RESPIRATION RATE: 22 BRPM | TEMPERATURE: 98.3 F | DIASTOLIC BLOOD PRESSURE: 88 MMHG | OXYGEN SATURATION: 99 % | BODY MASS INDEX: 32 KG/M2

## 2024-12-18 DIAGNOSIS — E66.811 CLASS 1 OBESITY WITHOUT SERIOUS COMORBIDITY WITH BODY MASS INDEX (BMI) OF 32.0 TO 32.9 IN ADULT, UNSPECIFIED OBESITY TYPE: ICD-10-CM

## 2024-12-18 DIAGNOSIS — R05.2 SUBACUTE COUGH: Primary | ICD-10-CM

## 2024-12-18 DIAGNOSIS — R09.82 POST-NASAL DRIP: ICD-10-CM

## 2024-12-18 DIAGNOSIS — I10 PRIMARY HYPERTENSION: ICD-10-CM

## 2024-12-18 RX ORDER — DEXTROMETHORPHAN HYDROBROMIDE AND PROMETHAZINE HYDROCHLORIDE 15; 6.25 MG/5ML; MG/5ML
5 SYRUP ORAL NIGHTLY PRN
Qty: 180 ML | Refills: 0 | Status: CANCELLED | OUTPATIENT
Start: 2024-12-18

## 2024-12-18 RX ORDER — BROMPHENIRAMINE MALEATE, PSEUDOEPHEDRINE HYDROCHLORIDE, AND DEXTROMETHORPHAN HYDROBROMIDE 2; 30; 10 MG/5ML; MG/5ML; MG/5ML
10 SYRUP ORAL 4 TIMES DAILY PRN
Qty: 118 ML | Refills: 0 | Status: SHIPPED | OUTPATIENT
Start: 2024-12-18

## 2024-12-18 NOTE — PROGRESS NOTES
"Chief Complaint  Hypertension (4 mo f/u), Hypothyroidism, Obesity, and Med Management      Subjective      History of Present Illness  The patient presents for evaluation of a persistent cough, elevated blood pressure, and weight management.    She has had a persistent cough for 3 weeks, initially from a viral sinus infection during her trip to [place]. After returning, she noticed blood in her mucus. Last Sunday, a viral infection was confirmed, and she was prescribed Sudafed and steroids. Symptoms worsened by Friday, leading to antibiotics. On the fifth day of antibiotics, she reports gradual improvement. Her runny nose and cough have resolved, but she struggles with thick mucus. No chest congestion, pain, or respiratory distress currently, except brief difficulty breathing in [place].    Her blood pressure is slightly elevated today but usually normal.    She has lost 10 pounds and is doing well with her weight loss medication, taking 0.7 mg per week.    Her mental health is doing well.             Objective   Vital Signs:   Vitals:    12/18/24 1405   BP: 138/88   BP Location: Right arm   Patient Position: Sitting   Cuff Size: Adult   Pulse: 96   Resp: 22   Temp: 98.3 °F (36.8 °C)   TempSrc: Temporal   SpO2: 99%   Weight: 81.9 kg (180 lb 9.6 oz)   Height: 160 cm (62.99\")     Body mass index is 32 kg/m².    Wt Readings from Last 3 Encounters:   12/18/24 81.9 kg (180 lb 9.6 oz)   12/08/24 81.6 kg (180 lb)   10/14/24 88.9 kg (196 lb)     BP Readings from Last 3 Encounters:   12/18/24 138/88   12/13/24 145/88   12/08/24 (!) 153/103       Health Maintenance   Topic Date Due    ANNUAL PHYSICAL  10/06/2024    MAMMOGRAM  11/02/2025    BMI FOLLOWUP  12/12/2025    COLORECTAL CANCER SCREENING  01/13/2027    TDAP/TD VACCINES (2 - Td or Tdap) 08/04/2031    HEPATITIS C SCREENING  Completed    COVID-19 Vaccine  Completed    INFLUENZA VACCINE  Completed    ZOSTER VACCINE  Completed    Pneumococcal Vaccine 0-64  Aged Out    PAP " SMEAR  Discontinued       Physical Exam  Vitals reviewed.   Constitutional:       Appearance: Normal appearance. She is well-developed.   HENT:      Head: Normocephalic and atraumatic.      Right Ear: External ear normal.      Left Ear: External ear normal.   Eyes:      Conjunctiva/sclera: Conjunctivae normal.      Pupils: Pupils are equal, round, and reactive to light.   Cardiovascular:      Rate and Rhythm: Normal rate and regular rhythm.      Heart sounds: No murmur heard.     No friction rub. No gallop.   Pulmonary:      Effort: Pulmonary effort is normal.      Breath sounds: Normal breath sounds. No wheezing or rhonchi.   Skin:     General: Skin is warm and dry.   Neurological:      Mental Status: She is alert and oriented to person, place, and time.   Psychiatric:         Mood and Affect: Affect normal.         Behavior: Behavior normal.         Thought Content: Thought content normal.        Physical Exam        Result Review :  The following data was reviewed by: Suri Lynn MD on 12/18/2024:         Results              Procedures            Assessment & Plan  Post-nasal drip    Orders:    brompheniramine-pseudoephedrine-DM 30-2-10 MG/5ML syrup; Take 10 mL by mouth 4 (Four) Times a Day As Needed for Congestion, Cough or Allergies. Take during the day    Subacute cough         Primary hypertension           Class 1 obesity without serious comorbidity with body mass index (BMI) of 32.0 to 32.9 in adult, unspecified obesity type                Assessment & Plan  1. Persistent cough:  - Persistent for three weeks  - Initially treated with Sudafed and steroids  - Currently on the fifth day of antibiotics  - Reports improvement but still needs daytime medication  - Prescribe brompheniramine for daytime use  - Continue nighttime cough syrup as needed  - Consider additional antibiotic if symptoms worsen due to mycoplasma pneumonia prevalence    2. Elevated blood pressure:  - Slightly elevated, likely due to  current medication  - No chest pain or trouble breathing    3. Weight management:  - Lost 10 pounds  - Doing well with weight loss medication  - Continue 0.7 mg per week  - Increase dosage if weight loss plateaus    Patient or patient representative verbalized consent for the use of Ambient Listening during the visit with  Suri Lynn MD for chart documentation. 12/19/2024  15:02 EST      FOLLOW UP  Return in about 3 months (around 3/18/2025).  Patient was given instructions and counseling regarding her condition or for health maintenance advice. Please see specific information pulled into the AVS if appropriate.     uSri Lynn MD  12/19/24  18:28 EST    CURRENT & DISCONTINUED MEDICATIONS  Current Outpatient Medications   Medication Instructions    amLODIPine (NORVASC) 5 mg, Oral, Daily    benzonatate (TESSALON) 100 mg, Oral, 3 Times Daily PRN    brompheniramine-pseudoephedrine-DM 30-2-10 MG/5ML syrup 10 mL, Oral, 4 Times Daily PRN, Take during the day    cetirizine (ZYRTEC) 10 mg, Oral, Daily    citalopram (CELEXA) 40 mg, Oral, Daily    doxycycline (MONODOX) 100 mg, Oral, 2 Times Daily    esomeprazole (NEXIUM) 40 mg, Oral, Nightly    lisinopril-hydrochlorothiazide (PRINZIDE,ZESTORETIC) 20-12.5 MG per tablet 1 tablet, Oral, Daily    promethazine-dextromethorphan (PROMETHAZINE-DM) 6.25-15 MG/5ML syrup 5 mL, Oral, Nightly PRN    Synthroid 150 MCG tablet     Wegovy 1.7 mg, Subcutaneous, Weekly       Medications Discontinued During This Encounter   Medication Reason    pseudoephedrine (SUDAFED) 120 MG 12 hr tablet *Therapy completed    brompheniramine-pseudoephedrine-DM 30-2-10 MG/5ML syrup Reorder

## 2024-12-18 NOTE — ED NOTES
Thank you for letting us care for you in your recent visit to our urgent care center. We would love to hear about your experience with us. Was this the first time you have visited our location?    We’re always looking for ways to make our patients’ experiences even better. Do you have any recommendations on ways we may improve?     I appreciate you taking the time to respond. Please be on the lookout for a survey about your recent visit from [employer] via text or email. We would greatly appreciate if you could fill that out and turn it back in. We want your voice to be heard and we value your feedback.   Thank you for choosing [facility] for your healthcare needs.

## 2025-01-02 ENCOUNTER — HOSPITAL ENCOUNTER (EMERGENCY)
Facility: HOSPITAL | Age: 55
Discharge: HOME | End: 2025-01-02
Attending: EMERGENCY MEDICINE
Payer: OTHER GOVERNMENT

## 2025-01-02 ENCOUNTER — APPOINTMENT (OUTPATIENT)
Dept: CT IMAGING | Facility: HOSPITAL | Age: 55
End: 2025-01-02
Payer: OTHER GOVERNMENT

## 2025-01-02 VITALS
BODY MASS INDEX: 32.11 KG/M2 | DIASTOLIC BLOOD PRESSURE: 98 MMHG | HEIGHT: 63 IN | SYSTOLIC BLOOD PRESSURE: 152 MMHG | TEMPERATURE: 98 F | RESPIRATION RATE: 20 BRPM | HEART RATE: 79 BPM | OXYGEN SATURATION: 99 % | WEIGHT: 181.22 LBS

## 2025-01-02 DIAGNOSIS — R10.84 GENERALIZED ABDOMINAL PAIN: Primary | ICD-10-CM

## 2025-01-02 LAB
ALBUMIN SERPL-MCNC: 4.5 G/DL (ref 3.5–5.2)
ALBUMIN/GLOB SERPL: 1.5 G/DL
ALP SERPL-CCNC: 64 U/L (ref 39–117)
ALT SERPL W P-5'-P-CCNC: 93 U/L (ref 1–33)
ANION GAP SERPL CALCULATED.3IONS-SCNC: 10.8 MMOL/L (ref 5–15)
AST SERPL-CCNC: 53 U/L (ref 1–32)
BASOPHILS # BLD AUTO: 0.04 10*3/MM3 (ref 0–0.2)
BASOPHILS NFR BLD AUTO: 0.4 % (ref 0–1.5)
BILIRUB SERPL-MCNC: 0.4 MG/DL (ref 0–1.2)
BILIRUB UR QL STRIP: NEGATIVE
BUN SERPL-MCNC: 11 MG/DL (ref 6–20)
BUN/CREAT SERPL: 14.3 (ref 7–25)
CALCIUM SPEC-SCNC: 9.7 MG/DL (ref 8.6–10.5)
CHLORIDE SERPL-SCNC: 99 MMOL/L (ref 98–107)
CLARITY UR: CLEAR
CO2 SERPL-SCNC: 27.2 MMOL/L (ref 22–29)
COLOR UR: YELLOW
CREAT SERPL-MCNC: 0.77 MG/DL (ref 0.57–1)
D-LACTATE SERPL-SCNC: 1.6 MMOL/L (ref 0.5–2)
DEPRECATED RDW RBC AUTO: 43.5 FL (ref 37–54)
EGFRCR SERPLBLD CKD-EPI 2021: 91.8 ML/MIN/1.73
EOSINOPHIL # BLD AUTO: 0.27 10*3/MM3 (ref 0–0.4)
EOSINOPHIL NFR BLD AUTO: 2.8 % (ref 0.3–6.2)
ERYTHROCYTE [DISTWIDTH] IN BLOOD BY AUTOMATED COUNT: 12.9 % (ref 12.3–15.4)
GLOBULIN UR ELPH-MCNC: 3.1 GM/DL
GLUCOSE SERPL-MCNC: 94 MG/DL (ref 65–99)
GLUCOSE UR STRIP-MCNC: NEGATIVE MG/DL
HCT VFR BLD AUTO: 42.4 % (ref 34–46.6)
HGB BLD-MCNC: 14.4 G/DL (ref 12–15.9)
HGB UR QL STRIP.AUTO: NEGATIVE
HOLD SPECIMEN: NORMAL
HOLD SPECIMEN: NORMAL
IMM GRANULOCYTES # BLD AUTO: 0.03 10*3/MM3 (ref 0–0.05)
IMM GRANULOCYTES NFR BLD AUTO: 0.3 % (ref 0–0.5)
KETONES UR QL STRIP: NEGATIVE
LEUKOCYTE ESTERASE UR QL STRIP.AUTO: NEGATIVE
LIPASE SERPL-CCNC: 44 U/L (ref 13–60)
LYMPHOCYTES # BLD AUTO: 3.45 10*3/MM3 (ref 0.7–3.1)
LYMPHOCYTES NFR BLD AUTO: 36.2 % (ref 19.6–45.3)
MCH RBC QN AUTO: 31.6 PG (ref 26.6–33)
MCHC RBC AUTO-ENTMCNC: 34 G/DL (ref 31.5–35.7)
MCV RBC AUTO: 93 FL (ref 79–97)
MONOCYTES # BLD AUTO: 0.67 10*3/MM3 (ref 0.1–0.9)
MONOCYTES NFR BLD AUTO: 7 % (ref 5–12)
NEUTROPHILS NFR BLD AUTO: 5.08 10*3/MM3 (ref 1.7–7)
NEUTROPHILS NFR BLD AUTO: 53.3 % (ref 42.7–76)
NITRITE UR QL STRIP: NEGATIVE
NRBC BLD AUTO-RTO: 0 /100 WBC (ref 0–0.2)
PH UR STRIP.AUTO: 6 [PH] (ref 5–8)
PLATELET # BLD AUTO: 278 10*3/MM3 (ref 140–450)
PMV BLD AUTO: 10.1 FL (ref 6–12)
POTASSIUM SERPL-SCNC: 3.4 MMOL/L (ref 3.5–5.2)
PROT SERPL-MCNC: 7.6 G/DL (ref 6–8.5)
PROT UR QL STRIP: NEGATIVE
RBC # BLD AUTO: 4.56 10*6/MM3 (ref 3.77–5.28)
SODIUM SERPL-SCNC: 137 MMOL/L (ref 136–145)
SP GR UR STRIP: 1.01 (ref 1–1.03)
UROBILINOGEN UR QL STRIP: NORMAL
WBC NRBC COR # BLD AUTO: 9.54 10*3/MM3 (ref 3.4–10.8)
WHOLE BLOOD HOLD COAG: NORMAL
WHOLE BLOOD HOLD SPECIMEN: NORMAL

## 2025-01-02 PROCEDURE — 83605 ASSAY OF LACTIC ACID: CPT | Performed by: EMERGENCY MEDICINE

## 2025-01-02 PROCEDURE — 99285 EMERGENCY DEPT VISIT HI MDM: CPT

## 2025-01-02 PROCEDURE — 74177 CT ABD & PELVIS W/CONTRAST: CPT

## 2025-01-02 PROCEDURE — 85025 COMPLETE CBC W/AUTO DIFF WBC: CPT | Performed by: EMERGENCY MEDICINE

## 2025-01-02 PROCEDURE — 81003 URINALYSIS AUTO W/O SCOPE: CPT | Performed by: EMERGENCY MEDICINE

## 2025-01-02 PROCEDURE — 83690 ASSAY OF LIPASE: CPT | Performed by: EMERGENCY MEDICINE

## 2025-01-02 PROCEDURE — 80053 COMPREHEN METABOLIC PANEL: CPT | Performed by: EMERGENCY MEDICINE

## 2025-01-02 PROCEDURE — 36415 COLL VENOUS BLD VENIPUNCTURE: CPT

## 2025-01-02 PROCEDURE — 25510000001 IOPAMIDOL PER 1 ML: Performed by: EMERGENCY MEDICINE

## 2025-01-02 RX ORDER — DICYCLOMINE HCL 20 MG
20 TABLET ORAL EVERY 6 HOURS PRN
Qty: 20 TABLET | Refills: 0 | Status: SHIPPED | OUTPATIENT
Start: 2025-01-02

## 2025-01-02 RX ORDER — SODIUM CHLORIDE 0.9 % (FLUSH) 0.9 %
10 SYRINGE (ML) INJECTION AS NEEDED
Status: DISCONTINUED | OUTPATIENT
Start: 2025-01-02 | End: 2025-01-02 | Stop reason: HOSPADM

## 2025-01-02 RX ORDER — IOPAMIDOL 755 MG/ML
100 INJECTION, SOLUTION INTRAVASCULAR
Status: COMPLETED | OUTPATIENT
Start: 2025-01-02 | End: 2025-01-02

## 2025-01-02 RX ORDER — ONDANSETRON 4 MG/1
4 TABLET, ORALLY DISINTEGRATING ORAL EVERY 8 HOURS PRN
Qty: 14 TABLET | Refills: 0 | Status: SHIPPED | OUTPATIENT
Start: 2025-01-02

## 2025-01-02 RX ADMIN — IOPAMIDOL 100 ML: 755 INJECTION, SOLUTION INTRAVENOUS at 18:35

## 2025-01-02 NOTE — ED PROVIDER NOTES
Time: 5:26 PM EST  Date of encounter:  1/2/2025  Independent Historian/Clinical History and Information was obtained by:   Patient and Family    History is limited by: N/A    Chief Complaint: Abdominal pain      History of Present Illness:  Patient is a 54 y.o. year old female who presents to the emergency department for evaluation of abdominal pain x 1 day.  She reports prior medical history of hypertension, UTIs.  Reports she was seen in urgent care earlier today and was sent here.  She has been having abdominal pain in her right lower abdomen since 12 PM today.  Nuys any nausea vomiting or diarrhea.  denies any known fevers.  Reports she still has her gallbladder and appendix.  Denies vaginal bleeding or discharge.  Denies dysuria or hematuria.      Patient Care Team  Primary Care Provider: Suri Lynn MD    Past Medical History:     Allergies   Allergen Reactions    Penicillins Anaphylaxis     unknown    Contrave [Naltrexone-Bupropion Hcl Er] Other (See Comments)     Severe constipation and insomnia     Past Medical History:   Diagnosis Date    Allergy     Anxiety     Carpal tunnel syndrome on left 11/11/2024    Colon cancer screening     Colon polyp     Depression     Depression 04/07/2015    working well, continue citalopram discussed that she can stay on meds long term or go off them whenever she is ready    Essential hypertension 04/06/2016    stable will try to decrease atenolol and see if that will give her more energy, need to watch bp closely    GERD (gastroesophageal reflux disease) January 2023    Prilosec. Protonix.    Glaucoma 2020    NO DROPS NEEDED    Heart murmur 01/2022    Hiatal hernia     NO CARDIOLOGIST NEEDED    History of medical problems 9/21/22    Panniculectomy    Hypertension     Hypothyroidism     Kidney stone     Obesity 04/06/2016    she is going to work on weight by trying to go to the gym she is working on this with her son    Sleep apnea     COMPLIANT WITH CPAP     UTI  (urinary tract infection)     CURRENTLY ON ABX BY RIOS, NO FEVER NOW    Vitamin D deficiency 2015    will repeat labs continue current OTC meds for now     Past Surgical History:   Procedure Laterality Date    BARIATRIC SURGERY  2020    BREAST AUGMENTATION Bilateral 2023    breast implants    CARPAL TUNNEL RELEASE Right 2005     SECTION  ,,2003    x3    COLONOSCOPY N/A 2022    Procedure: COLONOSCOPY WITH POLYPECTOMY COLD SNARE;  Surgeon: Luis Sterling MD;  Location: [facility] ENDOSCOPY;  Service: Gastroenterology;  Laterality: N/A;  COLON POLYP    COLONOSCOPY      COSMETIC SURGERY  23    Breast implants/breast lift    ENDOSCOPY N/A 2020    Procedure: ESOPHAGOGASTRODUODENOSCOPY WITH BIOPSY;  Surgeon: Ron Cadet Jr., MD;  Location: [facility] ENDOSCOPY;  Service: General;  Laterality: N/A;  PRE- DYSPEPSIA  POST- GASTRITIS, GASTRIC POLYPS, ESOPHAGITIS, HIATAL HERNIA      ENDOSCOPY N/A 2023    Procedure: ESOPHAGOGASTRODUODENOSCOPY WITH BIOPSIES;  Surgeon: Luis Sterling MD;  Location: [facility] ENDOSCOPY;  Service: Gastroenterology;  Laterality: N/A;  PRIOR GASTRIC SLEEVE    GASTRIC SLEEVE LAPAROSCOPIC N/A 2020    Procedure: GASTRIC SLEEVE LAPAROSCOPIC With Hiatal Hernia Repair;  Surgeon: Ron Cadet Jr., MD;  Location: [facility] OR OSC;  Service: Bariatric;  Laterality: N/A;    HERNIA REPAIR  20    Hiatal hernia    HYSTERECTOMY      KIDNEY STONE SURGERY  2024    PANNICULECTOMY      SUBTOTAL HYSTERECTOMY      UPPER GASTROINTESTINAL ENDOSCOPY      URETEROSCOPY LASER LITHOTRIPSY WITH STENT INSERTION Left 2024    Procedure: Cystoscopy with left ureteroscopy with laser and left renal stent exchange;  Surgeon: Salazar Rios MD;  Location: [facility] MAIN OR;  Service: Urology;  Laterality: Left;    URETEROSCOPY STENT INSERTION Left 2024    Procedure: Cystoscopy with left ureteral stent placement;  Surgeon:  Salazar Rios MD;  Location: [facility] MAIN OR;  Service: Urology;  Laterality: Left;     Family History   Problem Relation Age of Onset    Diabetes Mother     Hypertension Mother     Hypertension Father     Malig Hyperthermia Neg Hx     Colon cancer Neg Hx        Home Medications:  Prior to Admission medications    Medication Sig Start Date End Date Taking? Authorizing Provider   amLODIPine (NORVASC) 5 MG tablet Take 1 tablet by mouth Daily. 8/18/24   Suri Lynn MD   benzonatate (TESSALON) 100 MG capsule Take 1 capsule by mouth 3 (Three) Times a Day As Needed for Cough. 12/13/24   Suri Bee APRN   brompheniramine-pseudoephedrine-DM 30-2-10 MG/5ML syrup Take 10 mL by mouth 4 (Four) Times a Day As Needed for Congestion, Cough or Allergies. Take during the day 12/18/24   Suri Lynn MD   cetirizine (zyrTEC) 10 MG tablet Take 1 tablet by mouth Daily. 9/30/24   Suri Lynn MD   citalopram (CeleXA) 40 MG tablet Take 1 tablet by mouth Daily. 11/4/24   Suri Lynn MD   esomeprazole (nexIUM) 40 MG capsule Take 1 capsule by mouth Every Night. 8/21/24   Suri Lynn MD   lisinopril-hydrochlorothiazide (PRINZIDE,ZESTORETIC) 20-12.5 MG per tablet Take 1 tablet by mouth Daily. 11/13/24   Suri Lynn MD   promethazine-dextromethorphan (PROMETHAZINE-DM) 6.25-15 MG/5ML syrup Take 5 mL by mouth At Night As Needed for Cough. 12/13/24   Suri Bee APRN   Semaglutide-Weight Management (Wegovy) 1.7 MG/0.75ML solution auto-injector Inject 0.75 mL under the skin into the appropriate area as directed 1 (One) Time Per Week. 12/12/24   Suri Lynn MD   Synthroid 150 MCG tablet  12/9/24   ProviderIzabella MD        Social History:   Social History     Tobacco Use    Smoking status: Never     Passive exposure: Never    Smokeless tobacco: Never   Vaping Use    Vaping status: Never Used   Substance Use Topics    Alcohol use: Never    Drug use: Never         Review of  "Systems:  Review of Systems   Constitutional:  Negative for chills and fever.   HENT:  Negative for congestion, rhinorrhea and sore throat.    Eyes:  Negative for pain and visual disturbance.   Respiratory:  Negative for apnea, cough, chest tightness and shortness of breath.    Cardiovascular:  Negative for chest pain and palpitations.   Gastrointestinal:  Positive for abdominal pain. Negative for diarrhea, nausea and vomiting.   Genitourinary:  Negative for difficulty urinating and dysuria.   Musculoskeletal:  Negative for joint swelling and myalgias.   Skin:  Negative for color change.   Neurological:  Negative for seizures and headaches.   Psychiatric/Behavioral: Negative.     All other systems reviewed and are negative.       Physical Exam:  /98 (BP Location: Right arm, Patient Position: Sitting)   Pulse 79   Temp 98 °F (36.7 °C) (Oral)   Resp 20   Ht 160 cm (63\")   Wt 82.2 kg (181 lb 3.5 oz)   SpO2 99%   BMI 32.10 kg/m²     Physical Exam  Vitals and nursing note reviewed.   Constitutional:       General: She is not in acute distress.     Appearance: Normal appearance. She is not toxic-appearing.   HENT:      Head: Normocephalic and atraumatic.      Jaw: There is normal jaw occlusion.      Mouth/Throat:      Mouth: Mucous membranes are moist.   Eyes:      General: Lids are normal.      Extraocular Movements: Extraocular movements intact.      Conjunctiva/sclera: Conjunctivae normal.      Pupils: Pupils are equal, round, and reactive to light.   Cardiovascular:      Rate and Rhythm: Normal rate and regular rhythm.      Pulses: Normal pulses.      Heart sounds: Normal heart sounds.   Pulmonary:      Effort: Pulmonary effort is normal. No respiratory distress.      Breath sounds: Normal breath sounds. No wheezing or rhonchi.   Abdominal:      General: Abdomen is flat. There is no distension.      Palpations: Abdomen is soft.      Tenderness: There is abdominal tenderness in the right lower quadrant. " There is no guarding or rebound. Positive signs include Rovsing's sign. Negative signs include McBurney's sign.   Musculoskeletal:         General: Normal range of motion.      Cervical back: Normal range of motion and neck supple.      Right lower leg: No edema.      Left lower leg: No edema.   Skin:     General: Skin is warm and dry.   Neurological:      General: No focal deficit present.      Mental Status: She is alert and oriented to person, place, and time. Mental status is at baseline.   Psychiatric:         Mood and Affect: Mood normal.         Behavior: Behavior normal.                    Medical Decision Making:      Comorbidities that affect care:    Obesity, GERD    External Notes reviewed:    Previous Clinic Note: Internal medicine office visit for general medical management      The following orders were placed and all results were independently analyzed by me:  Orders Placed This Encounter   Procedures    CT Abdomen Pelvis With Contrast    [place] Draw    Comprehensive Metabolic Panel    Lipase    Urinalysis With Microscopic If Indicated (No Culture) - Urine, Clean Catch    Lactic Acid, Plasma    CBC Auto Differential    NPO Diet NPO Type: Strict NPO    Undress & Gown    Insert Peripheral IV    CBC & Differential    Green Top (Gel)    Lavender Top    Gold Top - SST    Light Blue Top       Medications Given in the Emergency Department:  Medications   sodium chloride 0.9 % flush 10 mL (has no administration in time range)   iopamidol (ISOVUE-370) 76 % injection 100 mL (100 mL Intravenous Given 1/2/25 1835)        ED Course:    ED Course as of 01/02/25 2244   Thu Jan 02, 2025 1728 --- PROVIDER IN TRIAGE NOTE ---    The patient was evaluated by Martin almazan in triage. Orders were placed and the patient is currently awaiting disposition.  [CB]      ED Course User Index  [CB] Martin Macias PA-C       Labs:    Lab Results (last 24 hours)       Procedure Component Value Units Date/Time     Urinalysis With Microscopic If Indicated (No Culture) - Urine, Clean Catch [[id number]]  (Normal) Collected: 01/02/25 1739    Specimen: Urine, Clean Catch Updated: 01/02/25 1750     Color, UA Yellow     Appearance, UA Clear     pH, UA 6.0     Specific Gravity, UA 1.015     Glucose, UA Negative     Ketones, UA Negative     Bilirubin, UA Negative     Blood, UA Negative     Protein, UA Negative     Leuk Esterase, UA Negative     Nitrite, UA Negative     Urobilinogen, UA 0.2 E.U./dL    Narrative:      Urine microscopic not indicated.    CBC & Differential [[id number]]  (Abnormal) Collected: 01/02/25 1744    Specimen: Blood Updated: 01/02/25 1749    Narrative:      The following orders were created for panel order CBC & Differential.  Procedure                               Abnormality         Status                     ---------                               -----------         ------                     CBC Auto Differential[[id number]]        Abnormal            Final result                 Please view results for these tests on the individual orders.    Comprehensive Metabolic Panel [[id number]]  (Abnormal) Collected: 01/02/25 1744    Specimen: Blood Updated: 01/02/25 1812     Glucose 94 mg/dL      BUN 11 mg/dL      Creatinine 0.77 mg/dL      Sodium 137 mmol/L      Potassium 3.4 mmol/L      Chloride 99 mmol/L      CO2 27.2 mmol/L      Calcium 9.7 mg/dL      Total Protein 7.6 g/dL      Albumin 4.5 g/dL      ALT (SGPT) 93 U/L      AST (SGOT) 53 U/L      Alkaline Phosphatase 64 U/L      Total Bilirubin 0.4 mg/dL      Globulin 3.1 gm/dL      A/G Ratio 1.5 g/dL      BUN/Creatinine Ratio 14.3     Anion Gap 10.8 mmol/L      eGFR 91.8 mL/min/1.73     Narrative:      GFR Categories in Chronic Kidney Disease (CKD)      GFR Category          GFR (mL/min/1.73)    Interpretation  G1                     90 or greater         Normal or high (1)  G2                      60-89                Mild decrease (1)  G3a                   45-59                 Mild to moderate decrease  G3b                   30-44                Moderate to severe decrease  G4                    15-29                Severe decrease  G5                    14 or less           Kidney failure          (1)In the absence of evidence of kidney disease, neither GFR category G1 or G2 fulfill the criteria for CKD.    eGFR calculation 2021 CKD-EPI creatinine equation, which does not include race as a factor    Lipase [[id number]]  (Normal) Collected: 01/02/25 1744    Specimen: Blood Updated: 01/02/25 1812     Lipase 44 U/L     Lactic Acid, Plasma [[id number]]  (Normal) Collected: 01/02/25 1744    Specimen: Blood Updated: 01/02/25 1810     Lactate 1.6 mmol/L     CBC Auto Differential [[id number]]  (Abnormal) Collected: 01/02/25 1744    Specimen: Blood Updated: 01/02/25 1749     WBC 9.54 10*3/mm3      RBC 4.56 10*6/mm3      Hemoglobin 14.4 g/dL      Hematocrit 42.4 %      MCV 93.0 fL      MCH 31.6 pg      MCHC 34.0 g/dL      RDW 12.9 %      RDW-SD 43.5 fl      MPV 10.1 fL      Platelets 278 10*3/mm3      Neutrophil % 53.3 %      Lymphocyte % 36.2 %      Monocyte % 7.0 %      Eosinophil % 2.8 %      Basophil % 0.4 %      Immature Grans % 0.3 %      Neutrophils, Absolute 5.08 10*3/mm3      Lymphocytes, Absolute 3.45 10*3/mm3      Monocytes, Absolute 0.67 10*3/mm3      Eosinophils, Absolute 0.27 10*3/mm3      Basophils, Absolute 0.04 10*3/mm3      Immature Grans, Absolute 0.03 10*3/mm3      nRBC 0.0 /100 WBC              Imaging:    CT Abdomen Pelvis With Contrast    Result Date: 1/2/2025  CT ABDOMEN PELVIS W CONTRAST Date of Exam: 1/2/2025 6:28 PM EST Indication: rlq pain. Comparison: CT abdomen and pelvis without contrast 10/9/2024 Technique: Axial CT images were obtained of the abdomen and pelvis after the uneventful intravenous administration of iodinated contrast. Reconstructed coronal and sagittal images were also obtained. Automated exposure control and iterative construction methods  were used. Findings: Visualized lower chest demonstrates clear lung bases. Calcified right basilar granuloma. No pericardial effusion or pleural effusion. Small hiatal hernia with distal esophageal wall thickening which may relate to reflux or esophagitis with postsurgical changes of gastric sleeve. The liver demonstrates hepatic steatosis. The adrenal gland on the right is normal. There is a small left adrenal gland nodule measuring 13 mm which is stable most compatible with adenoma. The spleen is normal in size. The pancreas is without acute abnormality. Kidneys enhance symmetrically. Negative for hydronephrosis or hydroureter. Nonobstructing calculus at the lower pole of the left kidney measures 2 mm. Nonobstructing calculus at the right kidney measures 5 mm. Trace perinephric fluid on the left. Small low-attenuation renal cyst on the left. At the lower pole of the right kidney medially there is a small lesion which measures 9 x 8 mm which could relate to complex cyst or solid lesion, difficult to assess (2/108). The uterus is absent. The right adnexa is without acute abnormality. There is a cyst at the left ovary measuring 3.4 cm which is similar to the prior study (2/164). Smaller adjacent cyst measures 1.1 cm (2/163). Trace free fluid in the pelvis. No organized collection or abscess. Negative for pneumoperitoneum. No bowel obstruction. The appendix is normal. The portal vein, splenic vein, and superior mesenteric vein are patent. Abdominal aorta and branch vasculature are patent. No aggressive osseous lesion or acute fracture.     Impression: 1. No acute abnormality in the abdomen or pelvis. 2. Normal appendix. 3. Bilateral nonobstructing nephrolithiasis. 4. Hepatic steatosis. 5. Indeterminate 9 mm lesion at medial lower pole right kidney may relate to complex cyst or small renal mass, consider consider nonemergent renal protocol MRI follow-up to assess for any internal enhancement. 6. Prior gastric sleeve  with small hiatal hernia and distal esophageal wall thickening suggesting reflux or esophagitis. Electronically Signed: Arturo Pringle MD  1/2/2025 7:34 PM EST  Workstation ID: [id number]       Differential Diagnosis and Discussion:    Abdominal Pain: Based on the patient's signs and symptoms, I considered abdominal aortic aneurysm, small bowel obstruction, pancreatitis, acute cholecystitis, acute appendecitis, peptic ulcer disease, gastritis, colitis, endocrine disorders, irritable bowel syndrome and other differential diagnosis an etiology of the patient's abdominal pain.    PROCEDURES:    Labs were collected in the emergency department and all labs were reviewed and interpreted by me.  CT scan was performed in the emergency department and the CT scan radiology impression was interpreted by me.    No orders to display       Procedures    MDM  Number of Diagnoses or Management Options  Generalized abdominal pain  Diagnosis management comments: In summary this is a 54-year-old female who presents to the emerged department for evaluation of abdominal pain in the periumbilical region.  CBC independently reviewed and interpreted by me and shows no critical abnormalities.  Unremarkable urinalysis independent reviewed interpreted by me is unremarkable and negative for acute pathology.  The scan of the abdomen pelvis reviewed by me is unremarkable for acute pathology.  Patient's abdominal pain has improved in the emergency department.  She is otherwise well-appearing in no acute distress.  Very strict return to ER and follow-up instructions have been provided to the patient.  Prescription Bentyl and Zofran provided.                       Patient Care Considerations:    ANTIBIOTICS: I considered prescribing antibiotics as an outpatient however no bacterial focus of infection was found.      Consultants/Shared Management Plan:    None    Social Determinants of Health:    Patient is independent, reliable, and has access to care.        Disposition and Care Coordination:    Discharged: The patient is suitable and stable for discharge with no need for consideration of admission.    I have explained the patient´s condition, diagnoses and treatment plan based on the information available to me at this time. I have answered questions and addressed any concerns. The patient has a good  understanding of the patient´s diagnosis, condition, and treatment plan as can be expected at this point. The vital signs have been stable. The patient´s condition is stable and appropriate for discharge from the emergency department.      The patient will pursue further outpatient evaluation with the primary care physician or other designated or consulting physician as outlined in the discharge instructions. They are agreeable to this plan of care and follow-up instructions have been explained in detail. The patient has received these instructions in written format and has expressed an understanding of the discharge instructions. The patient is aware that any significant change in condition or worsening of symptoms should prompt an immediate return to this or the closest emergency department or call to 911.  I have explained discharge medications and the need for follow up with the patient/caretakers. This was also printed in the discharge instructions. Patient was discharged with the following medications and follow up:      Medication List        New Prescriptions      dicyclomine 20 MG tablet  Commonly known as: BENTYL  Take 1 tablet by mouth Every 6 (Six) Hours As Needed (abdominal pain).     ondansetron ODT 4 MG disintegrating tablet  Commonly known as: ZOFRAN-ODT  Place 1 tablet on the tongue Every 8 (Eight) Hours As Needed for Nausea or Vomiting.               Where to Get Your Medications        These medications were sent to [id number] - [place], [place] - [address] AT [facility] OF [address] [facility] & [place] - [phone] [facility] [phone]  [address]  [address] [place] [address]      Phone: [phone]   dicyclomine 20 MG tablet  ondansetron ODT 4 MG disintegrating tablet      Suri Lynn MD  [address]  [place] [id number] [phone]    In 1 week         Final diagnoses:   Generalized abdominal pain        ED Disposition       ED Disposition   Discharge    Condition   Stable    Comment   --               This medical record created using voice recognition software.             Kj Felton MD  01/02/25 1108

## 2025-01-16 NOTE — PRE-PROCEDURE INSTRUCTIONS
IMPORTANT INSTRUCTIONS - PRE-ADMISSION TESTING  DO NOT EAT OR CHEW anything after midnight the night before your procedure.    NPO AFTER MN EXCEPT SIPS OF WATER TO TAKE MEDICATIONS LISTED BELOW  Take the following medications the morning of your procedure with JUST A SIP OF WATER:  _AMLODIPINE, CETIRIZINE, CELEXA, DICYCLOMINE IF NEEDED, ZOFRAN IF NEEDED, SYNTHROID______________________________________________________________________________________________________________________________________________________________________________________    DO NOT BRING your medications to the hospital with you, UNLESS something has changed since your PRE-Admission Testing appointment.  Hold all vitamins, supplements, and NSAIDS (Non- steroidal anti-inflammatory meds) for one week prior to surgery (you MAY take Tylenol or Acetaminophen).  If you are diabetic, check your blood sugar the morning of your procedure. If it is less than 70 or if you are feeling symptomatic, call the following number for further instructions: [phone]  Use your inhalers/nebulizers as usual, the morning of your procedure. BRING YOUR INHALERS with you.   Bring your CPAP or BIPAP to hospital, ONLY IF YOU WILL BE SPENDING THE NIGHT.   Make sure you have a ride home and have someone who will stay with you the day of your procedure after you go home.  If you have any questions, please call your Pre-Admission Testing Nurse, ___CHIQUITA_____________ at [phone]____________.   Per anesthesia request, do not smoke for 24 hours before your procedure or as instructed by your surgeon.    WILL CALL ON   1/20/25      NORMALLY BETWEEN 1 AND 4 PM TO GIVE OFFICIAL ARRIVAL TIME FOR DAY OF PROCEDURE  BATHING INSTRUCTIONS GIVEN. NO JEWELRY OF ANY TYPE OR NAIL POLISH UPPER OR LOWER EXT DAY OF PROCEDURE  COME TO ENTRANCE C [facility] MAIN DOOR DAY OF PROCEDURE  CASH,  OR CARD FOR MEDS TO BED IF INDICATED AT DISCHARGE  DO NOT TAKE ANOTHER DOSE OF WEGOVY PRIOR TO  SURGERY ON 1/21/25 PER ANESTHESIA PROTOCOL

## 2025-01-20 ENCOUNTER — ANESTHESIA EVENT (OUTPATIENT)
Dept: PERIOP | Facility: HOSPITAL | Age: 55
End: 2025-01-20
Payer: OTHER GOVERNMENT

## 2025-01-21 ENCOUNTER — HOSPITAL ENCOUNTER (OUTPATIENT)
Facility: HOSPITAL | Age: 55
Setting detail: HOSPITAL OUTPATIENT SURGERY
Discharge: HOME | End: 2025-01-21
Attending: STUDENT IN AN ORGANIZED HEALTH CARE EDUCATION/TRAINING PROGRAM | Admitting: STUDENT IN AN ORGANIZED HEALTH CARE EDUCATION/TRAINING PROGRAM
Payer: OTHER GOVERNMENT

## 2025-01-21 ENCOUNTER — ANESTHESIA (OUTPATIENT)
Dept: PERIOP | Facility: HOSPITAL | Age: 55
End: 2025-01-21
Payer: OTHER GOVERNMENT

## 2025-01-21 VITALS
HEART RATE: 74 BPM | OXYGEN SATURATION: 95 % | BODY MASS INDEX: 32.66 KG/M2 | DIASTOLIC BLOOD PRESSURE: 81 MMHG | SYSTOLIC BLOOD PRESSURE: 143 MMHG | RESPIRATION RATE: 16 BRPM | WEIGHT: 184.3 LBS | TEMPERATURE: 97.2 F | HEIGHT: 63 IN

## 2025-01-21 DIAGNOSIS — G56.02 CARPAL TUNNEL SYNDROME ON LEFT: Primary | ICD-10-CM

## 2025-01-21 PROCEDURE — 25010000002 FENTANYL CITRATE (PF) 50 MCG/ML SOLUTION: Performed by: NURSE ANESTHETIST, CERTIFIED REGISTERED

## 2025-01-21 PROCEDURE — 25010000002 CLINDAMYCIN 900 MG/50ML SOLUTION: Performed by: PHYSICIAN ASSISTANT

## 2025-01-21 PROCEDURE — 25010000002 BUPIVACAINE (PF) 0.25 % SOLUTION: Performed by: STUDENT IN AN ORGANIZED HEALTH CARE EDUCATION/TRAINING PROGRAM

## 2025-01-21 PROCEDURE — 25010000002 DEXAMETHASONE PER 1 MG: Performed by: NURSE ANESTHETIST, CERTIFIED REGISTERED

## 2025-01-21 PROCEDURE — 25010000002 LIDOCAINE PF 2% 2 % SOLUTION: Performed by: NURSE ANESTHETIST, CERTIFIED REGISTERED

## 2025-01-21 PROCEDURE — 25010000002 PROPOFOL 10 MG/ML EMULSION: Performed by: NURSE ANESTHETIST, CERTIFIED REGISTERED

## 2025-01-21 PROCEDURE — 25010000002 ONDANSETRON PER 1 MG: Performed by: STUDENT IN AN ORGANIZED HEALTH CARE EDUCATION/TRAINING PROGRAM

## 2025-01-21 PROCEDURE — 25810000003 LACTATED RINGERS PER 1000 ML: Performed by: ANESTHESIOLOGY

## 2025-01-21 PROCEDURE — 25010000002 MIDAZOLAM PER 1MG: Performed by: ANESTHESIOLOGY

## 2025-01-21 RX ORDER — HYDROCODONE BITARTRATE AND ACETAMINOPHEN 5; 325 MG/1; MG/1
1 TABLET ORAL EVERY 6 HOURS PRN
Qty: 8 TABLET | Refills: 0 | Status: SHIPPED | OUTPATIENT
Start: 2025-01-21

## 2025-01-21 RX ORDER — FENTANYL CITRATE 50 UG/ML
INJECTION, SOLUTION INTRAMUSCULAR; INTRAVENOUS AS NEEDED
Status: DISCONTINUED | OUTPATIENT
Start: 2025-01-21 | End: 2025-01-21 | Stop reason: SURG

## 2025-01-21 RX ORDER — MEPERIDINE HYDROCHLORIDE 25 MG/ML
12.5 INJECTION INTRAMUSCULAR; INTRAVENOUS; SUBCUTANEOUS
Status: DISCONTINUED | OUTPATIENT
Start: 2025-01-21 | End: 2025-01-21 | Stop reason: HOSPADM

## 2025-01-21 RX ORDER — SODIUM CHLORIDE, SODIUM LACTATE, POTASSIUM CHLORIDE, CALCIUM CHLORIDE 600; 310; 30; 20 MG/100ML; MG/100ML; MG/100ML; MG/100ML
9 INJECTION, SOLUTION INTRAVENOUS CONTINUOUS PRN
Status: DISCONTINUED | OUTPATIENT
Start: 2025-01-21 | End: 2025-01-21 | Stop reason: HOSPADM

## 2025-01-21 RX ORDER — BUPIVACAINE HYDROCHLORIDE 2.5 MG/ML
INJECTION, SOLUTION EPIDURAL; INFILTRATION; INTRACAUDAL AS NEEDED
Status: DISCONTINUED | OUTPATIENT
Start: 2025-01-21 | End: 2025-01-21 | Stop reason: HOSPADM

## 2025-01-21 RX ORDER — MIDAZOLAM HYDROCHLORIDE 2 MG/2ML
2 INJECTION, SOLUTION INTRAMUSCULAR; INTRAVENOUS ONCE
Status: COMPLETED | OUTPATIENT
Start: 2025-01-21 | End: 2025-01-21

## 2025-01-21 RX ORDER — ONDANSETRON 2 MG/ML
4 INJECTION INTRAMUSCULAR; INTRAVENOUS ONCE AS NEEDED
Status: COMPLETED | OUTPATIENT
Start: 2025-01-21 | End: 2025-01-21

## 2025-01-21 RX ORDER — PROMETHAZINE HYDROCHLORIDE 25 MG/1
25 SUPPOSITORY RECTAL ONCE AS NEEDED
Status: DISCONTINUED | OUTPATIENT
Start: 2025-01-21 | End: 2025-01-21 | Stop reason: HOSPADM

## 2025-01-21 RX ORDER — ONDANSETRON 4 MG/1
4 TABLET, FILM COATED ORAL EVERY 8 HOURS PRN
Qty: 30 TABLET | Refills: 0 | Status: SHIPPED | OUTPATIENT
Start: 2025-01-21

## 2025-01-21 RX ORDER — DOCUSATE SODIUM 100 MG/1
100 CAPSULE, LIQUID FILLED ORAL 2 TIMES DAILY PRN
Qty: 20 CAPSULE | Refills: 0 | Status: SHIPPED | OUTPATIENT
Start: 2025-01-21

## 2025-01-21 RX ORDER — DEXAMETHASONE SODIUM PHOSPHATE 4 MG/ML
INJECTION, SOLUTION INTRA-ARTICULAR; INTRALESIONAL; INTRAMUSCULAR; INTRAVENOUS; SOFT TISSUE AS NEEDED
Status: DISCONTINUED | OUTPATIENT
Start: 2025-01-21 | End: 2025-01-21 | Stop reason: SURG

## 2025-01-21 RX ORDER — LIDOCAINE HYDROCHLORIDE 20 MG/ML
INJECTION, SOLUTION EPIDURAL; INFILTRATION; INTRACAUDAL; PERINEURAL AS NEEDED
Status: DISCONTINUED | OUTPATIENT
Start: 2025-01-21 | End: 2025-01-21 | Stop reason: SURG

## 2025-01-21 RX ORDER — PROMETHAZINE HYDROCHLORIDE 12.5 MG/1
25 TABLET ORAL ONCE AS NEEDED
Status: DISCONTINUED | OUTPATIENT
Start: 2025-01-21 | End: 2025-01-21 | Stop reason: HOSPADM

## 2025-01-21 RX ORDER — PROPOFOL 10 MG/ML
VIAL (ML) INTRAVENOUS AS NEEDED
Status: DISCONTINUED | OUTPATIENT
Start: 2025-01-21 | End: 2025-01-21 | Stop reason: SURG

## 2025-01-21 RX ORDER — ACETAMINOPHEN 500 MG
1000 TABLET ORAL ONCE
Status: COMPLETED | OUTPATIENT
Start: 2025-01-21 | End: 2025-01-21

## 2025-01-21 RX ORDER — OXYCODONE HYDROCHLORIDE 5 MG/1
5 TABLET ORAL
Status: DISCONTINUED | OUTPATIENT
Start: 2025-01-21 | End: 2025-01-21 | Stop reason: HOSPADM

## 2025-01-21 RX ORDER — ONDANSETRON 2 MG/ML
4 INJECTION INTRAMUSCULAR; INTRAVENOUS ONCE AS NEEDED
Status: DISCONTINUED | OUTPATIENT
Start: 2025-01-21 | End: 2025-01-21 | Stop reason: HOSPADM

## 2025-01-21 RX ORDER — CLINDAMYCIN PHOSPHATE 900 MG/50ML
900 INJECTION, SOLUTION INTRAVENOUS ONCE
Status: COMPLETED | OUTPATIENT
Start: 2025-01-21 | End: 2025-01-21

## 2025-01-21 RX ADMIN — LIDOCAINE HYDROCHLORIDE 80 MG: 20 INJECTION, SOLUTION INTRAVENOUS at 07:19

## 2025-01-21 RX ADMIN — FENTANYL CITRATE 25 MCG: 50 INJECTION, SOLUTION INTRAMUSCULAR; INTRAVENOUS at 07:19

## 2025-01-21 RX ADMIN — DEXAMETHASONE SODIUM PHOSPHATE 4 MG: 4 INJECTION, SOLUTION INTRAMUSCULAR; INTRAVENOUS at 07:28

## 2025-01-21 RX ADMIN — FENTANYL CITRATE 25 MCG: 50 INJECTION, SOLUTION INTRAMUSCULAR; INTRAVENOUS at 07:37

## 2025-01-21 RX ADMIN — FENTANYL CITRATE 50 MCG: 50 INJECTION, SOLUTION INTRAMUSCULAR; INTRAVENOUS at 07:28

## 2025-01-21 RX ADMIN — MIDAZOLAM HYDROCHLORIDE 2 MG: 1 INJECTION, SOLUTION INTRAMUSCULAR; INTRAVENOUS at 06:49

## 2025-01-21 RX ADMIN — PROPOFOL 200 MG: 10 INJECTION, EMULSION INTRAVENOUS at 07:21

## 2025-01-21 RX ADMIN — CLINDAMYCIN PHOSPHATE 900 MG: 900 INJECTION, SOLUTION INTRAVENOUS at 07:22

## 2025-01-21 RX ADMIN — ONDANSETRON 4 MG: 2 INJECTION INTRAMUSCULAR; INTRAVENOUS at 07:32

## 2025-01-21 RX ADMIN — SODIUM CHLORIDE, POTASSIUM CHLORIDE, SODIUM LACTATE AND CALCIUM CHLORIDE 9 ML/HR: 600; 310; 30; 20 INJECTION, SOLUTION INTRAVENOUS at 06:38

## 2025-01-21 RX ADMIN — ACETAMINOPHEN 1000 MG: 500 TABLET ORAL at 06:40

## 2025-01-21 NOTE — ANESTHESIA PREPROCEDURE EVALUATION
Anesthesia Evaluation     Patient summary reviewed and Nursing notes reviewed   no history of anesthetic complications:   NPO Solid Status: > 8 hours  NPO Liquid Status: > 2 hours           Airway   Mallampati: III  TM distance: <3 FB  Neck ROM: full  No difficulty expected  Dental - normal exam     Pulmonary - normal exam    breath sounds clear to auscultation  (+) ,sleep apnea on CPAP  Cardiovascular - normal exam  Exercise tolerance: good (4-7 METS)    ECG reviewed  Rhythm: regular  Rate: normal    (+) hypertension      Neuro/Psych- negative ROS  GI/Hepatic/Renal/Endo    (+) obesity, GERD well controlled, renal disease- stones, thyroid problem hypothyroidism    Musculoskeletal (-) negative ROS    Abdominal    Substance History - negative use     OB/GYN negative ob/gyn ROS         Other - negative ROS       ROS/Med Hx Other: PAT Nursing Notes unavailable.     EKG 5/22/24- SR    ECHO 4/29/24- EF 58%, LVH, left atrial enlargement, grade I diastolic dysfunction    Off of GLP1 for 2 weeks              Anesthesia Plan    ASA 2     general     (Patient understands anesthesia not responsible for dental damage.)  intravenous induction     Anesthetic plan, risks, benefits, and alternatives have been provided, discussed and informed consent has been obtained with: patient.    Use of blood products discussed with patient .    Plan discussed with CRNA.    CODE STATUS:

## 2025-01-21 NOTE — OP NOTE
CARPAL TUNNEL RELEASE  Procedure Report    Patient Name:  Xuan Horvath  YOB: 1970    Date of Surgery:  1/21/2025     Indications: Xuan is a 54-year-old female with a history of left carpal tunnel syndrome.  We discussed the risks, benefits, indications, and alternatives to left carpal tunnel release.  She elected to proceed with surgical management and consent was obtained.    Pre-op Diagnosis:   Carpal tunnel syndrome on left [G56.02]       Post-Op Diagnosis Codes:     * Carpal tunnel syndrome on left [G56.02]    Procedure/CPT® Codes:  ID NEUROPLASTY &/TRANSPOS MEDIAN NRV CARPAL TUNNE [00549]    Procedure(s):  CARPAL TUNNEL RELEASE    Staff:  Surgeon(s):  Cisco Isaacs MD    Assistant: Maranda Estes PA-C    Anesthesia: Choice    Estimated Blood Loss: minimal    Implants:    Nothing was implanted during the procedure    Specimen:          None        Findings: Thickened transverse carpal ligament     Complications: None    Description of Procedure: The patient was met in the preoperative holding area and the operative extremity was marked. The patient was transported to the operating room and laid supine on the operating room table. 2 g of preoperative Ancef were administered.  General anesthesia was induced.  An upper arm tourniquet was applied.  The left upper extremity was then prepped and draped in the usual sterile fashion. A timeout was taken to ensure the appropriate patient, procedure, and procedural site. All were in agreement.  The left upper extremity was exsanguinated and the tourniquet was inflated to 250 mmHg.  I made a 2 cm longitudinal incision in line with the radial aspect of the fourth ray in the palm. Sharp dissection was carried down through the skin and subcutaneous tissues. The superficial palmar fascia was identified and incised longitudinally. The transverse carpal ligament was then identified. This was incised longitudinally and the carpal tunnel was  entered. I utilized a scissor to dissect proximal to the transverse carpal ligament at the proximal extent of the incision. A Ragnell retractor was inserted and the transverse carpal ligament was directly visualized proximally. I slid a [place] elevator beneath the transverse carpal ligament to free any adhesions. I then slid the scissors proximally to release the proximal extent of the transverse carpal ligament and distal forearm fascia. This was done without complication. No additional soft tissue bands were present proximally. I then moved distally and released the remaining portion of the transverse carpal ligament until the palmar fat pad was identified. The median nerve was directly visualized and was intact. The wound was then thoroughly irrigated.  10 cc of local anesthetic was injected.  Skin was closed with 4-0 nylon in interrupted fashion. Wound was dressed with Xeroform, fluff, and a well-padded volar resting splint was applied. The tourniquet was released. Less than 2-second capillary refill returned to the fingertips. The patient awoke from anesthesia without complication and was transferred to the recovery in stable condition. All surgical counts were correct.      Assistant: Maranda Estes PA-C  was responsible for performing the following activities: Retraction, Suction, Irrigation, Suturing, Closing, and Placing Dressing and their skilled assistance was necessary for the success of this case.    Cisco Isaacs MD     Date: 1/21/2025  Time: 07:39 EST

## 2025-01-21 NOTE — H&P
[facility]   HISTORY AND PHYSICAL    Patient Name: Xuan Horvath  : 1970  MRN: [id number]  Primary Care Physician:  Suri Lynn MD  Date of admission: 2025    Subjective   Subjective     Chief Complaint: Left hand pain    History of Present Illness    Xuan presents today for her left hand.  She experiences numbness and tingling along with pain to her left hand.  She reports dropping things.  She describes symptoms while driving and at night.  She has noted some relief with nighttime bracing.    Review of Systems     Constitutional: Denies fevers, chills, weight loss  Cardiovascular: Denies chest pain, shortness of breath  Skin: Denies rashes, acute skin changes  Neurologic: Denies headache, loss of consciousness  MSK: Left hand pain    Personal History     Past Medical History:   Diagnosis Date    Anxiety     Carpal tunnel syndrome on left 2024    Colon cancer screening     Colon polyp     Depression 2015    working well, continue citalopram discussed that she can stay on meds long term or go off them whenever she is ready    Essential hypertension 2016    stable will try to decrease atenolol and see if that will give her more energy, need to watch bp closely    GERD (gastroesophageal reflux disease) 2023    Prilosec. Protonix.    Glaucoma     Heart murmur     DENIES CP/SOA. FOLLOWED BY PCP. ACTIVE. STATES TOLD SHE DID NOT HAVE MURMUR    Hiatal hernia     REPORTS HAD REPAIR    Hypertension     Hypothyroidism     Kidney stone     Obesity 2016    she is going to work on weight by trying to go to the gym she is working on this with her son    Sleep apnea     COMPLIANT WITH CPAP     UTI (urinary tract infection)     NO ISSUES CURRENTLY    Vitamin D deficiency 2015    will repeat labs continue current OTC meds for now       Past Surgical History:   Procedure Laterality Date    BREAST AUGMENTATION Bilateral 2023    breast implants     CARPAL TUNNEL RELEASE Right 2005     SECTION  ,,2003    x3    COLONOSCOPY N/A 2022    Procedure: COLONOSCOPY WITH POLYPECTOMY COLD SNARE;  Surgeon: Luis Sterling MD;  Location: [facility] ENDOSCOPY;  Service: Gastroenterology;  Laterality: N/A;  COLON POLYP    COLONOSCOPY      ENDOSCOPY N/A 2020    Procedure: ESOPHAGOGASTRODUODENOSCOPY WITH BIOPSY;  Surgeon: Ron Cadet Jr., MD;  Location: [facility] ENDOSCOPY;  Service: General;  Laterality: N/A;  PRE- DYSPEPSIA  POST- GASTRITIS, GASTRIC POLYPS, ESOPHAGITIS, HIATAL HERNIA      ENDOSCOPY N/A 2023    Procedure: ESOPHAGOGASTRODUODENOSCOPY WITH BIOPSIES;  Surgeon: Luis Sterling MD;  Location: [facility] ENDOSCOPY;  Service: Gastroenterology;  Laterality: N/A;  PRIOR GASTRIC SLEEVE    GASTRIC SLEEVE LAPAROSCOPIC N/A 2020    Procedure: GASTRIC SLEEVE LAPAROSCOPIC With Hiatal Hernia Repair;  Surgeon: Ron Cadet Jr., MD;  Location: [facility] OR [facility];  Service: Bariatric;  Laterality: N/A;    HERNIA REPAIR  20    Hiatal hernia    KIDNEY STONE SURGERY  2024    PANNICULECTOMY      SUBTOTAL HYSTERECTOMY      UPPER GASTROINTESTINAL ENDOSCOPY      URETEROSCOPY LASER LITHOTRIPSY WITH STENT INSERTION Left 2024    Procedure: Cystoscopy with left ureteroscopy with laser and left renal stent exchange;  Surgeon: Salazar Rios MD;  Location: [facility] OR;  Service: Urology;  Laterality: Left;    URETEROSCOPY STENT INSERTION Left 2024    Procedure: Cystoscopy with left ureteral stent placement;  Surgeon: Salazar Rios MD;  Location: [facility] OR;  Service: Urology;  Laterality: Left;       Family History: family history includes Diabetes in her mother; Hypertension in her father and mother. Otherwise pertinent FHx was reviewed and not pertinent to current issue.    Social History:  reports that she has never smoked. She has never been exposed to tobacco smoke. She has never used smokeless  tobacco. She reports that she does not drink alcohol and does not use drugs.    Home Medications:  Calcium Carb-Cholecalciferol, Semaglutide-Weight Management, amLODIPine, cetirizine, citalopram, dicyclomine, esomeprazole, levothyroxine, lisinopril-hydrochlorothiazide, multivitamin with minerals, and ondansetron ODT    Allergies:  Allergies   Allergen Reactions    Penicillins Anaphylaxis    Contrave [Naltrexone-Bupropion Hcl Er] Other (See Comments)     Severe constipation and insomnia       Objective    Objective     Vitals:   Temp:  [97.5 °F (36.4 °C)] 97.5 °F (36.4 °C)  Heart Rate:  [78] 78  Resp:  [16] 16  BP: (156)/(97) 156/97    Physical Exam    General: Alert. No acute distress.   Cardiac: Intact peripheral pulses  Pulmonary: Unlabored respirations on room air.  Left upper extremity: Mild muscle atrophy.  Full finger extension.  Full finger flexion.  Able to make a tight fist.  Forearm soft.  Active wrist range of motion.  Thumb opposition intact.  Palmar abduction is intact.  Sensation intact to the median, radial, and ulnar nerve distributions with associated paresthesias.  Palpable radial pulse.    Result Review    Result Review:  I have personally reviewed the results from the time of this admission to 1/21/2025 06:37 EST and agree with these findings:  [x]  Laboratory list / accordion  []  Microbiology  [x]  Radiology  []  EKG/Telemetry   []  Cardiology/Vascular   []  Pathology  []  Old records  []  Other:      Assessment & Plan   Assessment / Plan     Brief Patient Summary:  Xuan Horvath is a 54 y.o. female who has left carpal tunnel syndrome.  She presents today for surgical management with left carpal tunnel release.    Active Hospital Problems:  Active Hospital Problems    Diagnosis     **Carpal tunnel syndrome on left      Plan:     Xuan Horvath presents today for her moderate to severe left carpal tunnel syndrome.  We discussed conservative management with bracing, injections, and  exercises versus surgical management. We specifically discussed left carpal tunnel release today.  We discussed that the primary benefit of surgery is relieving the compression on the median nerve.  We discussed surgical risk including bleeding, infection, damage to nerves and blood vessels, persistent pain, recurrent numbness, stiffness, anesthesia risk, DVT/PE, and need for additional procedures.  Patient expressed understanding and elected to proceed with surgical management for left carpal tunnel release     VTE Prophylaxis:  Mechanical VTE prophylaxis orders are present.      Maranda Estes PA-C      I have seen and examined the above patient and agree with the plan.     Electronically signed by Cisco Isaacs MD, 01/21/25, 6:54 AM EST.

## 2025-01-21 NOTE — DISCHARGE INSTRUCTIONS
Orthopedic instructions: 1 pound lifting restriction with the operative hand.  Keep her splint on, intact, clean, dry.  Ice and elevate help reduce pain and swelling.  Perform finger range of motion exercise at least 3 times daily to help prevent stiffness.  Monitor for increasing pain, drainage through splint, fevers, chills.  Call the office with any concerns.  Follow-up with Dr. Isaacs in 2 weeks for reevaluation.   DISCHARGE INSTRUCTIONS  CARPAL TUNNEL RELEASE      For your surgery you had:  General anesthesia (you may have a sore throat for the first 24 hours)  You may experience dizziness, drowsiness, or lightheadedness for several hours following surgery.  Do not stay alone today or tonight.  Limit your activity for 24 hours.  Resume your diet slowly.    You should not drive or operate machinery, drink alcohol, or sign legally binding documents for 24 hours or while you are taking pain medication.  If you had an axillary or regional block, you may not have control of the involved limb for up to 12 hours. Protect the arm with a sling or follow your physician's specific instructions. Carry the upper arm in a sling so that the hand and wrist are above the level of the heart. Elevate affected arm on a pillow when resting.   Use ice to affected area for 48-72 hours. Apply 20 minutes on - 20 minutes off. Do NOT apply directly to skin.  Exercise fingers frequently by making a full fist and fully straightening the fingers. This will help prevent swelling and stiffness.  Do NOT do any heavy lifting, pulling or strenuous activities using the affected hand.please move fingers make sure to move thumb [] Keep splint clean and dry.  []  .  [] Do NOT submerge in water.  []You may shower or bathe in today just wrap dressing with plastic and or bag to keep dressing dry      .  NOTIFY YOUR DOCTOR IF YOU EXPERIENCE ANY OF THE FOLLOWING:  Temperature greater than 101 degrees Fahrenheit  Shaking Chills  Redness or excessive  drainage from incision  Nausea, vomiting and/or pain that is not controlled by prescribed medications  Increase in bleeding or bleeding that is excessive  Unable to urinate in 6 hours after surgery  If unable to reach your doctor, please go to the closest Emergency Room  Last dose of pain medication was given at:   .  Y  SPECIAL INSTRUCTIONS:  May take an over the counter stool softener as needed             I have read and received the above instructions.

## 2025-01-21 NOTE — ANESTHESIA POSTPROCEDURE EVALUATION
Patient: Xuan Horvath    Procedure Summary       Date: 01/21/25 Room / Location: [facility] OR [address] [place] [address] OR [facility]    Anesthesia Start: 0715 Anesthesia Stop: 0748    Procedure: CARPAL TUNNEL RELEASE (Left: Wrist) Diagnosis:       Carpal tunnel syndrome on left      (Carpal tunnel syndrome on left [G56.02])    Surgeons: Cisco Isaacs MD Provider: Greg Camarillo MD    Anesthesia Type: general ASA Status: 2            Anesthesia Type: general    Vitals  Vitals Value Taken Time   /88 01/21/25 0802   Temp 36.1 °C (96.9 °F) 01/21/25 0802   Pulse 76 01/21/25 0802   Resp 16 01/21/25 0752   SpO2 96 % 01/21/25 0802           Post Anesthesia Care and Evaluation    Patient location during evaluation: bedside  Patient participation: complete - patient participated  Level of consciousness: awake  Pain management: adequate    Airway patency: patent  PONV Status: none  Cardiovascular status: acceptable and stable  Respiratory status: acceptable  Hydration status: acceptable

## 2025-01-27 RX ORDER — AMLODIPINE BESYLATE 5 MG/1
5 TABLET ORAL DAILY
Qty: 90 TABLET | Refills: 1 | Status: SHIPPED | OUTPATIENT
Start: 2025-01-27

## 2025-01-27 RX ORDER — CITALOPRAM HYDROBROMIDE 40 MG/1
40 TABLET ORAL DAILY
Qty: 90 TABLET | Refills: 1 | Status: SHIPPED | OUTPATIENT
Start: 2025-01-27

## 2025-01-28 ENCOUNTER — PATIENT MESSAGE (OUTPATIENT)
Dept: INTERNAL MEDICINE | Facility: CLINIC | Age: 55
End: 2025-01-28
Payer: OTHER GOVERNMENT

## 2025-01-28 DIAGNOSIS — R06.83 SNORING: Primary | ICD-10-CM

## 2025-02-01 RX ORDER — SEMAGLUTIDE 1.7 MG/.75ML
1.7 INJECTION, SOLUTION SUBCUTANEOUS WEEKLY
Qty: 6 ML | Refills: 3 | Status: CANCELLED | OUTPATIENT
Start: 2025-02-01

## 2025-02-03 ENCOUNTER — OFFICE VISIT (OUTPATIENT)
Dept: ORTHOPEDIC SURGERY | Facility: CLINIC | Age: 55
End: 2025-02-03
Payer: OTHER GOVERNMENT

## 2025-02-03 VITALS — HEART RATE: 79 BPM | OXYGEN SATURATION: 96 % | SYSTOLIC BLOOD PRESSURE: 164 MMHG | DIASTOLIC BLOOD PRESSURE: 87 MMHG

## 2025-02-03 DIAGNOSIS — Z98.890 S/P CARPAL TUNNEL RELEASE: Primary | ICD-10-CM

## 2025-02-03 RX ORDER — SEMAGLUTIDE 2.4 MG/.75ML
2.4 INJECTION, SOLUTION SUBCUTANEOUS WEEKLY
Qty: 6 ML | Refills: 1 | Status: SHIPPED | OUTPATIENT
Start: 2025-02-03

## 2025-02-03 NOTE — PROGRESS NOTES
Chief Complaint  Follow-up of the Left Hand    Subjective          Xuan Horvath presents to [facility]   History of Present Illness    Xuan Horvath presents today for a follow up of her left wrist. Patient is 2 weeks status post left carpal tunnel release performed by Dr. Isaacs on 1/21/2025. Today, patient states that she is doing well.  She reports no pain to her hand.  She is not taking any pain medications.  She has been doing her finger range of motion exercises.  She has remained in her splint.  She denies any fever or chills.  Denies complications, notes, or drainage from her incision site.  Denies numbness or tingling to her hand.      Allergies   Allergen Reactions    Penicillins Anaphylaxis    Contrave [Naltrexone-Bupropion Hcl Er] Other (See Comments)     Severe constipation and insomnia        Social History     Socioeconomic History    Marital status:    Tobacco Use    Smoking status: Never     Passive exposure: Never    Smokeless tobacco: Never   Vaping Use    Vaping status: Never Used   Substance and Sexual Activity    Alcohol use: Never    Drug use: Never    Sexual activity: Yes     Partners: Male     Birth control/protection: Hysterectomy     Comment: Hysterectomy 2004        I reviewed the patient's chief complaint, history of present illness, review of systems, past medical history, surgical history, family history, social history, medications, and allergy list.     REVIEW OF SYSTEMS    Constitutional: Denies fevers, chills, weight loss  Cardiovascular: Denies chest pain, shortness of breath  Skin: Denies rashes, acute skin changes  Neurologic: Denies headache, loss of consciousness  MSK: Left wrist pain      Objective   Vital Signs:   /87   Pulse 79   SpO2 96%     There is no height or weight on file to calculate BMI.    Physical Exam    General: Alert. No acute distress.  Left upper extremity: Splint removed.  Sutures removed today  without complications.  Well-healing incision about the palm.  No active drainage or redness noted. No concerning signs of infection.  Forearm soft.  Finger range of motion intact.  Full finger extension.  Full finger flexion.  Able to make a fist.  Demonstrates active wrist flexion and extension. Thumb opposition intact. Palmar abduction of the thumb intact. Intact motor function in the AIN, PIN, and ulnar nerve distributions. Sensation intact the median, radial, and ulnar nerve distributions. Palpable radial pulse.     Procedures    Imaging Results (Most Recent)       None                     Assessment and Plan    Diagnoses and all orders for this visit:    1. S/P carpal tunnel release (Primary)        Xuan Horvath presents today 2 weeks post op left carpal tunnel release performed by Dr. Isaacs on 1/21/2025. Sutures removed today without complications.  Incision is well-healing.  No active drainage or redness noted. No concerning signs of infection. Denies fever or chills. Incision site care was reviewed today. Patient instructed not to soak or submerge incision. Do not apply any lotions, creams, or ointments to the incision at this time.  We discussed concerning signs and symptoms regarding the incision site.  Patient understood and agreed. Okay for patient to apply gauze over the incision while wearing the wrist brace to avoid irritation. Patient had a wrist brace that she is instructed to wear this brace over the next 2-3 weeks before gradually decreasing use of the brace. Patient understood and agreed.  Home exercises for wrist and finger range of motion were provided today.  Discussed the importance of these exercises, including working on making a tight fist. We discussed ordering formal occupational therapy at next visit if necessary.      Patient will follow up in 4 weeks for reevaluation.  No new x-rays needed at next visit.      Call or return if symptoms worsen or patient has any concerns.        Follow Up   Return in about 4 weeks (around 3/3/2025).  Patient was given instructions and counseling regarding her condition or for health maintenance advice. Please see specific information pulled into the AVS if appropriate.     Maranda Estes PA-C  02/03/25  08:45 EST

## 2025-02-07 RX ORDER — CITALOPRAM HYDROBROMIDE 40 MG/1
40 TABLET ORAL DAILY
Qty: 90 TABLET | Refills: 1 | OUTPATIENT
Start: 2025-02-07

## 2025-02-25 ENCOUNTER — OFFICE VISIT (OUTPATIENT)
Dept: SLEEP MEDICINE | Facility: HOSPITAL | Age: 55
End: 2025-02-25
Payer: OTHER GOVERNMENT

## 2025-02-25 VITALS
HEART RATE: 68 BPM | DIASTOLIC BLOOD PRESSURE: 89 MMHG | BODY MASS INDEX: 31.61 KG/M2 | HEIGHT: 63 IN | OXYGEN SATURATION: 94 % | SYSTOLIC BLOOD PRESSURE: 142 MMHG | WEIGHT: 178.4 LBS

## 2025-02-25 DIAGNOSIS — G47.33 OSA (OBSTRUCTIVE SLEEP APNEA): Primary | ICD-10-CM

## 2025-02-25 PROCEDURE — G0463 HOSPITAL OUTPT CLINIC VISIT: HCPCS

## 2025-02-25 NOTE — PROGRESS NOTES
"  [facility]    SLEEP CLINIC FOLLOW UP PROGRESS NOTE.    Xuan Horvath  [id number]   1970  54 y.o.  female      PCP: Suri Lynn MD    Date of visit: 2/25/2025    No chief complaint on file.      HPI:  This is a 54 y.o. years old patient is here for the management of obstructive sleep apnea.  Sleep apnea is mild in severity with a AHI of 5.3 /hr from HSAT in dec 2021 with weight of 171 lbs.   Patient Goes to bed at 12 AM and gets up at 9 AM. Wakes up 1 times per night to use restroom and takes 0 naps per day.  She continues to benefit from CPAP use and notices more restorative sleep and feels better during the daytime when she uses a CPAP.  She is not having any issues with the device.  She is using full facemask.     ESS: 1    PAP download data dates January 18 through February 16, 2025  Device: AirSense 11 AutoSet  Mask type: Fullface  Pressure : 5 to 15 cm  % days used >4 hours: 100  Average usage days used: 8 hours 30 minutes  AHI: 1.1  Median leak: 0  95th % leak 3.3  DME supplier: [employer] (habits pertaining to sleep medicine)  History tobacco use: None  History of alcohol use: None per week  Caffeine use: 2 cups of coffee per day  OB History    No obstetric history on file.         REVIEW OF SYSTEMS:   Pertaining positive symptoms are:  Denies SOB.      PHYSICAL EXAMINATION:  CONSTITUTIONAL:  Vitals:    02/25/25 1300   BP: 142/89   Pulse: 68   SpO2: 94%   Weight: 80.9 kg (178 lb 6.4 oz)   Height: 160 cm (62.99\")    Body mass index is 31.61 kg/m².   RESP SYSTEM: No respiratory distress  CARDIOVASULAR: Regular rate  NEURO: Answers questions appropriately       ASSESSMENT AND PLAN:  Obstructive sleep apnea ( G 47.33)  I have independently reviewed the PAP compliance data and discussed with the patient the download data and encouarged the patient to continue to use the device. The device is benefiting the patient and the device is medically necessary.  The patient " is also instructed to get the supplies from the [employer] company and and change them on a regular basis.  A prescription for supplies has been sent to the [employer] company.   BMI is Body mass index is 31.61 kg/m².. I have discuss the relationship between the weight and sleep apnea. The benefit of weight loss in reducing severity of sleep apnea was discussed.   She has excellent use of CPAP and she is benefiting from using it and she is working on losing more weight. Continue current settings.   Return in about 1 year (around 2/25/2026). . Patient's questions were answered.      Esequiel Baxter, DO

## 2025-03-03 ENCOUNTER — OFFICE VISIT (OUTPATIENT)
Dept: ORTHOPEDIC SURGERY | Facility: CLINIC | Age: 55
End: 2025-03-03
Payer: OTHER GOVERNMENT

## 2025-03-03 VITALS — DIASTOLIC BLOOD PRESSURE: 94 MMHG | SYSTOLIC BLOOD PRESSURE: 165 MMHG | OXYGEN SATURATION: 94 % | HEART RATE: 85 BPM

## 2025-03-03 DIAGNOSIS — Z98.890 S/P CARPAL TUNNEL RELEASE: Primary | ICD-10-CM

## 2025-03-03 PROCEDURE — 99024 POSTOP FOLLOW-UP VISIT: CPT | Performed by: PHYSICIAN ASSISTANT

## 2025-03-03 NOTE — PROGRESS NOTES
Chief Complaint  Follow-up of the Left Hand    Subjective          Xuan Horvath presents to [facility]   History of Present Illness    Xuan Horvath presents today for a follow-up of her left hand.  Patient is 6 weeks postop left carpal tunnel release performed on 1/21/2025.  Today, patient states that she is doing fine.  She states that her incision is well-healed.  Denies any numbness or tingling to the hand.  Reports good  strength.  She still experiences a little bit of discomfort with applied pressure well and wrist extension.  She reports good finger range of motion.      Allergies   Allergen Reactions    Penicillins Anaphylaxis    Contrave [Naltrexone-Bupropion Hcl Er] Other (See Comments)     Severe constipation and insomnia        Social History     Socioeconomic History    Marital status:    Tobacco Use    Smoking status: Never     Passive exposure: Never    Smokeless tobacco: Never   Vaping Use    Vaping status: Never Used   Substance and Sexual Activity    Alcohol use: Never    Drug use: Never    Sexual activity: Yes     Partners: Male     Birth control/protection: Hysterectomy     Comment: Hysterectomy 2004        I reviewed the patient's chief complaint, history of present illness, review of systems, past medical history, surgical history, family history, social history, medications, and allergy list.     REVIEW OF SYSTEMS    Constitutional: Denies fevers, chills, weight loss  Cardiovascular: Denies chest pain, shortness of breath  Skin: Denies rashes, acute skin changes  Neurologic: Denies headache, loss of consciousness  MSK: Left hand pain      Objective   Vital Signs:   /94   Pulse 85   SpO2 94%     There is no height or weight on file to calculate BMI.    Physical Exam    General: Alert. No acute distress.   Left upper extremity: Well-healed incision with no concerning signs for infection.  Forearm soft.  Demonstrates active wrist  range of motion.  Full finger extension.  Full finger flexion.  Able to make a tight fist.  Thumb opposition intact.  Palmar abduction of the thumb intact.  Sensation intact to the median, radial, and ulnar nerve distributions.  Palpable radial pulse.    Procedures    Imaging Results (Most Recent)       None                   Assessment and Plan    Diagnoses and all orders for this visit:    1. S/P carpal tunnel release (Primary)        Xuan Horvath presents today 6 weeks postop left carpal tunnel release performed on 1/21/2025.  Incision is well-healed with no concerning signs for infection.  We discussed massaging over the incision to release scar tissue desensitize area.  Patient will continue with finger and wrist range of motion exercises.  Continue to gradually progress with activities as tolerated.      Patient will follow up as needed.    Call or return if symptoms worsen or patient has any concerns.       Follow Up   Return if symptoms worsen or fail to improve.  Patient was given instructions and counseling regarding her condition or for health maintenance advice. Please see specific information pulled into the AVS if appropriate.     Maranda Estes PA-C  03/03/25  15:19 EST

## 2025-03-07 RX ORDER — LEVOTHYROXINE SODIUM 150 MCG
150 TABLET ORAL EVERY MORNING
Qty: 90 TABLET | Refills: 1 | Status: SHIPPED | OUTPATIENT
Start: 2025-03-07

## 2025-03-19 ENCOUNTER — OFFICE VISIT (OUTPATIENT)
Dept: INTERNAL MEDICINE | Facility: CLINIC | Age: 55
End: 2025-03-19
Payer: OTHER GOVERNMENT

## 2025-03-19 VITALS
SYSTOLIC BLOOD PRESSURE: 144 MMHG | BODY MASS INDEX: 31.08 KG/M2 | DIASTOLIC BLOOD PRESSURE: 92 MMHG | OXYGEN SATURATION: 99 % | HEIGHT: 63 IN | RESPIRATION RATE: 20 BRPM | HEART RATE: 98 BPM | TEMPERATURE: 97 F | WEIGHT: 175.4 LBS

## 2025-03-19 DIAGNOSIS — E66.9 OBESITY, UNSPECIFIED CLASS, UNSPECIFIED OBESITY TYPE, UNSPECIFIED WHETHER SERIOUS COMORBIDITY PRESENT: ICD-10-CM

## 2025-03-19 DIAGNOSIS — N28.89 RENAL MASS: Primary | ICD-10-CM

## 2025-03-19 DIAGNOSIS — I10 ESSENTIAL HYPERTENSION: ICD-10-CM

## 2025-03-19 DIAGNOSIS — K21.9 GASTROESOPHAGEAL REFLUX DISEASE, UNSPECIFIED WHETHER ESOPHAGITIS PRESENT: ICD-10-CM

## 2025-03-19 RX ORDER — ESOMEPRAZOLE MAGNESIUM 40 MG/1
40 CAPSULE, DELAYED RELEASE ORAL NIGHTLY
Qty: 90 CAPSULE | Refills: 1 | Status: SHIPPED | OUTPATIENT
Start: 2025-03-19

## 2025-03-19 RX ORDER — LISINOPRIL AND HYDROCHLOROTHIAZIDE 12.5; 2 MG/1; MG/1
1 TABLET ORAL DAILY
Qty: 90 TABLET | Refills: 1 | Status: SHIPPED | OUTPATIENT
Start: 2025-03-19

## 2025-03-19 NOTE — PROGRESS NOTES
"Chief Complaint  Weight Management (Follow up ) and Abdominal Pain (Either side from time to time for about 2 month )      Subjective      History of Present Illness  The patient presents for weight management, right-sided abdominal pain, hypertension, and kidney stones.    She reports a positive response to Wegovy, with a 15-pound weight loss since October. She has been on the current dose for 6 weeks with 2 refills remaining. She has made dietary modifications and is not experiencing gastrointestinal symptoms. Her mental health is stable. She underwent surgery in January, temporarily interrupting her weight loss. She has resumed gym activities post-healing.    In January, she had right-sided abdominal pain leading to an ER visit. A CT scan showed a fatty liver and a renal lesion. She has a history of renal calculi and continues to have them. She has increased citrus intake, takes calcium supplements with meals, and avoids oxalate-rich foods. She switched from regular milk to coconut milk. She had another similar episode of abdominal pain lasting most of the day but no further episodes since.    Her blood pressure was elevated during this visit. She does not monitor it at home. She maintains an active lifestyle, including cycling and running, and does not consume excessive salt.             Objective   Vital Signs:   Vitals:    03/19/25 1448   BP: 144/92   BP Location: Left arm   Patient Position: Sitting   Cuff Size: Adult   Pulse: 98   Resp: 20   Temp: 97 °F (36.1 °C)   TempSrc: Temporal   SpO2: 99%   Weight: 79.6 kg (175 lb 6.4 oz)   Height: 160 cm (62.99\")     Body mass index is 31.08 kg/m².    Wt Readings from Last 3 Encounters:   03/26/25 80.3 kg (177 lb 1.6 oz)   03/19/25 79.6 kg (175 lb 6.4 oz)   02/25/25 80.9 kg (178 lb 6.4 oz)     BP Readings from Last 3 Encounters:   03/26/25 127/84   03/19/25 144/92   03/03/25 165/94       Health Maintenance   Topic Date Due    Pneumococcal Vaccine 50+ (1 of 1 - PCV) " Never done    ANNUAL PHYSICAL  10/06/2024    MAMMOGRAM  11/02/2025    COLORECTAL CANCER SCREENING  01/13/2027    TDAP/TD VACCINES (2 - Td or Tdap) 08/04/2031    HEPATITIS C SCREENING  Completed    COVID-19 Vaccine  Completed    INFLUENZA VACCINE  Completed    ZOSTER VACCINE  Completed       Physical Exam  Vitals reviewed.   Constitutional:       Appearance: Normal appearance. She is well-developed.   HENT:      Head: Normocephalic and atraumatic.      Right Ear: External ear normal.      Left Ear: External ear normal.   Eyes:      Conjunctiva/sclera: Conjunctivae normal.      Pupils: Pupils are equal, round, and reactive to light.   Cardiovascular:      Rate and Rhythm: Normal rate and regular rhythm.      Heart sounds: No murmur heard.     No friction rub. No gallop.   Pulmonary:      Effort: Pulmonary effort is normal.      Breath sounds: Normal breath sounds. No wheezing or rhonchi.   Skin:     General: Skin is warm and dry.   Neurological:      Mental Status: She is alert and oriented to person, place, and time.   Psychiatric:         Mood and Affect: Affect normal.         Behavior: Behavior normal.         Thought Content: Thought content normal.        Physical Exam        Result Review :  The following data was reviewed by: Suri Lynn MD on 03/19/2025:         Results              Procedures            Assessment & Plan  Essential hypertension      Orders:    lisinopril-hydrochlorothiazide (PRINZIDE,ZESTORETIC) 20-12.5 MG per tablet; Take 1 tablet by mouth Daily.    Gastroesophageal reflux disease, unspecified whether esophagitis present    Orders:    esomeprazole (nexIUM) 40 MG capsule; Take 1 capsule by mouth Every Night.    Renal mass         Obesity, unspecified class, unspecified obesity type, unspecified whether serious comorbidity present                Assessment & Plan  Weight management  - Lost 15 pounds since October with no adverse effects  - Continue Wegovy    Right-sided abdominal  pain  - ER visit in January revealed fatty liver and renal lesion  - Monitor abdominal pain timing with Wegovy injections  - Order MRI for renal lesion  - Consider urologist referral if MRI shows concerning findings    Hypertension  - Elevated blood pressure during visit  - Monitor BP at home and reduce salt intake  - Refilled lisinopril HCTZ  - Adjust medication if BP remains high    Medication management  - Refilled Nexium    Patient or patient representative verbalized consent for the use of Ambient Listening during the visit with  Suri Lynn MD for chart documentation. 3/27/2025  14:05 EDT      FOLLOW UP  Return in about 3 months (around 6/19/2025).  Patient was given instructions and counseling regarding her condition or for health maintenance advice. Please see specific information pulled into the AVS if appropriate.     Suri Lynn MD  03/27/25  14:06 EDT    CURRENT & DISCONTINUED MEDICATIONS  Current Outpatient Medications   Medication Instructions    amLODIPine (NORVASC) 5 mg, Oral, Daily    Calcium Carb-Cholecalciferol (CALCIUM 600/VITAMIN D PO) 2 tablets, Daily    cetirizine (ZYRTEC) 10 mg, Oral, Daily    citalopram (CELEXA) 40 mg, Oral, Daily    esomeprazole (NEXIUM) 40 mg, Oral, Nightly    lisinopril-hydrochlorothiazide (PRINZIDE,ZESTORETIC) 20-12.5 MG per tablet 1 tablet, Oral, Daily    multivitamin with minerals (MULTIVITAMIN WOMENS 50+ ADV PO) 1 tablet, Daily    Synthroid 150 mcg, Oral, Every Morning    Wegovy 2.4 mg, Subcutaneous, Weekly       Medications Discontinued During This Encounter   Medication Reason    esomeprazole (nexIUM) 40 MG capsule Reorder    lisinopril-hydrochlorothiazide (PRINZIDE,ZESTORETIC) 20-12.5 MG per tablet Reorder

## 2025-03-22 ENCOUNTER — PATIENT MESSAGE (OUTPATIENT)
Dept: INTERNAL MEDICINE | Facility: CLINIC | Age: 55
End: 2025-03-22
Payer: OTHER GOVERNMENT

## 2025-03-22 DIAGNOSIS — N28.89 RENAL MASS, RIGHT: Primary | ICD-10-CM

## 2025-03-28 ENCOUNTER — PATIENT MESSAGE (OUTPATIENT)
Dept: INTERNAL MEDICINE | Facility: CLINIC | Age: 55
End: 2025-03-28
Payer: OTHER GOVERNMENT

## 2025-03-28 RX ORDER — BROMPHENIRAMINE MALEATE, PSEUDOEPHEDRINE HYDROCHLORIDE, AND DEXTROMETHORPHAN HYDROBROMIDE 2; 30; 10 MG/5ML; MG/5ML; MG/5ML
5 SYRUP ORAL 4 TIMES DAILY PRN
Qty: 118 ML | Refills: 0 | Status: SHIPPED | OUTPATIENT
Start: 2025-03-28

## 2025-04-07 DIAGNOSIS — N28.89 RENAL MASS: Primary | ICD-10-CM

## 2025-04-14 RX ORDER — AMLODIPINE BESYLATE 5 MG/1
5 TABLET ORAL DAILY
Qty: 90 TABLET | Refills: 1 | Status: SHIPPED | OUTPATIENT
Start: 2025-04-14

## 2025-04-15 NOTE — TELEPHONE ENCOUNTER
Caller: Xuan Horvath    Relationship to patient: Self    Best call back number: [phone]    Patient is needing: PATIENT CALLED STATING [facility] DID RECEIVE HER REFILL FOR AMLODIPINE BUT PATIENT STATES PROVIDER HAD CHANGED THE DOSAGE FROM 5MG TO 10MG DAILY AT HER LAST APPT AND THE OLD DOSAGE WAS WHAT THE PHARMACY RECEIVED. PATIENT REQUESTING IF A NEW PRESCRIPTION WITH THE UPDATED DOSAGE COULD BE RESENT TO THE PHARMACY. PATIENT ALSO WANTING TO LET PCP KNOW HER BLOOD PRESSURE HAS BEEN DOING AWESOME SINCE STARTING THE 10MG DOSAGE.

## 2025-04-16 ENCOUNTER — PATIENT MESSAGE (OUTPATIENT)
Dept: INTERNAL MEDICINE | Facility: CLINIC | Age: 55
End: 2025-04-16
Payer: OTHER GOVERNMENT

## 2025-04-17 ENCOUNTER — PATIENT MESSAGE (OUTPATIENT)
Dept: INTERNAL MEDICINE | Facility: CLINIC | Age: 55
End: 2025-04-17
Payer: OTHER GOVERNMENT

## 2025-04-23 RX ORDER — MONTELUKAST SODIUM 10 MG/1
10 TABLET ORAL NIGHTLY
Qty: 90 TABLET | Refills: 1 | Status: SHIPPED | OUTPATIENT
Start: 2025-04-23

## 2025-04-23 RX ORDER — AMLODIPINE BESYLATE 10 MG/1
10 TABLET ORAL DAILY
Qty: 90 TABLET | Refills: 1 | Status: SHIPPED | OUTPATIENT
Start: 2025-04-23

## 2025-04-28 NOTE — TELEPHONE ENCOUNTER
Protocol failed       GLP-1 Agonist Protocol [id number]/26/2025 10:15 AM   Protocol Details A1c in last six months

## 2025-04-29 RX ORDER — SEMAGLUTIDE 2.4 MG/.75ML
2.4 INJECTION, SOLUTION SUBCUTANEOUS WEEKLY
Qty: 6 ML | Refills: 1 | Status: SHIPPED | OUTPATIENT
Start: 2025-04-29

## 2025-05-01 DIAGNOSIS — K21.9 GASTROESOPHAGEAL REFLUX DISEASE, UNSPECIFIED WHETHER ESOPHAGITIS PRESENT: ICD-10-CM

## 2025-05-01 RX ORDER — ESOMEPRAZOLE MAGNESIUM 40 MG/1
40 CAPSULE, DELAYED RELEASE ORAL NIGHTLY
Qty: 90 CAPSULE | Refills: 1 | Status: SHIPPED | OUTPATIENT
Start: 2025-05-01

## 2025-05-03 DIAGNOSIS — I10 ESSENTIAL HYPERTENSION: ICD-10-CM

## 2025-05-05 RX ORDER — LISINOPRIL AND HYDROCHLOROTHIAZIDE 12.5; 2 MG/1; MG/1
1 TABLET ORAL DAILY
Qty: 90 TABLET | Refills: 1 | Status: SHIPPED | OUTPATIENT
Start: 2025-05-05

## 2025-05-15 ENCOUNTER — HOSPITAL ENCOUNTER (OUTPATIENT)
Dept: MRI IMAGING | Facility: HOSPITAL | Age: 55
Discharge: HOME | End: 2025-05-15
Admitting: INTERNAL MEDICINE
Payer: OTHER GOVERNMENT

## 2025-05-15 DIAGNOSIS — N28.89 RENAL MASS: ICD-10-CM

## 2025-05-15 PROCEDURE — A9577 INJ MULTIHANCE: HCPCS | Performed by: INTERNAL MEDICINE

## 2025-05-15 PROCEDURE — 74183 MRI ABD W/O CNTR FLWD CNTR: CPT

## 2025-05-15 PROCEDURE — 25510000002 GADOBENATE DIMEGLUMINE 529 MG/ML SOLUTION: Performed by: INTERNAL MEDICINE

## 2025-05-15 RX ADMIN — GADOBENATE DIMEGLUMINE 15 ML: 529 INJECTION, SOLUTION INTRAVENOUS at 12:57

## 2025-05-27 ENCOUNTER — TELEPHONE (OUTPATIENT)
Dept: INTERNAL MEDICINE | Facility: CLINIC | Age: 55
End: 2025-05-27
Payer: OTHER GOVERNMENT

## 2025-05-27 ENCOUNTER — PATIENT MESSAGE (OUTPATIENT)
Dept: INTERNAL MEDICINE | Facility: CLINIC | Age: 55
End: 2025-05-27
Payer: OTHER GOVERNMENT

## 2025-05-27 RX ORDER — SEMAGLUTIDE 2.4 MG/.75ML
2.4 INJECTION, SOLUTION SUBCUTANEOUS WEEKLY
Qty: 6 ML | Refills: 1 | Status: SHIPPED | OUTPATIENT
Start: 2025-05-27

## 2025-05-27 NOTE — TELEPHONE ENCOUNTER
Spoke with patient and she stated she is needing this paperwork as soon as possible. Please advise

## 2025-05-27 NOTE — TELEPHONE ENCOUNTER
Caller: Xuan Horvath    Relationship: Self    Best call back number: [phone]    What form or medical record are you requesting: [facility] RISK FORM    Who is requesting this form or medical record from you: PATIENT    How would you like to receive the form or medical records (pick-up, mail, fax):   If fax, what is the fax number: N/A  If mail, what is the address: N/A  If pick-up, provide patient with address and location details    Timeframe paperwork needed: ASAP    Additional notes: PATIENT DROPPED THIS FORM OFF OVER 2 WEEKS AGO. SHE HAS NOT HEARD BACK ABOUT THIS BEING READY FOR PICKUP. PLEASE CALL PATIENT WHEN FORM IS COMPLETED AND SIGNED AND AT THE  READY FOR PICKUP.

## 2025-05-27 NOTE — TELEPHONE ENCOUNTER
I am not sure that I have seen this.  Please look for it.  I will be in my office all day if it needs a signature.  This is often something that is done with Jessica so it is possible it did not end up with me.

## 2025-05-28 ENCOUNTER — PATIENT MESSAGE (OUTPATIENT)
Dept: INTERNAL MEDICINE | Facility: CLINIC | Age: 55
End: 2025-05-28
Payer: OTHER GOVERNMENT

## 2025-05-28 DIAGNOSIS — K42.9 UMBILICAL HERNIA WITHOUT OBSTRUCTION AND WITHOUT GANGRENE: Primary | ICD-10-CM

## 2025-05-30 ENCOUNTER — TELEPHONE (OUTPATIENT)
Dept: UROLOGY | Age: 55
End: 2025-05-30
Payer: OTHER GOVERNMENT

## 2025-05-30 NOTE — TELEPHONE ENCOUNTER
PATIENT CALLED.  SHE HAS AN APPOINTMENT 06/13/25 FOR A LESION ON HER RIGHT KIDNEY,  WITH CONCERN FOR CANCER.    SHE WANTS TO KNOW IF THERE ARE ANY TESTS THAT DR. VELEZ WANTS HER TO HAVE PRIOR TO THE APPOINTMENT, AS ALL HE WILL HAVE TO GO OFF OF IS THE MRI.    SHE ASKED IF THERE IS CANCELLATION LIST FOR SOONER APPOINTMENT.    #[phone]

## 2025-05-30 NOTE — TELEPHONE ENCOUNTER
I CALLED THE PATIENT AND TOLD HIM, PER ISAIAH:  Dr Rios will discuss the process at her appt on 6/13. Nothing further to do at this time and no sooner appt available

## 2025-06-09 ENCOUNTER — PATIENT MESSAGE (OUTPATIENT)
Dept: INTERNAL MEDICINE | Facility: CLINIC | Age: 55
End: 2025-06-09
Payer: OTHER GOVERNMENT

## 2025-06-09 PROBLEM — N28.89 RENAL MASS: Status: ACTIVE | Noted: 2025-06-09

## 2025-06-09 NOTE — PROGRESS NOTES
Chief Complaint: Urologic complaint    Subjective         History of Present Illness                Enhancing renal lesion  Nephrolithiasis        11/4/2024 urine culture-negative      5/15/2025 MRI abdomen with and without - small adjacent left adrenal nodules largest measuring 1.1 cm.  Consistent with benign lipid rich adenomas.  8 x 9 mm heterogenous lesion in the medial aspect of the right inferior pole.  Stable  1/2/2025 CT abdomen/pelvis with - 9 mm lesion at the medial lower pole right kidney.  Few punctate nonobstructing stones.  Images reviewed.    1/25 0.77, GFR 91, UA - negative  no cardiopulmonary history  No anticoagulation           Previous     6/24 calcium oxalate monohydrate 85, calcium oxalate dihydrate 10     5/22/2024 left ureteroscopy - stent with string - all stones removed in the left         4/26/2024   CT abdomen/pelvis with - 6 mm stone seen in the proximal left ureter.  3  -2 mm stones in the lower pole left kidney.  4 mm nonobstructing stone right kidney.  Images reviewed       2 previous stones passed spontaneously, 2 lithotripsy  Episode of sepsis      Objective     Past Medical History:   Diagnosis Date    Allergic     Anxiety     Carpal tunnel syndrome on left 11/11/2024    Colon cancer screening     Colon polyp     Depression 04/07/2015    working well, continue citalopram discussed that she can stay on meds long term or go off them whenever she is ready    Essential hypertension 04/06/2016    stable will try to decrease atenolol and see if that will give her more energy, need to watch bp closely    GERD (gastroesophageal reflux disease) January 2023    Prilosec. Protonix.    Glaucoma 2020    Heart murmur     DENIES CP/SOA. FOLLOWED BY PCP. ACTIVE. STATES TOLD SHE DID NOT HAVE MURMUR    Hiatal hernia     REPORTS HAD REPAIR    Hypertension     Hypothyroidism     Kidney stone     Obesity 04/06/2016    she is going to work on weight by trying to go to the gym she is working on this with  her son    Sleep apnea     COMPLIANT WITH CPAP     UTI (urinary tract infection)     NO ISSUES CURRENTLY    Vitamin D deficiency 04/07/2015    will repeat labs continue current OTC meds for now                         Assessment and Plan    Diagnoses and all orders for this visit:    1. Renal mass (Primary)         Adrenal lesion/adenoma        Based on MRI results patient's adrenal lesion is likely adenoma.  We discussed this today.  She has not known about this prior so we will go ahead and get adrenal mass testing and orders were placed.          Possible enhancing renal lesion      Discussed with patient there is a small risk this could be a renal cell carcinoma, we will continue to follow this to make sure that it is not growing or changing, has been stable for the last several months was a good sign we discussed this today.  Patient understands she must follow-up or could be detrimental to her health or cause death.      Follow-up in 1 year with MRI abdomen with and without contrast

## 2025-06-13 ENCOUNTER — OFFICE VISIT (OUTPATIENT)
Dept: UROLOGY | Age: 55
End: 2025-06-13
Payer: OTHER GOVERNMENT

## 2025-06-13 VITALS — BODY MASS INDEX: 31.36 KG/M2 | WEIGHT: 177 LBS

## 2025-06-13 DIAGNOSIS — N28.89 RENAL MASS: Primary | ICD-10-CM

## 2025-06-13 DIAGNOSIS — E27.8 ADRENAL MASS: ICD-10-CM

## 2025-06-13 LAB
BILIRUB BLD-MCNC: NEGATIVE MG/DL
CLARITY, POC: CLEAR
COLOR UR: YELLOW
EXPIRATION DATE: ABNORMAL
GLUCOSE UR STRIP-MCNC: NEGATIVE MG/DL
KETONES UR QL: NEGATIVE
LEUKOCYTE EST, POC: NEGATIVE
Lab: ABNORMAL
NITRITE UR-MCNC: NEGATIVE MG/ML
PH UR: 6.5 [PH] (ref 5–8)
PROT UR STRIP-MCNC: NEGATIVE MG/DL
RBC # UR STRIP: ABNORMAL /UL
SP GR UR: 1.03 (ref 1–1.03)
UROBILINOGEN UR QL: ABNORMAL

## 2025-06-13 RX ORDER — DEXAMETHASONE 1 MG
TABLET ORAL
Qty: 1 TABLET | Refills: 0 | Status: SHIPPED | OUTPATIENT
Start: 2025-06-13

## 2025-06-19 ENCOUNTER — PREP FOR SURGERY (OUTPATIENT)
Dept: OTHER | Facility: HOSPITAL | Age: 55
End: 2025-06-19
Payer: OTHER GOVERNMENT

## 2025-06-19 ENCOUNTER — OFFICE VISIT (OUTPATIENT)
Dept: SURGERY | Facility: CLINIC | Age: 55
End: 2025-06-19
Payer: OTHER GOVERNMENT

## 2025-06-19 ENCOUNTER — LAB (OUTPATIENT)
Facility: HOSPITAL | Age: 55
End: 2025-06-19
Payer: OTHER GOVERNMENT

## 2025-06-19 VITALS — HEIGHT: 63 IN | RESPIRATION RATE: 20 BRPM | BODY MASS INDEX: 29.62 KG/M2 | WEIGHT: 167.2 LBS

## 2025-06-19 DIAGNOSIS — E27.8 ADRENAL MASS: ICD-10-CM

## 2025-06-19 DIAGNOSIS — K42.9 UMBILICAL HERNIA WITHOUT OBSTRUCTION AND WITHOUT GANGRENE: Primary | ICD-10-CM

## 2025-06-19 LAB — CORTIS AM PEAK SERPL-MCNC: 1.08 MCG/DL (ref 6.02–18.4)

## 2025-06-19 PROCEDURE — 36415 COLL VENOUS BLD VENIPUNCTURE: CPT

## 2025-06-19 PROCEDURE — 82533 TOTAL CORTISOL: CPT

## 2025-06-19 PROCEDURE — 83835 ASSAY OF METANEPHRINES: CPT

## 2025-06-19 RX ORDER — ONDANSETRON 2 MG/ML
4 INJECTION INTRAMUSCULAR; INTRAVENOUS EVERY 6 HOURS PRN
OUTPATIENT
Start: 2025-06-19

## 2025-06-19 RX ORDER — SODIUM CHLORIDE 9 MG/ML
40 INJECTION, SOLUTION INTRAVENOUS AS NEEDED
OUTPATIENT
Start: 2025-06-19

## 2025-06-19 RX ORDER — CLINDAMYCIN PHOSPHATE 900 MG/50ML
900 INJECTION, SOLUTION INTRAVENOUS ONCE
OUTPATIENT
Start: 2025-06-19 | End: 2025-06-19

## 2025-06-19 RX ORDER — SODIUM CHLORIDE 0.9 % (FLUSH) 0.9 %
10 SYRINGE (ML) INJECTION EVERY 12 HOURS SCHEDULED
OUTPATIENT
Start: 2025-06-19

## 2025-06-19 RX ORDER — SODIUM CHLORIDE 0.9 % (FLUSH) 0.9 %
10 SYRINGE (ML) INJECTION AS NEEDED
OUTPATIENT
Start: 2025-06-19

## 2025-06-19 NOTE — PROGRESS NOTES
Chief Complaint: Hernia (New patient, umbilical hernia)    Subjective       History of Present Illness    Xuan Horvath is a 55 y.o. female presents to [facility]   History of Present Illness  The patient presents for evaluation of a periumbilical hernia.    She underwent an MRI in 01/2025 due to pain localized to the right of her umbilicus. The MRI revealed the presence of a hernia, among other findings. She has experienced two severe episodes of pain associated with a noticeable bulge. Her primary concern is the potential for strangulation of the hernia. She maintains a healthy diet and engages in regular exercise, including running. She also reports occasional constipation and expresses fear of exacerbating the hernia during bowel movements.    She also has a hiatal hernia, which was previously repaired by Dr. Sagastume during her sleeve surgery. She is currently on Nexium and plans to consult with Dr. Shafer regarding further management of her hiatal hernia.    PAST SURGICAL HISTORY:  - Sleeve surgery with hiatal hernia repair by Dr. Sagastume  - Inguinal hernia repairs (multiple)  - Colonoscopy (approximately 10 years ago)  - Precancerous polyp removal    SOCIAL HISTORY  Exercise: Runs; walks on a treadmill; exercises regularly.  Diet: Good diet.  Objective     Past Medical History:   Diagnosis Date    Allergic     Anxiety     Carpal tunnel syndrome on left 11/11/2024    Colon cancer screening     Colon polyp     Depression 04/07/2015    working well, continue citalopram discussed that she can stay on meds long term or go off them whenever she is ready    Essential hypertension 04/06/2016    stable will try to decrease atenolol and see if that will give her more energy, need to watch bp closely    GERD (gastroesophageal reflux disease) January 2023    Prilosec. Protonix.    Glaucoma 2020    Heart murmur     DENIES CP/SOA. FOLLOWED BY PCP. ACTIVE. STATES TOLD SHE DID NOT HAVE  MURMUR    Hiatal hernia     REPORTS HAD REPAIR    Hypertension     Hypothyroidism     Kidney stone     Obesity 2016    she is going to work on weight by trying to go to the gym she is working on this with her son    Sleep apnea     COMPLIANT WITH CPAP     UTI (urinary tract infection)     NO ISSUES CURRENTLY    Vitamin D deficiency 2015    will repeat labs continue current OTC meds for now       Past Surgical History:   Procedure Laterality Date    ABDOMINAL SURGERY  20    BARIATRIC SURGERY      Gastric sleeve    BREAST AUGMENTATION Bilateral 2023    breast implants    BREAST SURGERY      Breast lift/implants    CARPAL TUNNEL RELEASE Right     CARPAL TUNNEL RELEASE Left 2025    Procedure: CARPAL TUNNEL RELEASE;  Surgeon: Cisco Isaacs MD;  [place] [facility] [place] [facility] [place]  Service: [place]opedics;  Laterality: Left;     SECTION  ,,2003    x3    COLONOSCOPY N/A 2022    Procedure: COLONOSCOPY WITH POLYPECTOMY COLD SNARE;  Surgeon: Luis Sterling MD;  Location: [facility] ENDOSCOPY;  Service: Gastroenterology;  Laterality: N/A;  COLON POLYP    COLONOSCOPY      ENDOSCOPY N/A 2020    Procedure: ESOPHAGOGASTRODUODENOSCOPY WITH BIOPSY;  Surgeon: Ron Cadet Jr., MD;  Location: [facility] ENDOSCOPY;  Service: General;  Laterality: N/A;  PRE- DYSPEPSIA  POST- GASTRITIS, GASTRIC POLYPS, ESOPHAGITIS, HIATAL HERNIA      ENDOSCOPY N/A 2023    Procedure: ESOPHAGOGASTRODUODENOSCOPY WITH BIOPSIES;  Surgeon: Luis Sterling MD;  Location: [facility] ENDOSCOPY;  Service: Gastroenterology;  Laterality: N/A;  PRIOR GASTRIC SLEEVE    GASTRIC SLEEVE LAPAROSCOPIC N/A 2020    Procedure: GASTRIC SLEEVE LAPAROSCOPIC With Hiatal Hernia Repair;  Surgeon: Ron Cadet Jr., MD;  [place] [facility] [place] [facility] [place]  Service: Bariatric;  Laterality: N/A;    HAND SURGERY      Carpal tunnel surgery    HERNIA REPAIR  20    Hiatal hernia    KIDNEY STONE SURGERY   5/2024    PANNICULECTOMY      SUBTOTAL HYSTERECTOMY  2004    UPPER GASTROINTESTINAL ENDOSCOPY      URETEROSCOPY LASER LITHOTRIPSY WITH STENT INSERTION Left 05/22/2024    Procedure: Cystoscopy with left ureteroscopy with laser and left renal stent exchange;  Surgeon: Salazar Rios MD;  Location: [facility] MAIN OR;  Service: Urology;  Laterality: Left;    URETEROSCOPY STENT INSERTION Left 04/27/2024    Procedure: Cystoscopy with left ureteral stent placement;  Surgeon: Salazar Rios MD;  Location: [facility] MAIN OR;  Service: Urology;  Laterality: Left;         Current Outpatient Medications:     amLODIPine (NORVASC) 10 MG tablet, Take 1 tablet by mouth Daily., Disp: 90 tablet, Rfl: 1    Calcium Carb-Cholecalciferol (CALCIUM 600/VITAMIN D PO), Take 2 tablets by mouth Daily., Disp: , Rfl:     cetirizine (zyrTEC) 10 MG tablet, Take 1 tablet by mouth Daily., Disp: 90 tablet, Rfl: 3    citalopram (CeleXA) 40 MG tablet, Take 1 tablet by mouth Daily., Disp: 90 tablet, Rfl: 1    dexAMETHasone (DECADRON) 1 MG tablet, Take 1 pill at 11 PM the night prior to morning serum cortisol and plasma free metanephrines test, Disp: 1 tablet, Rfl: 0    esomeprazole (nexIUM) 40 MG capsule, Take 1 capsule by mouth Every Night., Disp: 90 capsule, Rfl: 1    lisinopril-hydrochlorothiazide (PRINZIDE,ZESTORETIC) 20-12.5 MG per tablet, Take 1 tablet by mouth Daily., Disp: 90 tablet, Rfl: 1    montelukast (Singulair) 10 MG tablet, Take 1 tablet by mouth Every Night., Disp: 90 tablet, Rfl: 1    multivitamin with minerals (MULTIVITAMIN WOMENS 50+ ADV PO), Take 1 tablet by mouth Daily., Disp: , Rfl:     Semaglutide-Weight Management (Wegovy) 2.4 MG/0.75ML solution auto-injector, Inject 0.75 mL under the skin into the appropriate area as directed 1 (One) Time Per Week., Disp: 6 mL, Rfl: 1    Synthroid 150 MCG tablet, Take 1 tablet by mouth Every Morning., Disp: 90 tablet, Rfl: 1    brompheniramine-pseudoephedrine-DM 30-2-10 MG/5ML syrup, Take 5 mL  "by mouth 4 (Four) Times a Day As Needed for Allergies., Disp: 118 mL, Rfl: 0    Allergies   Allergen Reactions    Penicillins Anaphylaxis    Contrave [Naltrexone-Bupropion Hcl Er] Other (See Comments)     Severe constipation and insomnia        Family History   Problem Relation Age of Onset    Diabetes Mother     Hypertension Mother     Hypertension Father     Learning disabilities Son     Learning disabilities Son     Malig Hyperthermia Neg Hx     Colon cancer Neg Hx        Social History     Socioeconomic History    Marital status:    Tobacco Use    Smoking status: Never     Passive exposure: Never    Smokeless tobacco: Never   Vaping Use    Vaping status: Never Used   Substance and Sexual Activity    Alcohol use: Never    Drug use: Never    Sexual activity: Yes     Partners: Male     Birth control/protection: Hysterectomy     Comment: Hysterectomy 2004       Vital Signs:   Resp 20   Ht 160 cm (62.99\")   Wt 75.8 kg (167 lb 3.2 oz)   BMI 29.63 kg/m²      Physical Exam   Physical Exam  General: No acute distress.  Gastrointestinal: There is a slight periumbilical bulge, but no reducible hernia.      Result Review :         Procedures   Results  Imaging   - MRI: Periumbilical hernia containing fat.   - CT scan: 01/2025, Hiatal hernia.           Assessment and Plan   Diagnoses and all orders for this visit:    1. Umbilical hernia without obstruction and without gangrene (Primary)          Assessment & Plan  1. Periumbilical hernia.  Reports pain and a bulge to the right of the belly button, present since January. MRI confirmed a periumbilical hernia containing fat. A robotic periumbilical hernia repair with permanent mesh is planned. Risks, benefits, and alternatives of the procedure were discussed, including risks of bleeding, infection, and injury to surrounding structures. Postoperative restrictions include no heavy lifting >15 pounds for 4 weeks and no swimming or bathing for 2 weeks. Pain medication " will be provided for the first few days post-surgery. All questions were answered, and understanding was voiced, agreeing to proceed with the plan.    2. Hiatal hernia.  History of a hiatal hernia, previously repaired during sleeve gastrectomy. January CT scan showed a small hiatal hernia. Given the complexity of repair post-sleeve gastrectomy, medical management with medications like Nexium is recommended. If reflux symptoms become severe, converting the sleeve to a bypass may be considered, to be discussed further with her specialist.       Follow Up   No follow-ups on file.  Patient was given instructions and counseling regarding her condition or for health maintenance advice. Please see specific information pulled into the AVS if appropriate.     Discussed with the patient - all questions were answered they voiced understanding and agreed to proceed with above plan    Patient or patient representative verbalized consent for the use of Ambient Listening during the visit with  Ron Howell MD for chart documentation. 6/19/2025  14:18 EDT    Ron Howell MD

## 2025-06-19 NOTE — H&P (VIEW-ONLY)
Chief Complaint: Hernia (New patient, umbilical hernia)    Subjective       History of Present Illness    Xuan Horvath is a 55 y.o. female presents to [facility] GENERAL SURGERY   History of Present Illness  The patient presents for evaluation of a periumbilical hernia.    She underwent an MRI in 01/2025 due to pain localized to the right of her umbilicus. The MRI revealed the presence of a hernia, among other findings. She has experienced two severe episodes of pain associated with a noticeable bulge. Her primary concern is the potential for strangulation of the hernia. She maintains a healthy diet and engages in regular exercise, including running. She also reports occasional constipation and expresses fear of exacerbating the hernia during bowel movements.    She also has a hiatal hernia, which was previously repaired by Dr. Sagastume during her sleeve surgery. She is currently on Nexium and plans to consult with Dr. Shafer regarding further management of her hiatal hernia.    PAST SURGICAL HISTORY:  - Sleeve surgery with hiatal hernia repair by Dr. Sagastume  - Inguinal hernia repairs (multiple)  - Colonoscopy (approximately 10 years ago)  - Precancerous polyp removal    SOCIAL HISTORY  Exercise: Runs; walks on a treadmill; exercises regularly.  Diet: Good diet.  Objective     Past Medical History:   Diagnosis Date    Allergic     Anxiety     Carpal tunnel syndrome on left 11/11/2024    Colon cancer screening     Colon polyp     Depression 04/07/2015    working well, continue citalopram discussed that she can stay on meds long term or go off them whenever she is ready    Essential hypertension 04/06/2016    stable will try to decrease atenolol and see if that will give her more energy, need to watch bp closely    GERD (gastroesophageal reflux disease) January 2023    Prilosec. Protonix.    Glaucoma 2020    Heart murmur     DENIES CP/SOA. FOLLOWED BY PCP. ACTIVE. STATES TOLD SHE DID NOT HAVE  MURMUR    Hiatal hernia     REPORTS HAD REPAIR    Hypertension     Hypothyroidism     Kidney stone     Obesity 2016    she is going to work on weight by trying to go to the gym she is working on this with her son    Sleep apnea     COMPLIANT WITH CPAP     UTI (urinary tract infection)     NO ISSUES CURRENTLY    Vitamin D deficiency 2015    will repeat labs continue current OTC meds for now       Past Surgical History:   Procedure Laterality Date    ABDOMINAL SURGERY  20    BARIATRIC SURGERY      Gastric sleeve    BREAST AUGMENTATION Bilateral 2023    breast implants    BREAST SURGERY      Breast lift/implants    CARPAL TUNNEL RELEASE Right     CARPAL TUNNEL RELEASE Left 2025    Procedure: CARPAL TUNNEL RELEASE;  Surgeon: Cisco Isaacs MD;  Location: [facility] OR [facility];  Service: Orthopedics;  Laterality: Left;     SECTION  ,,2003    x3    COLONOSCOPY N/A 2022    Procedure: COLONOSCOPY WITH POLYPECTOMY COLD SNARE;  Surgeon: Luis Sterling MD;  Location: [facility] ENDOSCOPY;  Service: Gastroenterology;  Laterality: N/A;  COLON POLYP    COLONOSCOPY      ENDOSCOPY N/A 2020    Procedure: ESOPHAGOGASTRODUODENOSCOPY WITH BIOPSY;  Surgeon: Ron Cadet Jr., MD;  Location: [facility] ENDOSCOPY;  Service: General;  Laterality: N/A;  PRE- DYSPEPSIA  POST- GASTRITIS, GASTRIC POLYPS, ESOPHAGITIS, HIATAL HERNIA      ENDOSCOPY N/A 2023    Procedure: ESOPHAGOGASTRODUODENOSCOPY WITH BIOPSIES;  Surgeon: Luis Sterling MD;  Location: [facility] ENDOSCOPY;  Service: Gastroenterology;  Laterality: N/A;  PRIOR GASTRIC SLEEVE    GASTRIC SLEEVE LAPAROSCOPIC N/A 2020    Procedure: GASTRIC SLEEVE LAPAROSCOPIC With Hiatal Hernia Repair;  Surgeon: Ron Cadet Jr., MD;  Location: [facility] OR [facility];  Service: Bariatric;  Laterality: N/A;    HAND SURGERY      Carpal tunnel surgery    HERNIA REPAIR  20    Hiatal hernia    KIDNEY STONE SURGERY   5/2024    PANNICULECTOMY      SUBTOTAL HYSTERECTOMY  2004    UPPER GASTROINTESTINAL ENDOSCOPY      URETEROSCOPY LASER LITHOTRIPSY WITH STENT INSERTION Left 05/22/2024    Procedure: Cystoscopy with left ureteroscopy with laser and left renal stent exchange;  Surgeon: Salazar Rios MD;  Location: [facility] MAIN OR;  Service: Urology;  Laterality: Left;    URETEROSCOPY STENT INSERTION Left 04/27/2024    Procedure: Cystoscopy with left ureteral stent placement;  Surgeon: Salazar Rios MD;  Location: [facility] MAIN OR;  Service: Urology;  Laterality: Left;         Current Outpatient Medications:     amLODIPine (NORVASC) 10 MG tablet, Take 1 tablet by mouth Daily., Disp: 90 tablet, Rfl: 1    Calcium Carb-Cholecalciferol (CALCIUM 600/VITAMIN D PO), Take 2 tablets by mouth Daily., Disp: , Rfl:     cetirizine (zyrTEC) 10 MG tablet, Take 1 tablet by mouth Daily., Disp: 90 tablet, Rfl: 3    citalopram (CeleXA) 40 MG tablet, Take 1 tablet by mouth Daily., Disp: 90 tablet, Rfl: 1    dexAMETHasone (DECADRON) 1 MG tablet, Take 1 pill at 11 PM the night prior to morning serum cortisol and plasma free metanephrines test, Disp: 1 tablet, Rfl: 0    esomeprazole (nexIUM) 40 MG capsule, Take 1 capsule by mouth Every Night., Disp: 90 capsule, Rfl: 1    lisinopril-hydrochlorothiazide (PRINZIDE,ZESTORETIC) 20-12.5 MG per tablet, Take 1 tablet by mouth Daily., Disp: 90 tablet, Rfl: 1    montelukast (Singulair) 10 MG tablet, Take 1 tablet by mouth Every Night., Disp: 90 tablet, Rfl: 1    multivitamin with minerals (MULTIVITAMIN WOMENS 50+ ADV PO), Take 1 tablet by mouth Daily., Disp: , Rfl:     Semaglutide-Weight Management (Wegovy) 2.4 MG/0.75ML solution auto-injector, Inject 0.75 mL under the skin into the appropriate area as directed 1 (One) Time Per Week., Disp: 6 mL, Rfl: 1    Synthroid 150 MCG tablet, Take 1 tablet by mouth Every Morning., Disp: 90 tablet, Rfl: 1    brompheniramine-pseudoephedrine-DM 30-2-10 MG/5ML syrup, Take 5 mL  "by mouth 4 (Four) Times a Day As Needed for Allergies., Disp: 118 mL, Rfl: 0    Allergies   Allergen Reactions    Penicillins Anaphylaxis    Contrave [Naltrexone-Bupropion Hcl Er] Other (See Comments)     Severe constipation and insomnia        Family History   Problem Relation Age of Onset    Diabetes Mother     Hypertension Mother     Hypertension Father     Learning disabilities Son     Learning disabilities Son     Malig Hyperthermia Neg Hx     Colon cancer Neg Hx        Social History     Socioeconomic History    Marital status:    Tobacco Use    Smoking status: Never     Passive exposure: Never    Smokeless tobacco: Never   Vaping Use    Vaping status: Never Used   Substance and Sexual Activity    Alcohol use: Never    Drug use: Never    Sexual activity: Yes     Partners: Male     Birth control/protection: Hysterectomy     Comment: Hysterectomy 2004       Vital Signs:   Resp 20   Ht 160 cm (62.99\")   Wt 75.8 kg (167 lb 3.2 oz)   BMI 29.63 kg/m²      Physical Exam   Physical Exam  General: No acute distress.  Gastrointestinal: There is a slight periumbilical bulge, but no reducible hernia.      Result Review :         Procedures   Results  Imaging   - MRI: Periumbilical hernia containing fat.   - CT scan: 01/2025, Hiatal hernia.           Assessment and Plan   Diagnoses and all orders for this visit:    1. Umbilical hernia without obstruction and without gangrene (Primary)          Assessment & Plan  1. Periumbilical hernia.  Reports pain and a bulge to the right of the belly button, present since January. MRI confirmed a periumbilical hernia containing fat. A robotic periumbilical hernia repair with permanent mesh is planned. Risks, benefits, and alternatives of the procedure were discussed, including risks of bleeding, infection, and injury to surrounding structures. Postoperative restrictions include no heavy lifting >15 pounds for 4 weeks and no swimming or bathing for 2 weeks. Pain medication " will be provided for the first few days post-surgery. All questions were answered, and understanding was voiced, agreeing to proceed with the plan.    2. Hiatal hernia.  History of a hiatal hernia, previously repaired during sleeve gastrectomy. January CT scan showed a small hiatal hernia. Given the complexity of repair post-sleeve gastrectomy, medical management with medications like Nexium is recommended. If reflux symptoms become severe, converting the sleeve to a bypass may be considered, to be discussed further with her specialist.       Follow Up   No follow-ups on file.  Patient was given instructions and counseling regarding her condition or for health maintenance advice. Please see specific information pulled into the AVS if appropriate.     Discussed with the patient - all questions were answered they voiced understanding and agreed to proceed with above plan    Patient or patient representative verbalized consent for the use of Ambient Listening during the visit with  Ron Howell MD for chart documentation. 6/19/2025  14:18 EDT    Ron Howell MD

## 2025-06-25 ENCOUNTER — RESULTS FOLLOW-UP (OUTPATIENT)
Dept: UROLOGY | Age: 55
End: 2025-06-25
Payer: OTHER GOVERNMENT

## 2025-06-25 DIAGNOSIS — N28.89 RENAL MASS: Primary | ICD-10-CM

## 2025-06-25 LAB
METANEPH FREE SERPL-MCNC: NORMAL PG/ML
NORMETANEPHRINE SERPL-MCNC: NORMAL PG/ML

## 2025-06-25 NOTE — TELEPHONE ENCOUNTER
Pt answered.   I relayed Dr. Rios message to the Pt - Let patient know her cortisol numbers were good, for some reason they could not find her specimen through the plasma metanephrines.  If she is okay we need to have her get blood redrawn and reorder   metanephrines, get drawn before 9:30 in the morning     Pt verbalized they would be able to get the lab redone before 9:30 tomorrow. I will put the order in. Pt verbalized understanding.

## 2025-06-25 NOTE — TELEPHONE ENCOUNTER
----- Message from Salazar Rios sent at 6/25/2025  3:28 PM EDT -----  Regarding: lab    Let patient know her cortisol numbers were good, for some reason they could not find her specimen through the plasma metanephrines.  If she is okay we need to have her get blood redrawn and reorder   metanephrines, get drawn before 9:30 in the morning  ----- Message -----  From: Lab, Background User  Sent: 6/19/2025   9:02 AM EDT  To: Salazar Rios MD

## 2025-06-26 ENCOUNTER — LAB (OUTPATIENT)
Facility: HOSPITAL | Age: 55
End: 2025-06-26
Payer: OTHER GOVERNMENT

## 2025-06-26 DIAGNOSIS — N28.89 RENAL MASS: ICD-10-CM

## 2025-06-26 PROCEDURE — 36415 COLL VENOUS BLD VENIPUNCTURE: CPT

## 2025-06-26 PROCEDURE — 83835 ASSAY OF METANEPHRINES: CPT

## 2025-06-26 NOTE — PRE-PROCEDURE INSTRUCTIONS
IMPORTANT INSTRUCTIONS - PRE-ADMISSION TESTING  DO NOT EAT/DRINK  OR CHEW anything after midnight the night before your procedure.      Take the following medications the morning of your procedure with JUST A SIP OF WATER:  _____AMLODIPINE, ZYRTEC, CELEXA, SYNTHROID __________________________________________________________________________________________________________________________________________________________________________________    DO NOT BRING your medications to the hospital with you, UNLESS something has changed since your PRE-Admission Testing appointment.  Hold all vitamins, supplements, and NSAIDS (Non- steroidal anti-inflammatory meds) for one week prior to surgery (you MAY take Tylenol or Acetaminophen).  If you are diabetic, check your blood sugar the morning of your procedure. If it is less than 70 or if you are feeling symptomatic, call the following number for further instructions: [phone]_.  Use your inhalers/nebulizers as usual, the morning of your procedure. BRING YOUR INHALERS with you.   Bring your CPAP or BIPAP to hospital, ONLY IF YOU WILL BE SPENDING THE NIGHT.   Make sure you have a ride home and have someone who will stay with you the day of your procedure after you go home.  If you have any questions, please call your Pre-Admission Testing Nurse,DIANNE________________ at [phone]______.   Per anesthesia request, do not smoke for 24 hours before your procedure or as instructed by your surgeon.      PREOPERATIVE (BEFORE SURGERY)              BATHING INSTRUCTIONS  Instructions:    You will need to shower 1 times utilizing the soap provided; at the times indicated   below:     MORNING OF SURGERY      Wash your hair and face with normal shampoo and soap, rinse it well before using the surgical soap.      In the shower, wet the skin completely with water from your neck to your feet. Apply the cleanser to your   body ONLY FROM THE NECK TO YOUR FEET.     Do NOT USE THE CLEANSER  ON YOUR FACE, HEAD, OR GENITAL (PRIVATE) AREAS.   Keep it out of your eyes, ears, and mouth because of the risk of injury to those areas.      Scrub with a clean washcloth for each bath utilizing the soap provided from the top of your body to the   bottom starting at the neck area.      Pay close attention to your armpits, groin area, and the site of surgery.      Wash your body gently for 5 minutes. Stand outside the stream or turn off the water while scrubbing your   body. Do NOT wash with your regular soap after the surgical cleanser is used.      RINSE THE CLEANSER OFF COMPLETELY with plenty of water. Rinse the area again thoroughly.      Dry off with a clean towel. The surgical soap can cause dryness; however do NOT APPLY LOTION,   CREAM, POWDER, and/or DEODORANT AFTER SHOWERING.     Be sure to where clean clothes after showering.      Ensure CLEAN BED LINENS AFTER FIRST wash with the surgical soap.      NO PETS ALLOWED IN THE BED with you after utilizing the surgical soap.

## 2025-06-29 LAB
METANEPH FREE SERPL-MCNC: <25 PG/ML (ref 0–88)
NORMETANEPHRINE SERPL-MCNC: 28.7 PG/ML (ref 0–244)

## 2025-06-30 ENCOUNTER — ANESTHESIA EVENT (OUTPATIENT)
Dept: PERIOP | Facility: HOSPITAL | Age: 55
End: 2025-06-30
Payer: OTHER GOVERNMENT

## 2025-06-30 ENCOUNTER — HOSPITAL ENCOUNTER (OUTPATIENT)
Facility: HOSPITAL | Age: 55
Setting detail: HOSPITAL OUTPATIENT SURGERY
Discharge: HOME | End: 2025-06-30
Attending: SURGERY | Admitting: SURGERY
Payer: OTHER GOVERNMENT

## 2025-06-30 ENCOUNTER — RESULTS FOLLOW-UP (OUTPATIENT)
Dept: UROLOGY | Age: 55
End: 2025-06-30
Payer: OTHER GOVERNMENT

## 2025-06-30 ENCOUNTER — ANESTHESIA (OUTPATIENT)
Dept: PERIOP | Facility: HOSPITAL | Age: 55
End: 2025-06-30
Payer: OTHER GOVERNMENT

## 2025-06-30 VITALS
SYSTOLIC BLOOD PRESSURE: 132 MMHG | HEART RATE: 77 BPM | HEIGHT: 63 IN | DIASTOLIC BLOOD PRESSURE: 97 MMHG | TEMPERATURE: 98.2 F | WEIGHT: 166.23 LBS | BODY MASS INDEX: 29.45 KG/M2 | OXYGEN SATURATION: 91 % | RESPIRATION RATE: 14 BRPM

## 2025-06-30 DIAGNOSIS — K42.9 UMBILICAL HERNIA WITHOUT OBSTRUCTION AND WITHOUT GANGRENE: ICD-10-CM

## 2025-06-30 LAB
ANION GAP SERPL CALCULATED.3IONS-SCNC: 10.3 MMOL/L (ref 5–15)
BUN SERPL-MCNC: 12.3 MG/DL (ref 6–20)
BUN/CREAT SERPL: 20.5 (ref 7–25)
CALCIUM SPEC-SCNC: 9.2 MG/DL (ref 8.6–10.5)
CHLORIDE SERPL-SCNC: 106 MMOL/L (ref 98–107)
CO2 SERPL-SCNC: 25.7 MMOL/L (ref 22–29)
CREAT SERPL-MCNC: 0.6 MG/DL (ref 0.57–1)
EGFRCR SERPLBLD CKD-EPI 2021: 106.2 ML/MIN/1.73
GLUCOSE SERPL-MCNC: 88 MG/DL (ref 65–99)
POTASSIUM SERPL-SCNC: 3.8 MMOL/L (ref 3.5–5.2)
SODIUM SERPL-SCNC: 142 MMOL/L (ref 136–145)

## 2025-06-30 PROCEDURE — 80048 BASIC METABOLIC PNL TOTAL CA: CPT | Performed by: ANESTHESIOLOGY

## 2025-06-30 PROCEDURE — 25010000002 MIDAZOLAM PER 1MG: Performed by: ANESTHESIOLOGY

## 2025-06-30 PROCEDURE — 25010000002 DEXAMETHASONE PER 1 MG

## 2025-06-30 PROCEDURE — 25010000002 FENTANYL CITRATE (PF) 50 MCG/ML SOLUTION

## 2025-06-30 PROCEDURE — 25010000002 SUGAMMADEX 200 MG/2ML SOLUTION

## 2025-06-30 PROCEDURE — 25810000003 LACTATED RINGERS PER 1000 ML: Performed by: ANESTHESIOLOGY

## 2025-06-30 PROCEDURE — 49593 RPR AA HRN 1ST 3-10 RDC: CPT | Performed by: SPECIALIST/TECHNOLOGIST, OTHER

## 2025-06-30 PROCEDURE — 25010000002 ONDANSETRON PER 1 MG

## 2025-06-30 PROCEDURE — 25010000002 LIDOCAINE 1 % SOLUTION 20 ML VIAL: Performed by: SURGERY

## 2025-06-30 PROCEDURE — 25010000002 CLINDAMYCIN 900 MG/50ML SOLUTION: Performed by: SURGERY

## 2025-06-30 PROCEDURE — 49593 RPR AA HRN 1ST 3-10 RDC: CPT | Performed by: SURGERY

## 2025-06-30 PROCEDURE — S2900 ROBOTIC SURGICAL SYSTEM: HCPCS | Performed by: SURGERY

## 2025-06-30 PROCEDURE — 25010000002 HYDROMORPHONE 1 MG/ML SOLUTION

## 2025-06-30 PROCEDURE — 25010000002 LIDOCAINE PF 2% 2 % SOLUTION

## 2025-06-30 PROCEDURE — 25010000002 PROPOFOL 10 MG/ML EMULSION

## 2025-06-30 PROCEDURE — 93005 ELECTROCARDIOGRAM TRACING: CPT | Performed by: ANESTHESIOLOGY

## 2025-06-30 PROCEDURE — C1781 MESH (IMPLANTABLE): HCPCS | Performed by: SURGERY

## 2025-06-30 DEVICE — ABSORBABLE WOUND CLOSURE DEVICE
Type: IMPLANTABLE DEVICE | Site: ABDOMEN | Status: FUNCTIONAL
Brand: V-LOC 180

## 2025-06-30 DEVICE — VENTRALIGHT ST MESH, 4.5" (11.4 CM), CIRCLE
Type: IMPLANTABLE DEVICE | Site: ABDOMEN | Status: FUNCTIONAL
Brand: VENTRALIGHT

## 2025-06-30 RX ORDER — ONDANSETRON 2 MG/ML
4 INJECTION INTRAMUSCULAR; INTRAVENOUS ONCE AS NEEDED
Status: DISCONTINUED | OUTPATIENT
Start: 2025-06-30 | End: 2025-06-30 | Stop reason: HOSPADM

## 2025-06-30 RX ORDER — PROMETHAZINE HYDROCHLORIDE 25 MG/1
25 SUPPOSITORY RECTAL ONCE AS NEEDED
Status: DISCONTINUED | OUTPATIENT
Start: 2025-06-30 | End: 2025-06-30 | Stop reason: HOSPADM

## 2025-06-30 RX ORDER — ONDANSETRON 2 MG/ML
INJECTION INTRAMUSCULAR; INTRAVENOUS AS NEEDED
Status: DISCONTINUED | OUTPATIENT
Start: 2025-06-30 | End: 2025-06-30 | Stop reason: SURG

## 2025-06-30 RX ORDER — SODIUM CHLORIDE 9 MG/ML
40 INJECTION, SOLUTION INTRAVENOUS AS NEEDED
Status: DISCONTINUED | OUTPATIENT
Start: 2025-06-30 | End: 2025-06-30 | Stop reason: HOSPADM

## 2025-06-30 RX ORDER — HYDROCODONE BITARTRATE AND ACETAMINOPHEN 5; 325 MG/1; MG/1
1-2 TABLET ORAL EVERY 4 HOURS PRN
Qty: 20 TABLET | Refills: 0 | Status: SHIPPED | OUTPATIENT
Start: 2025-06-30

## 2025-06-30 RX ORDER — DEXAMETHASONE SODIUM PHOSPHATE 4 MG/ML
INJECTION, SOLUTION INTRA-ARTICULAR; INTRALESIONAL; INTRAMUSCULAR; INTRAVENOUS; SOFT TISSUE AS NEEDED
Status: DISCONTINUED | OUTPATIENT
Start: 2025-06-30 | End: 2025-06-30 | Stop reason: SURG

## 2025-06-30 RX ORDER — ACETAMINOPHEN 500 MG
1000 TABLET ORAL ONCE
Status: COMPLETED | OUTPATIENT
Start: 2025-06-30 | End: 2025-06-30

## 2025-06-30 RX ORDER — LIDOCAINE HYDROCHLORIDE 20 MG/ML
INJECTION, SOLUTION EPIDURAL; INFILTRATION; INTRACAUDAL; PERINEURAL AS NEEDED
Status: DISCONTINUED | OUTPATIENT
Start: 2025-06-30 | End: 2025-06-30 | Stop reason: SURG

## 2025-06-30 RX ORDER — PROMETHAZINE HYDROCHLORIDE 25 MG/1
25 TABLET ORAL ONCE AS NEEDED
Status: DISCONTINUED | OUTPATIENT
Start: 2025-06-30 | End: 2025-06-30 | Stop reason: HOSPADM

## 2025-06-30 RX ORDER — SODIUM CHLORIDE 0.9 % (FLUSH) 0.9 %
10 SYRINGE (ML) INJECTION EVERY 12 HOURS SCHEDULED
Status: DISCONTINUED | OUTPATIENT
Start: 2025-06-30 | End: 2025-06-30 | Stop reason: HOSPADM

## 2025-06-30 RX ORDER — HYDROCODONE BITARTRATE AND ACETAMINOPHEN 5; 325 MG/1; MG/1
1 TABLET ORAL ONCE AS NEEDED
Status: DISCONTINUED | OUTPATIENT
Start: 2025-06-30 | End: 2025-06-30 | Stop reason: HOSPADM

## 2025-06-30 RX ORDER — SODIUM CHLORIDE 0.9 % (FLUSH) 0.9 %
10 SYRINGE (ML) INJECTION AS NEEDED
Status: DISCONTINUED | OUTPATIENT
Start: 2025-06-30 | End: 2025-06-30 | Stop reason: HOSPADM

## 2025-06-30 RX ORDER — PROPOFOL 10 MG/ML
VIAL (ML) INTRAVENOUS AS NEEDED
Status: DISCONTINUED | OUTPATIENT
Start: 2025-06-30 | End: 2025-06-30 | Stop reason: SURG

## 2025-06-30 RX ORDER — DOCUSATE SODIUM 100 MG/1
100 CAPSULE, LIQUID FILLED ORAL 2 TIMES DAILY PRN
Qty: 10 CAPSULE | Refills: 1 | Status: SHIPPED | OUTPATIENT
Start: 2025-06-30 | End: 2026-06-30

## 2025-06-30 RX ORDER — MIDAZOLAM HYDROCHLORIDE 2 MG/2ML
2 INJECTION, SOLUTION INTRAMUSCULAR; INTRAVENOUS ONCE
Status: COMPLETED | OUTPATIENT
Start: 2025-06-30 | End: 2025-06-30

## 2025-06-30 RX ORDER — SODIUM CHLORIDE, SODIUM LACTATE, POTASSIUM CHLORIDE, CALCIUM CHLORIDE 600; 310; 30; 20 MG/100ML; MG/100ML; MG/100ML; MG/100ML
9 INJECTION, SOLUTION INTRAVENOUS CONTINUOUS PRN
Status: DISCONTINUED | OUTPATIENT
Start: 2025-06-30 | End: 2025-06-30 | Stop reason: HOSPADM

## 2025-06-30 RX ORDER — FENTANYL CITRATE 50 UG/ML
INJECTION, SOLUTION INTRAMUSCULAR; INTRAVENOUS AS NEEDED
Status: DISCONTINUED | OUTPATIENT
Start: 2025-06-30 | End: 2025-06-30 | Stop reason: SURG

## 2025-06-30 RX ORDER — TRAMADOL HYDROCHLORIDE 50 MG/1
50 TABLET ORAL ONCE
Status: COMPLETED | OUTPATIENT
Start: 2025-06-30 | End: 2025-06-30

## 2025-06-30 RX ORDER — OXYCODONE HYDROCHLORIDE 5 MG/1
5 TABLET ORAL
Status: DISCONTINUED | OUTPATIENT
Start: 2025-06-30 | End: 2025-06-30 | Stop reason: HOSPADM

## 2025-06-30 RX ORDER — MAGNESIUM HYDROXIDE 1200 MG/15ML
LIQUID ORAL AS NEEDED
Status: DISCONTINUED | OUTPATIENT
Start: 2025-06-30 | End: 2025-06-30 | Stop reason: HOSPADM

## 2025-06-30 RX ORDER — ONDANSETRON 2 MG/ML
4 INJECTION INTRAMUSCULAR; INTRAVENOUS EVERY 6 HOURS PRN
Status: DISCONTINUED | OUTPATIENT
Start: 2025-06-30 | End: 2025-06-30 | Stop reason: HOSPADM

## 2025-06-30 RX ORDER — ROCURONIUM BROMIDE 10 MG/ML
INJECTION, SOLUTION INTRAVENOUS AS NEEDED
Status: DISCONTINUED | OUTPATIENT
Start: 2025-06-30 | End: 2025-06-30 | Stop reason: SURG

## 2025-06-30 RX ORDER — CLINDAMYCIN PHOSPHATE 900 MG/50ML
900 INJECTION, SOLUTION INTRAVENOUS ONCE
Status: COMPLETED | OUTPATIENT
Start: 2025-06-30 | End: 2025-06-30

## 2025-06-30 RX ADMIN — SODIUM CHLORIDE, POTASSIUM CHLORIDE, SODIUM LACTATE AND CALCIUM CHLORIDE 9 ML/HR: 600; 310; 30; 20 INJECTION, SOLUTION INTRAVENOUS at 15:42

## 2025-06-30 RX ADMIN — CLINDAMYCIN PHOSPHATE 900 MG: 900 INJECTION, SOLUTION INTRAVENOUS at 17:24

## 2025-06-30 RX ADMIN — TRAMADOL HYDROCHLORIDE 50 MG: 50 TABLET, COATED ORAL at 15:42

## 2025-06-30 RX ADMIN — PROPOFOL 160 MG: 10 INJECTION, EMULSION INTRAVENOUS at 17:18

## 2025-06-30 RX ADMIN — ROCURONIUM BROMIDE 50 MG: 10 INJECTION, SOLUTION INTRAVENOUS at 17:18

## 2025-06-30 RX ADMIN — SUGAMMADEX 200 MG: 100 INJECTION, SOLUTION INTRAVENOUS at 18:32

## 2025-06-30 RX ADMIN — DEXAMETHASONE SODIUM PHOSPHATE 4 MG: 4 INJECTION, SOLUTION INTRAMUSCULAR; INTRAVENOUS at 17:25

## 2025-06-30 RX ADMIN — FENTANYL CITRATE 50 MCG: 50 INJECTION, SOLUTION INTRAMUSCULAR; INTRAVENOUS at 17:33

## 2025-06-30 RX ADMIN — MIDAZOLAM HYDROCHLORIDE 2 MG: 1 INJECTION, SOLUTION INTRAMUSCULAR; INTRAVENOUS at 16:36

## 2025-06-30 RX ADMIN — ONDANSETRON 4 MG: 2 INJECTION INTRAMUSCULAR; INTRAVENOUS at 17:25

## 2025-06-30 RX ADMIN — HYDROMORPHONE HYDROCHLORIDE 0.5 MG: 1 INJECTION, SOLUTION INTRAMUSCULAR; INTRAVENOUS; SUBCUTANEOUS at 18:22

## 2025-06-30 RX ADMIN — LIDOCAINE HYDROCHLORIDE 100 MG: 20 INJECTION, SOLUTION INTRAVENOUS at 17:18

## 2025-06-30 RX ADMIN — FENTANYL CITRATE 50 MCG: 50 INJECTION, SOLUTION INTRAMUSCULAR; INTRAVENOUS at 17:18

## 2025-06-30 RX ADMIN — ACETAMINOPHEN 1000 MG: 500 TABLET ORAL at 15:42

## 2025-06-30 RX ADMIN — SODIUM CHLORIDE, POTASSIUM CHLORIDE, SODIUM LACTATE AND CALCIUM CHLORIDE: 600; 310; 30; 20 INJECTION, SOLUTION INTRAVENOUS at 18:23

## 2025-06-30 RX ADMIN — OXYCODONE HYDROCHLORIDE 5 MG: 5 TABLET ORAL at 19:06

## 2025-06-30 NOTE — TELEPHONE ENCOUNTER
Pt answered.   I let the Pt know their adrenal testing was negative. Pt verbalized understanding.

## 2025-06-30 NOTE — DISCHARGE INSTRUCTIONS
DISCHARGE INSTRUCTIONS  HERNIA REPAIR      For your surgery you had:  General anesthesia (you may have a sore throat for the first 24 hours)  Local anesthesia    You may experience dizziness, drowsiness, or light-headedness for several hours following surgery/procedure.  Do not stay alone today or tonight.  Limit your activity for 24 hours.  Resume your diet slowly.  Follow whatever special dietary instructions you may have been given by your doctor.  You should not drive, operate machinery, drink alcohol, or sign legally binding documents for 24 hours or while you are taking pain medication.  NOTIFY YOUR DOCTOR IF YOU EXPERIENCE ANY OF THE FOLLOWING:  Temperature greater than 101 degrees Fahrenheit  Shaking Chills  Redness or excessive drainage from incision  Nausea, vomiting and/or pain that is not controlled by prescribed medications  Increase in bleeding or bleeding that is excessive  Unable to urinate in 6 hours after surgery  If unable to reach your doctor, please go to the closest Emergency Room   [x] Skin adhesive flakes off in 10-14 days.   [x] You may shower tomorrow, no submerging of incisions for 2 weeks.      Do not do any heavy lifting, pushing or pulling.  You may walk up and down stairs.  You may ride in a car but do not drive until instructed by your physician.  Avoid constipation.  Apply an ice pack for 24-48 hours  If unable to urinate in 6 to 8 hours after surgery or urinating frequently in small amounts, notify your doctor or go to the nearest Emergency Room.  Medications per physician instructions as indicated on Discharge Medication Information Sheet.    Last dose of pain medication was given at:   Tylenol 1000 mg given at 3:42. Do not exceed 4000 mg in a 24 hour period.  Oxycodone given at 7:06. May have norco at 11:06 pm.      SPECIAL INSTRUCTIONS:  Follow any verbal instructions from Dr. Howell.

## 2025-06-30 NOTE — ANESTHESIA PREPROCEDURE EVALUATION
Anesthesia Evaluation     Patient summary reviewed and Nursing notes reviewed   no history of anesthetic complications:   NPO Solid Status: > 8 hours  NPO Liquid Status: > 2 hours           Airway   Mallampati: II  TM distance: >3 FB  Neck ROM: full  Dental - normal exam     Pulmonary - normal exam   (+) ,sleep apnea  Cardiovascular - normal exam  Exercise tolerance: good (4-7 METS)    ECG reviewed    (+) hypertension    ROS comment: Complete Transthoracic Echocardiogram with Complete Doppler, Color Flow and Contrast    Patient Name: Xuan Horvath  Patient MRN: [id number]  Patient : 1970 (54 y.o.)  Legal Sex: Female     Accession Number: [id number]  Date of Study: 24  Ordering Provider: Ron Fernandez MD   Clinical Indications: Dyspnea     Reading Physicians  Performing Staff  Cardiology: Pete Brenner MD      Tech: Adelfo Levy      Patient Hx Of Height, Weight, and Vitals    Height Weight BSA (Calculated - sq m) BMI (Calculated) Retired BMI (kg/m2) Pulse BP       85 163/101     [facility] PACS Images     Show images for Adult Transthoracic Echo Complete W/ Cont if Necessary Per Protocol  Clinical Indication    Dyspnea    Interpretation Summary       ·  Left ventricular systolic function is normal. Calculated left ventricular EF = 58.8%  ·  Left ventricular wall thickness is consistent with borderline concentric hypertrophy.  ·  Left ventricular diastolic function is consistent with (grade I) impaired relaxation.  ·  The left atrial cavity is mildly dilated.      Neuro/Psych  (+) numbness, psychiatric history Anxiety and Depression  GI/Hepatic/Renal/Endo    (+) hiatal hernia, GERD, renal disease- stones, thyroid problem hypothyroidism    Musculoskeletal (-) negative ROS    Abdominal  - normal exam   Substance History - negative use     OB/GYN negative ob/gyn ROS         Other - negative ROS       ROS/Med Hx Other: PAT Nursing Notes unavailable.               Anesthesia Plan    ASA 2     general      (50mg of tramadol pre op)  intravenous induction     Anesthetic plan, risks, benefits, and alternatives have been provided, discussed and informed consent has been obtained with: patient.    Plan discussed with CRNA.    CODE STATUS:

## 2025-06-30 NOTE — TELEPHONE ENCOUNTER
----- Message from Ottoniel RUIZ sent at 6/30/2025  8:24 AM EDT -----  Regarding: FW: fu    ----- Message -----  From: Salazar Rios MD  Sent: 6/30/2025   6:59 AM EDT  To: Ottoniel Omalley RN  Subject: fu                                               You can let her know her adrenal testing was negative    ----- Message -----  From: Lab, Background User  Sent: 6/29/2025   5:06 PM EDT  To: Salazar Rios MD

## 2025-06-30 NOTE — OP NOTE
Preoperative diagnosis: Periumbilical hernia    Postoperative diagnosis: Periumbilical and low midline ventral hernia    Findings: 2 cm periumbilical hernia; 3 cm low midline ventral incisional hernia    Procedure: Robotic periumbilical low ventral hernia repair    Surgeon: Dante    Anesthesia: General    Assistant: Lakeisha Quevedo    EBL: 11 mL    Specimens: None    Complications: None    Indications: 55-year-old female with symptomatic periumbilical hernia.  Presents today for elective repair.    PROCEDURE    Patient was seen in the preoperative holding area.  Risks, benefits, alternatives of the operation were again discussed in detail.  All of the patient and family's questions were answered.  They voiced understanding, and agreed to proceed.    Patient was brought to the operating room.  Monitoring devices and Tanvir stockings were placed.  Anesthesia was administered.  Patient was prepped and draped in standard surgical fashion.  After prepping and draping a timeout was performed to verify both the correct patient and correct procedure.    We began by injecting local anesthesia in the left upper quadrant.  An incision to accommodate a 12 mm trocar was made in the left upper quadrant, and the Veress needle was inserted into the abdomen.  Intra-abdominal placement was verified with a normal saline drop test.  After verification, the abdomen was insufflated to 15 mmHg pressure.  Once the abdomen was insufflated, we removed the Veress needle.  Then using the Visiport technique the abdomen was entered in the left upper quadrant.  Once the abdomen was entered, we reinserted the laparoscope and looked underneath our Veress needle and Visiport entry sites, making sure there was no underlying injury.  We then went about placing our subsequent trocars.  After injection of local anesthesia and under direct visualization, an 8 mm trocar was placed in the left upper, left mid, and left lower abdomen.    We then brought in  and docked the robot.  We inspected the abdomen.  We noted the clear findings of the right sided periumbilical hernia.  We did also note an unexpected finding of a low midline ventral incisional hernia.  Once the robot was in appropriate position, we began by reducing any contents that were incarcerated in both the periumbilical and low ventral hernias.  We then dissected into the preperitoneal space, making sure we reduced all contents from the hernias.  Once everything was completely reduced from the hernias and we had a complete and adequate exposure, we closed the hernia defects primarily with running 0 V-Loc suture.  Once the defects were closed we then brought in a piece of Ventralight ST mesh for each site.  Each mesh was 11 centimeters round.  A chandelier stitch had been placed in the mesh, the chandelier stitch was grasped with a Tre-Lauro device through the center of each defect and pulled up flush with the anterior abdominal wall.  The mesh was then sewn into place circumferentially with multiple 3-0 V-Loc sutures.  We inspected each mesh to make sure it was sewn in 360 degrees with no gaps or folds.  Satisfied with the primary repair and coverage with the mesh, we then inspected the abdomen making sure everything appeared to be hemostatic and there were no obvious underlying injuries.  We then removed the robotic arms and undocked the robot.    We then removed the 12 mm trocar, and that site was closed with a Tre-Lauro device and 0 Vicryl suture under direct visualization.  The remaining trocars were then removed under direct visualization, and the abdomen was desufflated.  Skin incisions were closed with subcuticular 4-0 Vicryl stitch and dressed with skin glue.    The patient was then aroused from anesthesia, taken off the OR table, and taken to PACU in stable condition.  Sponge, needle, and instrument counts were correct x2.  Lakeisha Quevedo was present for the entire procedure and helped in  all portions of the case.    Assistant: Lakeisha Quevedo CSA was responsible for performing the following activities: Suturing, Closing, Placing Dressing, and Held/Positioned Camera and their skilled assistance was necessary for the success of this case.        The operative note was dictated with the help of the dragon dictation system.

## 2025-07-01 ENCOUNTER — HOSPITAL ENCOUNTER (OUTPATIENT)
Dept: MRI IMAGING | Facility: HOSPITAL | Age: 55
Discharge: HOME | End: 2025-07-01
Admitting: INTERNAL MEDICINE
Payer: OTHER GOVERNMENT

## 2025-07-01 DIAGNOSIS — R93.89 ABNORMAL MRI: ICD-10-CM

## 2025-07-01 PROCEDURE — A9573 GADOPICLENOL 0.5 MMOL/ML SOLUTION: HCPCS | Performed by: INTERNAL MEDICINE

## 2025-07-01 PROCEDURE — 72157 MRI CHEST SPINE W/O & W/DYE: CPT

## 2025-07-01 PROCEDURE — 25510000001 GADOPICLENOL 0.5 MMOL/ML SOLUTION: Performed by: INTERNAL MEDICINE

## 2025-07-01 RX ADMIN — GADOPICLENOL 7 ML: 485.1 INJECTION INTRAVENOUS at 18:32

## 2025-07-01 NOTE — ANESTHESIA POSTPROCEDURE EVALUATION
Patient: Xuan Horvath    Procedure Summary       Date: 06/30/25 Room / Location: [facility] OR  / [facility] MAIN OR    Anesthesia Start: 1714 Anesthesia Stop: 1841    Procedure: UMBILICAL HERNIA REPAIR LAPAROSCOPIC WITH DAVINCI ROBOT Diagnosis:       Umbilical hernia without obstruction and without gangrene      (Umbilical hernia without obstruction and without gangrene [K42.9])    Surgeons: Ron Howell MD Provider: Sandip Friend MD    Anesthesia Type: general ASA Status: 2            Anesthesia Type: general    Vitals  Vitals Value Taken Time   /72 06/30/25 19:40   Temp 36.2 °C (97.2 °F) 06/30/25 19:40   Pulse 66 06/30/25 19:43   Resp 12 06/30/25 19:40   SpO2 89 % 06/30/25 19:43   Vitals shown include unfiled device data.        Post Anesthesia Care and Evaluation    Patient location during evaluation: bedside  Patient participation: complete - patient participated  Level of consciousness: awake  Pain management: adequate    Airway patency: patent  PONV Status: none  Cardiovascular status: acceptable and stable  Respiratory status: acceptable  Hydration status: acceptable

## 2025-07-03 LAB
METANEPH FREE SERPL-MCNC: NORMAL PG/ML
NORMETANEPHRINE SERPL-MCNC: NORMAL PG/ML
QT INTERVAL: 432 MS
QTC INTERVAL: 431 MS

## 2025-07-04 ENCOUNTER — RESULTS FOLLOW-UP (OUTPATIENT)
Dept: INTERNAL MEDICINE | Facility: CLINIC | Age: 55
End: 2025-07-04
Payer: OTHER GOVERNMENT

## 2025-07-04 DIAGNOSIS — R93.89 ABNORMAL MRI: Primary | ICD-10-CM

## 2025-07-15 ENCOUNTER — OFFICE VISIT (OUTPATIENT)
Dept: SURGERY | Facility: CLINIC | Age: 55
End: 2025-07-15
Payer: OTHER GOVERNMENT

## 2025-07-15 VITALS — BODY MASS INDEX: 29.59 KG/M2 | WEIGHT: 167 LBS | RESPIRATION RATE: 16 BRPM | HEIGHT: 63 IN

## 2025-07-15 DIAGNOSIS — K42.9 UMBILICAL HERNIA WITHOUT OBSTRUCTION AND WITHOUT GANGRENE: Primary | ICD-10-CM

## 2025-07-15 DIAGNOSIS — R93.89 ABNORMAL MRI: Primary | ICD-10-CM

## 2025-07-15 PROCEDURE — 99024 POSTOP FOLLOW-UP VISIT: CPT | Performed by: SURGERY

## 2025-07-17 DIAGNOSIS — R93.89 ABNORMAL MRI: Primary | ICD-10-CM

## 2025-07-17 NOTE — PROGRESS NOTES
Chief Complaint: Post-op Hernia (Periumbilical/Ventral )    Subjective       History of Present Illness    Xuan Horvath is a 55 y.o. female presents to [facility] GENERAL SURGERY   History of Present Illness  The patient presents for a follow-up status post robotic incisional and umbilical hernia repair.    She reports mild soreness but no severe discomfort. Pain was managed with medication for the initial few days post-surgery. Appetite and hydration levels are normal, and there are no issues with urination or bowel movements. Sleeping on her side is possible, although it takes some time to find a comfortable position. She has not interfered with the surgical glue, allowing it to peel off naturally. No bulging or popping sensations are reported. Typically, she engages in running activities rather than weightlifting.    PAST SURGICAL HISTORY:  Robotic incisional and umbilical hernia repair on 06/30/2025.    SOCIAL HISTORY  Exercise: Runs regularly  Objective     Past Medical History:   Diagnosis Date    Allergic     Anxiety     Carpal tunnel syndrome on left 11/11/2024    Colon cancer screening     Colon polyp     Depression 04/07/2015    working well, continue citalopram discussed that she can stay on meds long term or go off them whenever she is ready    Essential hypertension 04/06/2016    stable will try to decrease atenolol and see if that will give her more energy, need to watch bp closely    GERD (gastroesophageal reflux disease) January 2023    Prilosec. Protonix.    Glaucoma 2020    Heart murmur     DENIES CP/SOA. FOLLOWED BY PCP. ACTIVE. STATES TOLD SHE DID NOT HAVE MURMUR    Hiatal hernia     REPORTS HAD REPAIR    Hypertension     Hypothyroidism     Kidney stone     Obesity 04/06/2016    she is going to work on weight by trying to go to the gym she is working on this with her son    Sleep apnea     COMPLIANT WITH CPAP     UTI (urinary tract infection)     NO ISSUES CURRENTLY     Vitamin D deficiency 2015    will repeat labs continue current OTC meds for now       Past Surgical History:   Procedure Laterality Date    ABDOMINAL SURGERY  20    BARIATRIC SURGERY      Gastric sleeve    BREAST AUGMENTATION Bilateral 2023    breast implants    BREAST SURGERY      Breast lift/implants    CARPAL TUNNEL RELEASE Right     CARPAL TUNNEL RELEASE Left 2025    Procedure: CARPAL TUNNEL RELEASE;  Surgeon: Cisco Isaacs MD;  [place] [facility] [place] [facility] [place]  Service: Orthopedics;  Laterality: Left;     SECTION  ,,2003    x3    COLONOSCOPY N/A 2022    Procedure: COLONOSCOPY WITH POLYPECTOMY COLD SNARE;  Surgeon: Luis Sterling MD;  Location: [facility] ENDOSCOPY;  Service: Gastroenterology;  Laterality: N/A;  COLON POLYP    COLONOSCOPY      ENDOSCOPY N/A 2020    Procedure: ESOPHAGOGASTRODUODENOSCOPY WITH BIOPSY;  Surgeon: Ron Cadet Jr., MD;  Location: [facility] ENDOSCOPY;  Service: General;  Laterality: N/A;  PRE- DYSPEPSIA  POST- GASTRITIS, GASTRIC POLYPS, ESOPHAGITIS, HIATAL HERNIA      ENDOSCOPY N/A 2023    Procedure: ESOPHAGOGASTRODUODENOSCOPY WITH BIOPSIES;  Surgeon: Luis Sterling MD;  Location: [facility] ENDOSCOPY;  Service: Gastroenterology;  Laterality: N/A;  PRIOR GASTRIC SLEEVE    GASTRIC SLEEVE LAPAROSCOPIC N/A 2020    Procedure: GASTRIC SLEEVE LAPAROSCOPIC With Hiatal Hernia Repair;  Surgeon: Ron Cadet Jr., MD;  [place] [facility] [place] [facility] [place]  Service: Bariatric;  Laterality: N/A;    HAND SURGERY      Carpal tunnel surgery    HERNIA REPAIR  20    Hiatal hernia    KIDNEY STONE SURGERY  2024    PANNICULECTOMY      SUBTOTAL HYSTERECTOMY      UMBILICAL HERNIA REPAIR N/A 2025    Procedure: UMBILICAL HERNIA REPAIR LAPAROSCOPIC WITH DAVINCI ROBOT;  Surgeon: Ron Howell MD;  Location: [facility] MAIN OR;  Service: Robotics - DaVinci;  Laterality: N/A;    UPPER GASTROINTESTINAL ENDOSCOPY       URETEROSCOPY LASER LITHOTRIPSY WITH STENT INSERTION Left 05/22/2024    Procedure: Cystoscopy with left ureteroscopy with laser and left renal stent exchange;  Surgeon: Salazar Rios MD;  Location: [facility] MA[facility] OR;  Service: Urology;  Laterality: Left;    URETEROSCOPY STENT INSERTION Left 04/27/2024    Procedure: Cystoscopy with left ureteral stent placement;  Surgeon: Salazar Rios MD;  Location: [facility] MAIN OR;  Service: Urology;  Laterality: Left;         Current Outpatient Medications:     amLODIPine (NORVASC) 10 MG tablet, Take 1 tablet by mouth Daily., Disp: 90 tablet, Rfl: 1    Calcium Carb-Cholecalciferol (CALCIUM 600/VITAMIN D PO), Take 2 tablets by mouth Daily., Disp: , Rfl:     cetirizine (zyrTEC) 10 MG tablet, Take 1 tablet by mouth Daily., Disp: 90 tablet, Rfl: 3    citalopram (CeleXA) 40 MG tablet, Take 1 tablet by mouth Daily., Disp: 90 tablet, Rfl: 1    esomeprazole (nexIUM) 40 MG capsule, Take 1 capsule by mouth Every Night., Disp: 90 capsule, Rfl: 1    lisinopril-hydrochlorothiazide (PRINZIDE,ZESTORETIC) 20-12.5 MG per tablet, Take 1 tablet by mouth Daily., Disp: 90 tablet, Rfl: 1    montelukast (Singulair) 10 MG tablet, Take 1 tablet by mouth Every Night., Disp: 90 tablet, Rfl: 1    multivitamin with minerals (MULTIVITAMIN WOMENS 50+ ADV PO), Take 1 tablet by mouth Daily., Disp: , Rfl:     Semaglutide-Weight Management (Wegovy) 2.4 MG/0.75ML solution auto-injector, Inject 0.75 mL under the skin into the appropriate area as directed 1 (One) Time Per Week., Disp: 6 mL, Rfl: 1    Synthroid 150 MCG tablet, Take 1 tablet by mouth Every Morning., Disp: 90 tablet, Rfl: 1    docusate sodium (Colace) 100 MG capsule, Take 1 capsule by mouth 2 (Two) Times a Day As Needed for Constipation., Disp: 10 capsule, Rfl: 1    docusate sodium (Colace) 100 MG capsule, Take 1 capsule by mouth 2 (Two) Times a Day As Needed for Constipation., Disp: 10 capsule, Rfl: 1    HYDROcodone-acetaminophen (NORCO) 5-325 MG  "per tablet, Take 1-2 tablets by mouth Every 4 (Four) Hours As Needed for Moderate Pain (Pain)., Disp: 20 tablet, Rfl: 0    HYDROcodone-acetaminophen (NORCO) 5-325 MG per tablet, Take 1-2 tablets by mouth Every 4 (Four) Hours As Needed for Moderate Pain., Disp: 20 tablet, Rfl: 0    Allergies   Allergen Reactions    Penicillins Anaphylaxis     Beta lactam allergy details  Antibiotic reaction: (!) hives, other, shortness of breath (swelling and itching)  Age at reaction: adult  Dose to reaction time: unknown  Reason for antibiotic: unknown  Epinephrine required for reaction?: (!) yes  Tolerated antibiotics: unknown        Contrave [Naltrexone-Bupropion Hcl Er] Other (See Comments)     Severe constipation and insomnia        Family History   Problem Relation Age of Onset    Diabetes Mother     Hypertension Mother     Hypertension Father     Learning disabilities Son     Learning disabilities Son     Malig Hyperthermia Neg Hx     Colon cancer Neg Hx        Social History     Socioeconomic History    Marital status:    Tobacco Use    Smoking status: Never     Passive exposure: Never    Smokeless tobacco: Never   Vaping Use    Vaping status: Never Used   Substance and Sexual Activity    Alcohol use: Never    Drug use: Never    Sexual activity: Yes     Partners: Male     Birth control/protection: Hysterectomy     Comment: Hysterectomy 2004       Vital Signs:   Resp 16   Ht 160 cm (63\")   Wt 75.8 kg (167 lb)   BMI 29.58 kg/m²      Physical Exam   Physical Exam  General: Patient is in no acute distress.  Respiratory: Breathing is nonlabored.  Gastrointestinal: Abdomen is soft. Incisions are healing well. No signs of infection. No signs of recurrence of the hernia.      Result Review :         Procedures   Results             Assessment and Plan   Diagnoses and all orders for this visit:    1. Umbilical hernia without obstruction and without gangrene (Primary)          Assessment & Plan  1. Status post robotic " incisional and umbilical hernia repair.  Healing is progressing well, with incisions showing no signs of infection or hernia recurrence. It is normal to experience firmness and pulling sensations due to the integration of new scar tissue with the abdominal wall. Fluid accumulation behind the mesh will be reabsorbed over time, leading to a softer and more normal feel. She can resume bathing, swimming, and other water activities. Lifting restrictions are in place until 07/28/2025, after which she can gradually return to normal activities, avoiding heavy lifting and strenuous activities until then. Driving is permissible as long as she is not taking heavy-duty pain medication and feels comfortable. All questions were answered, and she voiced understanding and agreed to proceed with the above plan.    Follow-up  Follow up as needed.       Follow Up   No follow-ups on file.  Patient was given instructions and counseling regarding her condition or for health maintenance advice. Please see specific information pulled into the AVS if appropriate.     Discussed with the patient - all questions were answered they voiced understanding and agreed to proceed with above plan    Patient or patient representative verbalized consent for the use of Ambient Listening during the visit with  Ron Howell MD for chart documentation. 7/17/2025  13:24 EDT    Ron Howell MD

## 2025-07-22 ENCOUNTER — PRIOR AUTHORIZATION (OUTPATIENT)
Dept: INTERNAL MEDICINE | Facility: CLINIC | Age: 55
End: 2025-07-22
Payer: OTHER GOVERNMENT

## 2025-07-22 NOTE — TELEPHONE ENCOUNTER
Wegovy 2.4MG/0.75ML auto-injectors    Status:Approved;Review Type:Prior Auth;Coverage Start Date:06/21/2025;Coverage End Date:07/22/2026

## 2025-07-24 ENCOUNTER — PATIENT MESSAGE (OUTPATIENT)
Dept: INTERNAL MEDICINE | Facility: CLINIC | Age: 55
End: 2025-07-24
Payer: OTHER GOVERNMENT

## 2025-08-04 RX ORDER — CITALOPRAM HYDROBROMIDE 40 MG/1
40 TABLET ORAL DAILY
Qty: 90 TABLET | Refills: 1 | Status: SHIPPED | OUTPATIENT
Start: 2025-08-04

## 2025-08-12 RX ORDER — LEVOTHYROXINE SODIUM 150 MCG
150 TABLET ORAL EVERY MORNING
Qty: 90 TABLET | Refills: 1 | Status: SHIPPED | OUTPATIENT
Start: 2025-08-12

## 2025-08-23 ENCOUNTER — PATIENT MESSAGE (OUTPATIENT)
Dept: INTERNAL MEDICINE | Facility: CLINIC | Age: 55
End: 2025-08-23
Payer: OTHER GOVERNMENT

## (undated) DEVICE — ARM DRAPE

## (undated) DEVICE — SOL IRRG H2O PL/BG 1000ML STRL

## (undated) DEVICE — BLD OPTH BEAVER/MINIBLADE STR 180DEG DBL/BVL SS

## (undated) DEVICE — KT ORCA ORCAPOD DISP STRL

## (undated) DEVICE — Device

## (undated) DEVICE — GAUZE,SPONGE,4"X4",16PLY,STRL,LF,10/TRAY: Brand: MEDLINE

## (undated) DEVICE — GLOVE,SURG,SENSICARE SLT,LF,PF,7.5: Brand: MEDLINE

## (undated) DEVICE — SOL IRR NACL 0.9PCT BO 1000ML

## (undated) DEVICE — CLMP STD 22CM DISP

## (undated) DEVICE — THE SINGLE USE ETRAP – POLYP TRAP IS USED FOR SUCTION RETRIEVAL OF ENDOSCOPICALLY REMOVED POLYPS.: Brand: ETRAP

## (undated) DEVICE — SYRINGE,TOOMEY,IRRIGATION,70CC,STERILE: Brand: MEDLINE

## (undated) DEVICE — GLOVE,SURG,SENSICARE SLT,LF,PF,8: Brand: MEDLINE

## (undated) DEVICE — ENDOSCOPIC VALVE WITH ADAPTER.: Brand: SURSEAL® II

## (undated) DEVICE — ECHELON FLEX POWERED PLUS LONG ARTICULATING ENDOSCOPIC LINEAR CUTTER, 60MM: Brand: ECHELON FLEX

## (undated) DEVICE — CONN JET HYDRA H20 AUXILIARY DISP

## (undated) DEVICE — DUAL LUMEN STOMACH TUBE,ANTI-REFLUX VALVE: Brand: SALEM SUMP

## (undated) DEVICE — DRESSING,GAUZE,XEROFORM,CURAD,1"X8",ST: Brand: CURAD

## (undated) DEVICE — SKIN PREP TRAY W/CHG: Brand: MEDLINE INDUSTRIES, INC.

## (undated) DEVICE — BASIC SINGLE BASIN-LF: Brand: MEDLINE INDUSTRIES, INC.

## (undated) DEVICE — TIP COVER ACCESSORY

## (undated) DEVICE — 40585 XL ADVANCED TRENDELENBURG POSITIONING KIT: Brand: 40585 XL ADVANCED TRENDELENBURG POSITIONING KIT

## (undated) DEVICE — CYSTO PACK: Brand: MEDLINE INDUSTRIES, INC.

## (undated) DEVICE — BITEBLOCK OMNI BLOC

## (undated) DEVICE — ENSEAL LAPAROSCOPIC TISSUE SEALER G2 ARTICULATING CURVED JAW FOR USE WITH G2 GENERATOR 5MM DIAMETER 45CM SHAFT LENGTH: Brand: ENSEAL

## (undated) DEVICE — Y-TYPE TUR/BLADDER IRRIGATION SET, REGULATING CLAMP

## (undated) DEVICE — SYS IRR PUMP SGL ACTN VAC SYR 10CC

## (undated) DEVICE — GW PTFE FIX/CORE STFF STR .038 3X150CM

## (undated) DEVICE — PK OSC LAP SLV 40

## (undated) DEVICE — LINER SURG CANSTR SXN S/RIGD 1500CC

## (undated) DEVICE — GW ZIPWIRE STD/SHFT STR TPR/3CM .038IN 150CM

## (undated) DEVICE — Device: Brand: DEFENDO AIR/WATER/SUCTION AND BIOPSY VALVE

## (undated) DEVICE — CYSTO/BLADDER IRRIGATION SET, REGULATING CLAMP

## (undated) DEVICE — SYS CLOSE PORTII CARTR/THOMASN XL

## (undated) DEVICE — COLON KIT: Brand: MEDLINE INDUSTRIES, INC.

## (undated) DEVICE — LAPAROVUE VISIBILITY SYSTEM LAPAROSCOPIC SOLUTIONS: Brand: LAPAROVUE

## (undated) DEVICE — SENSR O2 OXIMAX FNGR A/ 18IN NONSTR

## (undated) DEVICE — STERILE POLYISOPRENE POWDER-FREE SURGICAL GLOVES WITH EMOLLIENT COATING: Brand: PROTEXIS

## (undated) DEVICE — VISUALIZATION SYSTEM: Brand: CLEARIFY

## (undated) DEVICE — MARKR SKIN W/RULR AND LBL

## (undated) DEVICE — GW PTFE/COAT STR/TP STFF/BDY .038 150CM STRL EA/5

## (undated) DEVICE — SUT ETHLN 4/0 FS2 18IN 662H

## (undated) DEVICE — INTENDED FOR TISSUE SEPARATION, AND OTHER PROCEDURES THAT REQUIRE A SHARP SURGICAL BLADE TO PUNCTURE OR CUT.: Brand: BARD-PARKER ® CARBON RIB-BACK BLADES

## (undated) DEVICE — ENDOPATH XCEL WITH OPTIVIEW TECHNOLOGY BLADELESS TROCARS WITH STABILITY SLEEVES: Brand: ENDOPATH XCEL OPTIVIEW

## (undated) DEVICE — GLOVE,SURG,SENSICARE SLT,LF,PF,6: Brand: MEDLINE

## (undated) DEVICE — GLV SURG SENSICARE PI MIC PF SZ8.5 LF STRL

## (undated) DEVICE — GLOVE,SURG,SENSICARE SLT,LF,PF,6.5: Brand: MEDLINE

## (undated) DEVICE — TOWEL,OR,DSP,ST,BLUE,STD,4/PK,20PK/CS: Brand: MEDLINE

## (undated) DEVICE — LAPAROSCOPIC SMOKE FILTRATION SYSTEM: Brand: PALL LAPAROSHIELD® PLUS LAPAROSCOPIC SMOKE FILTRATION SYSTEM

## (undated) DEVICE — EXTREMITY-LF: Brand: MEDLINE INDUSTRIES, INC.

## (undated) DEVICE — VIOLET BRAIDED (POLYGLACTIN 910), SYNTHETIC ABSORBABLE SUTURE: Brand: COATED VICRYL

## (undated) DEVICE — SOL IRR NACL 0.9PCT 3000ML

## (undated) DEVICE — THE STERILE LIGHT HANDLE COVER IS USED WITH STERIS SURGICAL LIGHTING AND VISUALIZATION SYSTEMS.

## (undated) DEVICE — ENDOPATH PNEUMONEEDLE INSUFFLATION NEEDLES WITH LUER LOCK CONNECTORS 120MM: Brand: ENDOPATH

## (undated) DEVICE — BNDG ESMARK 4IN 12FT LF STRL BLU

## (undated) DEVICE — MSK PROC CURAPLEX O2 2/ADAPT 7FT

## (undated) DEVICE — 3 RING SUTURE PASSER - 16 CM: Brand: 3 RING SUTURE PASSER - 16 CM

## (undated) DEVICE — BNDG ELAS ECON W/CLIP 4IN 5YD LF STRL

## (undated) DEVICE — SOLIDIFIER LIQLOC PLS 1500CC BT

## (undated) DEVICE — UNDERCAST PADDING: Brand: DEROYAL

## (undated) DEVICE — FIBR LASR HOLMIUM COMPAT 272MH DISP

## (undated) DEVICE — FRCP BX RADJAW4 NDL 2.8 240CM LG OG BX40

## (undated) DEVICE — SEAL

## (undated) DEVICE — LN SMPL CO2 SHTRM SD STREAM W/M LUER

## (undated) DEVICE — SYR LL TP 10ML STRL

## (undated) DEVICE — NITINOL STONE RETRIEVAL BASKET: Brand: ESCAPE

## (undated) DEVICE — GLV SURG SENSICARE PI ORTHO SZ8 LF STRL

## (undated) DEVICE — SOL IRRG H2O BG 3000ML STRL

## (undated) DEVICE — SUT VIC PLS CTD BR 0 TIE 18IN VIL

## (undated) DEVICE — CATH 2L URETRL HC 6F 50CM

## (undated) DEVICE — SINGLE-USE BIOPSY FORCEPS: Brand: RADIAL JAW 4

## (undated) DEVICE — ANTIBACTERIAL UNDYED BRAIDED (POLYGLACTIN 910), SYNTHETIC ABSORBABLE SUTURE: Brand: COATED VICRYL

## (undated) DEVICE — DAVINCI-LF: Brand: MEDLINE INDUSTRIES, INC.

## (undated) DEVICE — SLV SCD KN/LEN ADJ EXPRSS BLENDED MD 1P/U

## (undated) DEVICE — GLV SURG BIOGEL LTX PF 8 1/2

## (undated) DEVICE — BLCK/BITE BLOX WO/DENTL/RIM W/STRAP 54F

## (undated) DEVICE — ENDOPATH XCEL BLADELESS TROCARS WITH STABILITY SLEEVES: Brand: ENDOPATH XCEL

## (undated) DEVICE — ADAPT CLN BIOGUARD AIR/H2O DISP

## (undated) DEVICE — KTTNER ENDO BLNT DISSCT

## (undated) DEVICE — NDL HYPO ECLPS SFTY 22G 1IN

## (undated) DEVICE — SHEET,DRAPE,70X85,STERILE: Brand: MEDLINE

## (undated) DEVICE — APL DUPLOSPRAYER MIS 40CM

## (undated) DEVICE — TUBING, SUCTION, 1/4" X 10', STRAIGHT: Brand: MEDLINE

## (undated) DEVICE — URETERAL ACCESS SHEATH SET: Brand: NAVIGATOR HD

## (undated) DEVICE — SNAR E/S POLYP SNAREMASTER OVL/10MM 2.8X2300MM YEL

## (undated) DEVICE — APPL CHLORAPREP HI/LITE 26ML ORNG

## (undated) DEVICE — STERILE POLYISOPRENE POWDER-FREE SURGICAL GLOVES: Brand: PROTEXIS